# Patient Record
Sex: FEMALE | Race: WHITE | NOT HISPANIC OR LATINO | Employment: FULL TIME | URBAN - METROPOLITAN AREA
[De-identification: names, ages, dates, MRNs, and addresses within clinical notes are randomized per-mention and may not be internally consistent; named-entity substitution may affect disease eponyms.]

---

## 2017-04-24 ENCOUNTER — TELEPHONE (OUTPATIENT)
Dept: OBSTETRICS AND GYNECOLOGY | Facility: CLINIC | Age: 32
End: 2017-04-24

## 2017-04-24 NOTE — TELEPHONE ENCOUNTER
----- Message from Mary Benson sent at 4/24/2017  3:13 PM EDT -----  Has appt with dr maloney, but has been having ovary pain.  Should she have U/S before visit?

## 2017-04-24 NOTE — TELEPHONE ENCOUNTER
Pain on both sides. Rates 8/10. Has appt with Dr. Mendes on 05/11/17 that is the soonest they could get her in. States she has a history of ovarian cysts. Has not tried anything for the pain.     Encouraged pt to take ibuprofen 800mg every 8 hours as needed for the pain until appt. And if pain does not get any better go to the ER. Pt verbalized understanding.     Dr. Mendes will see her then order US if needed after.

## 2017-05-06 PROBLEM — Z01.419 WELL WOMAN EXAM WITH ROUTINE GYNECOLOGICAL EXAM: Status: ACTIVE | Noted: 2017-05-06

## 2017-11-02 ENCOUNTER — APPOINTMENT (OUTPATIENT)
Dept: LAB | Facility: HOSPITAL | Age: 32
End: 2017-11-02

## 2017-11-02 ENCOUNTER — OFFICE VISIT (OUTPATIENT)
Dept: OBSTETRICS AND GYNECOLOGY | Facility: CLINIC | Age: 32
End: 2017-11-02

## 2017-11-02 VITALS
DIASTOLIC BLOOD PRESSURE: 80 MMHG | RESPIRATION RATE: 14 BRPM | SYSTOLIC BLOOD PRESSURE: 136 MMHG | HEIGHT: 67 IN | BODY MASS INDEX: 40.18 KG/M2 | WEIGHT: 256 LBS

## 2017-11-02 DIAGNOSIS — N94.6 DYSMENORRHEA: ICD-10-CM

## 2017-11-02 DIAGNOSIS — N92.1 METRORRHAGIA: ICD-10-CM

## 2017-11-02 DIAGNOSIS — Z01.419 WELL WOMAN EXAM WITH ROUTINE GYNECOLOGICAL EXAM: Primary | ICD-10-CM

## 2017-11-02 DIAGNOSIS — Z20.2 STD EXPOSURE: ICD-10-CM

## 2017-11-02 PROBLEM — F17.200 SMOKER: Status: ACTIVE | Noted: 2017-11-02

## 2017-11-02 LAB
DEPRECATED RDW RBC AUTO: 45.4 FL (ref 37–54)
ERYTHROCYTE [DISTWIDTH] IN BLOOD BY AUTOMATED COUNT: 13.9 % (ref 11.3–14.5)
HBV SURFACE AG SERPL QL IA: NORMAL
HCT VFR BLD AUTO: 43.9 % (ref 34.5–44)
HCV AB SER DONR QL: NORMAL
HGB BLD-MCNC: 15.1 G/DL (ref 11.5–15.5)
HIV1+2 AB SER QL: NORMAL
MCH RBC QN AUTO: 30.6 PG (ref 27–31)
MCHC RBC AUTO-ENTMCNC: 34.4 G/DL (ref 32–36)
MCV RBC AUTO: 88.9 FL (ref 80–99)
PLATELET # BLD AUTO: 387 10*3/MM3 (ref 150–450)
PMV BLD AUTO: 10.4 FL (ref 6–12)
RBC # BLD AUTO: 4.94 10*6/MM3 (ref 3.89–5.14)
TSH SERPL DL<=0.05 MIU/L-ACNC: 0.97 MIU/ML (ref 0.35–5.35)
WBC NRBC COR # BLD: 28.78 10*3/MM3 (ref 3.5–10.8)

## 2017-11-02 PROCEDURE — 86803 HEPATITIS C AB TEST: CPT | Performed by: OBSTETRICS & GYNECOLOGY

## 2017-11-02 PROCEDURE — 85027 COMPLETE CBC AUTOMATED: CPT | Performed by: OBSTETRICS & GYNECOLOGY

## 2017-11-02 PROCEDURE — 36415 COLL VENOUS BLD VENIPUNCTURE: CPT | Performed by: OBSTETRICS & GYNECOLOGY

## 2017-11-02 PROCEDURE — G0432 EIA HIV-1/HIV-2 SCREEN: HCPCS | Performed by: OBSTETRICS & GYNECOLOGY

## 2017-11-02 PROCEDURE — 84443 ASSAY THYROID STIM HORMONE: CPT | Performed by: OBSTETRICS & GYNECOLOGY

## 2017-11-02 PROCEDURE — 87340 HEPATITIS B SURFACE AG IA: CPT | Performed by: OBSTETRICS & GYNECOLOGY

## 2017-11-02 PROCEDURE — 99385 PREV VISIT NEW AGE 18-39: CPT | Performed by: OBSTETRICS & GYNECOLOGY

## 2017-11-02 RX ORDER — ASPIRIN 325 MG
325 TABLET ORAL DAILY
COMMUNITY
End: 2020-04-25

## 2017-11-02 RX ORDER — PHENOL 1.4 %
600 AEROSOL, SPRAY (ML) MUCOUS MEMBRANE DAILY
COMMUNITY
End: 2020-04-25

## 2017-11-02 RX ORDER — MELATONIN
1000 DAILY
COMMUNITY
End: 2020-04-25

## 2017-11-02 NOTE — PROGRESS NOTES
Subjective   Chief Complaint   Patient presents with   • Gynecologic Exam     Clare Turner is a 32 y.o. year old  presenting to be seen for her annual exam.     SEXUAL Hx:  She is currently sexually active.  In the past year there has been new sexual partners.    Condoms are used intermittently.  She would like to be screened for STD's at today's exam.  Current birth control method: tubal ligation.  She is happy with her current method of contraception and does not want to discuss alternative methods of contraception.  MENSTRUAL Hx:  Patient's last menstrual period was 10/16/2017 (exact date).  In the past 6 months her cycles have been unpredictable.  Her menstrual flow is typically moderately heavy.   Each month on average there are roughly 4 day(s) of very heavy flow.    Intermenstrual bleeding is present.    Post-coital bleeding is present.  Dysmenorrhea: severe and affecting her activities of daily living  PMS: is not affecting her activities of daily living  Her cycles ARE a source of concern for her that she wishes to discuss today.  HEALTH Hx:  She exercises regularly: yes.  She wears her seat belt: yes.  She has concerns about domestic violence: no.  OTHER THINGS SHE WANTS TO DISCUSS TODAY:  Nothing else    The following portions of the patient's history were reviewed and updated as appropriate:problem list, current medications, allergies, past family history, past medical history, past social history and past surgical history.    Smoking status: Heavy Tobacco Smoker                                                       Packs/day: 0.75      Years: 0.00         Types: Cigarettes     Start date:   Smokeless status: Never Used                        Review of Systems  Constitutional POS: nothing reported    NEG: anorexia or night sweats   Gastointestinal POS: nothing reported    NEG: bloating, change in bowel habits, melena or reflux symptoms   Genitourinary POS: JOSE DAVID is present and it IS effecting  "her ADL's and urge incontinene is present and it IS effecting her ADL's    NEG: dysuria or hematuria   Integument POS: nothing reported    NEG: moles that are changing in size, shape, color or rashes   Breast POS: nothing reported    NEG: persistent breast lump, skin dimpling or nipple discharge        Objective   /80  Resp 14  Ht 67\" (170.2 cm)  Wt 256 lb (116 kg)  LMP 10/16/2017 (Exact Date)  Breastfeeding? No  BMI 40.1 kg/m2    General:  well developed; well nourished  no acute distress   Skin:  No suspicious lesions seen   Thyroid: normal to inspection and palpation   Breasts:  Examined in supine position  Symmetric without masses or skin dimpling  Nipples normal without inversion, lesions or discharge  There are no palpable axillary nodes   Abdomen: soft, non-tender; no masses  no umbilical or inginual hernias are present  no hepato-splenomegaly   Pelvis: Clinical staff was present for exam  External genitalia:  normal appearance of the external genitalia including Bartholin's and [place]'s glands.  :  urethral meatus normal;  Vaginal:  normal pink mucosa without prolapse or lesions.  Cervix:  normal appearance.  Uterus:  normal size, shape and consistency.  Adnexa:  non palpable bilaterally.  Rectal:  digital rectal exam not performed; anus visually normal appearing.        Assessment   1. Normal GYN exam  2. Metrorrhagia with  Dysmenorrhea - affecting her activities of daily living.  3. Smoker  4. NANCY - affecting her activities of daily living.  5. STD exposure     Plan   1. Pap was done today.  If she does not receive the results of the Pap within 2 weeks  time, she was instructed to call to find out the results.  I explained to Clare that the recommendations for Pap smear interval in a low risk patient's has lengthened to 3 years time.  I encouraged her to be seen yearly for a full physical exam including breast and pelvic exam even during the off years when PAP's will not be " performed.  2. Ultrasound needs to be done any time that is convenient for her.   3. The following tests were ordered today: CBC w/o differential, STD swabs for GC, chlamydia and trichimoniasis, TSH and Hep B&C and HIV.  It was explained to Clare that all lab test should be back within the one week after they are performed. She will be notified about the results, regardless of the findings. If she has not been contacted by the office within 2 weeks after the test has been performed, it is her responsibility to contact us to learn about her results.  4. The importance of keeping all planned follow-up and taking all medications as prescribed was emphasized.  5. Follow up for ultrasound anytime          This note was electronically signed.    Tawanda Mendes M.D.  November 2, 2017    Note: Speech recognition transcription software may have been used to create portions of this document.  An attempt at proofreading has been made but errors in transcription could still be present.

## 2017-11-03 DIAGNOSIS — D72.829 LEUKOCYTOSIS, UNSPECIFIED TYPE: Primary | ICD-10-CM

## 2017-11-07 ENCOUNTER — LAB (OUTPATIENT)
Dept: LAB | Facility: HOSPITAL | Age: 32
End: 2017-11-07

## 2017-11-07 ENCOUNTER — TELEPHONE (OUTPATIENT)
Dept: OBSTETRICS AND GYNECOLOGY | Facility: CLINIC | Age: 32
End: 2017-11-07

## 2017-11-07 DIAGNOSIS — D72.829 LEUKOCYTOSIS, UNSPECIFIED TYPE: ICD-10-CM

## 2017-11-07 LAB
BASOPHILS # BLD AUTO: 0.04 10*3/MM3 (ref 0–0.2)
BASOPHILS NFR BLD AUTO: 0.3 % (ref 0–1)
DEPRECATED RDW RBC AUTO: 47.3 FL (ref 37–54)
EOSINOPHIL # BLD AUTO: 0.54 10*3/MM3 (ref 0–0.3)
EOSINOPHIL NFR BLD AUTO: 3.4 % (ref 0–3)
ERYTHROCYTE [DISTWIDTH] IN BLOOD BY AUTOMATED COUNT: 14.3 % (ref 11.3–14.5)
HCT VFR BLD AUTO: 41.2 % (ref 34.5–44)
HGB BLD-MCNC: 13.7 G/DL (ref 11.5–15.5)
IMM GRANULOCYTES # BLD: 0.06 10*3/MM3 (ref 0–0.03)
IMM GRANULOCYTES NFR BLD: 0.4 % (ref 0–0.6)
LYMPHOCYTES # BLD AUTO: 3.94 10*3/MM3 (ref 0.6–4.8)
LYMPHOCYTES NFR BLD AUTO: 24.8 % (ref 24–44)
MCH RBC QN AUTO: 30 PG (ref 27–31)
MCHC RBC AUTO-ENTMCNC: 33.3 G/DL (ref 32–36)
MCV RBC AUTO: 90.4 FL (ref 80–99)
MONOCYTES # BLD AUTO: 1.1 10*3/MM3 (ref 0–1)
MONOCYTES NFR BLD AUTO: 6.9 % (ref 0–12)
NEUTROPHILS # BLD AUTO: 10.21 10*3/MM3 (ref 1.5–8.3)
NEUTROPHILS NFR BLD AUTO: 64.2 % (ref 41–71)
PLATELET # BLD AUTO: 419 10*3/MM3 (ref 150–450)
PMV BLD AUTO: 10.6 FL (ref 6–12)
RBC # BLD AUTO: 4.56 10*6/MM3 (ref 3.89–5.14)
WBC NRBC COR # BLD: 15.89 10*3/MM3 (ref 3.5–10.8)

## 2017-11-07 PROCEDURE — 36415 COLL VENOUS BLD VENIPUNCTURE: CPT

## 2017-11-07 PROCEDURE — 85025 COMPLETE CBC W/AUTO DIFF WBC: CPT | Performed by: OBSTETRICS & GYNECOLOGY

## 2017-11-08 RX ORDER — METRONIDAZOLE 500 MG/1
TABLET ORAL
Qty: 4 TABLET | Refills: 0 | Status: SHIPPED | OUTPATIENT
Start: 2017-11-08 | End: 2018-03-15

## 2018-07-19 ENCOUNTER — LAB (OUTPATIENT)
Dept: LAB | Facility: HOSPITAL | Age: 33
End: 2018-07-19

## 2018-07-19 ENCOUNTER — TRANSCRIBE ORDERS (OUTPATIENT)
Dept: LAB | Facility: HOSPITAL | Age: 33
End: 2018-07-19

## 2018-07-19 DIAGNOSIS — M19.171 POST-TRAUMATIC ARTHRITIS OF ANKLE, RIGHT: Primary | ICD-10-CM

## 2018-07-19 DIAGNOSIS — M19.171 POST-TRAUMATIC ARTHRITIS OF ANKLE, RIGHT: ICD-10-CM

## 2018-07-19 LAB
25(OH)D3 SERPL-MCNC: 26.1 NG/ML
ALBUMIN SERPL-MCNC: 4.19 G/DL (ref 3.2–4.8)
CALCIUM SPEC-SCNC: 9.5 MG/DL (ref 8.7–10.4)
CRP SERPL-MCNC: 0.94 MG/DL (ref 0–1)
DEPRECATED RDW RBC AUTO: 43.7 FL (ref 37–54)
ERYTHROCYTE [DISTWIDTH] IN BLOOD BY AUTOMATED COUNT: 13.4 % (ref 11.3–14.5)
ERYTHROCYTE [SEDIMENTATION RATE] IN BLOOD: 51 MM/HR (ref 0–20)
HCT VFR BLD AUTO: 46.3 % (ref 34.5–44)
HGB BLD-MCNC: 15.8 G/DL (ref 11.5–15.5)
MCH RBC QN AUTO: 30.2 PG (ref 27–31)
MCHC RBC AUTO-ENTMCNC: 34.1 G/DL (ref 32–36)
MCV RBC AUTO: 88.4 FL (ref 80–99)
PLATELET # BLD AUTO: 563 10*3/MM3 (ref 150–450)
PMV BLD AUTO: 11.1 FL (ref 6–12)
PREALB SERPL-MCNC: 27 MG/DL (ref 10–40)
RBC # BLD AUTO: 5.24 10*6/MM3 (ref 3.89–5.14)
TSH SERPL DL<=0.05 MIU/L-ACNC: 1.53 MIU/ML (ref 0.35–5.35)
WBC NRBC COR # BLD: 14.1 10*3/MM3 (ref 3.5–10.8)

## 2018-07-19 PROCEDURE — 84443 ASSAY THYROID STIM HORMONE: CPT

## 2018-07-19 PROCEDURE — 82306 VITAMIN D 25 HYDROXY: CPT | Performed by: ORTHOPAEDIC SURGERY

## 2018-07-19 PROCEDURE — 82310 ASSAY OF CALCIUM: CPT

## 2018-07-19 PROCEDURE — 85027 COMPLETE CBC AUTOMATED: CPT

## 2018-07-19 PROCEDURE — 84134 ASSAY OF PREALBUMIN: CPT

## 2018-07-19 PROCEDURE — 84466 ASSAY OF TRANSFERRIN: CPT

## 2018-07-19 PROCEDURE — 86140 C-REACTIVE PROTEIN: CPT | Performed by: ORTHOPAEDIC SURGERY

## 2018-07-19 PROCEDURE — 36415 COLL VENOUS BLD VENIPUNCTURE: CPT | Performed by: ORTHOPAEDIC SURGERY

## 2018-07-19 PROCEDURE — 82040 ASSAY OF SERUM ALBUMIN: CPT

## 2018-07-21 LAB — TRANSFERRIN SERPL-MCNC: 203 MG/DL (ref 200–370)

## 2018-08-30 ENCOUNTER — LAB (OUTPATIENT)
Dept: LAB | Facility: HOSPITAL | Age: 33
End: 2018-08-30

## 2018-08-30 ENCOUNTER — TRANSCRIBE ORDERS (OUTPATIENT)
Dept: LAB | Facility: HOSPITAL | Age: 33
End: 2018-08-30

## 2018-08-30 DIAGNOSIS — G89.29 CHRONIC PAIN OF RIGHT ANKLE: Primary | ICD-10-CM

## 2018-08-30 DIAGNOSIS — M25.571 CHRONIC PAIN OF RIGHT ANKLE: Primary | ICD-10-CM

## 2018-08-30 DIAGNOSIS — M25.571 CHRONIC PAIN OF RIGHT ANKLE: ICD-10-CM

## 2018-08-30 DIAGNOSIS — G89.29 CHRONIC PAIN OF RIGHT ANKLE: ICD-10-CM

## 2018-08-30 LAB
CRP SERPL-MCNC: 0.54 MG/DL (ref 0–1)
DEPRECATED RDW RBC AUTO: 46 FL (ref 37–54)
ERYTHROCYTE [DISTWIDTH] IN BLOOD BY AUTOMATED COUNT: 14.3 % (ref 11.3–14.5)
ERYTHROCYTE [SEDIMENTATION RATE] IN BLOOD: 30 MM/HR (ref 0–20)
HCT VFR BLD AUTO: 46.9 % (ref 34.5–44)
HGB BLD-MCNC: 15.7 G/DL (ref 11.5–15.5)
MCH RBC QN AUTO: 29.8 PG (ref 27–31)
MCHC RBC AUTO-ENTMCNC: 33.5 G/DL (ref 32–36)
MCV RBC AUTO: 89 FL (ref 80–99)
PLATELET # BLD AUTO: 462 10*3/MM3 (ref 150–450)
PMV BLD AUTO: 11.4 FL (ref 6–12)
RBC # BLD AUTO: 5.27 10*6/MM3 (ref 3.89–5.14)
WBC NRBC COR # BLD: 20.15 10*3/MM3 (ref 3.5–10.8)

## 2018-08-30 PROCEDURE — 85027 COMPLETE CBC AUTOMATED: CPT

## 2018-08-30 PROCEDURE — 86140 C-REACTIVE PROTEIN: CPT | Performed by: ORTHOPAEDIC SURGERY

## 2018-08-30 PROCEDURE — 36415 COLL VENOUS BLD VENIPUNCTURE: CPT

## 2019-12-31 PROCEDURE — 87086 URINE CULTURE/COLONY COUNT: CPT | Performed by: PHYSICIAN ASSISTANT

## 2020-01-02 ENCOUNTER — TELEPHONE (OUTPATIENT)
Dept: URGENT CARE | Facility: CLINIC | Age: 35
End: 2020-01-02

## 2020-01-04 ENCOUNTER — TELEPHONE (OUTPATIENT)
Dept: URGENT CARE | Facility: CLINIC | Age: 35
End: 2020-01-04

## 2020-01-17 ENCOUNTER — TELEPHONE (OUTPATIENT)
Dept: URGENT CARE | Facility: CLINIC | Age: 35
End: 2020-01-17

## 2020-01-17 NOTE — TELEPHONE ENCOUNTER
Patient called asking for urine culture result.  She states that she was able to take 6 days of antibiotic and then was incarcerated and unable to get med.  Still having urinary symptoms.  I advised that she would need to be reassessed.  Patient stated understanding.

## 2020-11-25 ENCOUNTER — OFFICE VISIT (OUTPATIENT)
Dept: OBSTETRICS AND GYNECOLOGY | Facility: CLINIC | Age: 35
End: 2020-11-25

## 2020-11-25 VITALS
WEIGHT: 217 LBS | DIASTOLIC BLOOD PRESSURE: 80 MMHG | RESPIRATION RATE: 14 BRPM | BODY MASS INDEX: 33.99 KG/M2 | SYSTOLIC BLOOD PRESSURE: 126 MMHG

## 2020-11-25 DIAGNOSIS — Z72.51 HIGH RISK HETEROSEXUAL BEHAVIOR: Primary | ICD-10-CM

## 2020-11-25 PROCEDURE — 99213 OFFICE O/P EST LOW 20 MIN: CPT | Performed by: OBSTETRICS & GYNECOLOGY

## 2020-11-25 NOTE — PROGRESS NOTES
Subjective   Chief Complaint   Patient presents with   • Exposure to STD     Clare Turner is a 35 y.o. year old .  Patient's last menstrual period was 2020 (approximate).  She presents to be seen because of wanting to be checked for diseases.  She is having no symptoms today.  The main reason is back when she was in recovery in January she was tested positive apparently for some kind of trichomoniasis.  She really has no strong memory of exactly what they told her.  She was treated but was not sure she kept all medicine down.  She wants to get checked just to make sure there is nothing going on.   Just got remarried 10/3/2020.  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  Additionally she is interested in talking about tubal reversal or other options to try to conceive    OTHER THINGS SHE WANTS TO DISCUSS TODAY:  Nothing else    The following portions of the patient's history were reviewed and updated as appropriate:current medications and allergies    Social History    Tobacco Use      Smoking status: Heavy Tobacco Smoker        Packs/day: 0.75        Types: Cigarettes        Start date:       Smokeless tobacco: Never Used    Review of Systems  Constitutional POS: nothing reported    NEG: anorexia or night sweats   Genitourinary POS: nothing reported    NEG: dysuria or hematuria   Gastointestinal POS: nothing reported    NEG: bloating, change in bowel habits, melena or reflux symptoms   Integument POS: nothing reported    NEG: moles that are changing in size, shape, color or rashes   Breast POS: nothing reported    NEG: persistent breast lump, skin dimpling or nipple discharge         Objective   /80   Resp 14   Wt 98.4 kg (217 lb)   LMP 2020 (Approximate)   Breastfeeding No   BMI 33.99 kg/m²     General:  well developed; well nourished  no acute distress   Pelvis: Clinical staff was present for exam  External genitalia:  normal appearance of the external genitalia including  Bartholin's and [place]'s glands.  :  urethral meatus normal;  Vaginal:  normal pink mucosa without prolapse or lesions.  Cervix:  normal appearance.     Lab Review   No data reviewed    Imaging   No data reviewed        Assessment   1. STD testing     Plan   1. The following tests were ordered today: STD swabs for GC, chlamydia and trichimoniasis.  It was explained to Clare that all lab test should be back within the one week after they are performed. She will be notified about the results, regardless of the findings. If she has not been contacted by the office within 2 weeks after the test has been performed, it is her responsibility to contact us to learn about her results.  2. As relates to plans to conceive I would encourage her to look at IVF in lieu of tubal reversal.  Given her the name of [facility] in [place] as well as Dr. Dave Barragan in [place]  3. The importance of keeping all planned follow-up and taking all medications as prescribed was emphasized.  4. Follow up PRN          This note was electronically signed.    Tawanda Mendes M.D.  November 25, 2020    Note: Speech recognition transcription software may have been used to create portions of this document.  An attempt at proofreading has been made but errors in transcription could still be present.

## 2020-11-30 RX ORDER — METRONIDAZOLE 500 MG/1
TABLET ORAL
Qty: 4 TABLET | Refills: 0 | Status: SHIPPED | OUTPATIENT
Start: 2020-11-30 | End: 2021-11-19

## 2021-04-01 ENCOUNTER — IMMUNIZATION (OUTPATIENT)
Dept: VACCINE CLINIC | Facility: HOSPITAL | Age: 36
End: 2021-04-01

## 2021-04-01 PROCEDURE — 91300 HC SARSCOV02 VAC 30MCG/0.3ML IM: CPT | Performed by: INTERNAL MEDICINE

## 2021-04-01 PROCEDURE — 0001A: CPT | Performed by: INTERNAL MEDICINE

## 2021-04-21 ENCOUNTER — IMMUNIZATION (OUTPATIENT)
Dept: VACCINE CLINIC | Facility: HOSPITAL | Age: 36
End: 2021-04-21

## 2021-04-21 PROCEDURE — 0002A: CPT | Performed by: INTERNAL MEDICINE

## 2021-04-21 PROCEDURE — 91300 HC SARSCOV02 VAC 30MCG/0.3ML IM: CPT | Performed by: INTERNAL MEDICINE

## 2021-11-19 ENCOUNTER — OFFICE VISIT (OUTPATIENT)
Dept: OBSTETRICS AND GYNECOLOGY | Facility: CLINIC | Age: 36
End: 2021-11-19

## 2021-11-19 VITALS
DIASTOLIC BLOOD PRESSURE: 78 MMHG | BODY MASS INDEX: 38.69 KG/M2 | RESPIRATION RATE: 14 BRPM | WEIGHT: 247 LBS | SYSTOLIC BLOOD PRESSURE: 132 MMHG

## 2021-11-19 DIAGNOSIS — Z01.419 WELL WOMAN EXAM WITH ROUTINE GYNECOLOGICAL EXAM: Primary | ICD-10-CM

## 2021-11-19 DIAGNOSIS — Z11.8 SCREENING FOR CHLAMYDIAL DISEASE: ICD-10-CM

## 2021-11-19 DIAGNOSIS — F17.200 SMOKER: ICD-10-CM

## 2021-11-19 DIAGNOSIS — N39.46 MIXED INCONTINENCE URGE AND STRESS: ICD-10-CM

## 2021-11-19 PROBLEM — N94.6 DYSMENORRHEA: Status: RESOLVED | Noted: 2017-11-02 | Resolved: 2021-11-19

## 2021-11-19 PROBLEM — E78.00 HIGH CHOLESTEROL: Status: ACTIVE | Noted: 2019-01-01

## 2021-11-19 PROBLEM — N92.1 METRORRHAGIA: Status: RESOLVED | Noted: 2017-11-02 | Resolved: 2021-11-19

## 2021-11-19 PROCEDURE — 99395 PREV VISIT EST AGE 18-39: CPT | Performed by: OBSTETRICS & GYNECOLOGY

## 2021-11-19 NOTE — PROGRESS NOTES
Subjective   Chief Complaint   Patient presents with   • Gynecologic Exam     Clare Turner is a 36 y.o. year old  presenting to be seen for her annual exam.     SEXUAL Hx:  She is currently sexually active.  In the past year there there has been NO new sexual partners.    Condoms are never used.  She would like to be screened for STD's at today's exam.  Current birth control method: tubal ligation.  She is happy with her current method of contraception and does not want to discuss alternative methods of contraception.  MENSTRUAL Hx:  Patient's last menstrual period was 2021 (approximate).  In the past 6 months her cycles have been regular, predictable and occur monthly.  Her menstrual flow is typically moderately heavy.   Each month on average there are roughly 2 day(s) of very heavy flow.  Intermenstrual bleeding is present.    Post-coital bleeding is absent.  Dysmenorrhea: moderate and is not affecting her activities of daily living  PMS: is not affecting her activities of daily living  Her cycles are not a source of concern for her that she wishes to discuss today.  HEALTH Hx:  She exercises regularly: yes.  She wears her seat belt: yes.  She has concerns about domestic violence: no.  OTHER THINGS SHE WANTS TO DISCUSS TODAY:  Nothing else    The following portions of the patient's history were reviewed and updated as appropriate:problem list, current medications, allergies, past family history, past medical history, past social history and past surgical history.    Social History    Tobacco Use      Smoking status: Heavy Tobacco Smoker        Packs/day: 0.75        Types: Cigarettes        Start date:       Smokeless tobacco: Never Used    Review of Systems  Constitutional POS: nothing reported    NEG: anorexia or night sweats   Genitourinary POS: both stress and urge incontinence are present and it IS effecting her ADL's    NEG: dysuria or hematuria      Gastointestinal POS: nothing  reported    NEG: bloating, change in bowel habits, melena or reflux symptoms   Integument POS: nothing reported    NEG: moles that are changing in size, shape, color or rashes   Breast POS: nothing reported    NEG: persistent breast lump, skin dimpling or nipple discharge        Objective   /78   Resp 14   Wt 112 kg (247 lb)   LMP 11/08/2021 (Approximate)   Breastfeeding No   BMI 38.69 kg/m²     General:  well developed; well nourished  no acute distress   Skin:  No suspicious lesions seen   Thyroid: normal to inspection and palpation   Breasts:  Examined in supine position  Symmetric without masses or skin dimpling  Nipples normal without inversion, lesions or discharge  There are no palpable axillary nodes   Abdomen: soft, non-tender; no masses  no umbilical or inguinal hernias are present  no hepato-splenomegaly   Pelvis: Clinical staff was present for exam  External genitalia:  normal appearance of the external genitalia including Bartholin's and [place]'s glands.  :  urethral meatus normal;  Vaginal:  normal pink mucosa without prolapse or lesions.  Cervix:  normal appearance.  Uterus:  normal size, shape and consistency.  Adnexa:  normal bimanual exam of the adnexa.  Rectal:  digital rectal exam not performed; anus visually normal appearing.        Assessment   1. Normal GYN exam  2. Smoker  3. Mixed urinary incontinence impacting day-to-day function.  This is a new problem does require additional work-up     Plan   1. Pap and STD testing was done today.  If she does not receive the results of the Pap within 2 weeks  time, she was instructed to call to find out the results.  I explained to Clare that the recommendations for Pap smear interval in a low risk patient's has lengthened to 3 years time.  I encouraged her to be seen yearly for a full physical exam including breast and pelvic exam even during the off years when PAP's will not be performed.  2. The following tests were ordered today: UA  with culture if indicated.  It was explained to Clare that all lab test should be back within the one week after they are performed. She will be notified about the results, regardless of the findings. If she has not been contacted by the office within 2 weeks after the test has been performed, it is her responsibility to contact us to learn about her results.  3. I discussed with Clare that she may be behind on needed vaccinations for Influenza.  She may be able to obtain these vaccinations at her local pharmacy OR speak about obtaining them with her primary care.  If she does obtain her vaccines, I have asked Clare to let us know the date each vaccine was obtained so that her medical record could be updated in our system.  4. The importance of keeping all planned follow-up and taking all medications as prescribed was emphasized.  5. Follow up after completes urinary log          This note was electronically signed.    Tawanda Mendes M.D.  November 19, 2021    Part of this note may be an electronic transcription/translation of spoken language to printed text using the Dragon Dictation System.

## 2022-03-11 ENCOUNTER — TELEPHONE (OUTPATIENT)
Dept: INTERNAL MEDICINE | Facility: CLINIC | Age: 37
End: 2022-03-11

## 2022-03-11 ENCOUNTER — OFFICE VISIT (OUTPATIENT)
Dept: INTERNAL MEDICINE | Facility: CLINIC | Age: 37
End: 2022-03-11

## 2022-03-11 ENCOUNTER — HOSPITAL ENCOUNTER (OUTPATIENT)
Dept: GENERAL RADIOLOGY | Facility: HOSPITAL | Age: 37
Discharge: HOME | End: 2022-03-11
Admitting: STUDENT IN AN ORGANIZED HEALTH CARE EDUCATION/TRAINING PROGRAM

## 2022-03-11 VITALS
HEART RATE: 90 BPM | OXYGEN SATURATION: 99 % | HEIGHT: 66 IN | DIASTOLIC BLOOD PRESSURE: 70 MMHG | BODY MASS INDEX: 41.59 KG/M2 | WEIGHT: 258.8 LBS | SYSTOLIC BLOOD PRESSURE: 110 MMHG | TEMPERATURE: 97.7 F

## 2022-03-11 DIAGNOSIS — K21.9 GASTROESOPHAGEAL REFLUX DISEASE WITHOUT ESOPHAGITIS: Chronic | ICD-10-CM

## 2022-03-11 DIAGNOSIS — F90.2 ATTENTION DEFICIT HYPERACTIVITY DISORDER (ADHD), COMBINED TYPE: Chronic | ICD-10-CM

## 2022-03-11 DIAGNOSIS — G25.81 RESTLESS LEG SYNDROME: Chronic | ICD-10-CM

## 2022-03-11 DIAGNOSIS — J30.89 NON-SEASONAL ALLERGIC RHINITIS, UNSPECIFIED TRIGGER: ICD-10-CM

## 2022-03-11 DIAGNOSIS — E66.01 SEVERE OBESITY (BMI >= 40): ICD-10-CM

## 2022-03-11 DIAGNOSIS — R73.09 ELEVATED GLUCOSE: ICD-10-CM

## 2022-03-11 DIAGNOSIS — R05.3 CHRONIC COUGH: ICD-10-CM

## 2022-03-11 DIAGNOSIS — J45.40 MODERATE PERSISTENT ASTHMA WITHOUT COMPLICATION: Chronic | ICD-10-CM

## 2022-03-11 DIAGNOSIS — R91.1 LUNG NODULE: ICD-10-CM

## 2022-03-11 DIAGNOSIS — H92.03 OTALGIA OF BOTH EARS: ICD-10-CM

## 2022-03-11 DIAGNOSIS — Z87.898 HISTORY OF SUBSTANCE USE DISORDER: ICD-10-CM

## 2022-03-11 DIAGNOSIS — M54.6 ACUTE RIGHT-SIDED THORACIC BACK PAIN: ICD-10-CM

## 2022-03-11 DIAGNOSIS — E78.2 MIXED HYPERLIPIDEMIA: Primary | ICD-10-CM

## 2022-03-11 DIAGNOSIS — T78.2XXD ANAPHYLAXIS, SUBSEQUENT ENCOUNTER: ICD-10-CM

## 2022-03-11 DIAGNOSIS — F17.210 CIGARETTE SMOKER: ICD-10-CM

## 2022-03-11 DIAGNOSIS — F31.75 BIPOLAR 1 DISORDER, DEPRESSED, PARTIAL REMISSION: Chronic | ICD-10-CM

## 2022-03-11 PROBLEM — T78.2XXA ANAPHYLACTIC REACTION: Status: ACTIVE | Noted: 2022-03-11

## 2022-03-11 PROBLEM — J45.909 ASTHMA: Status: ACTIVE | Noted: 2022-03-11

## 2022-03-11 PROCEDURE — 90686 IIV4 VACC NO PRSV 0.5 ML IM: CPT | Performed by: STUDENT IN AN ORGANIZED HEALTH CARE EDUCATION/TRAINING PROGRAM

## 2022-03-11 PROCEDURE — 99215 OFFICE O/P EST HI 40 MIN: CPT | Performed by: STUDENT IN AN ORGANIZED HEALTH CARE EDUCATION/TRAINING PROGRAM

## 2022-03-11 PROCEDURE — 90471 IMMUNIZATION ADMIN: CPT | Performed by: STUDENT IN AN ORGANIZED HEALTH CARE EDUCATION/TRAINING PROGRAM

## 2022-03-11 PROCEDURE — 71046 X-RAY EXAM CHEST 2 VIEWS: CPT

## 2022-03-11 RX ORDER — FLUOXETINE HYDROCHLORIDE 40 MG/1
80 CAPSULE ORAL DAILY
Qty: 30 CAPSULE | Refills: 5 | Status: SHIPPED | OUTPATIENT
Start: 2022-03-11 | End: 2022-12-20 | Stop reason: SDUPTHER

## 2022-03-11 RX ORDER — OMEPRAZOLE 20 MG/1
1 TABLET, DELAYED RELEASE ORAL DAILY
Qty: 30 EACH | Refills: 5 | Status: SHIPPED | OUTPATIENT
Start: 2022-03-11

## 2022-03-11 RX ORDER — PRAZOSIN HYDROCHLORIDE 2 MG/1
2 CAPSULE ORAL NIGHTLY
Qty: 30 CAPSULE | Refills: 5 | Status: SHIPPED | OUTPATIENT
Start: 2022-03-11 | End: 2022-12-20 | Stop reason: SDUPTHER

## 2022-03-11 RX ORDER — ATORVASTATIN CALCIUM 10 MG/1
10 TABLET, FILM COATED ORAL DAILY
Qty: 30 TABLET | Refills: 5 | Status: SHIPPED | OUTPATIENT
Start: 2022-03-11 | End: 2023-04-05

## 2022-03-11 RX ORDER — EPINEPHRINE 0.3 MG/.3ML
0.3 INJECTION SUBCUTANEOUS AS NEEDED
Qty: 1 EACH | Refills: 1 | Status: SHIPPED | OUTPATIENT
Start: 2022-03-11 | End: 2022-11-21

## 2022-03-11 RX ORDER — BUPROPION HYDROCHLORIDE 300 MG/1
300 TABLET ORAL DAILY
Qty: 30 TABLET | Refills: 5 | Status: SHIPPED | OUTPATIENT
Start: 2022-03-11 | End: 2022-12-20 | Stop reason: SDUPTHER

## 2022-03-11 RX ORDER — OXCARBAZEPINE 600 MG/1
600 TABLET, FILM COATED ORAL DAILY
Qty: 60 TABLET | Refills: 5 | Status: SHIPPED | OUTPATIENT
Start: 2022-03-11

## 2022-03-11 RX ORDER — ALBUTEROL SULFATE 90 UG/1
2 AEROSOL, METERED RESPIRATORY (INHALATION) 2 TIMES DAILY
Qty: 18 G | Refills: 5 | Status: SHIPPED | OUTPATIENT
Start: 2022-03-11

## 2022-03-11 RX ORDER — LORATADINE 10 MG/1
10 TABLET ORAL DAILY
COMMUNITY
End: 2022-03-11 | Stop reason: SDUPTHER

## 2022-03-11 RX ORDER — ROPINIROLE 0.5 MG/1
0.5 TABLET, FILM COATED ORAL
Qty: 30 TABLET | Refills: 5 | Status: SHIPPED | OUTPATIENT
Start: 2022-03-11 | End: 2023-03-08 | Stop reason: SINTOL

## 2022-03-11 RX ORDER — LORATADINE 10 MG/1
10 TABLET ORAL DAILY
Qty: 30 TABLET | Refills: 5 | Status: SHIPPED | OUTPATIENT
Start: 2022-03-11 | End: 2023-04-05

## 2022-03-11 RX ORDER — BUSPIRONE HYDROCHLORIDE 10 MG/1
10 TABLET ORAL 3 TIMES DAILY
Qty: 90 TABLET | Refills: 5 | Status: SHIPPED | OUTPATIENT
Start: 2022-03-11 | End: 2022-11-15

## 2022-03-11 RX ORDER — QUETIAPINE FUMARATE 400 MG/1
400 TABLET, FILM COATED ORAL NIGHTLY
Qty: 30 TABLET | Refills: 5 | Status: SHIPPED | OUTPATIENT
Start: 2022-03-11 | End: 2022-12-20 | Stop reason: SDUPTHER

## 2022-03-11 NOTE — PROGRESS NOTES
Chief Complaint  Clare Turner is a 36 y.o. female presenting for Ear Drainage (Left    Establish Care) and Back Pain (Lung area     wants chest x-ray).     From [place]. . 3 children. 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    Patient has a past medical history of hyperlipidemia, bipolar 1 disorder w/depression and Hx of jack, ADHD, GERD, restless legs, moderate persistent asthma, lung nodule stable for years, history of alcohol and substance abuse, urge/stress incontinence and severe obesity.    History of Present Illness  Patient establish care.  She has not had primary care physician for several years, but she has followed with OB/GYN.    She has a history of substance abuse including alcohol, smoking synthetic marijuana, smoking methamphetamine.  She was in treatment program for about a month and has been in remission since 2020.    She also has a history of bipolar disorder with depression and also history of jack.  She also has a history of ADHD, used to be on stimulants, but wanted to continue on noncontrolled medications.  She is currently well controlled on her medications.    Patient also has a history of hyperlipidemia, she was started on Lipitor 10 mg, but has run out and would like refills.    Her last week she has noticed pain of both ears, sometimes feeling clogged up, sometimes itching and irritating.  She uses Q-tips.  No fever or chills.  Chronic nasal congestion and allergies for which he uses Claritin all year-round.    Patient also has noticed pain around her right back just below the scapula for about 2.5 weeks.  No injury, no lifting.  She does have chronic cough at baseline, sometimes hears rattling of her lungs.  Occasionally she coughs up yellow sputum.  Daily wheezing, worse in the morning.  She uses albuterol about twice daily at baseline.  She also is on mometasone formoterol 100-5 mcg, 2 puffs daily.  She continues to  "smoke and has smoked about a pack a day for 20 years.  She would like to quit, but is not quite ready yet.  She also vapes daily.    The following portions of the patient's history were reviewed and updated as appropriate: allergies, current medications, past family history, past medical history, past social history, past surgical history and problem list.    PHQ-9 Depression Screening  Little interest or pleasure in doing things? 1-->several days   Feeling down, depressed, or hopeless? 1-->several days   Trouble falling or staying asleep, or sleeping too much? 3-->nearly every day   Feeling tired or having little energy? 1-->several days   Poor appetite or overeating? 1-->several days   Feeling bad about yourself - or that you are a failure or have let yourself or your family down? 1-->several days   Trouble concentrating on things, such as reading the newspaper or watching television? 1-->several days   Moving or speaking so slowly that other people could have noticed? Or the opposite - being so fidgety or restless that you have been moving around a lot more than usual? 3-->nearly every day   Thoughts that you would be better off dead, or of hurting yourself in some way? 0-->not at all   PHQ-9 Total Score 12   If you checked off any problems, how difficult have these problems made it for you to do your work, take care of things at home, or get along with other people? very difficult           Objective  /70 (BP Location: Left arm, Patient Position: Sitting, Cuff Size: Large Adult)   Pulse 90   Temp 97.7 °F (36.5 °C) (Temporal)   Ht 168 cm (66.14\")   Wt 117 kg (258 lb 12.8 oz)   SpO2 99%   BMI 41.59 kg/m²     Physical Exam  Vitals reviewed.   Constitutional:       Appearance: Normal appearance. She is obese.   HENT:      Head: Normocephalic and atraumatic.      Right Ear: Tympanic membrane, ear canal and external ear normal. There is no impacted cerumen.      Left Ear: Tympanic membrane, ear canal and " external ear normal. There is no impacted cerumen.      Nose: Congestion present.      Mouth/Throat:      Mouth: Mucous membranes are moist.   Eyes:      Extraocular Movements: Extraocular movements intact.      Conjunctiva/sclera: Conjunctivae normal.      Comments: Left eye mild redness of the temporal sclera.   Cardiovascular:      Rate and Rhythm: Normal rate and regular rhythm.      Heart sounds: Normal heart sounds. No murmur heard.  Pulmonary:      Effort: Pulmonary effort is normal.      Breath sounds: Normal breath sounds.   Abdominal:      General: There is no distension.      Palpations: Abdomen is soft. There is no mass.      Tenderness: There is no abdominal tenderness.   Musculoskeletal:         General: Tenderness present.      Cervical back: Neck supple.      Right lower leg: No edema.      Left lower leg: No edema.      Comments: Back: Tenderness to palpation paravertebral area to the right just below the scapula.  Worse with deep inhalation, worse when rotating her upper body.   Skin:     General: Skin is warm and dry.   Neurological:      Mental Status: She is alert and oriented to person, place, and time. Mental status is at baseline.   Psychiatric:         Behavior: Behavior normal.         Thought Content: Thought content normal.         Assessment/Plan   1. Bipolar 1 disorder, depressed, partial remission (HCC)  2. Attention deficit hyperactivity disorder (ADHD), combined type  Patient is clearly on multiple medications, but appears to be doing well.  She would like to continue on her current regimen.  She used to have night terrors and prazosin has helped her for that.  - QUEtiapine (SEROquel) 400 MG tablet; Take 1 tablet by mouth Every Night.  Dispense: 30 tablet; Refill: 5  - prazosin (MINIPRESS) 2 MG capsule; Take 1 capsule by mouth Every Night.  Dispense: 30 capsule; Refill: 5  - OXcarbazepine (TRILEPTAL) 600 MG tablet; Take 1 tablet by mouth Daily.  Dispense: 60 tablet; Refill: 5  -  FLUoxetine (PROzac) 40 MG capsule; Take 2 capsules by mouth Daily. Takes 2 daily  Dispense: 30 capsule; Refill: 5  - busPIRone (BUSPAR) 10 MG tablet; Take 1 tablet by mouth 3 (Three) Times a Day.  Dispense: 90 tablet; Refill: 5  - buPROPion XL (WELLBUTRIN XL) 300 MG 24 hr tablet; Take 1 tablet by mouth Daily.  Dispense: 30 tablet; Refill: 5    3. Moderate persistent asthma without complication  4. Chronic cough  Not well controlled.  We have discussed smoking cessation and vaping.  This is likely contributing to her poor control.  She could potentially also be developing COPD.  She would like to see pulmonology for evaluation.  We will do an x-ray today.  - mometasone-formoterol (Dulera) 100-5 MCG/ACT inhaler; Inhale 2 puffs Daily.  Dispense: 3 g; Refill: 5  - albuterol sulfate  (90 Base) MCG/ACT inhaler; Inhale 2 puffs 2 (Two) Times a Day.  Dispense: 18 g; Refill: 5  - Ambulatory Referral to Pulmonology  - XR Chest PA & Lateral; Future    5. Non-seasonal allergic rhinitis, unspecified trigger  Stable.  - loratadine (CLARITIN) 10 MG tablet; Take 1 tablet by mouth Daily.  Dispense: 30 tablet; Refill: 5    6. Lung nodule  We will see if anything shows on her x-ray.    7. Acute right-sided thoracic back pain  Appears more muscular.  She has tried Tylenol and ibuprofen, recommend to continue on this for as needed.    8. Otalgia of both ears  Recommend avoiding Q-tips.  Rather use oily eardrops if she feels her ears are itching.  There is no earwax whatsoever on exam, and she should not remove scant earwax which has a protective effect.  Patient verbalized understanding.    9. Restless leg syndrome  Stable.  Continue on ropinirole  - rOPINIRole (REQUIP) 0.5 MG tablet; Take 1 tablet by mouth every night at bedtime.  Dispense: 30 tablet; Refill: 5    10. Mixed hyperlipidemia  We will recheck fasting lipids and restart the atorvastatin that she has run out of.  We will also check CMP.  - Lipid Panel; Future  -  atorvastatin (LIPITOR) 10 MG tablet; Take 1 tablet by mouth Daily.  Dispense: 30 tablet; Refill: 5  - Comprehensive Metabolic Panel; Future    11. Gastroesophageal reflux disease without esophagitis  Stable.  Continue on omeprazole.  - GNP Omeprazole 20 MG tablet delayed-release; Take 20 mg by mouth Daily.  Dispense: 30 each; Refill: 5    12. Elevated glucose  We will check A1c.  - Hemoglobin A1c; Future    13. 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  She has EpiPen available.  - EPINEPHrine (EpiPen 2-Demario) 0.3 MG/0.3ML solution auto-injector injection; Inject 0.3 mL into the appropriate muscle as directed by prescriber As Needed (Allergic reaction).  Dispense: 1 each; Refill: 1    14. History of substance use disorder  Information    15. Cigarette smoker  Counseled on cessation, patient not ready to quit.  - Ambulatory Referral to Pulmonology    16. Severe obesity (BMI >= 40) (formerly [facility])  Patient's Body mass index is 41.59 kg/m². indicating that she is morbidly obese (BMI > 40 or > 35 with obesity - related health condition). Obesity-related health conditions include the following: dyslipidemias and GERD. Obesity is worsening. BMI is is above average; BMI management plan is completed. We discussed portion control and increasing exercise..    Total time spent on chart review, charting and face-to-face with patient 62 minutes.    Return in about 1 month (around 4/11/2022) for Annual physical.    Future Appointments       Provider Department Center    3/28/2022 8:40 AM Tawanda Mendes MD [facility] OB GYN [place]    4/11/2022 8:00 AM Michael Miner MD [facility] INTERNAL MEDICINE [place]            Michael Miner MD  Family Medicine  03/11/2022

## 2022-03-11 NOTE — TELEPHONE ENCOUNTER
Caller: Turner Clare Tea    Relationship: Self    Best call back number: [phone]    Requested Prescriptions:   Requested Prescriptions     Pending Prescriptions Disp Refills   • mometasone-formoterol (Dulera) 100-5 MCG/ACT inhaler 3 g 5     Sig: Inhale 2 puffs Daily.        Pharmacy where request should be sent: [id number] - [place], [place] - [address] [facility] [address] [facility] [phone] [facility] [phone]      Additional details provided by patient:   PATIENT STATED THAT SHE WAS INFORMED BY THE PHARMACY THIS IS MEDICATION NEEDS A PRIOR AUTHORIZATION FOR INSURANCE TO COVER     Does the patient have less than a 3 day supply:  [x] Yes  [] No    Mercedez Joe Rep   03/11/22 13:15 EST

## 2022-03-11 NOTE — TELEPHONE ENCOUNTER
Caller: Clare Turner    Relationship: Self    Best call back number:[phone]    What was the call regarding:   PATIENT STATED THAT SHE WAS INFORMED BY   BRANDI TENORIO MD THAT HE WOULD LIKE A COPY OF HER PREVIOUS XRAY'S TO COMPARE TO THE XRAY THAT PATIENT HAS TODAY 03/11/2022 PATIENT STATED THAT SHE HAS HER XRAY DONE AT [facility] AND [facility]     PLEASE GET RECORDS FOR LEELEE TENORIO MD

## 2022-03-15 ENCOUNTER — PRIOR AUTHORIZATION (OUTPATIENT)
Dept: INTERNAL MEDICINE | Facility: CLINIC | Age: 37
End: 2022-03-15

## 2022-03-15 ENCOUNTER — TELEPHONE (OUTPATIENT)
Dept: INTERNAL MEDICINE | Facility: CLINIC | Age: 37
End: 2022-03-15

## 2022-03-15 NOTE — TELEPHONE ENCOUNTER
Called patient back informed her that I was doing a PA on her Dulera because her insurance denied it. I asked if she had tried any other inhalers she said she has tried Breo Ellipta , Advair

## 2022-03-22 DIAGNOSIS — Z01.812 BLOOD TESTS PRIOR TO TREATMENT OR PROCEDURE: Primary | ICD-10-CM

## 2022-03-23 ENCOUNTER — CLINICAL SUPPORT NO REQUIREMENTS (OUTPATIENT)
Dept: PULMONOLOGY | Facility: CLINIC | Age: 37
End: 2022-03-23

## 2022-03-23 DIAGNOSIS — Z01.812 BLOOD TESTS PRIOR TO TREATMENT OR PROCEDURE: ICD-10-CM

## 2022-03-23 PROCEDURE — 99211 OFF/OP EST MAY X REQ PHY/QHP: CPT | Performed by: INTERNAL MEDICINE

## 2022-03-23 PROCEDURE — U0004 COV-19 TEST NON-CDC HGH THRU: HCPCS | Performed by: INTERNAL MEDICINE

## 2022-03-24 LAB — SARS-COV-2 RNA NOSE QL NAA+PROBE: NOT DETECTED

## 2022-03-25 ENCOUNTER — OFFICE VISIT (OUTPATIENT)
Dept: PULMONOLOGY | Facility: CLINIC | Age: 37
End: 2022-03-25

## 2022-03-25 VITALS
HEIGHT: 66 IN | DIASTOLIC BLOOD PRESSURE: 82 MMHG | HEART RATE: 76 BPM | TEMPERATURE: 98.2 F | SYSTOLIC BLOOD PRESSURE: 124 MMHG | WEIGHT: 259 LBS | BODY MASS INDEX: 41.62 KG/M2 | OXYGEN SATURATION: 96 %

## 2022-03-25 DIAGNOSIS — F17.210 CIGARETTE SMOKER: ICD-10-CM

## 2022-03-25 DIAGNOSIS — R91.1 LUNG NODULE: ICD-10-CM

## 2022-03-25 DIAGNOSIS — J45.40 MODERATE PERSISTENT ASTHMA WITHOUT COMPLICATION: Primary | ICD-10-CM

## 2022-03-25 PROCEDURE — 94729 DIFFUSING CAPACITY: CPT | Performed by: INTERNAL MEDICINE

## 2022-03-25 PROCEDURE — 94375 RESPIRATORY FLOW VOLUME LOOP: CPT | Performed by: INTERNAL MEDICINE

## 2022-03-25 PROCEDURE — 99214 OFFICE O/P EST MOD 30 MIN: CPT | Performed by: INTERNAL MEDICINE

## 2022-03-25 PROCEDURE — 94726 PLETHYSMOGRAPHY LUNG VOLUMES: CPT | Performed by: INTERNAL MEDICINE

## 2022-03-25 NOTE — PROGRESS NOTES
New Patient Pulmonary Office Visit      Patient Name: Clare Turner    Referring Physician: Michael Miner MD    Chief Complaint:    Chief Complaint   Patient presents with   • Asthma   • Cough   • Shortness of Breath   • Exposure To Known Illness   • Follow-up       History of Present Illness: Clrae Turner is a 36 y.o. female who is here today to establish care with Pulmonary.  Patient is a past medical history significant for ADHD, bipolar disease, drug abuse, and GERD.  Who presents to pulmonary for evaluation of shortness of breath.  Patient notes that she has had shortness of breath now for a while and previously been utilizing Dulera which she felt helped.  But she has not been using it recently.  She also has albuterol.  She has a history of smoking methamphetamines, although she does not do this anymore and has been 3 years since she has used any drugs.  She does continue to smoke tobacco.  She denies any significant chest pain other than when she had Covid.  She denies any fever, chills, nausea, or vomiting.  No significant reflux symptoms although she does to have omeprazole take on as-needed basis.  She did have a history of allergies and currently utilizes Claritin, and has a significant pecan not allergy that causes anaphylaxis which is why she has the EpiPen.  She has no other acute complaints.    Review of Systems:   Review of Systems   Constitutional: Negative for activity change, appetite change, chills and diaphoresis.   HENT: Negative for congestion, postnasal drip, sinus pressure and voice change.    Eyes: Negative for blurred vision.   Respiratory: Positive for chest tightness, shortness of breath and wheezing. Negative for cough.    Cardiovascular: Negative for chest pain.   Gastrointestinal: Negative for abdominal pain.   Musculoskeletal: Negative for myalgias.   Skin: Negative for color change and dry skin.   Allergic/Immunologic: Negative for environmental allergies.    Neurological: Negative for weakness and confusion.   Hematological: Negative for adenopathy.   Psychiatric/Behavioral: Negative for sleep disturbance and depressed mood.       Past Medical History:   Past Medical History:   Diagnosis Date   • ADD (attention deficit disorder)    • Arthritis    • Asthma    • Bipolar 1 disorder ([facility])    • Fractures    • H/O alcohol abuse     Clean date 2020   • H/O drug abuse (CMS/HCC) - methamphetamine     Clean date 2020   • Heartburn    • High cholesterol    • Restless leg syndrome 3/11/2022   • Trichimoniasis 2017   • Trichimoniasis 2020   • Trichimoniasis 2020       Past Surgical History:   Past Surgical History:   Procedure Laterality Date   • APPENDECTOMY     • CERVICAL CONIZATION     •  SECTION     •  SECTION WITH TUBAL  2012   • LAPAROSCOPIC APPENDECTOMY     • NASAL FRACTURE SURGERY  2017   • ORIF TIBIA FRACTURE Right 2016    Talar fractur   • SEPTOPLASTY     • SINUS SURGERY      debridement   • TIBIAL TALAR FUSION Right 2017   • TONSILLECTOMY AND ADENOIDECTOMY     • TUBAL ABDOMINAL LIGATION         Family History:   Family History   Problem Relation Age of Onset   • Hyperlipidemia Mother    • Cancer Mother    • Mental illness Mother    • Arthritis Mother    • Osteoporosis Mother    • Hypertension Father    • Obesity Father    • Thyroid disease Maternal Grandmother    • Hyperlipidemia Maternal Grandmother    • Stroke Maternal Grandmother    • Hypertension Maternal Grandmother    • Obesity Maternal Grandmother    • Developmental Disability Maternal Grandmother    • Mental illness Maternal Grandmother    • Liver disease Maternal Grandmother    • Arthritis Maternal Grandmother    • Migraines Maternal Grandmother    • Hypertension Maternal Grandfather    • Heart attack Maternal Grandfather    • Developmental Disability Maternal Grandfather    • Arthritis Maternal Grandfather    • Hypertension  Paternal Grandmother    • Obesity Paternal Grandmother    • Developmental Disability Paternal Grandmother    • Arthritis Paternal Grandmother    • Heart attack Paternal Grandmother    • Hypertension Paternal Grandfather    • Obesity Paternal Grandfather    • Developmental Disability Paternal Grandfather    • Arthritis Paternal Grandfather        Social History:   Social History     Socioeconomic History   • Marital status:    Tobacco Use   • Smoking status: Heavy Tobacco Smoker     Packs/day: 1.00     Years: 20.00     Pack years: 20.00     Types: Cigarettes     Start date: 2003   • Smokeless tobacco: Never Used   Vaping Use   • Vaping Use: Every day   • Substances: Nicotine, Flavoring   Substance and Sexual Activity   • Alcohol use: Not Currently     Comment: quit 1/28/20   • Drug use: Not Currently     Types: Amphetamines, Heroin     Comment: Clean ~ 1/28/2020   • Sexual activity: Defer       Medications:     Current Outpatient Medications:   •  albuterol sulfate  (90 Base) MCG/ACT inhaler, Inhale 2 puffs 2 (Two) Times a Day., Disp: 18 g, Rfl: 5  •  atorvastatin (LIPITOR) 10 MG tablet, Take 1 tablet by mouth Daily., Disp: 30 tablet, Rfl: 5  •  buPROPion XL (WELLBUTRIN XL) 300 MG 24 hr tablet, Take 1 tablet by mouth Daily., Disp: 30 tablet, Rfl: 5  •  busPIRone (BUSPAR) 10 MG tablet, Take 1 tablet by mouth 3 (Three) Times a Day., Disp: 90 tablet, Rfl: 5  •  EPINEPHrine (EpiPen 2-Demario) 0.3 MG/0.3ML solution auto-injector injection, Inject 0.3 mL into the appropriate muscle as directed by prescriber As Needed (Allergic reaction)., Disp: 1 each, Rfl: 1  •  FLUoxetine (PROzac) 40 MG capsule, Take 2 capsules by mouth Daily. Takes 2 daily, Disp: 30 capsule, Rfl: 5  •  GNP Omeprazole 20 MG tablet delayed-release, Take 20 mg by mouth Daily., Disp: 30 each, Rfl: 5  •  loratadine (CLARITIN) 10 MG tablet, Take 1 tablet by mouth Daily., Disp: 30 tablet, Rfl: 5  •  OXcarbazepine (TRILEPTAL) 600 MG tablet, Take 1  "tablet by mouth Daily., Disp: 60 tablet, Rfl: 5  •  prazosin (MINIPRESS) 2 MG capsule, Take 1 capsule by mouth Every Night., Disp: 30 capsule, Rfl: 5  •  QUEtiapine (SEROquel) 400 MG tablet, Take 1 tablet by mouth Every Night., Disp: 30 tablet, Rfl: 5  •  rOPINIRole (REQUIP) 0.5 MG tablet, Take 1 tablet by mouth every night at bedtime., Disp: 30 tablet, Rfl: 5  •  TobraDex 0.3-0.1 % ophthalmic suspension, INSTILL 1 DROP FOUR TIMES DAILY IN THE LEFT EYE FOR 7 DAYS, Disp: , Rfl:   •  Fluticasone Furoate-Vilanterol (Breo Ellipta) 200-25 MCG/INH inhaler, Inhale 1 puff Daily., Disp: 60 each, Rfl: 5    Allergies:   Allergies   Allergen Reactions   • Pecan Nut Anaphylaxis     Hives, swelling of lips and throat, difficulties breathing. Has epipen   • Sulfa Antibiotics Rash   • Sulfamethoxazole-Trimethoprim Rash       Physical Exam:  Vital Signs:   Vitals:    03/25/22 0920   BP: 124/82   Pulse: 76   Temp: 98.2 °F (36.8 °C)   SpO2: 96%  Comment: resting, room air   Weight: 117 kg (259 lb)   Height: 167.6 cm (66\")       Physical Exam  Vitals and nursing note reviewed.   Constitutional:       General: She is not in acute distress.     Appearance: She is well-developed and normal weight. She is not ill-appearing or toxic-appearing.   HENT:      Head: Normocephalic and atraumatic.   Cardiovascular:      Rate and Rhythm: Normal rate and regular rhythm.      Pulses: Normal pulses.      Heart sounds: Normal heart sounds. No murmur heard.    No friction rub. No gallop.   Pulmonary:      Effort: Pulmonary effort is normal. No respiratory distress.      Breath sounds: Wheezing and rhonchi present. No rales.   Musculoskeletal:      Right lower leg: No edema.      Left lower leg: No edema.   Skin:     General: Skin is warm and dry.   Neurological:      Mental Status: She is alert and oriented to person, place, and time.         Immunization History   Administered Date(s) Administered   • COVID-19 (PFIZER) PURPLE CAP 04/01/2021, " 04/21/2021, 11/15/2021   • FluLaval/Fluarix/Fluzone >6 03/11/2022   • Pneumococcal Polysaccharide (PPSV23) 06/06/2016       Results Review:   - I personally reviewed the pts imaging from chest x-ray 3/11/2022 shows no acute cardiopulmonary process, with a 13 mm left upper lobe nodule new compared to 2014.  - I personally reviewed the pts PFT from 3/25/2022 shows moderate obstruction with an FEV1 of 74%, FVC of 88%, residual volume is 147%, no signs of restriction and a mildly reduced DLCO.  - I personally reviewed the pts chart with regards to evaluation by her PCP    Assessment / Plan:   1. Moderate persistent asthma without complication (Primary)  -Patient does appear to have asthma.  She has obstructive PFTs with a significant residual volume.  Given her age COPD is unlikely.  For now I am going to go and start her on Breo 200 mcg 1 puff once daily.  I informed her that if insurance will cover that to please let us know and we will switch her to any other equivalent LABA/ICS combination inhaler.  I would also keep her on albuterol 2 puffs every 6 hours as needed for shortness of breath or wheezing.  -She does have some underlying allergies and in the future may be a candidate for biologic therapy.  I would like to have her on stable inhaler therapy prior to proceeding with that though.    2. 14 mm left upper lobe lung nodule (3/2021)  -Patient has a 14 mm left upper lobe nodule.  In review of the chart.  I also reviewed the records from the [facility].  She was recently there had a CT scan of the chest in January 2022 at which time they mention that there was a 14 mm left upper lobe nodule that has been stable since March 2021.  It had initially grown from 2016.  In which at that time was only 7 mm.  Informed her that we will continue to follow this and I would recommend a repeat scan in January 2022.    3.  Tobacco abuse  -Counseled the patient on tobacco cessation today.  She is not ready to quit at  this time as this was one of the mechanisms utilized to help get off drugs.  We will continue to address this on future visits.    Follow Up:   Return in about 4 months (around 7/25/2022).     JHONATAN Grant,   Pulmonary and Critical Care Medicine  Note Electronically Signed    Part of this note may be an electronic transcription/translation of spoken language to printed text using the Dragon Dictation System.

## 2022-03-27 NOTE — PROGRESS NOTES
Subjective   Chief Complaint   Patient presents with   • Abnormal Pap Smear     Clare Turner is a 36 y.o. year old  presenting to be seen for a PAP in follow-up of a prior abnormal PAP and/or HPV screen.  She has completed her 24-hour urinary log but forgot to bring it in.  She does want to talk about options to treat because it is impacting her day-to-day function.    She does not have glaucoma    OTHER THINGS SHE WANTS TO DISCUSS TODAY:  Nothing else     The following portions of the patient's history were reviewed and updated as appropriate:no additional history reviewed.    Social History    Tobacco Use      Smoking status: Heavy Tobacco Smoker        Packs/day: 1.00        Years: 20.00        Pack years: 20        Types: Cigarettes        Start date:       Smokeless tobacco: Never Used    Review of Systems  Constitutional POS: nothing reported    NEG: anorexia or night sweats   Genitourinary POS: see HPI    NEG: dysuria or hematuria      Gastointestinal POS: nothing reported    NEG: bloating, change in bowel habits, melena or reflux symptoms   Integument POS: nothing reported    NEG: moles that are changing in size, shape, color or rashes   Breast POS: nothing reported    NEG: persistent breast lump, skin dimpling or nipple discharge        Objective   Resp 14   Wt 117 kg (258 lb)   LMP 2022 (Approximate)   Breastfeeding No   BMI 41.64 kg/m²     General:  well developed; well nourished  no acute distress   Pelvis: Clinical staff was present for exam  External genitalia:  normal appearance of the external genitalia including Bartholin's and Colona's glands.  :  urethral meatus normal;  Vaginal:  normal pink mucosa without prolapse or lesions.  Cervix:  normal appearance.     Lab Review   No data reviewed    Imaging   No data reviewed       Assessment   1. ASC-US with (+) pooled HPV and (-) 16/18/45   2. Mixed stress and urge incontinence impacting day-to-day function.  Anticipate  etiology to be at least partially related to excess caffeine consumption  3. Smoker - she is aware of this being a risk factor for HPV progression     Plan   1. Pap was done today.  If she does not receive the results of the Pap within 2 weeks  time, she was instructed to call to find out the results.  I explained to Clare that because she is being seen in follow-up of a previously abnormal Pap smear, her next Pap needs to be performed in 4-6 months.  She will need to be seen roughly every 6 months until 3 consecutive normal Paps have been obtained.  At that point, she can return to routine screening intervals.  2. Still needs urine culture done as ordered at her prior visit  3. The importance of keeping all planned follow-up and taking all medications as prescribed was emphasized.  4. Needs post void residual checked today.  PVR by straight cath = < 5 mLs.  5. She will send us a copy of 24-hour urinary log  6. Follow up for repeat PAP smear 6 months         This note was electronically signed.    Tawanda Mendes M.D.  March 28, 2022    Part of this note may be an electronic transcription/translation of spoken language to printed text using the Dragon Dictation System.

## 2022-03-28 ENCOUNTER — OFFICE VISIT (OUTPATIENT)
Dept: OBSTETRICS AND GYNECOLOGY | Facility: CLINIC | Age: 37
End: 2022-03-28

## 2022-03-28 VITALS — WEIGHT: 258 LBS | BODY MASS INDEX: 41.64 KG/M2 | RESPIRATION RATE: 14 BRPM

## 2022-03-28 DIAGNOSIS — F17.210 CIGARETTE SMOKER: ICD-10-CM

## 2022-03-28 DIAGNOSIS — R87.810 ASCUS WITH POSITIVE HIGH RISK HPV CERVICAL: Primary | ICD-10-CM

## 2022-03-28 DIAGNOSIS — R87.610 ASCUS WITH POSITIVE HIGH RISK HPV CERVICAL: Primary | ICD-10-CM

## 2022-03-28 DIAGNOSIS — N39.46 MIXED INCONTINENCE URGE AND STRESS: ICD-10-CM

## 2022-03-28 PROCEDURE — 99213 OFFICE O/P EST LOW 20 MIN: CPT | Performed by: OBSTETRICS & GYNECOLOGY

## 2022-03-28 PROCEDURE — 51701 INSERT BLADDER CATHETER: CPT | Performed by: OBSTETRICS & GYNECOLOGY

## 2022-03-28 NOTE — PATIENT INSTRUCTIONS
You will be given a special clean-catch kit that contains sterile wipes.  Tear open the provided sterile wipes so they are ready to use.  Take the lid off the sterile collection cup so it is ready to use.  Sit on the toilet with the legs spread apart.  Use two fingers to completely spread open the labia.  They must be held open until the urine has been collected.  Use the first wipe to clean the inner folds of the labia. Wipe from the front to the back.  Repeat the process using the second wipe.    To collect the urine sample:  While keeping the labia open, begin to urinate.    WITHOUT STOPPING bring the sterile urine collection cup into the middle of the urine stream and fill the cup about half full.    Remove the cup from the urine stream and set aside.  Finish voiding.  Close the cup with the lid provided.

## 2022-10-11 ENCOUNTER — TELEPHONE (OUTPATIENT)
Dept: INTERNAL MEDICINE | Facility: CLINIC | Age: 37
End: 2022-10-11

## 2022-10-11 NOTE — TELEPHONE ENCOUNTER
Caller: Clare Turnern    Relationship: Self    Best call back number: [phone]    What form or medical record are you requesting: INDRA FORM    Who is requesting this form or medical record from you: PATIENT    Timeframe paperwork needed: AS SOON AS POSSIBLE    Additional notes: PATIENT WOULD LIKE TO KNOW IF SHE NEEDS TO COME IN FOR AN APPOINTMENT TO HAVE HER DOG REGISTERED AS AN EMOTIONAL SUPPORT ANIMAL. SHE IS REQUESTING THIS CERTIFICATION BEFORE SHE MOVES IN ORDER TO AVOID ASSOCIATED FEES WITH PETS.     PLEASE ADVISE IF THIS CAN BE DONE WITH OR WITHOUT AN APPOINTMENT. PATIENT STATES SHE CAN BE REACHED THROUGH [employer] OR BY PHONE.

## 2022-10-12 ENCOUNTER — OFFICE VISIT (OUTPATIENT)
Dept: INTERNAL MEDICINE | Facility: CLINIC | Age: 37
End: 2022-10-12

## 2022-10-12 VITALS
WEIGHT: 268.6 LBS | HEART RATE: 72 BPM | HEIGHT: 66 IN | TEMPERATURE: 98.2 F | BODY MASS INDEX: 43.17 KG/M2 | SYSTOLIC BLOOD PRESSURE: 122 MMHG | DIASTOLIC BLOOD PRESSURE: 82 MMHG

## 2022-10-12 DIAGNOSIS — H92.02 LEFT EAR PAIN: ICD-10-CM

## 2022-10-12 DIAGNOSIS — Z23 NEED FOR INFLUENZA VACCINATION: ICD-10-CM

## 2022-10-12 DIAGNOSIS — H15.102 EPISCLERITIS OF LEFT EYE: ICD-10-CM

## 2022-10-12 DIAGNOSIS — J30.1 SEASONAL ALLERGIC RHINITIS DUE TO POLLEN: Chronic | ICD-10-CM

## 2022-10-12 DIAGNOSIS — R73.09 ELEVATED GLUCOSE: ICD-10-CM

## 2022-10-12 DIAGNOSIS — Z23 NEED FOR COVID-19 VACCINE: ICD-10-CM

## 2022-10-12 DIAGNOSIS — E78.2 MIXED HYPERLIPIDEMIA: Chronic | ICD-10-CM

## 2022-10-12 DIAGNOSIS — F90.2 ATTENTION DEFICIT HYPERACTIVITY DISORDER (ADHD), COMBINED TYPE: Chronic | ICD-10-CM

## 2022-10-12 DIAGNOSIS — F31.9 BIPOLAR 1 DISORDER: Primary | Chronic | ICD-10-CM

## 2022-10-12 PROCEDURE — 90471 IMMUNIZATION ADMIN: CPT | Performed by: STUDENT IN AN ORGANIZED HEALTH CARE EDUCATION/TRAINING PROGRAM

## 2022-10-12 PROCEDURE — 0124A PR ADM SARSCOV2 30MCG/0.3ML BST: CPT | Performed by: STUDENT IN AN ORGANIZED HEALTH CARE EDUCATION/TRAINING PROGRAM

## 2022-10-12 PROCEDURE — 91312 COVID-19 (PFIZER) BIVALENT BOOSTER 12+YRS: CPT | Performed by: STUDENT IN AN ORGANIZED HEALTH CARE EDUCATION/TRAINING PROGRAM

## 2022-10-12 PROCEDURE — 99214 OFFICE O/P EST MOD 30 MIN: CPT | Performed by: STUDENT IN AN ORGANIZED HEALTH CARE EDUCATION/TRAINING PROGRAM

## 2022-10-12 PROCEDURE — 90686 IIV4 VACC NO PRSV 0.5 ML IM: CPT | Performed by: STUDENT IN AN ORGANIZED HEALTH CARE EDUCATION/TRAINING PROGRAM

## 2022-10-12 RX ORDER — PREDNISOLONE ACETATE 10 MG/ML
SUSPENSION/ DROPS OPHTHALMIC
COMMUNITY
Start: 2022-08-09 | End: 2022-11-15

## 2022-10-12 NOTE — PROGRESS NOTES
Chief Complaint  Clare Turner is a 37 y.o. female presenting for INDRA forms and Pain (Left  also ringing).     From [place]. . 3 children. 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     Patient has a past medical history of hyperlipidemia, bipolar 1 disorder w/depression and Hx of jack, ADHD, GERD, restless legs, moderate persistent asthma, lung nodule stable for years, history of alcohol and substance abuse, urge/stress incontinence and severe obesity.    History of Present Illness  Patient is here for evaluation of left ear pain for 1 week.  She also would like letter for emotional support animal.    She does have seasonal allergies and she has been feeling more congested recently.  No recent illness with coughing, fever or chills.  About 1 week ago she started noticing pain of the left ear, also feeling somewhat muffled hearing and ringing of her ear.  Also some pain and discharge of clear fluid.  She does not use Q-tips or any instrument of the ear canals, she does not flush her ear canals.  She also reports some itching.    She also has a dog that is very important to her and that helps her cope with her mental illness.  She is about to sign a new lease today and they did require her to bring documentation from her PCP for INDRA approval.    Patient also has follow-up with ophthalmologist, they are concerned for recurrent left episcleritis, currently on prednisolone eyedrops.  They did recommend testing for rheumatic disease.  Patient reports some stiffness of her lower back, also some issues with her ankle after her injury.  Does have family member with lupus.  No known history of ankylosing spondylitis.  No generalized morning stiffness.    The following portions of the patient's history were reviewed and updated as appropriate: allergies, current medications, past family history, past medical history, past social history, past surgical history and  "problem list.    Objective  /82 (BP Location: Left arm, Patient Position: Sitting, Cuff Size: Large Adult)   Pulse 72   Temp 98.2 °F (36.8 °C) (Temporal)   Ht 167.6 cm (65.98\")   Wt 122 kg (268 lb 9.6 oz)   BMI 43.37 kg/m²     Physical Exam  Constitutional:       Appearance: Normal appearance. She is not ill-appearing.   HENT:      Right Ear: Tympanic membrane, ear canal and external ear normal. There is no impacted cerumen.      Left Ear: Ear canal and external ear normal. There is no impacted cerumen.      Ears:      Comments: No irritation of the ear canal.  Left eardrum is clear, possible mild fluid behind the eardrum, but no sign of pus or inflammation.     Nose: No congestion.   Eyes:      Extraocular Movements: Extraocular movements intact.      Conjunctiva/sclera: Conjunctivae normal.   Musculoskeletal:      Cervical back: Neck supple.   Neurological:      Mental Status: She is alert and oriented to person, place, and time. Mental status is at baseline.   Psychiatric:         Behavior: Behavior normal.         Thought Content: Thought content normal.         Assessment/Plan   1. Bipolar 1 disorder (HCC)  2. Attention deficit hyperactivity disorder (ADHD), combined type  Continue on current medications.  Overall stable.  Letter in support of INDRA provided.    3. Left ear pain  4. Seasonal allergic rhinitis due to pollen  May be in setting of congestion related to allergies.  Will likely resolve soon.    5. Episcleritis of left eye  We will do blood work as ordered  - prednisoLONE acetate (PRED FORTE) 1 % ophthalmic suspension; ONE DROP THREE TIMES DAILY LEFT EYE X 7 DAYS THEN ONCE A DAY FOR 7 DAYS THEN STOP  - HLA-B27 Antigen; Future  - ORAL With / DsDNA, RNP, Sjogrens A / B, Still; Future    6. Mixed hyperlipidemia  Patient will return for fasting lipids, has follow-up appointment for annual physical next week  - Comprehensive metabolic panel; Future  - Lipid panel; Future    7. Elevated glucose  - " Hemoglobin A1c; Future    8. Need for COVID-19 vaccine  Administered today  - COVID-19 Bivalent Booster (Pfizer) 12+yrs    9. Need for influenza vaccination  Administered today  - FluLaval/Fluarix/Fluzone >6 Months      Return for Next scheduled follow up.    Future Appointments       Provider Department Center    10/20/2022 8:00 AM Michael Miner MD [facility] INTERNAL MEDICINE [place]    11/21/2022 2:40 PM Tawanda Mendes MD [facility] OBGYN [address] [place]          Michael Miner MD  Family Medicine  10/12/2022

## 2022-11-03 ENCOUNTER — LAB (OUTPATIENT)
Dept: LAB | Facility: HOSPITAL | Age: 37
End: 2022-11-03

## 2022-11-03 DIAGNOSIS — R73.09 ELEVATED GLUCOSE: ICD-10-CM

## 2022-11-03 DIAGNOSIS — E78.2 MIXED HYPERLIPIDEMIA: Chronic | ICD-10-CM

## 2022-11-03 DIAGNOSIS — H15.102 EPISCLERITIS OF LEFT EYE: ICD-10-CM

## 2022-11-03 PROCEDURE — 81374 HLA I TYPING 1 ANTIGEN LR: CPT

## 2022-11-03 PROCEDURE — 80061 LIPID PANEL: CPT

## 2022-11-03 PROCEDURE — 80053 COMPREHEN METABOLIC PANEL: CPT

## 2022-11-03 PROCEDURE — 86038 ANTINUCLEAR ANTIBODIES: CPT

## 2022-11-03 PROCEDURE — 36415 COLL VENOUS BLD VENIPUNCTURE: CPT

## 2022-11-03 PROCEDURE — 83036 HEMOGLOBIN GLYCOSYLATED A1C: CPT

## 2022-11-04 PROBLEM — R73.03 PREDIABETES: Status: ACTIVE | Noted: 2022-11-04

## 2022-11-04 LAB
ALBUMIN SERPL-MCNC: 4.1 G/DL (ref 3.5–5.2)
ALBUMIN/GLOB SERPL: 1.2 G/DL
ALP SERPL-CCNC: 100 U/L (ref 39–117)
ALT SERPL W P-5'-P-CCNC: 23 U/L (ref 1–33)
ANION GAP SERPL CALCULATED.3IONS-SCNC: 13.7 MMOL/L (ref 5–15)
AST SERPL-CCNC: 17 U/L (ref 1–32)
BILIRUB SERPL-MCNC: 0.3 MG/DL (ref 0–1.2)
BUN SERPL-MCNC: 15 MG/DL (ref 6–20)
BUN/CREAT SERPL: 17.9 (ref 7–25)
CALCIUM SPEC-SCNC: 9.5 MG/DL (ref 8.6–10.5)
CHLORIDE SERPL-SCNC: 100 MMOL/L (ref 98–107)
CHOLEST SERPL-MCNC: 209 MG/DL (ref 0–200)
CO2 SERPL-SCNC: 22.3 MMOL/L (ref 22–29)
CREAT SERPL-MCNC: 0.84 MG/DL (ref 0.57–1)
EGFRCR SERPLBLD CKD-EPI 2021: 92.5 ML/MIN/1.73
GLOBULIN UR ELPH-MCNC: 3.5 GM/DL
GLUCOSE SERPL-MCNC: 73 MG/DL (ref 65–99)
HBA1C MFR BLD: 5.7 % (ref 4.8–5.6)
HDLC SERPL-MCNC: 33 MG/DL (ref 40–60)
LDLC SERPL CALC-MCNC: 152 MG/DL (ref 0–100)
LDLC/HDLC SERPL: 4.55 {RATIO}
POTASSIUM SERPL-SCNC: 4.5 MMOL/L (ref 3.5–5.2)
PROT SERPL-MCNC: 7.6 G/DL (ref 6–8.5)
SODIUM SERPL-SCNC: 136 MMOL/L (ref 136–145)
TRIGL SERPL-MCNC: 130 MG/DL (ref 0–150)
VLDLC SERPL-MCNC: 24 MG/DL (ref 5–40)

## 2022-11-05 LAB — ANA SER QL: NEGATIVE

## 2022-11-14 LAB — HLA-B27 QL NAA+PROBE: NEGATIVE

## 2022-11-15 ENCOUNTER — OFFICE VISIT (OUTPATIENT)
Dept: INTERNAL MEDICINE | Facility: CLINIC | Age: 37
End: 2022-11-15

## 2022-11-15 VITALS
BODY MASS INDEX: 42.47 KG/M2 | TEMPERATURE: 99.8 F | SYSTOLIC BLOOD PRESSURE: 110 MMHG | HEIGHT: 67 IN | WEIGHT: 270.6 LBS | DIASTOLIC BLOOD PRESSURE: 78 MMHG | HEART RATE: 72 BPM

## 2022-11-15 DIAGNOSIS — F90.2 ATTENTION DEFICIT HYPERACTIVITY DISORDER (ADHD), COMBINED TYPE: Chronic | ICD-10-CM

## 2022-11-15 DIAGNOSIS — Z00.00 WELL ADULT EXAM: Primary | ICD-10-CM

## 2022-11-15 DIAGNOSIS — F31.75 BIPOLAR 1 DISORDER, DEPRESSED, PARTIAL REMISSION: Chronic | ICD-10-CM

## 2022-11-15 DIAGNOSIS — Z23 NEED FOR PNEUMOCOCCAL VACCINE: ICD-10-CM

## 2022-11-15 DIAGNOSIS — J45.41 MODERATE PERSISTENT ASTHMA WITH ACUTE EXACERBATION: ICD-10-CM

## 2022-11-15 DIAGNOSIS — Z87.898 HISTORY OF SUBSTANCE USE DISORDER: ICD-10-CM

## 2022-11-15 DIAGNOSIS — E66.01 SEVERE OBESITY (BMI >= 40): ICD-10-CM

## 2022-11-15 DIAGNOSIS — R35.0 FREQUENCY OF URINATION: ICD-10-CM

## 2022-11-15 DIAGNOSIS — R73.03 PREDIABETES: ICD-10-CM

## 2022-11-15 LAB
BILIRUB BLD-MCNC: NEGATIVE MG/DL
CLARITY, POC: ABNORMAL
COLOR UR: YELLOW
EXPIRATION DATE: ABNORMAL
GLUCOSE UR STRIP-MCNC: NEGATIVE MG/DL
KETONES UR QL: NEGATIVE
LEUKOCYTE EST, POC: ABNORMAL
Lab: ABNORMAL
NITRITE UR-MCNC: NEGATIVE MG/ML
PH UR: 6.5 [PH] (ref 5–8)
PROT UR STRIP-MCNC: NEGATIVE MG/DL
RBC # UR STRIP: NEGATIVE /UL
SP GR UR: 1.01 (ref 1–1.03)
UROBILINOGEN UR QL: ABNORMAL

## 2022-11-15 PROCEDURE — 90677 PCV20 VACCINE IM: CPT | Performed by: STUDENT IN AN ORGANIZED HEALTH CARE EDUCATION/TRAINING PROGRAM

## 2022-11-15 PROCEDURE — 81003 URINALYSIS AUTO W/O SCOPE: CPT | Performed by: STUDENT IN AN ORGANIZED HEALTH CARE EDUCATION/TRAINING PROGRAM

## 2022-11-15 PROCEDURE — 87086 URINE CULTURE/COLONY COUNT: CPT | Performed by: STUDENT IN AN ORGANIZED HEALTH CARE EDUCATION/TRAINING PROGRAM

## 2022-11-15 PROCEDURE — 99395 PREV VISIT EST AGE 18-39: CPT | Performed by: STUDENT IN AN ORGANIZED HEALTH CARE EDUCATION/TRAINING PROGRAM

## 2022-11-15 PROCEDURE — 3008F BODY MASS INDEX DOCD: CPT | Performed by: STUDENT IN AN ORGANIZED HEALTH CARE EDUCATION/TRAINING PROGRAM

## 2022-11-15 PROCEDURE — 2014F MENTAL STATUS ASSESS: CPT | Performed by: STUDENT IN AN ORGANIZED HEALTH CARE EDUCATION/TRAINING PROGRAM

## 2022-11-15 PROCEDURE — 90471 IMMUNIZATION ADMIN: CPT | Performed by: STUDENT IN AN ORGANIZED HEALTH CARE EDUCATION/TRAINING PROGRAM

## 2022-11-15 RX ORDER — BUSPIRONE HYDROCHLORIDE 10 MG/1
10 TABLET ORAL 2 TIMES DAILY
Qty: 90 TABLET | Refills: 5 | Status: SHIPPED | COMMUNITY
Start: 2022-11-15 | End: 2023-04-05

## 2022-11-15 RX ORDER — PREDNISONE 10 MG/1
TABLET ORAL
Qty: 18 TABLET | Refills: 0 | Status: SHIPPED | OUTPATIENT
Start: 2022-11-15 | End: 2022-12-20

## 2022-11-15 NOTE — PROGRESS NOTES
Chief Complaint  Clare Turner is a 37 y.o. female presenting for Annual Exam, chest tightness (2 days   back pain lung area//), and ADHD medication.     From [place]. . 3 children. 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     Patient has a past medical history of hyperlipidemia, bipolar 1 disorder w/depression and Hx of jack, ADHD, GERD, restless legs, moderate persistent asthma, lung nodule stable for years, history of alcohol and substance abuse, urge/stress incontinence and severe obesity.    History of Present Illness  Patient is here for annual physical.    Over last few weeks he is experiencing some increased urinary frequency, but no dysuria.  She is not sure if she might have an infection and would like urine checked.    Patient is still in remission from her substance use since January 2020.  She is doing well.  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  She used to be on Adderall twice daily in the past.  She also tried Strattera, that did not work so well for her.  She would like evaluation to potentially restart medication.    Patient continues to follow-up with pulmonology, she has appointment in 2 days.  Over the last couple days she has noticed more tightness of the chest and wheezing.  Also cough with some green/yellow phlegm.  Nasal congestion at baseline.  No sore throat.  No fever or chills, no body aches.  Also some pain on the left side of the chest with her cough.    Patient also is interested in starting metformin for prediabetes.  She wants to focus on lifestyle and try to lose some weight.    The following portions of the patient's history were reviewed and updated as appropriate: allergies, current medications, past family history, past medical history, past social history, past surgical history and problem list.    Objective  /78 (BP Location: Left  "arm, Patient Position: Sitting, Cuff Size: Large Adult)   Pulse 72   Temp 99.8 °F (37.7 °C) (Temporal)   Ht 169 cm (66.54\")   Wt 123 kg (270 lb 9.6 oz)   BMI 42.98 kg/m²     Physical Exam  Vitals reviewed.   Constitutional:       Appearance: Normal appearance.   HENT:      Head: Normocephalic and atraumatic.      Nose: No congestion.   Eyes:      Extraocular Movements: Extraocular movements intact.      Conjunctiva/sclera: Conjunctivae normal.   Cardiovascular:      Rate and Rhythm: Normal rate and regular rhythm.      Heart sounds: Normal heart sounds. No murmur heard.  Pulmonary:      Effort: Pulmonary effort is normal. No respiratory distress.      Breath sounds: Wheezing present.   Abdominal:      General: There is no distension.      Palpations: Abdomen is soft. There is no mass.      Tenderness: There is no abdominal tenderness.   Musculoskeletal:      Cervical back: Neck supple.      Right lower leg: No edema.      Left lower leg: No edema.   Skin:     General: Skin is warm and dry.   Neurological:      Mental Status: She is alert and oriented to person, place, and time. Mental status is at baseline.   Psychiatric:         Behavior: Behavior normal.         Thought Content: Thought content normal.         Assessment/Plan   1. Well adult exam  Counseled on recommendations for Tdap every 10 years and also pneumococcus vaccine due to her asthma.  She would like the pneumococcus vaccine today, and later on Tdap/tetanus shot.    2. Prediabetes  Hemoglobin A1C   Date Value Ref Range Status   11/03/2022 5.70 (H) 4.80 - 5.60 % Final   Counseled on side effects of metformin, we will start twice daily, but if she has side effects with nausea, severe loose bowels or diarrhea she can try to reduce and take 1 tablet a day, otherwise we will try 500 mg twice daily.  Follow-up in 3 months.  - metFORMIN (Glucophage) 500 MG tablet; Take 1 tablet by mouth 2 (Two) Times a Day With Meals.  Dispense: 60 tablet; Refill: " 5    3. Moderate persistent asthma with acute exacerbation  Concern for exacerbation, possible viral disease.  No findings at the moment concerning for pneumonia.  She has follow-up with pulmonology in 2 days, I will start prednisone at reduced dose due to her prediabetes.  - predniSONE (DELTASONE) 10 MG tablet; 40 mg day 1-3, 20 mg day 4-5, 10 mg day 6-7.  Dispense: 18 tablet; Refill: 0    4. Bipolar 1 disorder, depressed, partial remission (HCC)  5. Attention deficit hyperactivity disorder (ADHD), combined type  6. History of substance use disorder  Will refer to behavioral health for evaluation for possible reinitiation of Adderall.  Patient overall doing well and sober for close to 3 years.  - Ambulatory Referral to Behavioral Health  - busPIRone (BUSPAR) 10 MG tablet; Take 1 tablet by mouth 2 (Two) Times a Day.  Dispense: 90 tablet; Refill: 5    7. Frequency of urination  Possible infection.  Patient has fairly mild symptoms and feels it is appropriate to wait for the culture to come back and treat if positive.  - POC Urinalysis Dipstick, Automated  - Urine Culture - Urine, Urine, Clean Catch; Future  - Urine Culture - Urine, Urine, Clean Catch    8. Need for pneumococcal vaccine  Administered today  - Pneumococcal Conjugate Vaccine 20-Valent (PCV20)    9. Severe obesity (BMI >= 40) ([facility])  Class 3 Severe Obesity (BMI >=40). Obesity-related health conditions include the following: dyslipidemias and GERD. Obesity is unchanged. BMI is is above average; BMI management plan is completed. We discussed portion control and increasing exercise.      Return in about 3 months (around 2/15/2023) for Recheck.    Future Appointments       Provider Department Center    11/17/2022 11:30 AM Robinson Grant DO [facility] PULMONARY & CRITICAL CARE MEDICINE [place]    11/21/2022 2:40 PM Tawanda Mendes MD [facility] OBGYN [address] [place]    2/15/2023 8:00 AM Michael Miner MD  [facility] INTERNAL MEDICINE [place] Miner MD  Family Medicine  11/15/2022

## 2022-11-16 ENCOUNTER — TELEPHONE (OUTPATIENT)
Dept: PULMONOLOGY | Facility: CLINIC | Age: 37
End: 2022-11-16

## 2022-11-16 LAB — BACTERIA SPEC AEROBE CULT: NO GROWTH

## 2022-11-16 NOTE — TELEPHONE ENCOUNTER
Per fax Rx Breo 200 not covered through pt's insurance. Pt has an upcoming apt scheduled tomorrow(11/17/22) to discuss plan on changing inhaler or continue with PA. Insurance is requesting Dulera or Flovent.

## 2022-11-17 ENCOUNTER — PATIENT MESSAGE (OUTPATIENT)
Dept: INTERNAL MEDICINE | Facility: CLINIC | Age: 37
End: 2022-11-17

## 2022-11-17 ENCOUNTER — OFFICE VISIT (OUTPATIENT)
Dept: PULMONOLOGY | Facility: CLINIC | Age: 37
End: 2022-11-17

## 2022-11-17 VITALS
HEART RATE: 92 BPM | DIASTOLIC BLOOD PRESSURE: 80 MMHG | BODY MASS INDEX: 42.44 KG/M2 | WEIGHT: 270.4 LBS | HEIGHT: 67 IN | TEMPERATURE: 98 F | SYSTOLIC BLOOD PRESSURE: 132 MMHG | OXYGEN SATURATION: 97 %

## 2022-11-17 DIAGNOSIS — R91.1 LUNG NODULE: ICD-10-CM

## 2022-11-17 DIAGNOSIS — F17.210 CIGARETTE SMOKER: ICD-10-CM

## 2022-11-17 DIAGNOSIS — Z11.3 ROUTINE SCREENING FOR STI (SEXUALLY TRANSMITTED INFECTION): Primary | ICD-10-CM

## 2022-11-17 DIAGNOSIS — J45.40 MODERATE PERSISTENT ASTHMA WITHOUT COMPLICATION: Primary | ICD-10-CM

## 2022-11-17 PROCEDURE — 99214 OFFICE O/P EST MOD 30 MIN: CPT | Performed by: INTERNAL MEDICINE

## 2022-11-17 NOTE — TELEPHONE ENCOUNTER
From: Clare Turner  To: Michael Miner MD  Sent: 11/17/2022 8:00 AM EST  Subject: Urine samples     There is a possibility that I could have been exposed to an sti could you send an order for another UA?

## 2022-11-17 NOTE — PROGRESS NOTES
"Follow Up Office Note       Patient Name: Clare Turner    Referring Physician: No ref. provider found    Chief Complaint:    Chief Complaint   Patient presents with   • Asthma     F/U       History of Present Illness: Clare Turner is a 37 y.o. female who is here today to follow-up care with Pulmonary.  Patient has a past medical history significant for ADHD, bipolar disease, drug abuse, and GERD.  Patient was doing very well but recently got an infection saw her primary care physician was started on antibiotics and steroids.  States that her daughter now has RSV and suspect that is what is the underlying issues.  Denies any chest pain, nausea, fever, or chills.  She continues to smoke cigarettes.  No other acute complaints.    Review of Systems:   Review of Systems   Constitutional: Negative for chills, fatigue and fever.   HENT: Negative for congestion and voice change.    Eyes: Negative for blurred vision.   Respiratory: Positive for cough, shortness of breath and wheezing.    Cardiovascular: Negative for chest pain.   Skin: Negative for dry skin.   Hematological: Negative for adenopathy.   Psychiatric/Behavioral: Negative for agitation and depressed mood.       The following portions of the patient's history were reviewed and updated as appropriate: allergies, current medications, past family history, past medical history, past social history, past surgical history and problem list.    Physical Exam:  Vital Signs:   Vitals:    11/17/22 1042   BP: 132/80   BP Location: Right arm   Patient Position: Sitting   Cuff Size: Adult   Pulse: 92   Temp: 98 °F (36.7 °C)   TempSrc: Infrared   SpO2: 97%  Comment: resting, room air   Weight: 123 kg (270 lb 6.4 oz)   Height: 169 cm (66.54\")       Physical Exam  Vitals and nursing note reviewed.   Constitutional:       General: She is not in acute distress.     Appearance: She is well-developed and normal weight. She is not ill-appearing or toxic-appearing.   HENT: "      Head: Normocephalic and atraumatic.   Cardiovascular:      Rate and Rhythm: Normal rate and regular rhythm.      Pulses: Normal pulses.      Heart sounds: Normal heart sounds. No murmur heard.    No friction rub. No gallop.   Pulmonary:      Effort: Pulmonary effort is normal. No respiratory distress.      Breath sounds: Normal breath sounds. No wheezing, rhonchi or rales.   Musculoskeletal:      Right lower leg: No edema.      Left lower leg: No edema.   Skin:     General: Skin is warm and dry.   Neurological:      Mental Status: She is alert and oriented to person, place, and time.         Immunization History   Administered Date(s) Administered   • COVID-19 (PFIZER) BIVALENT BOOSTER 12+YRS 10/12/2022   • COVID-19 (PFIZER) PURPLE CAP 04/01/2021, 04/21/2021, 11/15/2021   • FluLaval/Fluzone >6mos 03/11/2022, 10/12/2022   • Pneumococcal Conjugate 20-Valent (PCV20) 11/15/2022   • Pneumococcal Polysaccharide (PPSV23) 06/06/2016       Results Review:   - chest x-ray 3/11/2022 shows no acute cardiopulmonary process, with a 13 mm left upper lobe nodule new compared to 2014.  - PFT from 3/25/2022 shows moderate obstruction with an FEV1 of 74%, FVC of 88%, residual volume is 147%, no signs of restriction and a mildly reduced DLCO.    Assessment / Plan:   1. Moderate persistent asthma without complication (Primary)  -Patient does appear to have an exacerbation at this time.  She is already on steroids antibiotics.  Told her to complete both of these.  Dulera is what the insurance is not wanting.  Therefore I will go ahead and switch her from the Breo over to Dulera 200 mcg 2 puffs twice daily.  She was given a chamber as well as counseled on proper inhaler technique on today's visit.  She also should utilize albuterol on as-needed basis.  I will send off a work-up for secondary causes of asthma as noted below given that this is her second exacerbation in the last year.  I did ask her though to get the testing performed  in the next 4 months but at least be off steroids for 2 weeks prior to having the labs drawn.    Labs ordered today:  -     Allergens, Zone 8; Standing  -     ANCA Panel; Standing  -     Aspergillus Fumigatus IgE; Standing  -     CBC & Differential; Standing  -     IgE; Standing    2. 14 mm left upper lobe lung nodule (3/2021)  -Patient is due for repeat CT scan in January 2022 I have gone ahead and ordered a CT scan to be performed prior to the next visit.    3. Cigarette smoker  - In this visit the patient was advised to stop smoking and was offered tobacco cessation measures and resources, including NRT and/or medication intervention.  I specifically discussed with the patient psychological components to quitting smoking which includes determining why the patient wants to quit smoking, identifying smoking triggers, and figuring out ways to replace those triggers.  Greater than 10 minutes was spent on face-to-face counseling regarding smoking cessation.  Plan to follow-up at the next visit on status cessation success.  Patient was unwilling to quit at this time.  Goal is to have complete tobacco cessation within 1 year.    Follow Up:   Return in about 4 months (around 3/17/2023) for CT Chest with next visit, Labs.       JHONATAN Grant, DO  Pulmonary and Critical Care Medicine  Note Electronically Signed    Part of this note may be an electronic transcription/translation of spoken language to printed text using the Dragon Dictation System.

## 2022-11-18 DIAGNOSIS — J45.40 MODERATE PERSISTENT ASTHMA WITHOUT COMPLICATION: Primary | ICD-10-CM

## 2022-11-18 RX ORDER — MOMETASONE FUROATE AND FORMOTEROL FUMARATE DIHYDRATE 200; 5 UG/1; UG/1
2 AEROSOL RESPIRATORY (INHALATION)
Qty: 13 G | Refills: 11 | Status: SHIPPED | OUTPATIENT
Start: 2022-11-18

## 2022-11-19 DIAGNOSIS — T78.2XXD ANAPHYLAXIS, SUBSEQUENT ENCOUNTER: ICD-10-CM

## 2022-11-20 DIAGNOSIS — T78.2XXD ANAPHYLAXIS, SUBSEQUENT ENCOUNTER: ICD-10-CM

## 2022-11-21 DIAGNOSIS — T78.2XXD ANAPHYLAXIS, SUBSEQUENT ENCOUNTER: ICD-10-CM

## 2022-11-21 RX ORDER — EPINEPHRINE 0.3 MG/.3ML
INJECTION SUBCUTANEOUS
Qty: 2 EACH | OUTPATIENT
Start: 2022-11-21

## 2022-11-21 RX ORDER — EPINEPHRINE 0.3 MG/.3ML
INJECTION SUBCUTANEOUS
Qty: 2 EACH | Refills: 1 | Status: SHIPPED | OUTPATIENT
Start: 2022-11-21

## 2022-11-27 PROBLEM — J30.1 SEASONAL ALLERGIC RHINITIS DUE TO POLLEN: Status: ACTIVE | Noted: 2022-10-12

## 2022-11-27 PROBLEM — J45.40 MODERATE PERSISTENT ASTHMA WITHOUT COMPLICATION: Status: ACTIVE | Noted: 2022-03-11

## 2022-11-27 PROBLEM — K21.9 GASTROESOPHAGEAL REFLUX DISEASE WITHOUT ESOPHAGITIS: Status: ACTIVE | Noted: 2022-03-11

## 2022-11-27 PROBLEM — G25.81 RESTLESS LEG SYNDROME: Status: ACTIVE | Noted: 2022-03-11

## 2022-11-27 PROBLEM — F90.2 ATTENTION DEFICIT HYPERACTIVITY DISORDER (ADHD), COMBINED TYPE: Status: ACTIVE | Noted: 2022-03-11

## 2022-12-20 ENCOUNTER — TELEMEDICINE (OUTPATIENT)
Dept: PSYCHIATRY | Facility: CLINIC | Age: 37
End: 2022-12-20
Payer: COMMERCIAL

## 2022-12-20 DIAGNOSIS — F90.2 ATTENTION DEFICIT HYPERACTIVITY DISORDER (ADHD), COMBINED TYPE: Primary | ICD-10-CM

## 2022-12-20 DIAGNOSIS — F31.75 BIPOLAR 1 DISORDER, DEPRESSED, PARTIAL REMISSION: Chronic | ICD-10-CM

## 2022-12-20 PROCEDURE — 90792 PSYCH DIAG EVAL W/MED SRVCS: CPT | Performed by: NURSE PRACTITIONER

## 2022-12-20 PROCEDURE — 1160F RVW MEDS BY RX/DR IN RCRD: CPT | Performed by: NURSE PRACTITIONER

## 2022-12-20 PROCEDURE — 1159F MED LIST DOCD IN RCRD: CPT | Performed by: NURSE PRACTITIONER

## 2022-12-20 RX ORDER — DEXTROAMPHETAMINE SACCHARATE, AMPHETAMINE ASPARTATE MONOHYDRATE, DEXTROAMPHETAMINE SULFATE AND AMPHETAMINE SULFATE 5; 5; 5; 5 MG/1; MG/1; MG/1; MG/1
20 CAPSULE, EXTENDED RELEASE ORAL EVERY MORNING
Qty: 30 CAPSULE | Refills: 0 | Status: SHIPPED | OUTPATIENT
Start: 2022-12-20 | End: 2023-01-12

## 2022-12-20 RX ORDER — BUPROPION HYDROCHLORIDE 300 MG/1
300 TABLET ORAL DAILY
Qty: 30 TABLET | Refills: 5 | Status: SHIPPED | OUTPATIENT
Start: 2022-12-20

## 2022-12-20 RX ORDER — PRAZOSIN HYDROCHLORIDE 2 MG/1
2 CAPSULE ORAL NIGHTLY
Qty: 30 CAPSULE | Refills: 5 | Status: SHIPPED | OUTPATIENT
Start: 2022-12-20

## 2022-12-20 RX ORDER — QUETIAPINE FUMARATE 400 MG/1
400 TABLET, FILM COATED ORAL NIGHTLY
Qty: 30 TABLET | Refills: 5 | Status: SHIPPED | OUTPATIENT
Start: 2022-12-20 | End: 2023-02-23

## 2022-12-20 RX ORDER — FLUOXETINE HYDROCHLORIDE 40 MG/1
80 CAPSULE ORAL DAILY
Qty: 30 CAPSULE | Refills: 5 | Status: SHIPPED | OUTPATIENT
Start: 2022-12-20

## 2022-12-20 NOTE — PROGRESS NOTES
Patient Name: Clare Turner  MRN: [id number]   :  1985     Referring Physician: Michael Miner MD    This provider is located at the [facility] (through [facility]), [address], [facility] using a secure MyChart Video Visit through [employer]. Patient is being seen remotely via telehealth at their home address in [place], and stated they are in a secure environment for this session. The patient's condition being diagnosed/treated is appropriate for telemedicine. The provider identified herself as well as her credentials.   The patient, and/or patients guardian, consent to be seen remotely, and when consent is given they understand that the consent allows for patient identifiable information to be sent to a third party as needed.   They may refuse to be seen remotely at any time. The electronic data is encrypted and password protected, and the patient and/or guardian has been advised of the potential risks to privacy not withstanding such measures.    You have chosen to receive care through a telehealth visit.  Do you consent to use a video/audio connection for your medical care today? Yes    Chief Complaint:     ICD-10-CM ICD-9-CM   1. Attention deficit hyperactivity disorder (ADHD), combined type  F90.2 314.01   2. Bipolar 1 disorder, depressed, partial remission (HCC)  F31.75 296.55       HPI:   Clare Turner is a 37 y.o. female who is here today for initial evaluation of ADHD.  Patient states she is having a very hard time at work.  Getting in trouble because she cannot keep up with tasks.  Is an .  Has tried Strattera and Tenex before they made her very drowsy and \"blah\".  Tried Adderall in the past and this worked well.  Sleep is okay.  Seroquel manages her bipolar disorder.  This is the first time she has lived by herself.  One of her children takes Tenax.  Has a 13-year-old and 10-year-old twins.  Patient states her  sister is her best support.  Also her mother, cousin, and coworkers.  Is interested in initiating therapy as well.    Past Medical History:   Past Medical History:   Diagnosis Date   • ADD (attention deficit disorder)    • Arthritis    • Asthma    • Bipolar 1 disorder ([facility])    • Fractures    • GERD (gastroesophageal reflux disease)    • H/O alcohol abuse     Clean date 2020   • H/O drug abuse (CMS/HCC) - methamphetamine     Clean date 2020   • Heartburn    • High cholesterol    • Lung nodule Early     There is one in my left lung that is seen in several chest x-rays dating back to the early    • Pneumonia 2022   • Prediabetes 2022 A1c 5.7   • Restless leg syndrome 2022   • Seasonal allergic rhinitis due to pollen 10/12/2022   • Trichimoniasis 2017   • Trichimoniasis 2020   • Trichimoniasis 2020       Past Surgical History:   Past Surgical History:   Procedure Laterality Date   • APPENDECTOMY     • CERVICAL CONIZATION     •  SECTION     •  SECTION WITH TUBAL  2012   • LAPAROSCOPIC APPENDECTOMY     • NASAL FRACTURE SURGERY  2017   • ORIF TIBIA FRACTURE Right 2016    Talar fractur   • SEPTOPLASTY     • SINUS SURGERY  2011    debridement   • TIBIAL TALAR FUSION Right 2017   • TONSILLECTOMY AND ADENOIDECTOMY     • TUBAL ABDOMINAL LIGATION         Social History:   Social History     Socioeconomic History   • Marital status:    Tobacco Use   • Smoking status: Heavy Smoker     Packs/day: 1.00     Years: 20.00     Pack years: 20.00     Types: Cigarettes     Start date: 2003   • Smokeless tobacco: Never   Vaping Use   • Vaping Use: Some days   • Start date: 9/15/2022   • Substances: Nicotine, Flavoring   Substance and Sexual Activity   • Alcohol use: Not Currently     Comment: quit 20   • Drug use: Not Currently     Types: Amphetamines, Marijuana, Methamphetamines     Comment: Clean ~  1/28/2020   • Sexual activity: Yes     Partners: Male     Birth control/protection: Other       Family History:  Family History   Problem Relation Age of Onset   • Hyperlipidemia Mother    • Cancer Mother    • Mental illness Mother    • Arthritis Mother    • Osteoporosis Mother    • Asthma Mother    • Hypertension Father    • Obesity Father    • Thyroid disease Maternal Grandmother    • Hyperlipidemia Maternal Grandmother    • Stroke Maternal Grandmother    • Hypertension Maternal Grandmother    • Obesity Maternal Grandmother    • Developmental Disability Maternal Grandmother    • Mental illness Maternal Grandmother    • Liver disease Maternal Grandmother    • Arthritis Maternal Grandmother    • Migraines Maternal Grandmother    • Diabetes Maternal Grandmother    • Hypertension Maternal Grandfather    • Heart attack Maternal Grandfather    • Developmental Disability Maternal Grandfather    • Arthritis Maternal Grandfather    • Asthma Maternal Grandfather    • Hypertension Paternal Grandmother    • Obesity Paternal Grandmother    • Developmental Disability Paternal Grandmother    • Arthritis Paternal Grandmother    • Heart attack Paternal Grandmother    • Heart failure Paternal Grandmother    • Hypertension Paternal Grandfather    • Obesity Paternal Grandfather    • Developmental Disability Paternal Grandfather    • Arthritis Paternal Grandfather    • Diabetes Paternal Grandfather        Allergy:  Allergies   Allergen Reactions   • Pecan Nut Anaphylaxis     Hives, swelling of lips and throat, difficulties breathing. Has epipen   • Sulfa Antibiotics Rash   • Sulfamethoxazole-Trimethoprim Rash       Current Medications:   Current Outpatient Medications   Medication Sig Dispense Refill   • buPROPion XL (WELLBUTRIN XL) 300 MG 24 hr tablet Take 1 tablet by mouth Daily. 30 tablet 5   • FLUoxetine (PROzac) 40 MG capsule Take 2 capsules by mouth Daily. Takes 2 daily 30 capsule 5   • prazosin (MINIPRESS) 2 MG capsule Take 1  capsule by mouth Every Night. 30 capsule 5   • QUEtiapine (SEROquel) 400 MG tablet Take 1 tablet by mouth Every Night. 30 tablet 5   • albuterol sulfate  (90 Base) MCG/ACT inhaler Inhale 2 puffs 2 (Two) Times a Day. 18 g 5   • amphetamine-dextroamphetamine XR (Adderall XR) 20 MG 24 hr capsule Take 1 capsule by mouth Every Morning 30 capsule 0   • atorvastatin (LIPITOR) 10 MG tablet Take 1 tablet by mouth Daily. 30 tablet 5   • busPIRone (BUSPAR) 10 MG tablet Take 1 tablet by mouth 2 (Two) Times a Day. 90 tablet 5   • EPINEPHrine (EPIPEN) 0.3 MG/0.3ML solution auto-injector injection INJECT 1 PEN IN THE MUSCLE AS DIRECTED AS NEEDED FOR ALLERGIC REACTION 2 each 1   • GNP Omeprazole 20 MG tablet delayed-release Take 20 mg by mouth Daily. 30 each 5   • loratadine (CLARITIN) 10 MG tablet Take 1 tablet by mouth Daily. 30 tablet 5   • metFORMIN (Glucophage) 500 MG tablet Take 1 tablet by mouth 2 (Two) Times a Day With Meals. 60 tablet 5   • mometasone-formoterol (Dulera) 200-5 MCG/ACT inhaler Inhale 2 puffs 2 (Two) Times a Day. 13 g 11   • OXcarbazepine (TRILEPTAL) 600 MG tablet Take 1 tablet by mouth Daily. 60 tablet 5   • rOPINIRole (REQUIP) 0.5 MG tablet Take 1 tablet by mouth every night at bedtime. 30 tablet 5     No current facility-administered medications for this visit.       Lab Results:   Office Visit on 11/15/2022   Component Date Value Ref Range Status   • Color 11/15/2022 Yellow  Yellow, Straw, Dark Yellow, Olena Final   • Clarity, UA 11/15/2022 Turbid (A)  Clear Final   • Specific Gravity  11/15/2022 1.015  1.005 - 1.030 Final   • pH, Urine 11/15/2022 6.5  5.0 - 8.0 Final   • Leukocytes 11/15/2022 Small (1+) (A)  Negative Final   • Nitrite, UA 11/15/2022 Negative  Negative Final   • Protein, POC 11/15/2022 Negative  Negative mg/dL Final   • Glucose, UA 11/15/2022 Negative  Negative mg/dL Final   • Ketones, UA 11/15/2022 Negative  Negative Final   • Urobilinogen, UA 11/15/2022 0.2 E.U./dL  Normal, 0.2  E.U./dL Final   • Bilirubin 11/15/2022 Negative  Negative Final   • Blood, UA 11/15/2022 Negative  Negative Final   • Lot Number 11/15/2022 [id number]   Final   • Expiration Date 11/15/2022 09-   Final   • Urine Culture 11/15/2022 No growth   Final   Lab on 11/03/2022   Component Date Value Ref Range Status   • HLA B27 11/03/2022 Negative   Final    HLA-B*27 Negative  B27 allele interpretation for all loci based on IMGT/HLA  database version 3.44  This test was developed and its performance characteristics  determined by [facility].  It has not been cleared or approved  by the Food and Drug Administration.  [facility] CLIA ID Number [id number]  This test was performed using PCR (Polymerase Chain Reaction)/SSOP  (Sequence Specific Oligonucleotide Probes) technique.  SBT (Sequence  Based Typing) and/or SSP (Sequence Specific Primers) may be used as  supplemental methods when necessary.  Please contact [facility] Customer  Service at [phone] if you have any questions.   Director of [facility]   Dr Derrick Lai, PhD   • ORAL Direct 11/03/2022 Negative  Negative Final   • Glucose 11/03/2022 73  65 - 99 mg/dL Final   • BUN 11/03/2022 15  6 - 20 mg/dL Final   • Creatinine 11/03/2022 0.84  0.57 - 1.00 mg/dL Final   • Sodium 11/03/2022 136  136 - 145 mmol/L Final   • Potassium 11/03/2022 4.5  3.5 - 5.2 mmol/L Final   • Chloride 11/03/2022 100  98 - 107 mmol/L Final   • CO2 11/03/2022 22.3  22.0 - 29.0 mmol/L Final   • Calcium 11/03/2022 9.5  8.6 - 10.5 mg/dL Final   • Total Protein 11/03/2022 7.6  6.0 - 8.5 g/dL Final   • Albumin 11/03/2022 4.10  3.50 - 5.20 g/dL Final   • ALT (SGPT) 11/03/2022 23  1 - 33 U/L Final   • AST (SGOT) 11/03/2022 17  1 - 32 U/L Final   • Alkaline Phosphatase 11/03/2022 100  39 - 117 U/L Final   • Total Bilirubin 11/03/2022 0.3  0.0 - 1.2 mg/dL Final   • Globulin 11/03/2022 3.5  gm/dL Final   • A/G Ratio 11/03/2022 1.2  g/dL Final   • BUN/Creatinine Ratio 11/03/2022 17.9  7.0 - 25.0 Final   •  Anion Gap 11/03/2022 13.7  5.0 - 15.0 mmol/L Final   • eGFR 11/03/2022 92.5  >60.0 mL/min/1.73 Final    National Kidney Foundation and American Society of Nephrology (ASN) Task Force recommended calculation based on the Chronic Kidney Disease Epidemiology Collaboration (CKD-EPI) equation refit without adjustment for race.   • Total Cholesterol 11/03/2022 209 (H)  0 - 200 mg/dL Final   • Triglycerides 11/03/2022 130  0 - 150 mg/dL Final   • HDL Cholesterol 11/03/2022 33 (L)  40 - 60 mg/dL Final   • LDL Cholesterol  11/03/2022 152 (H)  0 - 100 mg/dL Final   • VLDL Cholesterol 11/03/2022 24  5 - 40 mg/dL Final   • LDL/HDL Ratio 11/03/2022 4.55   Final   • Hemoglobin A1C 11/03/2022 5.70 (H)  4.80 - 5.60 % Final       Review of Symptoms:   Review of Systems   Constitutional: Negative for activity change, appetite change, fatigue, unexpected weight gain and unexpected weight loss.   Respiratory: Negative for shortness of breath and wheezing.    Gastrointestinal: Negative for constipation, diarrhea, nausea and vomiting.   Musculoskeletal: Negative for gait problem.   Skin: Negative for dry skin and rash.   Neurological: Negative for dizziness, speech difficulty, weakness, light-headedness, headache, memory problem and confusion.   Psychiatric/Behavioral: Positive for decreased concentration and stress. Negative for agitation, behavioral problems, dysphoric mood, hallucinations, self-injury, sleep disturbance, suicidal ideas, negative for hyperactivity and depressed mood. The patient is not nervous/anxious.        Physical Exam:   Physical Exam  Constitutional:       General: She is not in acute distress.     Appearance: She is well-developed. She is not diaphoretic.   HENT:      Head: Normocephalic and atraumatic.   Eyes:      Conjunctiva/sclera: Conjunctivae normal.   Pulmonary:      Effort: Pulmonary effort is normal. No respiratory distress.   Musculoskeletal:         General: Normal range of motion.      Cervical back:  Full passive range of motion without pain and normal range of motion.   Neurological:      Mental Status: She is alert and oriented to person, place, and time.   Psychiatric:         Mood and Affect: Mood is not anxious or depressed. Affect is not labile, blunt, angry or inappropriate.         Speech: Speech is not rapid and pressured or tangential.         Behavior: Behavior normal. Behavior is not agitated, slowed, aggressive, withdrawn, hyperactive or combative. Behavior is cooperative.         Thought Content: Thought content normal. Thought content is not paranoid or delusional. Thought content does not include homicidal or suicidal ideation. Thought content does not include homicidal or suicidal plan.         Judgment: Judgment normal.       not currently breastfeeding.  There is no height or weight on file to calculate BMI. Video appt unable to obtain.     Mental Status Exam:   Appearance: appropriate  Hygiene:   good  Cooperation:  Cooperative  Eye Contact:  Good  Psychomotor Behavior:  Appropriate  Mood:within normal limits  Affect:  Appropriate  Hopelessness: Denies  Speech:  Normal  Thought Process:  Goal directed  Thought Content:  Normal  Suicidal:  None  Homicidal:  None  Hallucinations:  None  Delusion:  None  Memory:  Intact  Orientation:  Person, Place, Time and Situation  Reliability:  good  Insight:  Good  Judgement:  Good  Impulse Control:  Good  Physical/Medical Issues:  No     PHQ-9 Depression Screening  Little interest or pleasure in doing things?     Feeling down, depressed, or hopeless?     Trouble falling or staying asleep, or sleeping too much?     Feeling tired or having little energy?     Poor appetite or overeating?     Feeling bad about yourself - or that you are a failure or have let yourself or your family down?     Trouble concentrating on things, such as reading the newspaper or watching television?     Moving or speaking so slowly that other people could have noticed? Or the  opposite - being so fidgety or restless that you have been moving around a lot more than usual?     Thoughts that you would be better off dead, or of hurting yourself in some way?     PHQ-9 Total Score     If you checked off any problems, how difficult have these problems made it for you to do your work, take care of things at home, or get along with other people?        Reviewed EVA # [id number]    Controlled Substance Medication Contract    Controlled substance medications (i.e. benzodiazepines, opioids, amphetamines) are very useful, but have a high potential for misuse and are, therefore, closely controlled by local, state, and federal government(s). As a patient of [facility], you agree and understand the followin. I am responsible for the controlled substance medications prescribed to me. If my prescription is misplaced, stolen, or if \"I run out early,\" I understand this medication will not be replaced regardless of the circumstances.  2. Refills of controlled substance medications: (a) Will be made only during regular office hours Monday-Thursday once a month and during a scheduled virtual appointment. Refills will not be made at night, weekends, or on holidays. (b) Will not be made if \"I lost my prescriptions,\" \"ran out early,\" or \"misplaced my medication\". I am solely responsible for taking the medication as prescribed and for keeping track of the remaining.   3. I agree to comply with urine drug testing and pill counts at the provider's discretion, thereby, documenting the proper use of any medications. If alcohol abuse is suspected, a breathalyzer or blood alcohol level may be ordered. Unannounced urine or serum toxicology specimens may be requested and my cooperation is required.  4. I understand that if I violate any of the above conditions, my prescriptions for controlled medications my be terminated. If the violation involves obtaining these medications from  another individual, or the concomitant use of non-prescription illicit (illegal) drugs, I may also be reported to other providers, pharmacies, medical facilities and the appropriate authorities.   5. I further understand that if I violate this controlled substance contract due to non-compliance of medical directions, such as the failure in taking medications as prescribed, utilizing other illicit drugs, or abuse of controlled medications, the prescription for controlled medications may be terminated.   6. I agree to keep my scheduled appointments and conduct myself in a courteous manner.  7. I agree not to sell, share, or give any medication to another person. I understand that such mishandling of my medication is a serious violation of this agreement and would result in my treatment being terminated without any recourse for appeal.   8. I agree not to take my medication from any physicians, nurse practitioners, pharmacies or other sources without telling my prescriber.  9. I agree to take my medication as my prescriber has instructed and not to alter the way I take my medication without first consulting my prescriber.  10. I agree to abstain from problematic alcohol usage, opioids, marijuana, cocaine, and other addictive substances.   11. If I am legally involved related to legal or illegal drugs, including alcohol, refill of controlled substances will not be given until a re-evaluation of my chemical dependency treatment plan has been completed. Refills are at the discretion of the prescriber.   12. I agree to fill all of my controlled medications at an in-state ([place]) pharmacy.   13. I understand that [facility] utilizes the [place] All Schedule Prescription Electronic Reporting (EVA) system and will monitor my prescription history via this source.  14. Benzodiazepines are drugs prescribed to treat conditions like anxiety, insomnia, and seizures. Examples of these drugs  include: alprazolam, clonazepam, diazepam, and lorazepam. The FDA has applied a Black Box Warning (one of the strictest warnings) that the use of opioids and benzodiazepines together have serious risks to include unusual dizziness or lightheadedness, extreme sleepiness, slowed or difficult breathing, coma and death. There is an added risk if alcohol is also ingested. It is the policy of [facility] to NOT prescribe benzodiazepines to patients who also use opioids. If a patient already presents already prescribed both, the prescriber my direct the patient to their previous provider who prescribed it or taper the benzodiazepine as part of the treatment plan. These patients must be monitored at appropriate intervals and so visits may be more frequent.     This APRN has discussed and reviewed this information with the patient and/or guardian. The patient and/or guardian verbally agreed (no signatures are obtained during today's visit as they are a telehealth patient and is unable to print and sign this document, therefore, verbal agreement has been obtained).     Assessment/Plan:   Diagnoses and all orders for this visit:    1. Attention deficit hyperactivity disorder (ADHD), combined type (Primary)  -     amphetamine-dextroamphetamine XR (Adderall XR) 20 MG 24 hr capsule; Take 1 capsule by mouth Every Morning  Dispense: 30 capsule; Refill: 0  -     Compliance Drug Analysis, Ur - Urine, Clean Catch; Future  -     Ambulatory Referral to Psychiatry    2. Bipolar 1 disorder, depressed, partial remission (HCC)  -     buPROPion XL (WELLBUTRIN XL) 300 MG 24 hr tablet; Take 1 tablet by mouth Daily.  Dispense: 30 tablet; Refill: 5  -     FLUoxetine (PROzac) 40 MG capsule; Take 2 capsules by mouth Daily. Takes 2 daily  Dispense: 30 capsule; Refill: 5  -     prazosin (MINIPRESS) 2 MG capsule; Take 1 capsule by mouth Every Night.  Dispense: 30 capsule; Refill: 5  -     QUEtiapine (SEROquel) 400 MG  tablet; Take 1 tablet by mouth Every Night.  Dispense: 30 tablet; Refill: 5    We will start Adderall XR 20 mg every morning.  Patient will obtain urine drug screen.  Patient will continue her medications for bipolar disorder as well.  We will refer for therapy.    A psychological evaluation was conducted in order to assess past and current level of functioning. Areas assessed included, but were not limited to: perception of social support, perception of ability to face and deal with challenges in life (positive functioning), anxiety symptoms, depressive symptoms, perspective on beliefs/belief system, coping skills for stress, intelligence level,  Therapeutic rapport was established. Interventions conducted today were geared towards incorporating medication management along with support for continued therapy. Education was also provided as to the med management with this provider and what to expect in subsequent sessions.    We discussed risks, benefits,goals and side effects of the above medication and the patient was agreeable with the plan.Patient was educated on the importance of compliance with treatment and follow-up appointments. Patient is aware to contact the [facility] [phone] with any worsening of symptoms. To call for questions or concerns and return early if necessary. Patent is agreeable to go to the Emergency Department or call 911 should they begin SI/HI.     Part of this note may be an electronic transcription/translation of spoken language to printed text using the Dragon Dictation System.    Return in about 3 weeks (around 1/10/2023) for Follow Up 30 min, Video visit.    DALLAS Hoover

## 2023-01-02 ENCOUNTER — LAB (OUTPATIENT)
Dept: LAB | Facility: HOSPITAL | Age: 38
End: 2023-01-02
Payer: COMMERCIAL

## 2023-01-02 DIAGNOSIS — Z11.3 ROUTINE SCREENING FOR STI (SEXUALLY TRANSMITTED INFECTION): ICD-10-CM

## 2023-01-02 DIAGNOSIS — J45.40 MODERATE PERSISTENT ASTHMA WITHOUT COMPLICATION: ICD-10-CM

## 2023-01-02 LAB
BASOPHILS # BLD AUTO: 0.07 10*3/MM3 (ref 0–0.2)
BASOPHILS NFR BLD AUTO: 0.3 % (ref 0–1.5)
DEPRECATED RDW RBC AUTO: 43.5 FL (ref 37–54)
EOSINOPHIL # BLD AUTO: 0.26 10*3/MM3 (ref 0–0.4)
EOSINOPHIL NFR BLD AUTO: 1.3 % (ref 0.3–6.2)
ERYTHROCYTE [DISTWIDTH] IN BLOOD BY AUTOMATED COUNT: 13 % (ref 12.3–15.4)
HCT VFR BLD AUTO: 42.4 % (ref 34–46.6)
HGB BLD-MCNC: 14 G/DL (ref 12–15.9)
IMM GRANULOCYTES # BLD AUTO: 0.08 10*3/MM3 (ref 0–0.05)
IMM GRANULOCYTES NFR BLD AUTO: 0.4 % (ref 0–0.5)
LYMPHOCYTES # BLD AUTO: 3.52 10*3/MM3 (ref 0.7–3.1)
LYMPHOCYTES NFR BLD AUTO: 17.5 % (ref 19.6–45.3)
MCH RBC QN AUTO: 30.2 PG (ref 26.6–33)
MCHC RBC AUTO-ENTMCNC: 33 G/DL (ref 31.5–35.7)
MCV RBC AUTO: 91.6 FL (ref 79–97)
MONOCYTES # BLD AUTO: 1.23 10*3/MM3 (ref 0.1–0.9)
MONOCYTES NFR BLD AUTO: 6.1 % (ref 5–12)
NEUTROPHILS NFR BLD AUTO: 14.91 10*3/MM3 (ref 1.7–7)
NEUTROPHILS NFR BLD AUTO: 74.4 % (ref 42.7–76)
NRBC BLD AUTO-RTO: 0 /100 WBC (ref 0–0.2)
PLATELET # BLD AUTO: 417 10*3/MM3 (ref 140–450)
PMV BLD AUTO: 11.2 FL (ref 6–12)
RBC # BLD AUTO: 4.63 10*6/MM3 (ref 3.77–5.28)
WBC NRBC COR # BLD: 20.07 10*3/MM3 (ref 3.4–10.8)

## 2023-01-02 PROCEDURE — 86003 ALLG SPEC IGE CRUDE XTRC EA: CPT

## 2023-01-02 PROCEDURE — 86037 ANCA TITER EACH ANTIBODY: CPT

## 2023-01-02 PROCEDURE — 87591 N.GONORRHOEAE DNA AMP PROB: CPT

## 2023-01-02 PROCEDURE — G0483 DRUG TEST DEF 22+ CLASSES: HCPCS | Performed by: NURSE PRACTITIONER

## 2023-01-02 PROCEDURE — 82785 ASSAY OF IGE: CPT

## 2023-01-02 PROCEDURE — 83516 IMMUNOASSAY NONANTIBODY: CPT

## 2023-01-02 PROCEDURE — 87491 CHLMYD TRACH DNA AMP PROBE: CPT

## 2023-01-02 PROCEDURE — 80307 DRUG TEST PRSMV CHEM ANLYZR: CPT | Performed by: NURSE PRACTITIONER

## 2023-01-02 PROCEDURE — 85025 COMPLETE CBC W/AUTO DIFF WBC: CPT

## 2023-01-03 LAB
C TRACH RRNA SPEC QL NAA+PROBE: NEGATIVE
C-ANCA TITR SER IF: ABNORMAL TITER
MYELOPEROXIDASE AB SER IA-ACNC: <0.2 UNITS (ref 0–0.9)
N GONORRHOEA RRNA SPEC QL NAA+PROBE: NEGATIVE
P-ANCA ATYPICAL TITR SER IF: ABNORMAL TITER
P-ANCA TITR SER IF: ABNORMAL TITER
PROTEINASE3 AB SER IA-ACNC: <0.2 UNITS (ref 0–0.9)

## 2023-01-04 LAB
A FUMIGATUS IGE QN: <0.1 KU/L
IGE SERPL-ACNC: 452 IU/ML (ref 6–495)

## 2023-01-05 LAB
A ALTERNATA IGE QN: <0.1 KU/L
A FUMIGATUS IGE QN: <0.1 KU/L
AMER ROACH IGE QN: <0.1 KU/L
BAHIA GRASS IGE QN: <0.1 KU/L
BERMUDA GRASS IGE QN: <0.1 KU/L
BOXELDER IGE QN: <0.1 KU/L
C HERBARUM IGE QN: <0.1 KU/L
CAT DANDER IGE QN: 1.14 KU/L
CMN PIGWEED IGE QN: <0.1 KU/L
COMMON RAGWEED IGE QN: 1.27 KU/L
CONV CLASS DESCRIPTION: ABNORMAL
D FARINAE IGE QN: 4.38 KU/L
D PTERONYSS IGE QN: 6.17 KU/L
DOG DANDER IGE QN: 0.31 KU/L
ENGL PLANTAIN IGE QN: <0.1 KU/L
HAZELNUT POLN IGE QN: <0.1 KU/L
JOHNSON GRASS IGE QN: <0.1 KU/L
KENT BLUE GRASS IGE QN: <0.1 KU/L
LONDON PLANE IGE QN: <0.1 KU/L
M RACEMOSUS IGE QN: <0.1 KU/L
MT JUNIPER IGE QN: <0.1 KU/L
MUGWORT IGE QN: <0.1 KU/L
NETTLE IGE QN: <0.1 KU/L
P NOTATUM IGE QN: <0.1 KU/L
S BOTRYOSUM IGE QN: <0.1 KU/L
SHEEP SORREL IGE QN: <0.1 KU/L
SWEET GUM IGE QN: <0.1 KU/L
WHITE ELM IGE QN: <0.1 KU/L
WHITE HICKORY IGE QN: <0.1 KU/L
WHITE MULBERRY IGE QN: <0.1 KU/L
WHITE OAK IGE QN: <0.1 KU/L

## 2023-01-12 ENCOUNTER — TELEMEDICINE (OUTPATIENT)
Dept: PSYCHIATRY | Facility: CLINIC | Age: 38
End: 2023-01-12
Payer: COMMERCIAL

## 2023-01-12 DIAGNOSIS — F31.75 BIPOLAR 1 DISORDER, DEPRESSED, PARTIAL REMISSION: ICD-10-CM

## 2023-01-12 DIAGNOSIS — F90.2 ATTENTION DEFICIT HYPERACTIVITY DISORDER (ADHD), COMBINED TYPE: Primary | ICD-10-CM

## 2023-01-12 PROCEDURE — 99214 OFFICE O/P EST MOD 30 MIN: CPT | Performed by: NURSE PRACTITIONER

## 2023-01-12 RX ORDER — DEXTROAMPHETAMINE SACCHARATE, AMPHETAMINE ASPARTATE MONOHYDRATE, DEXTROAMPHETAMINE SULFATE AND AMPHETAMINE SULFATE 7.5; 7.5; 7.5; 7.5 MG/1; MG/1; MG/1; MG/1
30 CAPSULE, EXTENDED RELEASE ORAL EVERY MORNING
Qty: 30 CAPSULE | Refills: 0 | Status: SHIPPED | OUTPATIENT
Start: 2023-01-12 | End: 2023-01-17 | Stop reason: SDUPTHER

## 2023-01-13 ENCOUNTER — PRIOR AUTHORIZATION (OUTPATIENT)
Dept: PSYCHIATRY | Facility: CLINIC | Age: 38
End: 2023-01-13
Payer: COMMERCIAL

## 2023-01-16 ENCOUNTER — PRIOR AUTHORIZATION (OUTPATIENT)
Dept: PSYCHIATRY | Facility: CLINIC | Age: 38
End: 2023-01-16
Payer: COMMERCIAL

## 2023-01-17 ENCOUNTER — HOSPITAL ENCOUNTER (OUTPATIENT)
Dept: CT IMAGING | Facility: HOSPITAL | Age: 38
Discharge: HOME | End: 2023-01-17
Admitting: INTERNAL MEDICINE
Payer: COMMERCIAL

## 2023-01-17 DIAGNOSIS — F90.2 ATTENTION DEFICIT HYPERACTIVITY DISORDER (ADHD), COMBINED TYPE: ICD-10-CM

## 2023-01-17 DIAGNOSIS — J45.40 MODERATE PERSISTENT ASTHMA WITHOUT COMPLICATION: ICD-10-CM

## 2023-01-17 PROCEDURE — 71250 CT THORAX DX C-: CPT

## 2023-01-17 RX ORDER — DEXTROAMPHETAMINE SACCHARATE, AMPHETAMINE ASPARTATE MONOHYDRATE, DEXTROAMPHETAMINE SULFATE AND AMPHETAMINE SULFATE 7.5; 7.5; 7.5; 7.5 MG/1; MG/1; MG/1; MG/1
30 CAPSULE, EXTENDED RELEASE ORAL EVERY MORNING
Qty: 30 CAPSULE | Refills: 0 | Status: SHIPPED | OUTPATIENT
Start: 2023-01-17 | End: 2023-02-23

## 2023-01-17 NOTE — TELEPHONE ENCOUNTER
Patient's pharmacy is out of stock of Adderall XR 30mg due to [employer] shortage.  Patient would like medication sent to [facility].  Please advise.

## 2023-01-20 ENCOUNTER — TELEPHONE (OUTPATIENT)
Dept: PULMONOLOGY | Facility: CLINIC | Age: 38
End: 2023-01-20
Payer: COMMERCIAL

## 2023-01-20 DIAGNOSIS — Z00.6 EXAMINATION FOR NORMAL COMPARISON OR CONTROL IN CLINICAL RESEARCH: Primary | ICD-10-CM

## 2023-01-20 NOTE — TELEPHONE ENCOUNTER
Patient called stating Blayne showed her the results of her most recent CT scan and she'd like to speak with Dr. Grant as she's concerned re: the findings on the report and doesn't want to wait until her followup in March to learn what the results were. -- Please advise.

## 2023-01-20 NOTE — PROGRESS NOTES
I called the patient and let her know that I reviewed both of her CT scans from [facility] and here.  The left upper lobe nodule has changed in size from 2016 through 2021 but from 2022 to 2023 there has not been a significant change in the nodule.  The right upper lobe nodule out as a new nodule and was not present back in 2022.  Therefore I would like for the patient to continue to follow this nodule.  I will plan to have her get a repeat CT scan in roughly 3 months.  And then I have already sent her a message informing her about the most recent set of labs that we will plan to start Biolog[employer] at the next visit.  She has an appointment to see me back in March.    Electronically signed by Robinson Grant DO, 01/20/23, 2:49 PM EST.

## 2023-02-23 ENCOUNTER — TELEMEDICINE (OUTPATIENT)
Dept: PSYCHIATRY | Facility: CLINIC | Age: 38
End: 2023-02-23
Payer: COMMERCIAL

## 2023-02-23 DIAGNOSIS — F31.75 BIPOLAR 1 DISORDER, DEPRESSED, PARTIAL REMISSION: ICD-10-CM

## 2023-02-23 DIAGNOSIS — F90.2 ATTENTION DEFICIT HYPERACTIVITY DISORDER (ADHD), COMBINED TYPE: Primary | ICD-10-CM

## 2023-02-23 PROCEDURE — 99214 OFFICE O/P EST MOD 30 MIN: CPT | Performed by: NURSE PRACTITIONER

## 2023-02-23 RX ORDER — QUETIAPINE 400 MG/1
400 TABLET, FILM COATED, EXTENDED RELEASE ORAL NIGHTLY
Qty: 30 TABLET | Refills: 1 | Status: SHIPPED | OUTPATIENT
Start: 2023-02-23

## 2023-02-23 RX ORDER — DEXTROAMPHETAMINE SULFATE, DEXTROAMPHETAMINE SACCHARATE, AMPHETAMINE ASPARTATE MONOHYDRATE, AND AMPHETAMINE SULFATE 9.375; 9.375; 9.375; 9.375 MG/1; MG/1; MG/1; MG/1
37.5 CAPSULE, EXTENDED RELEASE ORAL EVERY MORNING
Qty: 30 CAPSULE | Refills: 0 | Status: SHIPPED | OUTPATIENT
Start: 2023-02-23 | End: 2023-03-23

## 2023-03-08 ENCOUNTER — OFFICE VISIT (OUTPATIENT)
Dept: INTERNAL MEDICINE | Facility: CLINIC | Age: 38
End: 2023-03-08
Payer: COMMERCIAL

## 2023-03-08 VITALS
HEART RATE: 64 BPM | BODY MASS INDEX: 44.2 KG/M2 | HEIGHT: 66 IN | TEMPERATURE: 98.4 F | WEIGHT: 275 LBS | SYSTOLIC BLOOD PRESSURE: 110 MMHG | DIASTOLIC BLOOD PRESSURE: 76 MMHG

## 2023-03-08 DIAGNOSIS — F90.2 ATTENTION DEFICIT HYPERACTIVITY DISORDER (ADHD), COMBINED TYPE: Chronic | ICD-10-CM

## 2023-03-08 DIAGNOSIS — R73.03 PREDIABETES: Primary | Chronic | ICD-10-CM

## 2023-03-08 DIAGNOSIS — E66.01 SEVERE OBESITY (BMI >= 40): ICD-10-CM

## 2023-03-08 PROCEDURE — 99214 OFFICE O/P EST MOD 30 MIN: CPT | Performed by: STUDENT IN AN ORGANIZED HEALTH CARE EDUCATION/TRAINING PROGRAM

## 2023-03-08 PROCEDURE — 1159F MED LIST DOCD IN RCRD: CPT | Performed by: STUDENT IN AN ORGANIZED HEALTH CARE EDUCATION/TRAINING PROGRAM

## 2023-03-08 PROCEDURE — 1160F RVW MEDS BY RX/DR IN RCRD: CPT | Performed by: STUDENT IN AN ORGANIZED HEALTH CARE EDUCATION/TRAINING PROGRAM

## 2023-03-08 NOTE — PROGRESS NOTES
"Chief Complaint  Clare Turner is a 37 y.o. female presenting for ADHD (F/u ).     From [place]. . 3 children. 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     Patient has a past medical history of hyperlipidemia, bipolar 1 disorder w/depression and Hx of jack, ADHD, GERD, restless legs, moderate persistent asthma, lung nodule stable for years, history of alcohol and substance abuse, urge/stress incontinence and severe obesity.    History of Present Illness  Patient is here for follow-up.    She did start metformin and has tolerated well.  She has taken 2 of the 500 mg tablets in the morning as she sometimes does not eat supper, but she typically eats breakfast and lunch.    She is inquiring about Ozempic/semaglutide for weight loss.  She is on Medicaid, but will likely get new insurance within the next couple of months.  She did try phentermine in the past that helped somewhat, but she could not be on this medication while on her ADHD medication.    The following portions of the patient's history were reviewed and updated as appropriate: allergies, current medications, past family history, past medical history, past social history, past surgical history and problem list.    Objective  /76 (BP Location: Left arm, Patient Position: Sitting, Cuff Size: Large Adult)   Pulse 64   Temp 98.4 °F (36.9 °C) (Temporal)   Ht 167.6 cm (65.98\")   Wt 125 kg (275 lb)   BMI 44.41 kg/m²     Physical Exam  Constitutional:       Appearance: Normal appearance. She is obese.   HENT:      Head: Normocephalic and atraumatic.   Eyes:      Extraocular Movements: Extraocular movements intact.      Conjunctiva/sclera: Conjunctivae normal.   Pulmonary:      Effort: Pulmonary effort is normal. No respiratory distress.   Musculoskeletal:      Cervical back: Neck supple.   Neurological:      Mental Status: She is alert and oriented to person, place, and time. Mental status is " at baseline.   Psychiatric:         Behavior: Behavior normal.         Thought Content: Thought content normal.         Assessment/Plan   1. Prediabetes  2. Severe obesity (BMI >= 40) (HCC)  Hemoglobin A1C   Date Value Ref Range Status   11/03/2022 5.70 (H) 4.80 - 5.60 % Final   Continue on metformin 500 mg twice daily, I recommend taking it with breakfast and lunch if she does not eat supper.  However she can also take 1000 mg in the morning if that is more convenient.  Although her plan might cover semaglutide/Ozempic for diabetes, it might not be covered for prediabetes or weight loss.  No doubt it would be beneficial for her given her obesity.  She would still like me to order it for her so they can run it with insurance and see if it might be covered.  If not there is possibility with new insurance that it might be covered, she should also look into that possibility when signing up for new insurance.  We will recheck A1c and BMP after starting metformin.  Counseled on side effects of Ozempic including GI upset, severe abdominal pain which would lead to immediate cessation and she should get in touch right away, as this can be concerning for acute pancreatitis.  Medication can also cause increased risk for infections of the urine and yeast infections.  - Semaglutide,0.25 or 0.5MG/DOS, (OZEMPIC) 2 MG/1.5ML solution pen-injector; Inject 0.25 mg under the skin into the appropriate area as directed 1 (One) Time Per Week for 4 days.  Dispense: 1.5 mL; Refill: 0  - Basic Metabolic Panel; Future  - Hemoglobin A1c; Future    3. Attention deficit hyperactivity disorder (ADHD), combined type  Overall stable, continue follow-up with psychiatry on Mydayis.  Not eligible for phentermine for weight loss due to being on this medication.      Return in about 3 months (around 6/8/2023).    Future Appointments       Provider Department Center    3/23/2023 11:30 AM Nani Ball APRN [facility]  [facility]    3/23/2023 2:45 PM Robinson Grant DO [facility] [place]    4/19/2023 9:00 AM Stevenson Coker, [facility]    6/12/2023 9:00 AM Michael Miner MD [facility] INTERNAL MEDICINE [place]            Michael Miner MD  Family Medicine  03/08/2023

## 2023-03-09 ENCOUNTER — PRIOR AUTHORIZATION (OUTPATIENT)
Dept: INTERNAL MEDICINE | Facility: CLINIC | Age: 38
End: 2023-03-09
Payer: COMMERCIAL

## 2023-03-23 ENCOUNTER — TELEMEDICINE (OUTPATIENT)
Dept: PSYCHIATRY | Facility: CLINIC | Age: 38
End: 2023-03-23
Payer: COMMERCIAL

## 2023-03-23 ENCOUNTER — PRIOR AUTHORIZATION (OUTPATIENT)
Dept: PSYCHIATRY | Facility: CLINIC | Age: 38
End: 2023-03-23
Payer: COMMERCIAL

## 2023-03-23 DIAGNOSIS — F31.75 BIPOLAR 1 DISORDER, DEPRESSED, PARTIAL REMISSION: ICD-10-CM

## 2023-03-23 DIAGNOSIS — F90.2 ATTENTION DEFICIT HYPERACTIVITY DISORDER (ADHD), COMBINED TYPE: Primary | ICD-10-CM

## 2023-03-23 PROCEDURE — 1160F RVW MEDS BY RX/DR IN RCRD: CPT | Performed by: NURSE PRACTITIONER

## 2023-03-23 PROCEDURE — 99214 OFFICE O/P EST MOD 30 MIN: CPT | Performed by: NURSE PRACTITIONER

## 2023-03-23 PROCEDURE — 1159F MED LIST DOCD IN RCRD: CPT | Performed by: NURSE PRACTITIONER

## 2023-03-23 RX ORDER — DEXTROAMPHETAMINE SACCHARATE, AMPHETAMINE ASPARTATE, DEXTROAMPHETAMINE SULFATE AND AMPHETAMINE SULFATE 5; 5; 5; 5 MG/1; MG/1; MG/1; MG/1
TABLET ORAL
Qty: 30 TABLET | Refills: 0 | Status: SHIPPED | OUTPATIENT
Start: 2023-03-23

## 2023-03-23 RX ORDER — DEXTROAMPHETAMINE SACCHARATE, AMPHETAMINE ASPARTATE MONOHYDRATE, DEXTROAMPHETAMINE SULFATE AND AMPHETAMINE SULFATE 5; 5; 5; 5 MG/1; MG/1; MG/1; MG/1
40 CAPSULE, EXTENDED RELEASE ORAL EVERY MORNING
Qty: 60 CAPSULE | Refills: 0 | Status: SHIPPED | OUTPATIENT
Start: 2023-03-23

## 2023-03-23 NOTE — PROGRESS NOTES
Patient Name: Clare Turner  MRN: [id number]   :  1985     This provider is located at her home office through the [facility] (through [facility]), [address], [address] using a secure [employer] Video Visit through [employer]. Patient is being seen remotely via telehealth at their home address in [place], and stated they are in a secure environment for this session. The patient's condition being diagnosed/treated is appropriate for telemedicine. The provider identified herself as well as her credentials.   The patient, and/or patients guardian, consent to be seen remotely, and when consent is given they understand that the consent allows for patient identifiable information to be sent to a third party as needed.   They may refuse to be seen remotely at any time. The electronic data is encrypted and password protected, and the patient and/or guardian has been advised of the potential risks to privacy not withstanding such measures.    You have chosen to receive care through a telehealth visit.  Do you consent to use a video/audio connection for your medical care today? Yes    Chief Complaint:      ICD-10-CM ICD-9-CM   1. Attention deficit hyperactivity disorder (ADHD), combined type  F90.2 314.01   2. Bipolar 1 disorder, depressed, partial remission ([facility])  F31.75 296.55       History of Present Illness: Clare Turner is a 37 y.o. female is here today for medication management follow up.  Patient states she feels the Mydayis is making her focus worse.  Coworkers have noticed a lapse in her focus and task completion.  Feels the Adderall was not working great however it was better than the Mydayis.  Patient has been unable to start Seroquel XR as she states the pharmacy said it needs a prior authorization.  Has been taking the short acting broken in half.    The following portions of the patient's history were reviewed and updated as appropriate:  allergies, current medications, past family history, past medical history, past social history, past surgical history and problem list.    Review of Systems;;  Review of Systems   Constitutional: Negative for activity change, appetite change, fatigue, unexpected weight gain and unexpected weight loss.   Respiratory: Negative for shortness of breath and wheezing.    Gastrointestinal: Negative for constipation, diarrhea, nausea and vomiting.   Musculoskeletal: Negative for gait problem.   Skin: Negative for dry skin and rash.   Neurological: Negative for dizziness, speech difficulty, weakness, light-headedness, headache, memory problem and confusion.   Psychiatric/Behavioral: Positive for decreased concentration, depressed mood and stress. Negative for agitation, behavioral problems, dysphoric mood, hallucinations, self-injury, sleep disturbance, suicidal ideas and negative for hyperactivity. The patient is nervous/anxious.        Physical Exam;;  Physical Exam  Constitutional:       General: She is not in acute distress.     Appearance: She is well-developed. She is not diaphoretic.   HENT:      Head: Normocephalic and atraumatic.   Eyes:      Conjunctiva/sclera: Conjunctivae normal.   Pulmonary:      Effort: Pulmonary effort is normal. No respiratory distress.   Musculoskeletal:         General: Normal range of motion.      Cervical back: Full passive range of motion without pain and normal range of motion.   Neurological:      Mental Status: She is alert and oriented to person, place, and time.   Psychiatric:         Mood and Affect: Mood is anxious and depressed. Affect is not labile, blunt, angry or inappropriate.         Speech: Speech is not rapid and pressured or tangential.         Behavior: Behavior normal. Behavior is not agitated, slowed, aggressive, withdrawn, hyperactive or combative. Behavior is cooperative.         Thought Content: Thought content normal. Thought content is not paranoid or delusional.  Thought content does not include homicidal or suicidal ideation. Thought content does not include homicidal or suicidal plan.         Judgment: Judgment normal.       not currently breastfeeding.  There is no height or weight on file to calculate BMI. Video appt unable to obtain.     Current Medications;;    Current Outpatient Medications:   •  albuterol sulfate  (90 Base) MCG/ACT inhaler, Inhale 2 puffs 2 (Two) Times a Day., Disp: 18 g, Rfl: 5  •  amphetamine-dextroamphetamine (Adderall) 20 MG tablet, Take 20 mg orally every afternoon., Disp: 30 tablet, Rfl: 0  •  amphetamine-dextroamphetamine XR (Adderall XR) 20 MG 24 hr capsule, Take 2 capsules by mouth Every Morning, Disp: 60 capsule, Rfl: 0  •  atorvastatin (LIPITOR) 10 MG tablet, Take 1 tablet by mouth Daily., Disp: 30 tablet, Rfl: 5  •  buPROPion XL (WELLBUTRIN XL) 300 MG 24 hr tablet, Take 1 tablet by mouth Daily., Disp: 30 tablet, Rfl: 5  •  busPIRone (BUSPAR) 10 MG tablet, Take 1 tablet by mouth 2 (Two) Times a Day., Disp: 90 tablet, Rfl: 5  •  EPINEPHrine (EPIPEN) 0.3 MG/0.3ML solution auto-injector injection, INJECT 1 PEN IN THE MUSCLE AS DIRECTED AS NEEDED FOR ALLERGIC REACTION, Disp: 2 each, Rfl: 1  •  FLUoxetine (PROzac) 40 MG capsule, Take 2 capsules by mouth Daily. Takes 2 daily, Disp: 30 capsule, Rfl: 5  •  GNP Omeprazole 20 MG tablet delayed-release, Take 20 mg by mouth Daily., Disp: 30 each, Rfl: 5  •  loratadine (CLARITIN) 10 MG tablet, Take 1 tablet by mouth Daily., Disp: 30 tablet, Rfl: 5  •  metFORMIN (Glucophage) 500 MG tablet, Take 1 tablet by mouth 2 (Two) Times a Day With Meals., Disp: 60 tablet, Rfl: 5  •  mometasone-formoterol (Dulera) 200-5 MCG/ACT inhaler, Inhale 2 puffs 2 (Two) Times a Day., Disp: 13 g, Rfl: 11  •  mupirocin (BACTROBAN) 2 % ointment, Apply  topically to the appropriate area as directed 3 (Three) Times a Day., Disp: 15 g, Rfl: 0  •  OXcarbazepine (TRILEPTAL) 600 MG tablet, Take 1 tablet by mouth Daily., Disp:  60 tablet, Rfl: 5  •  prazosin (MINIPRESS) 2 MG capsule, Take 1 capsule by mouth Every Night., Disp: 30 capsule, Rfl: 5  •  QUEtiapine XR (SEROquel XR) 400 MG 24 hr tablet, Take 1 tablet by mouth Every Night., Disp: 30 tablet, Rfl: 1    Lab Results:   Admission on 01/02/2023, Discharged on 01/02/2023   Component Date Value Ref Range Status   • Culture 01/02/2023 Final report (A)   Final   • Result 1 01/02/2023 Comment (A)   Final    Beta hemolytic Streptococcus, group B  Moderate growth  Penicillin and ampicillin are drugs of choice for treatment of  beta-hemolytic streptococcal infections. Susceptibility testing of  penicillins and other beta-lactam agents approved by the FDA for  treatment of beta-hemolytic streptococcal infections need not be  performed routinely because nonsusceptible isolates are extremely  rare in any beta-hemolytic streptococcus and have not been reported  for Streptococcus pyogenes (group A). (CLSI)   Lab on 01/02/2023   Component Date Value Ref Range Status   • Chlamydia trachomatis, FAZAL 01/02/2023 Negative  Negative Final   • Neisseria gonorrhoeae, FAZAL 01/02/2023 Negative  Negative Final   • Class Description 01/02/2023 Comment   Final        Levels of Specific IgE       Class  Description of Class      ---------------------------  -----  --------------------                     < 0.10         0         Negative             0.10 -    0.31         0/I       Equivocal/Low             0.32 -    0.55         I         Low             0.56 -    1.40         II        Moderate             1.41 -    3.90         III       High             3.91 -   19.00         IV        Very High            19.01 -  100.00         V         Very High                    >100.00         VI        Very High   • D. pteronyssinus (dust mite) 01/02/2023 6.17 (A)  Class IV kU/L Final   • D. farinae (dust mite) 01/02/2023 4.38 (A)  Class IV kU/L Final   • Cat Dander 01/02/2023 1.14 (A)  Class II kU/L Final   • Dog  Dander, IgE 01/02/2023 0.31 (A)  Class 0/I kU/L Final   • [place] Grass 01/02/2023 <0.10  Class 0 kU/L Final   • [place] IgE 01/02/2023 <0.10  Class 0 kU/L Final   • [place] Grass 01/02/2023 <0.10  Class 0 kU/L Final   • [place] Grass 01/02/2023 <0.10  Class 0 kU/L Final   • Cockroach, American 01/02/2023 <0.10  Class 0 kU/L Final   • Penicillium chrysogen 01/02/2023 <0.10  Class 0 kU/L Final   • Cladosporium herbarum 01/02/2023 <0.10  Class 0 kU/L Final   • Aspergillus fumigatus 01/02/2023 <0.10  Class 0 kU/L Final   • Mucor racemosus 01/02/2023 <0.10  Class 0 kU/L Final   • Alternaria alternata 01/02/2023 <0.10  Class 0 kU/L Final   • Stemphylium herbarum 01/02/2023 <0.10  Class 0 kU/L Final   • [place], White 01/02/2023 <0.10  Class 0 kU/L Final   • Elm, American 01/02/2023 <0.10  Class 0 kU/L Final   • Maple/[place] 01/02/2023 <0.10  Class 0 kU/L Final   • Hazelnut Tree 01/02/2023 <0.10  Class 0 kU/L Final   • [place], White 01/02/2023 <0.10  Class 0 kU/L Final   • Maple [place] 01/02/2023 <0.10  Class 0 kU/L Final   • [place] 01/02/2023 <0.10  Class 0 kU/L Final   • [place], Mountain 01/02/2023 <0.10  Class 0 kU/L Final   • Sweet Gum 01/02/2023 <0.10  Class 0 kU/L Final   • Ragweed, Common/Short 01/02/2023 1.27 (A)  Class II kU/L Final   • Mugwort 01/02/2023 <0.10  Class 0 kU/L Final   • English Plantain 01/02/2023 <0.10  Class 0 kU/L Final   • Pigweed, Rough/Common 01/02/2023 <0.10  Class 0 kU/L Final   • Sheep Sorrel 01/02/2023 <0.10  Class 0 kU/L Final   • Nettle 01/02/2023 <0.10  Class 0 kU/L Final   • ANTI-MPO ANTIBODIES 01/02/2023 <0.2  0.0 - 0.9 units Final   • ANTI-PR3 ANTIBODIES 01/02/2023 <0.2  0.0 - 0.9 units Final   • C-ANCA 01/02/2023 1:160 (H)  Neg:<1:20 titer Final   • P-ANCA 01/02/2023 <1:20  Neg:<1:20 titer Final    The presence of positive fluorescence exhibiting P-ANCA or C-ANCA  patterns alone is not specific for the diagnosis of Wegener's  Granulomatosis (WG) or  microscopic polyangiitis. Decisions about  treatment should not be based solely on ANCA IFA results.  The  International ANCA Group Consensus recommends follow up testing of  positive sera with both NE-3 and MPO-ANCA enzyme immunoassays. As  many as 5% serum samples are positive only by EIA.  Ref. AM J Clin Pathol 1999;111:507-513.   • Atypical pANCA 01/02/2023 <1:20  Neg:<1:20 titer Final    The atypical pANCA pattern has been observed in a significant  percentage of patients with ulcerative colitis, primary sclerosing  cholangitis and autoimmune hepatitis.   • Aspergillus fumigatus 01/02/2023 <0.10  Class 0 kU/L Final        Levels of Specific IgE       Class  Description of Class      ---------------------------  -----  --------------------                     < 0.10         0         Negative             0.10 -    0.31         0/I       Equivocal/Low             0.32 -    0.55         I         Low             0.56 -    1.40         II        Moderate             1.41 -    3.90         III       High             3.91 -   19.00         IV        Very High            19.01 -  100.00         V         Very High                    >100.00         VI        Very High   • IgE 01/02/2023 452  6 - 495 IU/mL Final   • WBC 01/02/2023 20.07 (H)  3.40 - 10.80 10*3/mm3 Final   • RBC 01/02/2023 4.63  3.77 - 5.28 10*6/mm3 Final   • Hemoglobin 01/02/2023 14.0  12.0 - 15.9 g/dL Final   • Hematocrit 01/02/2023 42.4  34.0 - 46.6 % Final   • MCV 01/02/2023 91.6  79.0 - 97.0 fL Final   • MCH 01/02/2023 30.2  26.6 - 33.0 pg Final   • MCHC 01/02/2023 33.0  31.5 - 35.7 g/dL Final   • RDW 01/02/2023 13.0  12.3 - 15.4 % Final   • RDW-SD 01/02/2023 43.5  37.0 - 54.0 fl Final   • MPV 01/02/2023 11.2  6.0 - 12.0 fL Final   • Platelets 01/02/2023 417  140 - 450 10*3/mm3 Final   • Neutrophil % 01/02/2023 74.4  42.7 - 76.0 % Final   • Lymphocyte % 01/02/2023 17.5 (L)  19.6 - 45.3 % Final   • Monocyte % 01/02/2023 6.1  5.0 - 12.0 % Final   •  Eosinophil % 01/02/2023 1.3  0.3 - 6.2 % Final   • Basophil % 01/02/2023 0.3  0.0 - 1.5 % Final   • Immature Grans % 01/02/2023 0.4  0.0 - 0.5 % Final   • Neutrophils, Absolute 01/02/2023 14.91 (H)  1.70 - 7.00 10*3/mm3 Final   • Lymphocytes, Absolute 01/02/2023 3.52 (H)  0.70 - 3.10 10*3/mm3 Final   • Monocytes, Absolute 01/02/2023 1.23 (H)  0.10 - 0.90 10*3/mm3 Final   • Eosinophils, Absolute 01/02/2023 0.26  0.00 - 0.40 10*3/mm3 Final   • Basophils, Absolute 01/02/2023 0.07  0.00 - 0.20 10*3/mm3 Final   • Immature Grans, Absolute 01/02/2023 0.08 (H)  0.00 - 0.05 10*3/mm3 Final   • nRBC 01/02/2023 0.0  0.0 - 0.2 /100 WBC Final   Results Encounter on 12/25/2022   Component Date Value Ref Range Status   • Report Summary 01/02/2023 FINAL   Final    Comment: ====================================================================  [facility] COMP DRUG ANALYSIS,UR  ====================================================================  Specimen Alert  Note:  Urinary creatinine is low; ability to detect some  drugs may be compromised.  Interpret results  with caution.  ====================================================================  Test                             Result       Flag       Units  Drug Present    Amphetamine                    2259                    ng/mg creat     Amphetamine is available as a schedule II prescription drug.    Oxcarbazepine MHD              PRESENT     Oxcarbazepine MHD is the active metabolite of oxcarbazepine and     eslicarbazepine.    Hydroxybupropion               PRESENT     Hydroxybupropion is an expected metabolite of bupropion.    Fluoxetine                     PRESENT    Norfluoxetine                  PRESENT     Norfluoxetine is an expected metabolite of                            fluoxetine.  ====================================================================  Test                      Result    Flag   Units      Ref Range    Creatinine              17        L      mg/dL       >=20  ====================================================================  Declared Medications:   Medication list was not provided.  ====================================================================  For clinical consultation, please call [phone].  ====================================================================       Mental Status Exam:   Hygiene:   good  Cooperation:  Cooperative  Eye Contact:  Good  Psychomotor Behavior:  Appropriate  Mood:anxious and depressed  Affect:  Appropriate  Hopelessness: Denies  Speech:  Normal  Thought Process:  Goal directed  Thought Content:  Normal  Suicidal:  None  Homicidal:  None  Hallucinations:  None  Delusion:  None  Memory:  Intact  Orientation:  Person, Place, Time and Situation  Reliability:  good  Insight:  Good  Judgement:  Good  Impulse Control:  Good    PHQ-9 Depression Screening  Little interest or pleasure in doing things? (P) 1-->several days   Feeling down, depressed, or hopeless? (P) 0-->not at all   Trouble falling or staying asleep, or sleeping too much? (P) 1-->several days   Feeling tired or having little energy? (P) 1-->several days   Poor appetite or overeating? (P) 1-->several days   Feeling bad about yourself - or that you are a failure or have let yourself or your family down? (P) 0-->not at all   Trouble concentrating on things, such as reading the newspaper or watching television? (P) 3-->nearly every day   Moving or speaking so slowly that other people could have noticed? Or the opposite - being so fidgety or restless that you have been moving around a lot more than usual? (P) 1-->several days   Thoughts that you would be better off dead, or of hurting yourself in some way? (P) 0-->not at all   PHQ-9 Total Score (P) 8   If you checked off any problems, how difficult have these problems made it for you to do your work, take care of things at home, or get along with other people? (P) extremely difficult        Assessment/Plan:  Diagnoses  and all orders for this visit:    1. Attention deficit hyperactivity disorder (ADHD), combined type (Primary)  -     amphetamine-dextroamphetamine XR (Adderall XR) 20 MG 24 hr capsule; Take 2 capsules by mouth Every Morning  Dispense: 60 capsule; Refill: 0  -     amphetamine-dextroamphetamine (Adderall) 20 MG tablet; Take 20 mg orally every afternoon.  Dispense: 30 tablet; Refill: 0    2. Bipolar 1 disorder, depressed, partial remission (HCC)    We will discontinue Mydayis.  We will go back to Adderall however adjust dosing.  We will start Adderall XR 20 mg capsules take 2 every morning.  We will add Adderall 20 mg tablets every afternoon.  We will follow up with prior authorization for Seroquel XR.    A psychological evaluation was conducted in order to assess past and current level of functioning. Areas assessed included, but were not limited to: perception of social support, perception of ability to face and deal with challenges in life (positive functioning), anxiety symptoms, depressive symptoms, perspective on beliefs/belief system, coping skills for stress, intelligence level,  Therapeutic rapport was established. Interventions conducted today were geared towards incorporating medication management along with support for continued therapy. Education was also provided as to the med management with this provider and what to expect in subsequent sessions.    We discussed risks, benefits,goals and side effects of the above medication and the patient was agreeable with the plan.Patient was educated on the importance of compliance with treatment and follow-up appointments. Patient is aware to contact the [facility] [phone] with any worsening of symptoms. To call for questions or concerns and return early if necessary. Patent is agreeable to go to the Emergency Department or call 911 should they begin SI/HI.     Part of this note may be an electronic transcription/translation of  spoken language to printed text using the Dragon Dictation System.    Return in about 4 weeks (around 4/20/2023) for Follow Up 30 min, Video visit.    Nani Ball, APRN

## 2023-04-04 ENCOUNTER — PRIOR AUTHORIZATION (OUTPATIENT)
Dept: PSYCHIATRY | Facility: CLINIC | Age: 38
End: 2023-04-04
Payer: COMMERCIAL

## 2023-04-05 DIAGNOSIS — F31.75 BIPOLAR 1 DISORDER, DEPRESSED, PARTIAL REMISSION: Chronic | ICD-10-CM

## 2023-04-05 DIAGNOSIS — J30.89 NON-SEASONAL ALLERGIC RHINITIS, UNSPECIFIED TRIGGER: ICD-10-CM

## 2023-04-05 DIAGNOSIS — E78.2 MIXED HYPERLIPIDEMIA: ICD-10-CM

## 2023-04-05 RX ORDER — BUSPIRONE HYDROCHLORIDE 10 MG/1
TABLET ORAL
Qty: 90 TABLET | Refills: 5 | Status: SHIPPED | OUTPATIENT
Start: 2023-04-05

## 2023-04-05 RX ORDER — ATORVASTATIN CALCIUM 10 MG/1
10 TABLET, FILM COATED ORAL DAILY
Qty: 30 TABLET | Refills: 5 | Status: SHIPPED | OUTPATIENT
Start: 2023-04-05

## 2023-04-05 RX ORDER — LORATADINE 10 MG/1
TABLET ORAL
Qty: 30 TABLET | Refills: 5 | Status: SHIPPED | OUTPATIENT
Start: 2023-04-05

## 2023-04-05 NOTE — TELEPHONE ENCOUNTER
Rx Refill Note  Requested Prescriptions     Pending Prescriptions Disp Refills   • loratadine (CLARITIN) 10 MG tablet [Pharmacy Med Name: LORATADINE 10MG TABLETS] 30 tablet 5     Sig: TAKE 1 TABLET BY MOUTH EVERY DAY   • busPIRone (BUSPAR) 10 MG tablet [Pharmacy Med Name: BUSPIRONE 10MG TABLETS] 90 tablet 5     Sig: TAKE 1 TABLET BY MOUTH THREE TIMES DAILY   • atorvastatin (LIPITOR) 10 MG tablet [Pharmacy Med Name: ATORVASTATIN 10MG TABLETS] 30 tablet 5     Sig: TAKE 1 TABLET BY MOUTH DAILY      Last office visit with prescribing clinician: 3/8/2023   Last telemedicine visit with prescribing clinician: 6/12/2023   Next office visit with prescribing clinician: 6/12/2023                         Would you like a call back once the refill request has been completed: [] Yes [] No    If the office needs to give you a call back, can they leave a voicemail: [] Yes [] No    Taylor Cerna LPN  04/05/23, 07:34 EDT

## 2023-04-12 ENCOUNTER — TELEPHONE (OUTPATIENT)
Dept: PULMONOLOGY | Facility: CLINIC | Age: 38
End: 2023-04-12
Payer: COMMERCIAL

## 2023-04-12 RX ORDER — FLUTICASONE FUROATE AND VILANTEROL 200; 25 UG/1; UG/1
1 POWDER RESPIRATORY (INHALATION)
Qty: 60 EACH | Refills: 5 | Status: SHIPPED | OUTPATIENT
Start: 2023-04-12

## 2023-04-18 ENCOUNTER — TELEPHONE (OUTPATIENT)
Dept: INTERNAL MEDICINE | Facility: CLINIC | Age: 38
End: 2023-04-18

## 2023-04-18 NOTE — TELEPHONE ENCOUNTER
Prednisone eyedrops will be prescribed by eye doctor for patients with chronic eye disease of some type.  If it is significant inflammation of the eyes she should come in for a visit.

## 2023-04-18 NOTE — TELEPHONE ENCOUNTER
Caller: Clare Turner    Relationship: Self    Best call back number: [phone]    What medication are you requesting: PREDNISONE EYE DROPS    What are your current symptoms: INFLAMMATION IN EYES    How long have you been experiencing symptoms:     Have you had these symptoms before:    [x] Yes  [] No    Have you been treated for these symptoms before:   [x] Yes  [] No    If a prescription is needed, what is your preferred pharmacy and phone number: [id number] [place] [address] [address] AT [facility] - [phone]  - [phone] FX     Additional notes: PATIENT STATES THIS IS A RECURRING ISSUE

## 2023-04-19 ENCOUNTER — OFFICE VISIT (OUTPATIENT)
Dept: INTERNAL MEDICINE | Facility: CLINIC | Age: 38
End: 2023-04-19
Payer: COMMERCIAL

## 2023-04-19 ENCOUNTER — TELEMEDICINE (OUTPATIENT)
Dept: PSYCHIATRY | Facility: CLINIC | Age: 38
End: 2023-04-19
Payer: COMMERCIAL

## 2023-04-19 VITALS
SYSTOLIC BLOOD PRESSURE: 122 MMHG | HEIGHT: 66 IN | HEART RATE: 60 BPM | WEIGHT: 272.8 LBS | BODY MASS INDEX: 43.84 KG/M2 | TEMPERATURE: 97.8 F | DIASTOLIC BLOOD PRESSURE: 80 MMHG

## 2023-04-19 DIAGNOSIS — B37.9 YEAST INFECTION: ICD-10-CM

## 2023-04-19 DIAGNOSIS — H15.101 EPISCLERITIS OF RIGHT EYE: Primary | Chronic | ICD-10-CM

## 2023-04-19 DIAGNOSIS — F90.2 ATTENTION DEFICIT HYPERACTIVITY DISORDER, COMBINED TYPE: ICD-10-CM

## 2023-04-19 DIAGNOSIS — N30.00 ACUTE CYSTITIS WITHOUT HEMATURIA: ICD-10-CM

## 2023-04-19 DIAGNOSIS — F31.75 BIPOLAR 1 DISORDER, DEPRESSED, PARTIAL REMISSION: Primary | ICD-10-CM

## 2023-04-19 DIAGNOSIS — E66.01 SEVERE OBESITY (BMI >= 40): ICD-10-CM

## 2023-04-19 LAB
BILIRUB BLD-MCNC: NEGATIVE MG/DL
CLARITY, POC: CLEAR
COLOR UR: ABNORMAL
EXPIRATION DATE: ABNORMAL
GLUCOSE UR STRIP-MCNC: NEGATIVE MG/DL
KETONES UR QL: NEGATIVE
LEUKOCYTE EST, POC: ABNORMAL
Lab: ABNORMAL
NITRITE UR-MCNC: NEGATIVE MG/ML
PH UR: 6 [PH] (ref 5–8)
PROT UR STRIP-MCNC: NEGATIVE MG/DL
RBC # UR STRIP: NEGATIVE /UL
SP GR UR: 1.01 (ref 1–1.03)
UROBILINOGEN UR QL: ABNORMAL

## 2023-04-19 RX ORDER — FLUCONAZOLE 150 MG/1
150 TABLET ORAL ONCE AS NEEDED
Qty: 1 TABLET | Refills: 0 | Status: SHIPPED | OUTPATIENT
Start: 2023-04-19

## 2023-04-19 RX ORDER — PREDNISOLONE ACETATE 10 MG/ML
1 SUSPENSION/ DROPS OPHTHALMIC 3 TIMES DAILY
Qty: 5 ML | Refills: 0 | Status: SHIPPED | OUTPATIENT
Start: 2023-04-19 | End: 2023-04-29

## 2023-04-19 RX ORDER — NITROFURANTOIN 25; 75 MG/1; MG/1
100 CAPSULE ORAL 2 TIMES DAILY
Qty: 6 CAPSULE | Refills: 0 | Status: SHIPPED | OUTPATIENT
Start: 2023-04-19 | End: 2023-04-22

## 2023-04-19 RX ORDER — DICYCLOMINE HYDROCHLORIDE 10 MG/1
CAPSULE ORAL
COMMUNITY
Start: 2023-03-30

## 2023-04-19 RX ORDER — PREDNISOLONE ACETATE 10 MG/ML
1 SUSPENSION/ DROPS OPHTHALMIC 3 TIMES DAILY
COMMUNITY
End: 2023-04-19 | Stop reason: SDUPTHER

## 2023-04-19 RX ORDER — ONDANSETRON 4 MG/1
4 TABLET, FILM COATED ORAL EVERY 6 HOURS PRN
COMMUNITY
Start: 2023-03-30
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
## 2023-04-19 NOTE — PROGRESS NOTES
Chief Complaint  Clare Turner is a 37 y.o. female presenting for Eyeball inflamed (Right ) and Urinary Frequency (Pain ).     From [place]. . 3 children. 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     Patient has a past medical history of hyperlipidemia, bipolar 1 disorder w/depression and Hx of jack, ADHD, GERD, episcleritis, restless legs, moderate persistent asthma, lung nodule stable for years, history of alcohol and substance abuse, urge/stress incontinence and severe obesity.    History of Present Illness  Patient is here due to flareup of her right eye.  I was not aware, but she does have history of this that has been treated by her optometrist.  She never saw medical eye doctor/ophthalmologist.  Does not have a history of rheumatic disease.  She tried to get in touch with her optometrist but was not able to get in.  She also reports family history of glaucoma.  She does have flareups about 6 times a year, so every 2 months on average.  She reports irritation and some light sensitivity of her right eye    Since yesterday she also has been experiencing increased urinary frequency, urinating every hour, feeling pressure and painful urination.  Also some lower back pain, but no flank pain.  No hematuria.  She was recently done with her period.    Of note she was recently seen at [facility] with concern for pancreatitis, blood work and CT scan were normal.  We will request records.  Symptoms now resolved.    The following portions of the patient's history were reviewed and updated as appropriate: allergies, current medications, past family history, past medical history, past social history, past surgical history and problem list.    Images captured per patient verbal consent:            Objective  /80 (BP Location: Left arm, Patient Position: Sitting, Cuff Size: Large Adult)   Pulse 60   Temp 97.8 °F (36.6 °C) (Temporal)   Ht  "167.6 cm (65.98\")   Wt 124 kg (272 lb 12.8 oz)   BMI 44.05 kg/m²     Physical Exam  Constitutional:       Appearance: Normal appearance.   HENT:      Head: Normocephalic and atraumatic.   Eyes:      General: No scleral icterus.        Right eye: No discharge.         Left eye: No discharge.      Extraocular Movements: Extraocular movements intact.      Conjunctiva/sclera:      Right eye: Right conjunctiva is injected.      Left eye: Left conjunctiva is not injected.      Pupils: Pupils are equal, round, and reactive to light.     Pulmonary:      Effort: Pulmonary effort is normal. No respiratory distress.   Abdominal:      Tenderness: There is no right CVA tenderness or left CVA tenderness.   Musculoskeletal:      Cervical back: Neck supple.   Neurological:      Mental Status: She is alert and oriented to person, place, and time. Mental status is at baseline.   Psychiatric:         Behavior: Behavior normal.         Thought Content: Thought content normal.         Assessment/Plan   1. Episcleritis of right eye  Flareup of episcleritis, she has recurrent episodes of both eyes.  This needs to be monitored by eye doctor, and she also has family history of glaucoma.  I will prescribe her 10-day course of prednisone eyedrops, but this would need to be managed by eye doctor.  I will refer her to ophthalmology for evaluation.  - Ambulatory Referral to Ophthalmology  - prednisoLONE acetate (PRED FORTE) 1 % ophthalmic suspension; Administer 1 drop to the right eye 3 (Three) Times a Day for 10 days.  Dispense: 5 mL; Refill: 0    2. Acute cystitis without hematuria  Very clear symptoms.  We will culture her urine.  She feels chance of STI is very low.  She typically gets yeast infection after antibiotics, so I will prescribe her for as needed use.  - POC Urinalysis Dipstick, Automated  - Urine Culture - Urine, Urine, Clean Catch; Future  - nitrofurantoin, macrocrystal-monohydrate, (Macrobid) 100 MG capsule; Take 1 capsule by " mouth 2 (Two) Times a Day for 3 days.  Dispense: 6 capsule; Refill: 0    3. Yeast infection  - fluconazole (Diflucan) 150 MG tablet; Take 1 tablet by mouth 1 (One) Time As Needed (With symptoms of yeast infection) for up to 1 dose.  Dispense: 1 tablet; Refill: 0    4. Severe obesity (BMI >= 40)  Class 3 Severe Obesity (BMI >=40). Obesity-related health conditions include the following: dyslipidemias and GERD. Obesity is unchanged. BMI is is above average; BMI management plan is completed. We discussed pharmacologic options including Wegovy/Ozempic/semaglutide.  Patient will possibly get new insurance through her employer, and potentially may qualify for medication for weight loss..    Return if symptoms worsen or fail to improve, for Next scheduled follow up.    Future Appointments       Provider Department Center    4/20/2023 11:30 AM Nani Ball APRN [facility]    5/1/2023 2:30 PM Robinson Grant DO [facility] [place]    5/18/2023 8:30 AM Stevenson Coker, MANOJW [facility]    6/12/2023 9:00 AM Michael Miner MD [facility] [place]            Michael Miner MD  Family Medicine  04/19/2023

## 2023-04-19 NOTE — Clinical Note
Please request CT scan, lab work and EGD report from [facility] in [place].  She was there within a few weeks ago.  Thanks

## 2023-04-20 ENCOUNTER — TELEMEDICINE (OUTPATIENT)
Dept: PSYCHIATRY | Facility: CLINIC | Age: 38
End: 2023-04-20
Payer: COMMERCIAL

## 2023-04-20 ENCOUNTER — TELEPHONE (OUTPATIENT)
Dept: INTERNAL MEDICINE | Facility: CLINIC | Age: 38
End: 2023-04-20
Payer: COMMERCIAL

## 2023-04-20 DIAGNOSIS — F90.2 ATTENTION DEFICIT HYPERACTIVITY DISORDER, COMBINED TYPE: ICD-10-CM

## 2023-04-20 DIAGNOSIS — N30.00 ACUTE CYSTITIS WITHOUT HEMATURIA: Primary | ICD-10-CM

## 2023-04-20 DIAGNOSIS — F31.75 BIPOLAR 1 DISORDER, DEPRESSED, PARTIAL REMISSION: Primary | ICD-10-CM

## 2023-04-20 DIAGNOSIS — F90.2 ATTENTION DEFICIT HYPERACTIVITY DISORDER (ADHD), COMBINED TYPE: ICD-10-CM

## 2023-04-20 PROCEDURE — 1160F RVW MEDS BY RX/DR IN RCRD: CPT | Performed by: NURSE PRACTITIONER

## 2023-04-20 PROCEDURE — 99214 OFFICE O/P EST MOD 30 MIN: CPT | Performed by: NURSE PRACTITIONER

## 2023-04-20 PROCEDURE — 1159F MED LIST DOCD IN RCRD: CPT | Performed by: NURSE PRACTITIONER

## 2023-04-20 PROCEDURE — 87086 URINE CULTURE/COLONY COUNT: CPT | Performed by: STUDENT IN AN ORGANIZED HEALTH CARE EDUCATION/TRAINING PROGRAM

## 2023-04-20 RX ORDER — PHENAZOPYRIDINE HYDROCHLORIDE 200 MG/1
200 TABLET, FILM COATED ORAL 3 TIMES DAILY PRN
Qty: 6 TABLET | Refills: 0 | Status: SHIPPED | OUTPATIENT
Start: 2023-04-20 | End: 2023-04-22

## 2023-04-20 RX ORDER — BUPROPION HYDROCHLORIDE 300 MG/1
300 TABLET ORAL DAILY
Qty: 30 TABLET | Refills: 5 | Status: SHIPPED | OUTPATIENT
Start: 2023-04-20

## 2023-04-20 RX ORDER — DEXTROAMPHETAMINE SACCHARATE, AMPHETAMINE ASPARTATE, DEXTROAMPHETAMINE SULFATE AND AMPHETAMINE SULFATE 5; 5; 5; 5 MG/1; MG/1; MG/1; MG/1
TABLET ORAL
Qty: 30 TABLET | Refills: 0 | Status: SHIPPED | OUTPATIENT
Start: 2023-04-20

## 2023-04-20 RX ORDER — QUETIAPINE 400 MG/1
400 TABLET, FILM COATED, EXTENDED RELEASE ORAL NIGHTLY
Qty: 30 TABLET | Refills: 6 | Status: SHIPPED | OUTPATIENT
Start: 2023-04-20

## 2023-04-20 RX ORDER — DEXTROAMPHETAMINE SACCHARATE, AMPHETAMINE ASPARTATE MONOHYDRATE, DEXTROAMPHETAMINE SULFATE AND AMPHETAMINE SULFATE 5; 5; 5; 5 MG/1; MG/1; MG/1; MG/1
40 CAPSULE, EXTENDED RELEASE ORAL EVERY MORNING
Qty: 60 CAPSULE | Refills: 0 | Status: SHIPPED | OUTPATIENT
Start: 2023-04-20

## 2023-04-20 RX ORDER — FLUOXETINE HYDROCHLORIDE 20 MG/1
20 CAPSULE ORAL DAILY
Qty: 30 CAPSULE | Refills: 0 | Status: SHIPPED | OUTPATIENT
Start: 2023-04-20 | End: 2024-04-19

## 2023-04-20 NOTE — PROGRESS NOTES
I HAVE SPOKEN WITH VIVIAN, [facility] [phone] [employer] AND SHE IS FAXING THE LAST CT SCAN, LAB WORK AND EGD.

## 2023-04-20 NOTE — TELEPHONE ENCOUNTER
Caller: Clare Turner    Relationship: Self    Best call back number: [phone]    What medication are you requesting: PERIDIUM    What are your current symptoms: UTI    How long have you been experiencing symptoms: 3 DAYS    Have you had these symptoms before:    [x] Yes  [] No    Have you been treated for these symptoms before:   [x] Yes  [] No    If a prescription is needed, what is your preferred pharmacy and phone number: [id number] [place] [id number] [address] AT [facility] [address] - [phone]  - [phone] FX     Additional notes: PATIENT WOULD LIKE THIS ON TOP OF THE ANTIBIOTICS PRESCRIBED YESTERDAY.

## 2023-04-20 NOTE — PROGRESS NOTES
Patient Name: Clare uTrner  MRN: [id number]   :  1985     This provider is located at her home office through the [facility] (through [facility]), [address], [address] using a secure [employer] Video Visit through [facility]. Patient is being seen remotely via telehealth at their home address in [place], and stated they are in a secure environment for this session. The patient's condition being diagnosed/treated is appropriate for telemedicine. The provider identified herself as well as her credentials.   The patient, and/or patients guardian, consent to be seen remotely, and when consent is given they understand that the consent allows for patient identifiable information to be sent to a third party as needed.   They may refuse to be seen remotely at any time. The electronic data is encrypted and password protected, and the patient and/or guardian has been advised of the potential risks to privacy not withstanding such measures.    You have chosen to receive care through a telehealth visit.  Do you consent to use a video/audio connection for your medical care today? Yes    Chief Complaint:      ICD-10-CM ICD-9-CM   1. Bipolar 1 disorder, depressed, partial remission  F31.75 296.55   2. Attention deficit hyperactivity disorder, combined type  F90.2 314.01   3. Attention deficit hyperactivity disorder (ADHD), combined type  F90.2 314.01       History of Present Illness: Clare Turner is a 37 y.o. female is here today for medication management follow up.  Patient states depression has been up.  States Monday was a rough day.  Has been crying.  Sleeping all day.  Family dynamics with son and son's grandmother not getting along.    The following portions of the patient's history were reviewed and updated as appropriate: allergies, current medications, past family history, past medical history, past social history, past surgical history and problem  list.    Review of Systems;;  Review of Systems   Constitutional: Negative for activity change, appetite change, fatigue, unexpected weight gain and unexpected weight loss.   Respiratory: Negative for shortness of breath and wheezing.    Gastrointestinal: Negative for constipation, diarrhea, nausea and vomiting.   Musculoskeletal: Negative for gait problem.   Skin: Negative for dry skin and rash.   Neurological: Negative for dizziness, speech difficulty, weakness, light-headedness, headache, memory problem and confusion.   Psychiatric/Behavioral: Positive for depressed mood and stress. Negative for agitation, behavioral problems, decreased concentration, dysphoric mood, hallucinations, self-injury, sleep disturbance, suicidal ideas and negative for hyperactivity. The patient is not nervous/anxious.        Physical Exam;;  Physical Exam  Constitutional:       General: She is not in acute distress.     Appearance: She is well-developed. She is not diaphoretic.   HENT:      Head: Normocephalic and atraumatic.   Eyes:      Conjunctiva/sclera: Conjunctivae normal.   Pulmonary:      Effort: Pulmonary effort is normal. No respiratory distress.   Musculoskeletal:         General: Normal range of motion.      Cervical back: Full passive range of motion without pain and normal range of motion.   Neurological:      Mental Status: She is alert and oriented to person, place, and time.   Psychiatric:         Mood and Affect: Mood is depressed. Mood is not anxious. Affect is not labile, blunt, angry or inappropriate.         Speech: Speech is not rapid and pressured or tangential.         Behavior: Behavior normal. Behavior is not agitated, slowed, aggressive, withdrawn, hyperactive or combative. Behavior is cooperative.         Thought Content: Thought content normal. Thought content is not paranoid or delusional. Thought content does not include homicidal or suicidal ideation. Thought content does not include homicidal or  suicidal plan.         Judgment: Judgment normal.       not currently breastfeeding.  There is no height or weight on file to calculate BMI. Video appt unable to obtain.     Current Medications;;    Current Outpatient Medications:   •  amphetamine-dextroamphetamine (Adderall) 20 MG tablet, Take 20 mg orally every afternoon., Disp: 30 tablet, Rfl: 0  •  amphetamine-dextroamphetamine XR (Adderall XR) 20 MG 24 hr capsule, Take 2 capsules by mouth Every Morning, Disp: 60 capsule, Rfl: 0  •  buPROPion XL (WELLBUTRIN XL) 300 MG 24 hr tablet, Take 1 tablet by mouth Daily., Disp: 30 tablet, Rfl: 5  •  QUEtiapine XR (SEROquel XR) 400 MG 24 hr tablet, Take 1 tablet by mouth Every Night., Disp: 30 tablet, Rfl: 6  •  albuterol sulfate  (90 Base) MCG/ACT inhaler, Inhale 2 puffs 2 (Two) Times a Day., Disp: 18 g, Rfl: 5  •  atorvastatin (LIPITOR) 10 MG tablet, TAKE 1 TABLET BY MOUTH DAILY, Disp: 30 tablet, Rfl: 5  •  busPIRone (BUSPAR) 10 MG tablet, TAKE 1 TABLET BY MOUTH THREE TIMES DAILY, Disp: 90 tablet, Rfl: 5  •  dicyclomine (BENTYL) 10 MG capsule, TAKE 1 CAPSULE BY MOUTH EVERY 6 HOURS AS NEEDED, Disp: , Rfl:   •  EPINEPHrine (EPIPEN) 0.3 MG/0.3ML solution auto-injector injection, INJECT 1 PEN IN THE MUSCLE AS DIRECTED AS NEEDED FOR ALLERGIC REACTION, Disp: 2 each, Rfl: 1  •  fluconazole (Diflucan) 150 MG tablet, Take 1 tablet by mouth 1 (One) Time As Needed (With symptoms of yeast infection) for up to 1 dose., Disp: 1 tablet, Rfl: 0  •  FLUoxetine (PROzac) 20 MG capsule, Take 1 capsule by mouth Daily., Disp: 30 capsule, Rfl: 0  •  Fluticasone Furoate-Vilanterol (BREO ELLIPTA) 200-25 MCG/ACT inhaler, Inhale 1 puff Daily. 1 inhalation once a day, Disp: 60 each, Rfl: 5  •  GNP Omeprazole 20 MG tablet delayed-release, Take 20 mg by mouth Daily., Disp: 30 each, Rfl: 5  •  loratadine (CLARITIN) 10 MG tablet, TAKE 1 TABLET BY MOUTH EVERY DAY, Disp: 30 tablet, Rfl: 5  •  metFORMIN (Glucophage) 500 MG tablet, Take 1 tablet by  mouth 2 (Two) Times a Day With Meals., Disp: 60 tablet, Rfl: 5  •  mupirocin (BACTROBAN) 2 % ointment, Apply  topically to the appropriate area as directed 3 (Three) Times a Day., Disp: 15 g, Rfl: 0  •  ondansetron (ZOFRAN) 4 MG tablet, Take 1 tablet by mouth Every 6 (Six) Hours As Needed., Disp: , Rfl:   •  OXcarbazepine (TRILEPTAL) 600 MG tablet, Take 1 tablet by mouth Daily., Disp: 60 tablet, Rfl: 5  •  prazosin (MINIPRESS) 2 MG capsule, Take 1 capsule by mouth Every Night., Disp: 30 capsule, Rfl: 5  •  prednisoLONE acetate (PRED FORTE) 1 % ophthalmic suspension, Administer 1 drop to the right eye 3 (Three) Times a Day for 10 days., Disp: 5 mL, Rfl: 0    Lab Results:   Office Visit on 04/19/2023   Component Date Value Ref Range Status   • Color 04/19/2023 Dark Yellow  Yellow, Straw, Dark Yellow, Olena Final   • Clarity, UA 04/19/2023 Clear  Clear Final   • Specific Gravity  04/19/2023 1.015  1.005 - 1.030 Final   • pH, Urine 04/19/2023 6.0  5.0 - 8.0 Final   • Leukocytes 04/19/2023 Trace (A)  Negative Final   • Nitrite, UA 04/19/2023 Negative  Negative Final   • Protein, POC 04/19/2023 Negative  Negative mg/dL Final   • Glucose, UA 04/19/2023 Negative  Negative mg/dL Final   • Ketones, UA 04/19/2023 Negative  Negative Final   • Urobilinogen, UA 04/19/2023 0.2 E.U./dL  Normal, 0.2 E.U./dL Final   • Bilirubin 04/19/2023 Negative  Negative Final   • Blood, UA 04/19/2023 Negative  Negative Final   • Lot Number 04/19/2023 [id number]   Final   • Expiration Date 04/19/2023 02-29-20245   Final   • Urine Culture 04/20/2023 No growth   Final       Mental Status Exam:   Hygiene:   good  Cooperation:  Cooperative  Eye Contact:  Good  Psychomotor Behavior:  Appropriate  Mood:depressed and sad  Affect:  Appropriate  Hopelessness: Denies  Speech:  Normal  Thought Process:  Goal directed  Thought Content:  Normal  Suicidal:  None  Homicidal:  None  Hallucinations:  None  Delusion:  None  Memory:  Intact  Orientation:  Person,  Place, Time and Situation  Reliability:  good  Insight:  Good  Judgement:  Good  Impulse Control:  Good    PHQ-9 Depression Screening  Little interest or pleasure in doing things?     Feeling down, depressed, or hopeless?     Trouble falling or staying asleep, or sleeping too much?     Feeling tired or having little energy?     Poor appetite or overeating?     Feeling bad about yourself - or that you are a failure or have let yourself or your family down?     Trouble concentrating on things, such as reading the newspaper or watching television?     Moving or speaking so slowly that other people could have noticed? Or the opposite - being so fidgety or restless that you have been moving around a lot more than usual?     Thoughts that you would be better off dead, or of hurting yourself in some way?     PHQ-9 Total Score     If you checked off any problems, how difficult have these problems made it for you to do your work, take care of things at home, or get along with other people?          Assessment/Plan:  Diagnoses and all orders for this visit:    1. Bipolar 1 disorder, depressed, partial remission (Primary)  -     buPROPion XL (WELLBUTRIN XL) 300 MG 24 hr tablet; Take 1 tablet by mouth Daily.  Dispense: 30 tablet; Refill: 5  -     FLUoxetine (PROzac) 20 MG capsule; Take 1 capsule by mouth Daily.  Dispense: 30 capsule; Refill: 0  -     QUEtiapine XR (SEROquel XR) 400 MG 24 hr tablet; Take 1 tablet by mouth Every Night.  Dispense: 30 tablet; Refill: 6    2. Attention deficit hyperactivity disorder, combined type    3. Attention deficit hyperactivity disorder (ADHD), combined type  -     amphetamine-dextroamphetamine (Adderall) 20 MG tablet; Take 20 mg orally every afternoon.  Dispense: 30 tablet; Refill: 0  -     amphetamine-dextroamphetamine XR (Adderall XR) 20 MG 24 hr capsule; Take 2 capsules by mouth Every Morning  Dispense: 60 capsule; Refill: 0      We will continue to monitor patient's mood and follow-up in  6 weeks.    A psychological evaluation was conducted in order to assess past and current level of functioning. Areas assessed included, but were not limited to: perception of social support, perception of ability to face and deal with challenges in life (positive functioning), anxiety symptoms, depressive symptoms, perspective on beliefs/belief system, coping skills for stress, intelligence level,  Therapeutic rapport was established. Interventions conducted today were geared towards incorporating medication management along with support for continued therapy. Education was also provided as to the med management with this provider and what to expect in subsequent sessions.    We discussed risks, benefits,goals and side effects of the above medication and the patient was agreeable with the plan.Patient was educated on the importance of compliance with treatment and follow-up appointments. Patient is aware to contact the [facility] [phone] with any worsening of symptoms. To call for questions or concerns and return early if necessary. Patent is agreeable to go to the Emergency Department or call 911 should they begin SI/HI.     Part of this note may be an electronic transcription/translation of spoken language to printed text using the Dragon Dictation System.    Return in about 6 weeks (around 6/1/2023) for Follow Up 30 min, Video visit.    DALLAS Hoover

## 2023-04-21 ENCOUNTER — PATIENT MESSAGE (OUTPATIENT)
Dept: INTERNAL MEDICINE | Facility: CLINIC | Age: 38
End: 2023-04-21
Payer: COMMERCIAL

## 2023-04-21 DIAGNOSIS — Z11.3 ROUTINE SCREENING FOR STI (SEXUALLY TRANSMITTED INFECTION): Primary | ICD-10-CM

## 2023-04-21 LAB — BACTERIA SPEC AEROBE CULT: NO GROWTH

## 2023-05-01 ENCOUNTER — OFFICE VISIT (OUTPATIENT)
Dept: PULMONOLOGY | Facility: CLINIC | Age: 38
End: 2023-05-01
Payer: COMMERCIAL

## 2023-05-01 VITALS
BODY MASS INDEX: 45.29 KG/M2 | OXYGEN SATURATION: 99 % | TEMPERATURE: 96.9 F | HEART RATE: 98 BPM | HEIGHT: 66 IN | SYSTOLIC BLOOD PRESSURE: 124 MMHG | WEIGHT: 281.8 LBS | DIASTOLIC BLOOD PRESSURE: 82 MMHG

## 2023-05-01 DIAGNOSIS — R91.1 LUNG NODULE: Primary | ICD-10-CM

## 2023-05-01 DIAGNOSIS — R91.1 RIGHT UPPER LOBE PULMONARY NODULE: ICD-10-CM

## 2023-05-01 DIAGNOSIS — J45.40 MODERATE PERSISTENT ASTHMA WITHOUT COMPLICATION: ICD-10-CM

## 2023-05-01 NOTE — PROGRESS NOTES
"Follow Up Office Note       Patient Name: Clare Turner    Referring Physician: No ref. provider found    Chief Complaint:    Chief Complaint   Patient presents with   • Moderate persistent asthma without complication     F/U       History of Present Illness: Clare Turner is a 37 y.o. female who is here today to follow-up care with Pulmonary.    Patient has a past medical history significant for ADHD, bipolar disease, drug abuse, and GERD.  Patient is currently doing fairly well denying chest pain, nausea, fever, or chills.  Has had a few exacerbations last visit.  Has been on Breo and Dulera due to insurance issues but currently is on Breo and albuterol.  No other acute complaints continues to smoke.    Review of Systems:   Review of Systems   Constitutional: Negative for activity change, appetite change, chills and diaphoresis.   HENT: Negative for congestion, postnasal drip, sinus pressure and voice change.    Eyes: Negative for blurred vision.   Respiratory: Negative for cough, shortness of breath and wheezing.    Cardiovascular: Negative for chest pain.   Gastrointestinal: Negative for abdominal pain.   Musculoskeletal: Negative for myalgias.   Skin: Negative for color change and dry skin.   Allergic/Immunologic: Negative for environmental allergies.   Neurological: Negative for weakness and confusion.   Hematological: Negative for adenopathy.   Psychiatric/Behavioral: Negative for sleep disturbance and depressed mood.       The following portions of the patient's history were reviewed and updated as appropriate: allergies, current medications, past family history, past medical history, past social history, past surgical history and problem list.    Physical Exam:  Vital Signs:   Vitals:    05/01/23 1445   BP: 124/82   Pulse: 98   Temp: 96.9 °F (36.1 °C)   TempSrc: Temporal   SpO2: 99%  Comment: Resting at room air   Weight: 128 kg (281 lb 12.8 oz)   Height: 167.6 cm (65.98\")       Physical " Exam  Vitals and nursing note reviewed.   Constitutional:       General: She is not in acute distress.     Appearance: She is well-developed and normal weight. She is not ill-appearing or toxic-appearing.   HENT:      Head: Normocephalic and atraumatic.   Cardiovascular:      Rate and Rhythm: Normal rate and regular rhythm.      Pulses: Normal pulses.      Heart sounds: Normal heart sounds. No murmur heard.    No friction rub. No gallop.   Pulmonary:      Effort: Pulmonary effort is normal. No respiratory distress.      Breath sounds: Normal breath sounds. No wheezing, rhonchi or rales.   Musculoskeletal:      Right lower leg: No edema.      Left lower leg: No edema.   Skin:     General: Skin is warm and dry.   Neurological:      Mental Status: She is alert and oriented to person, place, and time.         Immunization History   Administered Date(s) Administered   • COVID-19 (PFIZER) BIVALENT 12+YRS 10/12/2022   • COVID-19 (PFIZER) Purple Cap Monovalent 04/01/2021, 04/21/2021, 11/15/2021   • FluLaval/Fluzone >6mos 03/11/2022, 10/12/2022   • Pneumococcal Conjugate 20-Valent (PCV20) 11/15/2022   • Pneumococcal Polysaccharide (PPSV23) 06/06/2016       Results Review:   -Personally reviewed the CT scans from January 2023 in January 2022, the left upper lobe nodule is 15 mm and stable in size, but in January 2023 there was a right upper 6 mm nodule that is now present.  This was not present in January 2022.   - chest x-ray 3/11/2022 shows no acute cardiopulmonary process, with a 13 mm left upper lobe nodule new compared to 2014.  -I personally reviewed your labs from January 2023.  There were multiple allergens positive, eosinophil count was 260 Aspergillus IgE was negative and IgE level was 452.  - PFT from 3/25/2022 shows moderate obstruction with an FEV1 of 74%, FVC of 88%, residual volume is 147%, no signs of restriction and a mildly reduced DLCO.    Assessment / Plan:   Diagnoses and all orders for this visit:    1.  14 mm left upper lobe lung nodule (3/2021) (Primary)  -Left upper lobe nodule is currently stable, but the 6 mm right upper lobe nodule does need ongoing follow-up it is new compared to 2022.  I suspect this is all old granulomatous disease partially of the calcifications but given the new nodule in the smoking history we do need ongoing follow-up.  I have ordered a CT of the chest to be performed in the next couple of weeks to ensure stability of the right upper lobe nodule.  This point the patient that if I ended up going into biopsy the right side that we would also consider biopsy in the left due to the fact that the left nodule has definitely increased in size since 2016    2. Moderate persistent asthma without complication  -Asthma is overall poorly controlled, she had multiple positive allergens, she does qualify for Biologics I want her to continue on the Breo, albuterol, and Singulair but in addition to that I am going to go ahead and start her on Dupixent we discussed the risk and benefits of the medication today including anaphylaxis, she verbalized understanding of all this and agreed with the plan.  The patient has had multiple exacerbations she has had a least 4 exacerbations over the course of the last year.    3. 6 mm right upper lobe noncalcified nodule (January 2023)  -As noted above 6 mm nodule will need to be followed with a follow-up CT scan in the next couple of weeks.      Follow Up:   Return in about 4 months (around 9/1/2023) for CT Chest with next visit.       JHONATAN Grant, DO  Pulmonary and Critical Care Medicine  Note Electronically Signed    Part of this note may be an electronic transcription/translation of spoken language to printed text using the Dragon Dictation System.

## 2023-05-03 ENCOUNTER — TELEPHONE (OUTPATIENT)
Dept: PULMONOLOGY | Facility: CLINIC | Age: 38
End: 2023-05-03
Payer: COMMERCIAL

## 2023-05-03 DIAGNOSIS — J45.40 MODERATE PERSISTENT ASTHMA WITHOUT COMPLICATION: Primary | ICD-10-CM

## 2023-05-03 NOTE — TELEPHONE ENCOUNTER
Spoke with patient regarding Dupixent PA approval. Prescription sent to [facility]. [facility] [place] phone number [phone] given for potential financial  assistance. Patient requested to administer medication at home. Patient states have given injections in the past, sister is an RN and does currently have an EPI PEN. Dosing schedule reviewed with patient.  Patient verbalized understanding.

## 2023-05-04 ENCOUNTER — LAB (OUTPATIENT)
Dept: LAB | Facility: HOSPITAL | Age: 38
End: 2023-05-04
Payer: COMMERCIAL

## 2023-05-04 DIAGNOSIS — Z11.3 ROUTINE SCREENING FOR STI (SEXUALLY TRANSMITTED INFECTION): ICD-10-CM

## 2023-05-04 DIAGNOSIS — R73.03 PREDIABETES: Chronic | ICD-10-CM

## 2023-05-04 LAB
ANION GAP SERPL CALCULATED.3IONS-SCNC: 11.4 MMOL/L (ref 5–15)
BUN SERPL-MCNC: 9 MG/DL (ref 6–20)
BUN/CREAT SERPL: 11.7 (ref 7–25)
CALCIUM SPEC-SCNC: 9.3 MG/DL (ref 8.6–10.5)
CHLORIDE SERPL-SCNC: 100 MMOL/L (ref 98–107)
CO2 SERPL-SCNC: 24.6 MMOL/L (ref 22–29)
CREAT SERPL-MCNC: 0.77 MG/DL (ref 0.57–1)
EGFRCR SERPLBLD CKD-EPI 2021: 102.7 ML/MIN/1.73
GLUCOSE SERPL-MCNC: 76 MG/DL (ref 65–99)
HBA1C MFR BLD: 5.4 % (ref 4.8–5.6)
POTASSIUM SERPL-SCNC: 3.9 MMOL/L (ref 3.5–5.2)
SODIUM SERPL-SCNC: 136 MMOL/L (ref 136–145)

## 2023-05-04 PROCEDURE — 87591 N.GONORRHOEAE DNA AMP PROB: CPT

## 2023-05-04 PROCEDURE — 87491 CHLMYD TRACH DNA AMP PROBE: CPT

## 2023-05-04 PROCEDURE — 80048 BASIC METABOLIC PNL TOTAL CA: CPT

## 2023-05-04 PROCEDURE — 83036 HEMOGLOBIN GLYCOSYLATED A1C: CPT

## 2023-05-05 DIAGNOSIS — F31.75 BIPOLAR 1 DISORDER, DEPRESSED, PARTIAL REMISSION: Chronic | ICD-10-CM

## 2023-05-05 DIAGNOSIS — R73.03 PREDIABETES: ICD-10-CM

## 2023-05-05 LAB
C TRACH RRNA SPEC QL NAA+PROBE: NEGATIVE
N GONORRHOEA RRNA SPEC QL NAA+PROBE: NEGATIVE

## 2023-05-05 RX ORDER — QUETIAPINE FUMARATE 400 MG/1
400 TABLET, FILM COATED ORAL NIGHTLY
Qty: 30 TABLET | Refills: 5 | OUTPATIENT
Start: 2023-05-05

## 2023-05-05 NOTE — TELEPHONE ENCOUNTER
Rx Refill Note  Requested Prescriptions     Pending Prescriptions Disp Refills   • QUEtiapine (SEROquel) 400 MG tablet [Pharmacy Med Name: QUETIAPINE 400MG TABLETS] 30 tablet 5     Sig: TAKE 1 TABLET BY MOUTH EVERY NIGHT   • metFORMIN (GLUCOPHAGE) 500 MG tablet [Pharmacy Med Name: METFORMIN 500MG TABLETS] 60 tablet 5     Sig: TAKE 1 TABLET BY MOUTH TWICE DAILY WITH MEALS      Last office visit with prescribing clinician: 4/19/2023   Last telemedicine visit with prescribing clinician: 6/12/2023   Next office visit with prescribing clinician: 6/12/2023                         Would you like a call back once the refill request has been completed: [] Yes [] No    If the office needs to give you a call back, can they leave a voicemail: [] Yes [] No    Taylor Cerna LPN  05/05/23, 08:53 EDT

## 2023-05-08 DIAGNOSIS — F90.2 ATTENTION DEFICIT HYPERACTIVITY DISORDER (ADHD), COMBINED TYPE: ICD-10-CM

## 2023-05-08 DIAGNOSIS — F31.75 BIPOLAR 1 DISORDER, DEPRESSED, PARTIAL REMISSION: Chronic | ICD-10-CM

## 2023-05-08 RX ORDER — DEXTROAMPHETAMINE SACCHARATE, AMPHETAMINE ASPARTATE MONOHYDRATE, DEXTROAMPHETAMINE SULFATE AND AMPHETAMINE SULFATE 5; 5; 5; 5 MG/1; MG/1; MG/1; MG/1
40 CAPSULE, EXTENDED RELEASE ORAL EVERY MORNING
Qty: 60 CAPSULE | Refills: 0 | Status: SHIPPED | OUTPATIENT
Start: 2023-05-08

## 2023-05-08 RX ORDER — OXCARBAZEPINE 600 MG/1
600 TABLET, FILM COATED ORAL DAILY
Qty: 60 TABLET | Refills: 5 | Status: SHIPPED | OUTPATIENT
Start: 2023-05-08

## 2023-05-08 RX ORDER — DEXTROAMPHETAMINE SACCHARATE, AMPHETAMINE ASPARTATE, DEXTROAMPHETAMINE SULFATE AND AMPHETAMINE SULFATE 5; 5; 5; 5 MG/1; MG/1; MG/1; MG/1
TABLET ORAL
Qty: 30 TABLET | Refills: 0 | Status: SHIPPED | OUTPATIENT
Start: 2023-05-08

## 2023-05-11 DIAGNOSIS — J45.40 MODERATE PERSISTENT ASTHMA WITHOUT COMPLICATION: ICD-10-CM

## 2023-05-11 RX ORDER — DUPILUMAB 300 MG/2ML
INJECTION, SOLUTION SUBCUTANEOUS
Refills: 0 | OUTPATIENT
Start: 2023-05-11

## 2023-05-18 ENCOUNTER — TELEMEDICINE (OUTPATIENT)
Dept: PSYCHIATRY | Facility: CLINIC | Age: 38
End: 2023-05-18
Payer: COMMERCIAL

## 2023-05-18 DIAGNOSIS — F31.75 BIPOLAR 1 DISORDER, DEPRESSED, PARTIAL REMISSION: Primary | ICD-10-CM

## 2023-05-18 DIAGNOSIS — F90.2 ATTENTION DEFICIT HYPERACTIVITY DISORDER, COMBINED TYPE: ICD-10-CM

## 2023-05-18 NOTE — PLAN OF CARE
Problem: Mood Instability  Goal: Patient will achieve mood stability as evidenced by controlled behavior and a more deliberate thought process  Outcome: Ongoing, Not Progressing

## 2023-05-18 NOTE — TREATMENT PLAN
Multi-Disciplinary Problems (from Behavioral Health Treatment Plan)    Active Problems     Problem: Mood Instability  Start Date: 05/18/23    Problem Details: The patient self-scales this problem as a 8 with 10 being the worst.        Goal Priority Start Date Expected End Date End Date    Patient will achieve mood stability as evidenced by controlled behavior and a more deliberate thought process -- 05/18/23 -- --    Goal Details: Progress toward goal:  Not appropriate to rate progress toward goal since this is the initial treatment plan.        Goal Intervention Frequency Start Date End Date    Provide structure and focus to patient's thoughts and actions by establishing plans and routine. Q4 Weeks 05/18/23 --    Intervention Details: Duration of treatment until until remission of symptoms.        Goal Intervention Frequency Start Date End Date    Assist patient in setting responsible goals and limits in behavior. Q4 Weeks 05/18/23 --    Intervention Details: Duration of treatment until until remission of symptoms.                    Reviewed By     Stevenson Coker, [facility] 05/18/23 2880                 I have discussed and reviewed this treatment plan with the patient.

## 2023-05-18 NOTE — PROGRESS NOTES
[facility]   Follow-up Progress Note     Date: May 18, 2023  Time In: 2:02  Time Out: 2:27      PROGRESS NOTE  Data:  Clare Turner is a 37 y.o. female presenting to [facility] (through [facility]), [address] [place], [address] using a secure MyChart Video Visit through [facility] for assessment with Stevenson Coker LCSW. The patient is seen remotely in their home using [facility] My Chart. Patient is being seen via telehealth and stated they are in a secure environment for this session. The patient's condition being diagnosed/treated is appropriate for telemedicine. The provider identified herself as well as her credentials. The patient and/or patients guardian consent to be seen remotely, and when consent is given they understand that the consent allows for patient identifiable information to be sent to a third party as needed. They may refuse to be seen remotely at any time. The electronic data is encrypted and password protected, and the patient has been advised of the potential risks to privacy not withstanding such measures.    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 Patient stated she decided to take a new position. Patient reported she is still struggling with her son's behavior. Patient stated their grandmother continues to be resistant to allow the children to return to patient's care. Patient expressed she does not feel moving back to their town will be supportive to her continued sobriety. Patient expressed that she would like to build skills to on managing intrusive thoughts and manage her anxiety. Patient stated she would also like to have a safe space to positively process her thoughts and emotions. Patient reported she would also like to continue to build a relationship with her children that is supportive to their struggles.       Clinical  Maneuvering/Intervention:    Chief Complaint: mood instability     (Scales based on 0 - 10 with 10 being the worst)  Depression: 5 Anxiety: 8     Assisted Patient in processing above session content; acknowledged and normalized patient’s thoughts, feelings, and concerns.  Rationalized patient thought process regarding identifying treatment goals.  Discussed triggers associated with patient's mood instability.  Also discussed coping skills for patient to implement such as maintaining healthy boundaries.    Allowed Patient to freely discuss issues  without interruption or judgement with unconditional positive regard, active listening skills, and empathy. Therapist provided a safe, confidential environment to facilitate the development of a positive therapeutic relationship and encouraged open, honest communication. Assisted Patient in identifying risk factors which would indicate the need for higher level of care including thoughts to harm self or others and/or self-harming behavior and encouraged Patient to contact this office, call 911, or present to the nearest emergency room should any of these events occur. Discussed crisis intervention services and means to access. Patient adamantly and convincingly denies current suicidal or homicidal ideation or perceptual disturbance. Assisted Patient in processing session content; acknowledged and normalized Patient’s thoughts, feelings, and concerns by utilizing a person-centered approach in efforts to build appropriate rapport and a positive therapeutic relationship with open and honest communication. Therapist utilized dialectical behavior techniques to teach and model emotional regulation and relaxation methods. Therapist assisted Patient with identifying and implementing healthier coping strategies.     Assessment   Patient appears to be experiencing heightened anxiety and depression in response to COVID-19 epidemic  As a result, they can be reasonably expected to  continue to benefit from treatment and would likely be at increased risk for decompensation otherwise.    Mental Status Exam:   Hygiene:   good  Cooperation:  Cooperative  Eye Contact:  Good  Psychomotor Behavior:  Appropriate  Affect:  Full range  Mood: anxious  Speech:  Normal  Thought Process:  Goal directed  Thought Content:  Mood congruent  Suicidal:  None  Homicidal:  None  Hallucinations:  None  Delusion:  None  Memory:  Intact  Orientation:  Person, Place, Time and Situation  Reliability:  good  Insight:  Good  Judgement:  Good  Impulse Control:  Good  Physical/Medical Issues:  No      PHQ-Score Total:  PHQ-9 Total Score:        Patient's Support Network Includes:  children, mother, extended family and friends, siblings, coworkers    Functional Status: Moderate impairment     Progress toward goal: Not at goal    Prognosis: Good with Ongoing Treatment            Impression/Formulation:    VISIT DIAGNOSIS:     ICD-10-CM ICD-9-CM   1. Bipolar 1 disorder, depressed, partial remission  F31.75 296.55   2. Attention deficit hyperactivity disorder, combined type  F90.2 314.01        Patient appeared alert and oriented.  Patient is voluntarily requesting to continue outpatient therapy at [facility].  Patient is receptive to assistance with maintaining a stable lifestyle.  Patient presents with history of mood instability and ADHD.  Patient is agreeable to attend routine therapy sessions.  Patient expressed desire to maintain stability and participate in the therapeutic process.        Crisis Plan:  Symptoms and/or behaviors to indicate a crisis: Isolation and Increased hunger or lack of appetite    What calming techniques or other strategies will patient use to de-esclate and stay safe: slow down, breathe, visualize calming self, think it though, listen to music, change focus, take a walk    Who is one person patient can contact to assist with de-escalation? sisters    If  symptoms/behaviors persist, Patient will present to the nearest hospital for an assessment. Advised patient of [facility] ER and assessment services.     Plan:   Patient will continue in individual outpatient therapy with focus on improved functioning and coping skills, maintaining stability, and avoiding decompensation and the need for higher level of care.    Patient will contact this office ([facility] at [phone]), call 911 or present to the nearest emergency room should suicidal or homicidal ideations occur. Provide Cognitive Behavioral Therapy and Solution Focused Therapy to improve functioning, maintain stability, and avoid decompensation and the need for higher level of care.     Return in about 4 weeks, or earlier if symptoms worsen or fail to improve.    Recommended Referrals: none        This document has been electronically signed by Stevenson Coker LCSW  May 18, 2023 14:01 EDT        Part of this note may be an electronic transcription/translation of spoken language to printed text using the Dragon Dictation System.

## 2023-05-19 DIAGNOSIS — F31.75 BIPOLAR 1 DISORDER, DEPRESSED, PARTIAL REMISSION: ICD-10-CM

## 2023-05-21 RX ORDER — FLUOXETINE HYDROCHLORIDE 20 MG/1
20 CAPSULE ORAL DAILY
Qty: 30 CAPSULE | Refills: 6 | Status: SHIPPED | OUTPATIENT
Start: 2023-05-21 | End: 2024-05-20

## 2023-05-28 DIAGNOSIS — K21.9 GASTROESOPHAGEAL REFLUX DISEASE WITHOUT ESOPHAGITIS: Chronic | ICD-10-CM

## 2023-05-30 RX ORDER — NICOTINE POLACRILEX 4 MG/1
GUM, CHEWING ORAL
Qty: 30 EACH | Refills: 4 | Status: SHIPPED | OUTPATIENT
Start: 2023-05-30

## 2023-05-30 NOTE — TELEPHONE ENCOUNTER
Rx Refill Note  Requested Prescriptions     Pending Prescriptions Disp Refills   • Omeprazole 20 MG tablet delayed-release [Pharmacy Med Name: OMEPRAZOLE 20MG (OTC) TABLETS]       Sig: TAKE 1 TABLET BY MOUTH EVERY DAY      Last office visit with prescribing clinician: 4/19/2023   Last telemedicine visit with prescribing clinician: Visit date not found   Next office visit with prescribing clinician: 6/12/2023                         Would you like a call back once the refill request has been completed: [] Yes [] No    If the office needs to give you a call back, can they leave a voicemail: [] Yes [] No    Taylor Cerna LPN  05/30/23, 10:56 EDT

## 2023-06-01 ENCOUNTER — TELEMEDICINE (OUTPATIENT)
Dept: PSYCHIATRY | Facility: CLINIC | Age: 38
End: 2023-06-01

## 2023-06-01 DIAGNOSIS — F90.2 ATTENTION DEFICIT HYPERACTIVITY DISORDER, COMBINED TYPE: ICD-10-CM

## 2023-06-01 DIAGNOSIS — F90.2 ATTENTION DEFICIT HYPERACTIVITY DISORDER (ADHD), COMBINED TYPE: ICD-10-CM

## 2023-06-01 DIAGNOSIS — F31.75 BIPOLAR 1 DISORDER, DEPRESSED, PARTIAL REMISSION: Primary | ICD-10-CM

## 2023-06-01 PROCEDURE — 99214 OFFICE O/P EST MOD 30 MIN: CPT | Performed by: NURSE PRACTITIONER

## 2023-06-01 PROCEDURE — 1160F RVW MEDS BY RX/DR IN RCRD: CPT | Performed by: NURSE PRACTITIONER

## 2023-06-01 PROCEDURE — 1159F MED LIST DOCD IN RCRD: CPT | Performed by: NURSE PRACTITIONER

## 2023-06-01 RX ORDER — DEXTROAMPHETAMINE SACCHARATE, AMPHETAMINE ASPARTATE MONOHYDRATE, DEXTROAMPHETAMINE SULFATE AND AMPHETAMINE SULFATE 5; 5; 5; 5 MG/1; MG/1; MG/1; MG/1
40 CAPSULE, EXTENDED RELEASE ORAL EVERY MORNING
Qty: 60 CAPSULE | Refills: 0 | Status: SHIPPED | OUTPATIENT
Start: 2023-06-01

## 2023-06-01 RX ORDER — DEXTROAMPHETAMINE SACCHARATE, AMPHETAMINE ASPARTATE, DEXTROAMPHETAMINE SULFATE AND AMPHETAMINE SULFATE 5; 5; 5; 5 MG/1; MG/1; MG/1; MG/1
TABLET ORAL
Qty: 30 TABLET | Refills: 0 | Status: SHIPPED | OUTPATIENT
Start: 2023-06-01

## 2023-06-01 RX ORDER — PRAZOSIN HYDROCHLORIDE 2 MG/1
2 CAPSULE ORAL NIGHTLY
Qty: 30 CAPSULE | Refills: 5 | Status: SHIPPED | OUTPATIENT
Start: 2023-06-01

## 2023-06-01 RX ORDER — FLUOXETINE HYDROCHLORIDE 40 MG/1
40 CAPSULE ORAL DAILY
Qty: 30 CAPSULE | Refills: 2 | Status: SHIPPED | OUTPATIENT
Start: 2023-06-01 | End: 2024-05-31
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
## 2023-06-01 NOTE — PROGRESS NOTES
I have reviewed the notes, assessments, and/or procedures performed by Nani Ball, I concur with her/his documentation of Clare Turner.

## 2023-06-12 DIAGNOSIS — F90.2 ATTENTION DEFICIT HYPERACTIVITY DISORDER (ADHD), COMBINED TYPE: ICD-10-CM

## 2023-06-12 RX ORDER — DEXTROAMPHETAMINE SACCHARATE, AMPHETAMINE ASPARTATE MONOHYDRATE, DEXTROAMPHETAMINE SULFATE AND AMPHETAMINE SULFATE 5; 5; 5; 5 MG/1; MG/1; MG/1; MG/1
40 CAPSULE, EXTENDED RELEASE ORAL EVERY MORNING
Qty: 60 CAPSULE | Refills: 0 | Status: SHIPPED | OUTPATIENT
Start: 2023-06-12

## 2023-07-14 NOTE — PROGRESS NOTES
Patient Name: Clare Turner  MRN: [id number]   :  1985     This provider is located at the [facility]), [address], [address] using a secure [employer] Video Visit through [employer]. Patient is being seen remotely via telehealth at their home address in [place], and stated they are in a secure environment for this session. The patient's condition being diagnosed/treated is appropriate for telemedicine. The provider identified herself as well as her credentials.   The patient, and/or patients guardian, consent to be seen remotely, and when consent is given they understand that the consent allows for patient identifiable information to be sent to a third party as needed.   They may refuse to be seen remotely at any time. The electronic data is encrypted and password protected, and the patient and/or guardian has been advised of the potential risks to privacy not withstanding such measures.    You have chosen to receive care through a telehealth visit.  Do you consent to use a video/audio connection for your medical care today? Yes    Chief Complaint:      ICD-10-CM ICD-9-CM   1. Attention deficit hyperactivity disorder (ADHD), combined type  F90.2 314.01   2. Bipolar 1 disorder, depressed, partial remission ([facility])  F31.75 296.55       History of Present Illness: Clare Turner is a 37 y.o. female is here today for medication management follow up.  Patient states the Adderall is working however it runs out about 11 AM.  States sleep is good.  Some mild anxiety but BuSpar helps manage that.    The following portions of the patient's history were reviewed and updated as appropriate: allergies, current medications, past family history, past medical history, past social history, past surgical history and problem list.    Review of Systems;;  Review of Systems   Constitutional: Negative for activity change, appetite change, fatigue,  [FreeTextEntry1] : Ms. SOTO is a 29 year old woman with morbid obesity who is unable to lose weight and lower her risk of co-morbid conditions with medical management including diet and exercise. She wishes to proceed with planning for bariatric surgery.\par  unexpected weight gain and unexpected weight loss.   Respiratory: Negative for shortness of breath and wheezing.    Gastrointestinal: Negative for constipation, diarrhea, nausea and vomiting.   Musculoskeletal: Negative for gait problem.   Skin: Negative for dry skin and rash.   Neurological: Negative for dizziness, speech difficulty, weakness, light-headedness, headache, memory problem and confusion.   Psychiatric/Behavioral: Positive for decreased concentration. Negative for agitation, behavioral problems, dysphoric mood, hallucinations, self-injury, sleep disturbance, suicidal ideas, negative for hyperactivity, depressed mood and stress. The patient is not nervous/anxious.        Physical Exam;;  Physical Exam  Constitutional:       General: She is not in acute distress.     Appearance: She is well-developed. She is not diaphoretic.   HENT:      Head: Normocephalic and atraumatic.   Eyes:      Conjunctiva/sclera: Conjunctivae normal.   Pulmonary:      Effort: Pulmonary effort is normal. No respiratory distress.   Musculoskeletal:         General: Normal range of motion.      Cervical back: Full passive range of motion without pain and normal range of motion.   Neurological:      Mental Status: She is alert and oriented to person, place, and time.   Psychiatric:         Mood and Affect: Mood is not anxious or depressed. Affect is not labile, blunt, angry or inappropriate.         Speech: Speech is not rapid and pressured or tangential.         Behavior: Behavior normal. Behavior is not agitated, slowed, aggressive, withdrawn, hyperactive or combative. Behavior is cooperative.         Thought Content: Thought content normal. Thought content is not paranoid or delusional. Thought content does not include homicidal or suicidal ideation. Thought content does not include homicidal or suicidal plan.         Judgment: Judgment normal.       not currently breastfeeding.  There is no height or weight on file to calculate  BMI. Video appt unable to obtain.     Current Medications;;    Current Outpatient Medications:   •  albuterol sulfate  (90 Base) MCG/ACT inhaler, Inhale 2 puffs 2 (Two) Times a Day., Disp: 18 g, Rfl: 5  •  amphetamine-dextroamphetamine XR (Adderall XR) 30 MG 24 hr capsule, Take 1 capsule by mouth Every Morning, Disp: 30 capsule, Rfl: 0  •  atorvastatin (LIPITOR) 10 MG tablet, Take 1 tablet by mouth Daily., Disp: 30 tablet, Rfl: 5  •  buPROPion XL (WELLBUTRIN XL) 300 MG 24 hr tablet, Take 1 tablet by mouth Daily., Disp: 30 tablet, Rfl: 5  •  busPIRone (BUSPAR) 10 MG tablet, Take 1 tablet by mouth 2 (Two) Times a Day., Disp: 90 tablet, Rfl: 5  •  EPINEPHrine (EPIPEN) 0.3 MG/0.3ML solution auto-injector injection, INJECT 1 PEN IN THE MUSCLE AS DIRECTED AS NEEDED FOR ALLERGIC REACTION, Disp: 2 each, Rfl: 1  •  FLUoxetine (PROzac) 40 MG capsule, Take 2 capsules by mouth Daily. Takes 2 daily, Disp: 30 capsule, Rfl: 5  •  GNP Omeprazole 20 MG tablet delayed-release, Take 20 mg by mouth Daily., Disp: 30 each, Rfl: 5  •  loratadine (CLARITIN) 10 MG tablet, Take 1 tablet by mouth Daily., Disp: 30 tablet, Rfl: 5  •  metFORMIN (Glucophage) 500 MG tablet, Take 1 tablet by mouth 2 (Two) Times a Day With Meals., Disp: 60 tablet, Rfl: 5  •  mometasone-formoterol (Dulera) 200-5 MCG/ACT inhaler, Inhale 2 puffs 2 (Two) Times a Day., Disp: 13 g, Rfl: 11  •  mupirocin (BACTROBAN) 2 % ointment, Apply  topically to the appropriate area as directed 3 (Three) Times a Day., Disp: 15 g, Rfl: 0  •  OXcarbazepine (TRILEPTAL) 600 MG tablet, Take 1 tablet by mouth Daily., Disp: 60 tablet, Rfl: 5  •  prazosin (MINIPRESS) 2 MG capsule, Take 1 capsule by mouth Every Night., Disp: 30 capsule, Rfl: 5  •  QUEtiapine (SEROquel) 400 MG tablet, Take 1 tablet by mouth Every Night., Disp: 30 tablet, Rfl: 5  •  rOPINIRole (REQUIP) 0.5 MG tablet, Take 1 tablet by mouth every night at bedtime., Disp: 30 tablet, Rfl: 5    Lab Results:   Admission on  01/02/2023, Discharged on 01/02/2023   Component Date Value Ref Range Status   • Culture 01/02/2023 Final report (A)   Final   • Result 1 01/02/2023 Comment (A)   Final    Beta hemolytic Streptococcus, group B  Moderate growth  Penicillin and ampicillin are drugs of choice for treatment of  beta-hemolytic streptococcal infections. Susceptibility testing of  penicillins and other beta-lactam agents approved by the FDA for  treatment of beta-hemolytic streptococcal infections need not be  performed routinely because nonsusceptible isolates are extremely  rare in any beta-hemolytic streptococcus and have not been reported  for Streptococcus pyogenes (group A). (CLSI)   Lab on 01/02/2023   Component Date Value Ref Range Status   • Chlamydia trachomatis, FAZAL 01/02/2023 Negative  Negative Final   • Neisseria gonorrhoeae, FAZAL 01/02/2023 Negative  Negative Final   • Class Description 01/02/2023 Comment   Final        Levels of Specific IgE       Class  Description of Class      ---------------------------  -----  --------------------                     < 0.10         0         Negative             0.10 -    0.31         0/I       Equivocal/Low             0.32 -    0.55         I         Low             0.56 -    1.40         II        Moderate             1.41 -    3.90         III       High             3.91 -   19.00         IV        Very High            19.01 -  100.00         V         Very High                    >100.00         VI        Very High   • D. pteronyssinus (dust mite) 01/02/2023 6.17 (A)  Class IV kU/L Final   • D. farinae (dust mite) 01/02/2023 4.38 (A)  Class IV kU/L Final   • Cat Dander 01/02/2023 1.14 (A)  Class II kU/L Final   • Dog Dander, IgE 01/02/2023 0.31 (A)  Class 0/I kU/L Final   • [place] Grass 01/02/2023 <0.10  Class 0 kU/L Final   • [place] IgE 01/02/2023 <0.10  Class 0 kU/L Final   • [place] Grass 01/02/2023 <0.10  Class 0 kU/L Final   • [place] Grass 01/02/2023 <0.10  Class 0  kU/L Final   • Cockroach, American 01/02/2023 <0.10  Class 0 kU/L Final   • Penicillium chrysogen 01/02/2023 <0.10  Class 0 kU/L Final   • Cladosporium herbarum 01/02/2023 <0.10  Class 0 kU/L Final   • Aspergillus fumigatus 01/02/2023 <0.10  Class 0 kU/L Final   • Mucor racemosus 01/02/2023 <0.10  Class 0 kU/L Final   • Alternaria alternata 01/02/2023 <0.10  Class 0 kU/L Final   • Stemphylium herbarum 01/02/2023 <0.10  Class 0 kU/L Final   • [place], White 01/02/2023 <0.10  Class 0 kU/L Final   • Elm, American 01/02/2023 <0.10  Class 0 kU/L Final   • Maple/[place] 01/02/2023 <0.10  Class 0 kU/L Final   • Hazelnut Tree 01/02/2023 <0.10  Class 0 kU/L Final   • [place], White 01/02/2023 <0.10  Class 0 kU/L Final   • Maple [place] 01/02/2023 <0.10  Class 0 kU/L Final   • White [place] 01/02/2023 <0.10  Class 0 kU/L Final   • [place], Mountain 01/02/2023 <0.10  Class 0 kU/L Final   • Sweet Gum 01/02/2023 <0.10  Class 0 kU/L Final   • Ragweed, Common/Short 01/02/2023 1.27 (A)  Class II kU/L Final   • Mugwort 01/02/2023 <0.10  Class 0 kU/L Final   • [place] Plantain 01/02/2023 <0.10  Class 0 kU/L Final   • Pigweed, Rough/Common 01/02/2023 <0.10  Class 0 kU/L Final   • Sheep Sorrel 01/02/2023 <0.10  Class 0 kU/L Final   • Nettle 01/02/2023 <0.10  Class 0 kU/L Final   • ANTI-MPO ANTIBODIES 01/02/2023 <0.2  0.0 - 0.9 units Final   • ANTI-PR3 ANTIBODIES 01/02/2023 <0.2  0.0 - 0.9 units Final   • C-ANCA 01/02/2023 1:160 (H)  Neg:<1:20 titer Final   • P-ANCA 01/02/2023 <1:20  Neg:<1:20 titer Final    The presence of positive fluorescence exhibiting P-ANCA or C-ANCA  patterns alone is not specific for the diagnosis of Wegener's  Granulomatosis (WG) or microscopic polyangiitis. Decisions about  treatment should not be based solely on ANCA IFA results.  The  International ANCA Group Consensus recommends follow up testing of  positive sera with both NV-3 and MPO-ANCA enzyme immunoassays. As  many as 5% serum samples are positive  only by EIA.  Ref. AM J Clin Pathol 1999;111:507-513.   • Atypical pANCA 01/02/2023 <1:20  Neg:<1:20 titer Final    The atypical pANCA pattern has been observed in a significant  percentage of patients with ulcerative colitis, primary sclerosing  cholangitis and autoimmune hepatitis.   • Aspergillus fumigatus 01/02/2023 <0.10  Class 0 kU/L Final        Levels of Specific IgE       Class  Description of Class      ---------------------------  -----  --------------------                     < 0.10         0         Negative             0.10 -    0.31         0/I       Equivocal/Low             0.32 -    0.55         I         Low             0.56 -    1.40         II        Moderate             1.41 -    3.90         III       High             3.91 -   19.00         IV        Very High            19.01 -  100.00         V         Very High                    >100.00         VI        Very High   • IgE 01/02/2023 452  6 - 495 IU/mL Final   • WBC 01/02/2023 20.07 (H)  3.40 - 10.80 10*3/mm3 Final   • RBC 01/02/2023 4.63  3.77 - 5.28 10*6/mm3 Final   • Hemoglobin 01/02/2023 14.0  12.0 - 15.9 g/dL Final   • Hematocrit 01/02/2023 42.4  34.0 - 46.6 % Final   • MCV 01/02/2023 91.6  79.0 - 97.0 fL Final   • MCH 01/02/2023 30.2  26.6 - 33.0 pg Final   • MCHC 01/02/2023 33.0  31.5 - 35.7 g/dL Final   • RDW 01/02/2023 13.0  12.3 - 15.4 % Final   • RDW-SD 01/02/2023 43.5  37.0 - 54.0 fl Final   • MPV 01/02/2023 11.2  6.0 - 12.0 fL Final   • Platelets 01/02/2023 417  140 - 450 10*3/mm3 Final   • Neutrophil % 01/02/2023 74.4  42.7 - 76.0 % Final   • Lymphocyte % 01/02/2023 17.5 (L)  19.6 - 45.3 % Final   • Monocyte % 01/02/2023 6.1  5.0 - 12.0 % Final   • Eosinophil % 01/02/2023 1.3  0.3 - 6.2 % Final   • Basophil % 01/02/2023 0.3  0.0 - 1.5 % Final   • Immature Grans % 01/02/2023 0.4  0.0 - 0.5 % Final   • Neutrophils, Absolute 01/02/2023 14.91 (H)  1.70 - 7.00 10*3/mm3 Final   • Lymphocytes, Absolute 01/02/2023 3.52 (H)  0.70 -  3.10 10*3/mm3 Final   • Monocytes, Absolute 01/02/2023 1.23 (H)  0.10 - 0.90 10*3/mm3 Final   • Eosinophils, Absolute 01/02/2023 0.26  0.00 - 0.40 10*3/mm3 Final   • Basophils, Absolute 01/02/2023 0.07  0.00 - 0.20 10*3/mm3 Final   • Immature Grans, Absolute 01/02/2023 0.08 (H)  0.00 - 0.05 10*3/mm3 Final   • nRBC 01/02/2023 0.0  0.0 - 0.2 /100 WBC Final   Results Encounter on 12/25/2022   Component Date Value Ref Range Status   • Report Summary 01/02/2023 FINAL   Final    Comment: ====================================================================  [facility] COMP DRUG ANALYSIS,UR  ====================================================================  Specimen Alert  Note:  Urinary creatinine is low; ability to detect some  drugs may be compromised.  Interpret results  with caution.  ====================================================================  Test                             Result       Flag       Units  Drug Present    Amphetamine                    2259                    ng/mg creat     Amphetamine is available as a schedule II prescription drug.    Oxcarbazepine MHD              PRESENT     Oxcarbazepine MHD is the active metabolite of oxcarbazepine and     eslicarbazepine.    Hydroxybupropion               PRESENT     Hydroxybupropion is an expected metabolite of bupropion.    Fluoxetine                     PRESENT    Norfluoxetine                  PRESENT     Norfluoxetine is an expected metabolite of                            fluoxetine.  ====================================================================  Test                      Result    Flag   Units      Ref Range    Creatinine              17        L      mg/dL      >=20  ====================================================================  Declared Medications:   Medication list was not provided.  ====================================================================  For clinical consultation, please call [phone].  ====================================================================   Office Visit on 11/15/2022   Component Date Value Ref Range Status   • Color 11/15/2022 Yellow  Yellow, Straw, Dark Yellow, [place] Final   • Clarity, UA 11/15/2022 Turbid (A)  Clear Final   • Specific Gravity  11/15/2022 1.015  1.005 - 1.030 Final   • pH, Urine 11/15/2022 6.5  5.0 - 8.0 Final   • Leukocytes 11/15/2022 Small (1+) (A)  Negative Final   • Nitrite, UA 11/15/2022 Negative  Negative Final   • Protein, POC 11/15/2022 Negative  Negative mg/dL Final   • Glucose, UA 11/15/2022 Negative  Negative mg/dL Final   • Ketones, UA 11/15/2022 Negative  Negative Final   • Urobilinogen, UA 11/15/2022 0.2 E.U./dL  Normal, 0.2 E.U./dL Final   • Bilirubin 11/15/2022 Negative  Negative Final   • Blood, UA 11/15/2022 Negative  Negative Final   • Lot Number 11/15/2022 [id number]   Final   • Expiration Date 11/15/2022 09-   Final   • Urine Culture 11/15/2022 No growth   Final   Lab on 11/03/2022   Component Date Value Ref Range Status   • HLA B27 11/03/2022 Negative   Final    HLA-B*27 Negative  B27 allele interpretation for all loci based on IMGT/HLA  database version 3.44  This test was developed and its performance characteristics  determined by [facility].  It has not been cleared or approved  by the Food and Drug Administration.  [facility] CLIA ID Number [id number]  This test was performed using PCR (Polymerase Chain Reaction)/SSOP  (Sequence Specific Oligonucleotide Probes) technique.  SBT (Sequence  Based Typing) and/or SSP (Sequence Specific Primers) may be used as  supplemental methods when necessary.  Please contact HLA Customer  Service at [phone] if you have any questions.   Director of [facility] Laboratory   Dr Derrick Lai, PhD   • ORAL Direct 11/03/2022 Negative  Negative Final   • Glucose 11/03/2022 73  65 - 99 mg/dL Final   • BUN 11/03/2022 15  6 - 20 mg/dL Final   • Creatinine 11/03/2022 0.84  0.57 - 1.00 mg/dL Final   • Sodium  11/03/2022 136  136 - 145 mmol/L Final   • Potassium 11/03/2022 4.5  3.5 - 5.2 mmol/L Final   • Chloride 11/03/2022 100  98 - 107 mmol/L Final   • CO2 11/03/2022 22.3  22.0 - 29.0 mmol/L Final   • Calcium 11/03/2022 9.5  8.6 - 10.5 mg/dL Final   • Total Protein 11/03/2022 7.6  6.0 - 8.5 g/dL Final   • Albumin 11/03/2022 4.10  3.50 - 5.20 g/dL Final   • ALT (SGPT) 11/03/2022 23  1 - 33 U/L Final   • AST (SGOT) 11/03/2022 17  1 - 32 U/L Final   • Alkaline Phosphatase 11/03/2022 100  39 - 117 U/L Final   • Total Bilirubin 11/03/2022 0.3  0.0 - 1.2 mg/dL Final   • Globulin 11/03/2022 3.5  gm/dL Final   • A/G Ratio 11/03/2022 1.2  g/dL Final   • BUN/Creatinine Ratio 11/03/2022 17.9  7.0 - 25.0 Final   • Anion Gap 11/03/2022 13.7  5.0 - 15.0 mmol/L Final   • eGFR 11/03/2022 92.5  >60.0 mL/min/1.73 Final    National Kidney Foundation and American Society of Nephrology (ASN) Task Force recommended calculation based on the Chronic Kidney Disease Epidemiology Collaboration (CKD-EPI) equation refit without adjustment for race.   • Total Cholesterol 11/03/2022 209 (H)  0 - 200 mg/dL Final   • Triglycerides 11/03/2022 130  0 - 150 mg/dL Final   • HDL Cholesterol 11/03/2022 33 (L)  40 - 60 mg/dL Final   • LDL Cholesterol  11/03/2022 152 (H)  0 - 100 mg/dL Final   • VLDL Cholesterol 11/03/2022 24  5 - 40 mg/dL Final   • LDL/HDL Ratio 11/03/2022 4.55   Final   • Hemoglobin A1C 11/03/2022 5.70 (H)  4.80 - 5.60 % Final       Mental Status Exam:   Hygiene:   good  Cooperation:  Cooperative  Eye Contact:  Good  Psychomotor Behavior:  Appropriate  Mood:within normal limits  Affect:  Appropriate  Hopelessness: Denies  Speech:  Normal  Thought Process:  Goal directed  Thought Content:  Normal  Suicidal:  None  Homicidal:  None  Hallucinations:  None  Delusion:  None  Memory:  Intact  Orientation:  Person, Place, Time and Situation  Reliability:  good  Insight:  Good  Judgement:  Good  Impulse Control:  Good    PHQ-9 Depression  Screening  Little interest or pleasure in doing things?     Feeling down, depressed, or hopeless?     Trouble falling or staying asleep, or sleeping too much?     Feeling tired or having little energy?     Poor appetite or overeating?     Feeling bad about yourself - or that you are a failure or have let yourself or your family down?     Trouble concentrating on things, such as reading the newspaper or watching television?     Moving or speaking so slowly that other people could have noticed? Or the opposite - being so fidgety or restless that you have been moving around a lot more than usual?     Thoughts that you would be better off dead, or of hurting yourself in some way?     PHQ-9 Total Score     If you checked off any problems, how difficult have these problems made it for you to do your work, take care of things at home, or get along with other people?          Assessment/Plan:  Diagnoses and all orders for this visit:    1. Attention deficit hyperactivity disorder (ADHD), combined type (Primary)  -     amphetamine-dextroamphetamine XR (Adderall XR) 30 MG 24 hr capsule; Take 1 capsule by mouth Every Morning  Dispense: 30 capsule; Refill: 0    2. Bipolar 1 disorder, depressed, partial remission (HCC)      Patient states mood is well managed.  Patient states she has some anxiety however the BuSpar helps that.  Adderall is not lasting past 11 AM.  We will increase Adderall XR to 30 mg every morning.    A psychological evaluation was conducted in order to assess past and current level of functioning. Areas assessed included, but were not limited to: perception of social support, perception of ability to face and deal with challenges in life (positive functioning), anxiety symptoms, depressive symptoms, perspective on beliefs/belief system, coping skills for stress, intelligence level,  Therapeutic rapport was established. Interventions conducted today were geared towards incorporating medication management along  with support for continued therapy. Education was also provided as to the med management with this provider and what to expect in subsequent sessions.    We discussed risks, benefits,goals and side effects of the above medication and the patient was agreeable with the plan.Patient was educated on the importance of compliance with treatment and follow-up appointments. Patient is aware to contact the [facility] [phone] with any worsening of symptoms. To call for questions or concerns and return early if necessary. Patent is agreeable to go to the Emergency Department or call 911 should they begin SI/HI.     Part of this note may be an electronic transcription/translation of spoken language to printed text using the Dragon Dictation System.    Return in about 4 weeks (around 2/9/2023) for Follow Up 30 min, Video visit.    Nani Ball, [place]

## 2023-07-17 PROBLEM — R73.03 PREDIABETES: Chronic | Status: ACTIVE | Noted: 2022-11-04

## 2023-07-21 ENCOUNTER — PRIOR AUTHORIZATION (OUTPATIENT)
Dept: INTERNAL MEDICINE | Facility: CLINIC | Age: 38
End: 2023-07-21
Payer: COMMERCIAL

## 2023-07-21 NOTE — TELEPHONE ENCOUNTER
PA requested for [employer]    KEY - [id number]    The health plan does not accept electronic Prior Authorization request at this time.

## 2023-07-24 ENCOUNTER — TELEMEDICINE (OUTPATIENT)
Dept: PSYCHIATRY | Facility: CLINIC | Age: 38
End: 2023-07-24
Payer: COMMERCIAL

## 2023-07-24 DIAGNOSIS — F41.1 GENERALIZED ANXIETY DISORDER: ICD-10-CM

## 2023-07-24 DIAGNOSIS — F31.75 BIPOLAR 1 DISORDER, DEPRESSED, PARTIAL REMISSION: Chronic | ICD-10-CM

## 2023-07-24 DIAGNOSIS — F90.2 ATTENTION DEFICIT HYPERACTIVITY DISORDER (ADHD), COMBINED TYPE: Primary | ICD-10-CM

## 2023-07-24 PROCEDURE — 99214 OFFICE O/P EST MOD 30 MIN: CPT | Performed by: NURSE PRACTITIONER

## 2023-07-24 RX ORDER — BUPROPION HYDROCHLORIDE 300 MG/1
300 TABLET ORAL DAILY
Qty: 30 TABLET | Refills: 5 | Status: SHIPPED | OUTPATIENT
Start: 2023-07-24

## 2023-07-24 RX ORDER — BUSPIRONE HYDROCHLORIDE 10 MG/1
10 TABLET ORAL 3 TIMES DAILY
Qty: 90 TABLET | Refills: 5 | Status: SHIPPED | OUTPATIENT
Start: 2023-07-24

## 2023-07-24 RX ORDER — FLUOXETINE HYDROCHLORIDE 40 MG/1
40 CAPSULE ORAL DAILY
Qty: 30 CAPSULE | Refills: 2 | Status: SHIPPED | OUTPATIENT
Start: 2023-07-24 | End: 2024-07-23

## 2023-07-24 RX ORDER — DEXTROAMPHETAMINE SACCHARATE, AMPHETAMINE ASPARTATE, DEXTROAMPHETAMINE SULFATE AND AMPHETAMINE SULFATE 5; 5; 5; 5 MG/1; MG/1; MG/1; MG/1
TABLET ORAL
Qty: 30 TABLET | Refills: 0 | Status: SHIPPED | OUTPATIENT
Start: 2023-07-24

## 2023-07-24 RX ORDER — DEXTROAMPHETAMINE SACCHARATE, AMPHETAMINE ASPARTATE MONOHYDRATE, DEXTROAMPHETAMINE SULFATE AND AMPHETAMINE SULFATE 5; 5; 5; 5 MG/1; MG/1; MG/1; MG/1
40 CAPSULE, EXTENDED RELEASE ORAL EVERY MORNING
Qty: 60 CAPSULE | Refills: 0 | Status: SHIPPED | OUTPATIENT
Start: 2023-07-24

## 2023-07-24 NOTE — PROGRESS NOTES
Patient Name: Clare Turner  MRN: [id number]   :  1985     This provider is located at her home office through the [facility] (through [facility]), [address], [address] using a secure [employer] Video Visit through [employer]. Patient is being seen remotely via telehealth at their home address in [place], and stated they are in a secure environment for this session. The patient's condition being diagnosed/treated is appropriate for telemedicine. The provider identified herself as well as her credentials.   The patient, and/or patients guardian, consent to be seen remotely, and when consent is given they understand that the consent allows for patient identifiable information to be sent to a third party as needed.   They may refuse to be seen remotely at any time. The electronic data is encrypted and password protected, and the patient and/or guardian has been advised of the potential risks to privacy not withstanding such measures.    You have chosen to receive care through a telehealth visit.  Do you consent to use a video/audio connection for your medical care today? Yes    Chief Complaint:      ICD-10-CM ICD-9-CM   1. Attention deficit hyperactivity disorder (ADHD), combined type  F90.2 314.01   2. Bipolar 1 disorder, depressed, partial remission  F31.75 296.55   3. Generalized anxiety disorder  F41.1 300.02       History of Present Illness: Clare Turner is a 38 y.o. female is here today for medication management follow up.  Patient states her anxiety comes and goes.  They are short staffed at work so she is working more.  States she has more up days than down.  Patient's son came home from the hospital and is doing an IOP.  Patient states sleep is good when she is able to have the time to lay down to go to sleep.    The following portions of the patient's history were reviewed and updated as appropriate: allergies, current medications, past  family history, past medical history, past social history, past surgical history, and problem list.    Review of Systems;;  Review of Systems   Constitutional:  Negative for activity change, appetite change, fatigue, unexpected weight gain and unexpected weight loss.   Respiratory:  Negative for shortness of breath and wheezing.    Gastrointestinal:  Negative for constipation, diarrhea, nausea and vomiting.   Musculoskeletal:  Negative for gait problem.   Skin:  Negative for dry skin and rash.   Neurological:  Negative for dizziness, speech difficulty, weakness, light-headedness, headache, memory problem and confusion.   Psychiatric/Behavioral:  Positive for stress. Negative for agitation, behavioral problems, decreased concentration, dysphoric mood, hallucinations, self-injury, sleep disturbance, suicidal ideas, negative for hyperactivity and depressed mood. The patient is nervous/anxious.      Physical Exam;;  Physical Exam  Constitutional:       General: She is not in acute distress.     Appearance: She is well-developed. She is not diaphoretic.   HENT:      Head: Normocephalic and atraumatic.   Eyes:      Conjunctiva/sclera: Conjunctivae normal.   Pulmonary:      Effort: Pulmonary effort is normal. No respiratory distress.   Musculoskeletal:         General: Normal range of motion.      Cervical back: Full passive range of motion without pain and normal range of motion.   Neurological:      Mental Status: She is alert and oriented to person, place, and time.   Psychiatric:         Mood and Affect: Mood is anxious. Mood is not depressed. Affect is not labile, blunt, angry or inappropriate.         Speech: Speech is not rapid and pressured or tangential.         Behavior: Behavior normal. Behavior is not agitated, slowed, aggressive, withdrawn, hyperactive or combative. Behavior is cooperative.         Thought Content: Thought content normal. Thought content is not paranoid or delusional. Thought content does not  include homicidal or suicidal ideation. Thought content does not include homicidal or suicidal plan.         Judgment: Judgment normal.     not currently breastfeeding.  There is no height or weight on file to calculate BMI. Video appt unable to obtain.     Current Medications;;    Current Outpatient Medications:     amphetamine-dextroamphetamine (Adderall) 20 MG tablet, Take 20 mg orally every afternoon., Disp: 30 tablet, Rfl: 0    amphetamine-dextroamphetamine XR (Adderall XR) 20 MG 24 hr capsule, Take 2 capsules by mouth Every Morning, Disp: 60 capsule, Rfl: 0    buPROPion XL (WELLBUTRIN XL) 300 MG 24 hr tablet, Take 1 tablet by mouth Daily., Disp: 30 tablet, Rfl: 5    busPIRone (BUSPAR) 10 MG tablet, Take 1 tablet by mouth 3 (Three) Times a Day., Disp: 90 tablet, Rfl: 5    FLUoxetine (PROzac) 40 MG capsule, Take 1 capsule by mouth Daily., Disp: 30 capsule, Rfl: 2    albuterol sulfate  (90 Base) MCG/ACT inhaler, Inhale 2 puffs 2 (Two) Times a Day., Disp: 18 g, Rfl: 5    atorvastatin (LIPITOR) 10 MG tablet, TAKE 1 TABLET BY MOUTH DAILY, Disp: 30 tablet, Rfl: 5    Dupilumab 300 MG/2ML solution pen-injector, Inject 2 mL under the skin into the appropriate area as directed Every 14 (Fourteen) Days., Disp: 4 mL, Rfl: 11    EPINEPHrine (EPIPEN) 0.3 MG/0.3ML solution auto-injector injection, INJECT 1 PEN IN THE MUSCLE AS DIRECTED AS NEEDED FOR ALLERGIC REACTION, Disp: 2 each, Rfl: 1    Fluticasone Furoate-Vilanterol (BREO ELLIPTA) 200-25 MCG/ACT inhaler, Inhale 1 puff Daily. 1 inhalation once a day, Disp: 60 each, Rfl: 5    furosemide (LASIX) 20 MG tablet, Take 1 tablet by mouth 2 (Two) Times a Week. PRN for leg/ankle swelling., Disp: 30 tablet, Rfl: 1    loratadine (CLARITIN) 10 MG tablet, TAKE 1 TABLET BY MOUTH EVERY DAY, Disp: 30 tablet, Rfl: 5    Omeprazole 20 MG tablet delayed-release, TAKE 1 TABLET BY MOUTH EVERY DAY, Disp: 30 each, Rfl: 4    OXcarbazepine (TRILEPTAL) 600 MG tablet, TAKE 1 TABLET BY MOUTH  DAILY, Disp: 60 tablet, Rfl: 5    QUEtiapine XR (SEROquel XR) 400 MG 24 hr tablet, Take 1 tablet by mouth Every Night., Disp: 30 tablet, Rfl: 6    Semaglutide-Weight Management 0.25 MG/0.5ML solution auto-injector, Inject 0.25 mg under the skin into the appropriate area as directed 1 (One) Time Per Week for 4 doses. Follow up visit 3-4w after start, Disp: 2 mL, Rfl: 0    Lab Results:   Lab on 05/04/2023   Component Date Value Ref Range Status    Glucose 05/04/2023 76  65 - 99 mg/dL Final    BUN 05/04/2023 9  6 - 20 mg/dL Final    Creatinine 05/04/2023 0.77  0.57 - 1.00 mg/dL Final    Sodium 05/04/2023 136  136 - 145 mmol/L Final    Potassium 05/04/2023 3.9  3.5 - 5.2 mmol/L Final    Chloride 05/04/2023 100  98 - 107 mmol/L Final    CO2 05/04/2023 24.6  22.0 - 29.0 mmol/L Final    Calcium 05/04/2023 9.3  8.6 - 10.5 mg/dL Final    BUN/Creatinine Ratio 05/04/2023 11.7  7.0 - 25.0 Final    Anion Gap 05/04/2023 11.4  5.0 - 15.0 mmol/L Final    eGFR 05/04/2023 102.7  >60.0 mL/min/1.73 Final    Hemoglobin A1C 05/04/2023 5.40  4.80 - 5.60 % Final    Chlamydia trachomatis, FAZAL 05/04/2023 Negative  Negative Final    Neisseria gonorrhoeae, FAZAL 05/04/2023 Negative  Negative Final       Mental Status Exam:   Hygiene:   good  Cooperation:  Cooperative  Eye Contact:  Good  Psychomotor Behavior:  Appropriate  Mood:anxious  Affect:  Appropriate  Hopelessness: Denies  Speech:  Normal  Thought Process:  Goal directed  Thought Content:  Normal  Suicidal:  None  Homicidal:  None  Hallucinations:  None  Delusion:  None  Memory:  Intact  Orientation:  Person, Place, Time, and Situation  Reliability:  good  Insight:  Good  Judgement:  Good  Impulse Control:  Good    PHQ-9 Depression Screening  Little interest or pleasure in doing things?     Feeling down, depressed, or hopeless?     Trouble falling or staying asleep, or sleeping too much?     Feeling tired or having little energy?     Poor appetite or overeating?     Feeling bad about  yourself - or that you are a failure or have let yourself or your family down?     Trouble concentrating on things, such as reading the newspaper or watching television?     Moving or speaking so slowly that other people could have noticed? Or the opposite - being so fidgety or restless that you have been moving around a lot more than usual?     Thoughts that you would be better off dead, or of hurting yourself in some way?     PHQ-9 Total Score     If you checked off any problems, how difficult have these problems made it for you to do your work, take care of things at home, or get along with other people?          Assessment/Plan:  Diagnoses and all orders for this visit:    1. Attention deficit hyperactivity disorder (ADHD), combined type (Primary)  -     amphetamine-dextroamphetamine (Adderall) 20 MG tablet; Take 20 mg orally every afternoon.  Dispense: 30 tablet; Refill: 0  -     amphetamine-dextroamphetamine XR (Adderall XR) 20 MG 24 hr capsule; Take 2 capsules by mouth Every Morning  Dispense: 60 capsule; Refill: 0    2. Bipolar 1 disorder, depressed, partial remission  -     buPROPion XL (WELLBUTRIN XL) 300 MG 24 hr tablet; Take 1 tablet by mouth Daily.  Dispense: 30 tablet; Refill: 5  -     busPIRone (BUSPAR) 10 MG tablet; Take 1 tablet by mouth 3 (Three) Times a Day.  Dispense: 90 tablet; Refill: 5  -     FLUoxetine (PROzac) 40 MG capsule; Take 1 capsule by mouth Daily.  Dispense: 30 capsule; Refill: 2    3. Generalized anxiety disorder    Patient feels she is doing well on current medications.  Has some situational stress that seems to be managed well.    A psychological evaluation was conducted in order to assess past and current level of functioning. Areas assessed included, but were not limited to: perception of social support, perception of ability to face and deal with challenges in life (positive functioning), anxiety symptoms, depressive symptoms, perspective on beliefs/belief system, coping skills  for stress, intelligence level,  Therapeutic rapport was established. Interventions conducted today were geared towards incorporating medication management along with support for continued therapy. Education was also provided as to the med management with this provider and what to expect in subsequent sessions.    We discussed risks, benefits,goals and side effects of the above medication and the patient was agreeable with the plan.Patient was educated on the importance of compliance with treatment and follow-up appointments. Patient is aware to contact the [facility] [phone] with any worsening of symptoms. To call for questions or concerns and return early if necessary. Patent is agreeable to go to the Emergency Department or call 911 should they begin SI/HI.     Part of this note may be an electronic transcription/translation of spoken language to printed text using the Dragon Dictation System.    Return in about 2 months (around 9/24/2023) for Follow Up 30 min, Video visit.    DALLAS Hoover

## 2023-07-31 ENCOUNTER — HOSPITAL ENCOUNTER (OUTPATIENT)
Dept: CT IMAGING | Facility: HOSPITAL | Age: 38
Discharge: HOME | End: 2023-07-31
Admitting: INTERNAL MEDICINE
Payer: COMMERCIAL

## 2023-07-31 DIAGNOSIS — R91.1 RIGHT UPPER LOBE PULMONARY NODULE: ICD-10-CM

## 2023-07-31 PROCEDURE — 71250 CT THORAX DX C-: CPT

## 2023-08-01 DIAGNOSIS — R91.1 RIGHT UPPER LOBE PULMONARY NODULE: Primary | ICD-10-CM

## 2023-08-02 DIAGNOSIS — F90.2 ATTENTION DEFICIT HYPERACTIVITY DISORDER (ADHD), COMBINED TYPE: ICD-10-CM

## 2023-08-02 RX ORDER — DEXTROAMPHETAMINE SACCHARATE, AMPHETAMINE ASPARTATE MONOHYDRATE, DEXTROAMPHETAMINE SULFATE AND AMPHETAMINE SULFATE 5; 5; 5; 5 MG/1; MG/1; MG/1; MG/1
40 CAPSULE, EXTENDED RELEASE ORAL EVERY MORNING
Qty: 60 CAPSULE | Refills: 0 | Status: SHIPPED | OUTPATIENT
Start: 2023-08-02

## 2023-08-21 ENCOUNTER — TELEMEDICINE (OUTPATIENT)
Dept: PSYCHIATRY | Facility: CLINIC | Age: 38
End: 2023-08-21
Payer: COMMERCIAL

## 2023-08-21 ENCOUNTER — TELEPHONE (OUTPATIENT)
Dept: PSYCHIATRY | Facility: CLINIC | Age: 38
End: 2023-08-21

## 2023-08-21 DIAGNOSIS — F31.75 BIPOLAR 1 DISORDER, DEPRESSED, PARTIAL REMISSION: Primary | ICD-10-CM

## 2023-08-21 DIAGNOSIS — F90.2 ATTENTION DEFICIT HYPERACTIVITY DISORDER, COMBINED TYPE: ICD-10-CM

## 2023-08-21 PROCEDURE — 90832 PSYTX W PT 30 MINUTES: CPT | Performed by: SOCIAL WORKER

## 2023-08-21 NOTE — PROGRESS NOTES
[facility]   Follow-up Progress Note     Date: August 21, 2023  Time In: 1:47  Time Out: 2:04      PROGRESS NOTE  Data:  Clare Turner is a 38 y.o. female presenting to [facility] (through [facility]), [address], [place], [address] using a secure MyChart Video Visit through [facility] for assessment with Stevenson Coker LCSW. The patient is seen remotely in their  car  using [facility] My Chart. Patient is being seen via telehealth and stated they are in a secure environment for this session. The patient's condition being diagnosed/treated is appropriate for telemedicine. The provider identified herself as well as her credentials. The patient and/or patients guardian consent to be seen remotely, and when consent is given they understand that the consent allows for patient identifiable information to be sent to a third party as needed. They may refuse to be seen remotely at any time. The electronic data is encrypted and password protected, and the patient has been advised of the potential risks to privacy not withstanding such measures.    Today Patient stated her eldest son has continued to have issues with his mental health. Patient stated the second day of school he got in significant trouble and ran away from his school. Patient stated the police had to be called to bring her son back. Patient stated after this incident his grandmother allowed patient to take back full time custody. Patient stated his behavior has continued to escalate under patient's care. Patient stated she is concerned about his behavior and she has had to make challenging decisions about his future. Patient stated she has had to miss significant amounts of time at her job because of her son's behavior. Patient stated she has had to take extra precautions due to the increase in risky behaviors. Patient stated she is working with his  grandmother and court appointment worker to find a solution that is going to be appropriate. Provided patient with validation that she is doing the best she can with the resources she has available. Engaged patient in processing her emotions. The appointment ended abruptly due to patient needing to attend a meeting to discuss the future care for her son. Provider's office reached out to patient to schedule a follow-up appointment.       Clinical Maneuvering/Intervention:    Chief Complaint: mood instability    (Scales based on 0 - 10 with 10 being the worst)  Depression: Unable to answer Anxiety: Unable to answer     Assisted Patient in processing above session content; acknowledged and normalized patient's thoughts, feelings, and concerns.  Rationalized patient thought process regarding increase in stress related to parenting her son who is engaging in high risk taking/defiant behaviors.  Discussed triggers associated with patient's mood instability.  Also discussed coping skills for patient to implement such as validating her emotions.    Allowed Patient to freely discuss issues  without interruption or judgement with unconditional positive regard, active listening skills, and empathy. Therapist provided a safe, confidential environment to facilitate the development of a positive therapeutic relationship and encouraged open, honest communication. Assisted Patient in identifying risk factors which would indicate the need for higher level of care including thoughts to harm self or others and/or self-harming behavior and encouraged Patient to contact this office, call 911, or present to the nearest emergency room should any of these events occur. Discussed crisis intervention services and means to access. Patient adamantly and convincingly denies current suicidal or homicidal ideation or perceptual disturbance. Assisted Patient in processing session content; acknowledged and normalized Patient's thoughts, feelings,  and concerns by utilizing a person-centered approach in efforts to build appropriate rapport and a positive therapeutic relationship with open and honest communication. Therapist utilized dialectical behavior techniques to teach and model emotional regulation and relaxation methods. Therapist assisted Patient with identifying and implementing healthier coping strategies.     Assessment   Patient appears to be experiencing heightened anxiety and depression in response to COVID-19 epidemic  As a result, they can be reasonably expected to continue to benefit from treatment and would likely be at increased risk for decompensation otherwise.    Mental Status Exam:   Hygiene:   good  Cooperation:  Cooperative  Eye Contact:  Good  Psychomotor Behavior:  Appropriate  Affect:  Full range  Mood:  stressed  Speech:  Normal  Thought Process:  Goal directed  Thought Content:  Mood congruent  Suicidal:  None  Homicidal:  None  Hallucinations:  None  Delusion:  None  Memory:  Intact  Orientation:  Person, Place, Time, and Situation  Reliability:  good  Insight:  Good  Judgement:  Good  Impulse Control:  Good  Physical/Medical Issues:  No      PHQ-Score Total:  PHQ-9 Total Score:        Patient's Support Network Includes:  children, mother, extended family, and friends, coworkers    Functional Status: Moderate impairment     Progress toward goal: Not at goal    Prognosis: Good with Ongoing Treatment            Impression/Formulation:    VISIT DIAGNOSIS:     ICD-10-CM ICD-9-CM   1. Bipolar 1 disorder, depressed, partial remission  F31.75 296.55   2. Attention deficit hyperactivity disorder, combined type  F90.2 314.01        Patient appeared alert and oriented.  Patient is voluntarily requesting to continue outpatient therapy at [facility].  Patient is receptive to assistance with maintaining a stable lifestyle.  Patient presents with history of mood instability and ADHD.  Patient is agreeable to  attend routine therapy sessions.  Patient expressed desire to maintain stability and participate in the therapeutic process.        Crisis Plan:  Symptoms and/or behaviors to indicate a crisis: Isolation and Increased hunger or lack of appetite    What calming techniques or other strategies will patient use to de-esclate and stay safe: slow down, breathe, visualize calming self, think it though, listen to music, change focus, take a walk    Who is one person patient can contact to assist with de-escalation? sister    If symptoms/behaviors persist, Patient will present to the nearest hospital for an assessment. Advised patient of [facility] ER and assessment services.     Plan:   Patient will continue in individual outpatient therapy with focus on improved functioning and coping skills, maintaining stability, and avoiding decompensation and the need for higher level of care.    Patient will contact this office ([facility] at [phone]), call 911 or present to the nearest emergency room should suicidal or homicidal ideations occur. Provide Cognitive Behavioral Therapy and Solution Focused Therapy to improve functioning, maintain stability, and avoid decompensation and the need for higher level of care.     Return in about 4 weeks, or earlier if symptoms worsen or fail to improve.    Recommended Referrals: none        This document has been electronically signed by Stevenson Coker LCSW  August 21, 2023 13:44 EDT        Part of this note may be an electronic transcription/translation of spoken language to printed text using the Dragon Dictation System.

## 2023-08-22 DIAGNOSIS — F90.2 ATTENTION DEFICIT HYPERACTIVITY DISORDER (ADHD), COMBINED TYPE: ICD-10-CM

## 2023-08-22 RX ORDER — DEXTROAMPHETAMINE SACCHARATE, AMPHETAMINE ASPARTATE, DEXTROAMPHETAMINE SULFATE AND AMPHETAMINE SULFATE 5; 5; 5; 5 MG/1; MG/1; MG/1; MG/1
TABLET ORAL
Qty: 30 TABLET | Refills: 0 | Status: SHIPPED | OUTPATIENT
Start: 2023-08-22

## 2023-09-06 ENCOUNTER — OFFICE VISIT (OUTPATIENT)
Dept: INTERNAL MEDICINE | Facility: CLINIC | Age: 38
End: 2023-09-06
Payer: COMMERCIAL

## 2023-09-06 ENCOUNTER — TELEPHONE (OUTPATIENT)
Dept: INTERNAL MEDICINE | Facility: CLINIC | Age: 38
End: 2023-09-06

## 2023-09-06 VITALS
BODY MASS INDEX: 45.61 KG/M2 | TEMPERATURE: 97.8 F | HEIGHT: 66 IN | HEART RATE: 68 BPM | SYSTOLIC BLOOD PRESSURE: 124 MMHG | DIASTOLIC BLOOD PRESSURE: 70 MMHG | WEIGHT: 283.8 LBS

## 2023-09-06 DIAGNOSIS — F90.2 ATTENTION DEFICIT HYPERACTIVITY DISORDER (ADHD), COMBINED TYPE: ICD-10-CM

## 2023-09-06 DIAGNOSIS — J45.51 SEVERE PERSISTENT ASTHMA WITH ACUTE EXACERBATION: Primary | Chronic | ICD-10-CM

## 2023-09-06 PROBLEM — Z87.891 PERSONAL HISTORY OF SMOKING: Status: ACTIVE | Noted: 2017-11-02

## 2023-09-06 LAB
EXPIRATION DATE: NORMAL
EXPIRATION DATE: NORMAL
FLUAV AG UPPER RESP QL IA.RAPID: NOT DETECTED
FLUBV AG UPPER RESP QL IA.RAPID: NOT DETECTED
INTERNAL CONTROL: NORMAL
INTERNAL CONTROL: NORMAL
Lab: NORMAL
Lab: NORMAL
S PYO AG THROAT QL: NEGATIVE
SARS-COV-2 AG UPPER RESP QL IA.RAPID: NOT DETECTED

## 2023-09-06 PROCEDURE — 99214 OFFICE O/P EST MOD 30 MIN: CPT | Performed by: STUDENT IN AN ORGANIZED HEALTH CARE EDUCATION/TRAINING PROGRAM

## 2023-09-06 PROCEDURE — 87428 SARSCOV & INF VIR A&B AG IA: CPT | Performed by: STUDENT IN AN ORGANIZED HEALTH CARE EDUCATION/TRAINING PROGRAM

## 2023-09-06 PROCEDURE — 87880 STREP A ASSAY W/OPTIC: CPT | Performed by: STUDENT IN AN ORGANIZED HEALTH CARE EDUCATION/TRAINING PROGRAM

## 2023-09-06 RX ORDER — DEXTROAMPHETAMINE SACCHARATE, AMPHETAMINE ASPARTATE MONOHYDRATE, DEXTROAMPHETAMINE SULFATE AND AMPHETAMINE SULFATE 5; 5; 5; 5 MG/1; MG/1; MG/1; MG/1
40 CAPSULE, EXTENDED RELEASE ORAL EVERY MORNING
Qty: 60 CAPSULE | Refills: 0 | Status: SHIPPED | OUTPATIENT
Start: 2023-09-06

## 2023-09-06 RX ORDER — AMOXICILLIN AND CLAVULANATE POTASSIUM 875; 125 MG/1; MG/1
1 TABLET, FILM COATED ORAL 2 TIMES DAILY
Qty: 20 TABLET | Refills: 0 | Status: SHIPPED | OUTPATIENT
Start: 2023-09-06 | End: 2023-09-16

## 2023-09-06 RX ORDER — BROMPHENIRAMINE MALEATE, PSEUDOEPHEDRINE HYDROCHLORIDE, AND DEXTROMETHORPHAN HYDROBROMIDE 2; 30; 10 MG/5ML; MG/5ML; MG/5ML
SYRUP ORAL
Qty: 120 ML | Refills: 0 | Status: SHIPPED | OUTPATIENT
Start: 2023-09-06

## 2023-09-06 NOTE — TELEPHONE ENCOUNTER
See my note.  Patient told me she had a diagnosis of penicillin allergy in childhood, but later on she has received amoxicillin and tolerated well, which means she is not allergic.

## 2023-09-06 NOTE — TELEPHONE ENCOUNTER
Pharmacy Name:  [facility]    Pharmacy representative name: ALONA    Pharmacy representative phone number: [phone]     What medication are you calling in regards to: AUGMENTIN    What question does the pharmacy have: PHARMACY STATED THAT THEY HAVE THE PATIENT AS ALLERGIC TO PENICILLIN.    Who is the provider that prescribed the medication: DR TENORIO

## 2023-09-06 NOTE — PROGRESS NOTES
"Chief Complaint  Clare Turner is a 38 y.o. female presenting for Follow-up (Urgent care ).     From [place]. . 3 children. 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     Patient has a past medical history of hyperlipidemia, bipolar 1 disorder w/depression and Hx of jack, ADHD, GERD, episcleritis, restless legs, moderate persistent asthma (since childhood, f/u pulm), lung nodule stable for years, history of alcohol and substance abuse, urge/stress incontinence and severe obesity.    History of Present Illness  Patient is here for new evaluation.  He was seen at urgent care, prescribed cough syrup, steroid course and inhaler.  She is using albuterol nebulizer every 4 hours.  She was recently started on Dupixent, and has follow-up with pulmonology, but was not able to get in with them during current illness.  She continues to smoke up to half a pack of cigarettes daily.  She has a history of severe asthma since childhood.  She is reporting symptoms that started 4 days ago with sore throat, lost her voice, congested, low-grade fever around 99 F, worsening cough, wheezing, chest feels tight.  Of note she is on her second dose of steroids for the last month..    The following portions of the patient's history were reviewed and updated as appropriate: allergies, current medications, past family history, past medical history, past social history, past surgical history, and problem list.    Objective  /70 (BP Location: Left arm, Patient Position: Sitting, Cuff Size: Large Adult)   Pulse 68   Temp 97.8 °F (36.6 °C) (Temporal)   Ht 167.6 cm (65.98\")   Wt 129 kg (283 lb 12.8 oz)   BMI 45.83 kg/m²     Physical Exam  Vitals reviewed.   Constitutional:       General: She is not in acute distress.     Appearance: Normal appearance. She is ill-appearing. She is not toxic-appearing or diaphoretic.   HENT:      " Head: Normocephalic and atraumatic.      Right Ear: Tympanic membrane, ear canal and external ear normal. There is no impacted cerumen.      Left Ear: Ear canal and external ear normal. There is no impacted cerumen.      Ears:      Comments: (Mild redness of the right eardrum, no yellowing, no bulging.     Nose: Nose normal. No congestion.      Mouth/Throat:      Mouth: Mucous membranes are moist.      Pharynx: Oropharynx is clear. No posterior oropharyngeal erythema.      Comments: Speaking with hoarse voice.  Eyes:      Extraocular Movements: Extraocular movements intact.      Conjunctiva/sclera: Conjunctivae normal.   Cardiovascular:      Rate and Rhythm: Normal rate and regular rhythm.      Heart sounds: Normal heart sounds. No murmur heard.  Pulmonary:      Effort: Pulmonary effort is normal. No respiratory distress.      Breath sounds: Wheezing present.      Comments: Fairly good air movement bilaterally, but wheezes throughout both lungs, also with coarse breath sounds.  No focal findings.  Abdominal:      General: There is no distension.      Palpations: Abdomen is soft.   Musculoskeletal:      Cervical back: Neck supple. No tenderness.   Lymphadenopathy:      Cervical: No cervical adenopathy.   Skin:     General: Skin is warm and dry.   Neurological:      Mental Status: She is alert and oriented to person, place, and time. Mental status is at baseline.   Psychiatric:         Behavior: Behavior normal.         Thought Content: Thought content normal.       Assessment/Plan   1. Severe persistent asthma with acute exacerbation  Strep, COVID and flu negative.  Counseled on smoking cessation, this is likely a contributing factor.  Symptoms are consistent with viral infection, but cannot rule out secondary infection.  No history of COPD as far as I can see. Given worsening symptoms, we will do trial of Augmentin.  She has been on amoxicillin, and tolerated well.  Recently she was on doxycycline.  With any  worsening symptoms she should get in touch right away.  - POCT SARS-CoV-2 Antigen IZABEL  - POC Rapid Strep A  - amoxicillin-clavulanate (AUGMENTIN) 875-125 MG per tablet; Take 1 tablet by mouth 2 (Two) Times a Day for 10 days.  Dispense: 20 tablet; Refill: 0  - brompheniramine-pseudoephedrine-DM 30-2-10 MG/5ML syrup; 1 to 2 teaspoons 4 times daily as needed cough congestion  Dispense: 120 mL; Refill: 0      Return in about 10 weeks (around 11/15/2023), or if symptoms worsen or fail to improve, for Annual physical.    Future Appointments         Provider Department Center    9/7/2023 9:00 AM Khadra Clements APRN [facility] [place]    9/25/2023 2:30 PM Nani Ball APRN [facility]    11/17/2023 8:00 AM Michael Miner MD [facility] INTER[facility]IC[facility] [place]              Michael Miner MD  Family Medicine  09/06/2023

## 2023-09-06 NOTE — LETTER
September 6, 2023     Patient: Clare Turner   YOB: 1985   Date of Visit: 9/6/2023       To Whom It May Concern:    Clare Turner was seen in my clinic today. Please excuse her absence from work 09/06/23-9/8/2023, return to work 9/9/2023.         Sincerely,        Michael Miner MD    CC: No Recipients

## 2023-09-07 ENCOUNTER — TELEPHONE (OUTPATIENT)
Dept: PULMONOLOGY | Facility: CLINIC | Age: 38
End: 2023-09-07

## 2023-09-07 ENCOUNTER — TELEPHONE (OUTPATIENT)
Dept: PULMONOLOGY | Facility: CLINIC | Age: 38
End: 2023-09-07
Payer: COMMERCIAL

## 2023-09-07 ENCOUNTER — OFFICE VISIT (OUTPATIENT)
Dept: PULMONOLOGY | Facility: CLINIC | Age: 38
End: 2023-09-07
Payer: COMMERCIAL

## 2023-09-07 VITALS
HEIGHT: 66 IN | WEIGHT: 283 LBS | HEART RATE: 108 BPM | BODY MASS INDEX: 45.48 KG/M2 | OXYGEN SATURATION: 97 % | DIASTOLIC BLOOD PRESSURE: 80 MMHG | TEMPERATURE: 98 F | SYSTOLIC BLOOD PRESSURE: 120 MMHG

## 2023-09-07 DIAGNOSIS — J30.1 SEASONAL ALLERGIC RHINITIS DUE TO POLLEN: ICD-10-CM

## 2023-09-07 DIAGNOSIS — Z87.891 PERSONAL HISTORY OF SMOKING: ICD-10-CM

## 2023-09-07 DIAGNOSIS — J45.50 SEVERE PERSISTENT ASTHMA WITHOUT COMPLICATION: Primary | ICD-10-CM

## 2023-09-07 PROBLEM — J45.40 MODERATE PERSISTENT ASTHMA WITHOUT COMPLICATION: Chronic | Status: ACTIVE | Noted: 2023-09-07

## 2023-09-07 PROCEDURE — 99214 OFFICE O/P EST MOD 30 MIN: CPT | Performed by: NURSE PRACTITIONER

## 2023-09-07 RX ORDER — PREDNISONE 10 MG/1
10 TABLET ORAL DAILY
Qty: 30 TABLET | Refills: 0 | Status: SHIPPED | OUTPATIENT
Start: 2023-09-07

## 2023-09-07 RX ORDER — ALBUTEROL SULFATE 2.5 MG/3ML
2.5 SOLUTION RESPIRATORY (INHALATION) 4 TIMES DAILY PRN
Qty: 120 EACH | Refills: 5 | Status: SHIPPED | OUTPATIENT
Start: 2023-09-07

## 2023-09-07 RX ORDER — ALBUTEROL SULFATE 90 UG/1
2 AEROSOL, METERED RESPIRATORY (INHALATION) 2 TIMES DAILY
Qty: 18 G | Refills: 5 | Status: SHIPPED | OUTPATIENT
Start: 2023-09-07 | End: 2023-09-07 | Stop reason: SDUPTHER

## 2023-09-07 RX ORDER — BUDESONIDE 0.5 MG/2ML
0.5 INHALANT ORAL 2 TIMES DAILY
Qty: 60 EACH | Refills: 0 | Status: SHIPPED | OUTPATIENT
Start: 2023-09-07

## 2023-09-07 RX ORDER — ALBUTEROL SULFATE 90 UG/1
2 AEROSOL, METERED RESPIRATORY (INHALATION) 2 TIMES DAILY
Qty: 18 G | Refills: 5 | Status: SHIPPED | OUTPATIENT
Start: 2023-09-07

## 2023-09-07 NOTE — TELEPHONE ENCOUNTER
Spoke with patient regarding Dupixent PA approval. [facility] phone number [phone] opt 1 given for potential financial assistance. Patient verbalized understanding.

## 2023-09-07 NOTE — PROGRESS NOTES
"[facility] Pulmonary Follow up      Chief Complaint  Asthma, Lung Nodule, and Follow-up    Subjective          Clare Turner presents to [facility] for worsening asthma symptoms.  For about a month now she is having quite a bit of worsening cough and congestion.  The cough is mostly dry and nonproductive.  She is also been having worsening wheezing and shortness of breath.  She initially took a round of doxycycline and a steroid Dosepak.  She does continue on her Breo 200.  She continues to be quite symptomatic and is currently on Augmentin and prednisone.  She is taking 20 mg twice daily.        She also has a history of a stable 14 mm left upper lobe nodule that has been stable for years, that is partially calcified.  On her last CT follow-up in January she had a new 6 mm right upper lobe nodule, she did complete a 6-month follow-up in July that showed stability of that nodule as well.      Objective     Vital Signs:   /80   Pulse 108   Temp 98 °F (36.7 °C)   Ht 167.6 cm (65.98\")   Wt 128 kg (283 lb)   SpO2 97% Comment: resting, room air  BMI 45.71 kg/m²       Immunization History   Administered Date(s) Administered    COVID-19 (PFIZER) BIVALENT 12+YRS 10/12/2022    COVID-19 (PFIZER) Purple Cap Monovalent 04/01/2021, 04/21/2021, 11/15/2021    Fluzone >6mos 03/11/2022, 10/12/2022    Pneumococcal Conjugate 20-Valent (PCV20) 11/15/2022    Pneumococcal Polysaccharide (PPSV23) 06/06/2016       Physical Exam  Vitals reviewed.   Constitutional:       Appearance: She is well-developed.   HENT:      Head: Normocephalic and atraumatic.   Eyes:      Pupils: Pupils are equal, round, and reactive to light.   Cardiovascular:      Rate and Rhythm: Normal rate and regular rhythm.      Heart sounds: No murmur heard.  Pulmonary:      Effort: Pulmonary effort is normal. No respiratory distress.      Breath sounds: Wheezing and rhonchi present. No rales.   Abdominal: "      General: Bowel sounds are normal. There is no distension.      Palpations: Abdomen is soft.   Musculoskeletal:         General: Normal range of motion.      Cervical back: Normal range of motion and neck supple.   Skin:     General: Skin is warm and dry.      Findings: No erythema.   Neurological:      Mental Status: She is alert and oriented to person, place, and time.   Psychiatric:         Behavior: Behavior normal.        Result Review :       Data reviewed : Radiologic studies CT chest 7/31/2023      Findings:       MEDIASTINUM: Unremarkable. Aortic and heart size are normal. No mass nor pericardial effusion.  CORONARY ARTERIES: No calcified atherosclerotic disease.  LUNGS: Stable nodules including:  *1.7 x 1.4 cm left upper lobe nodule (image 39, series 4)  *0.6 cm right upper lobe nodule (image 26, series 4)  There is a calcified peripheral right upper lobe granuloma. Improving mosaic/groundglass attenuation PLEURAL SPACE: No effusion, mass, nor pneumothorax.  LYMPH NODES: There are numerous prominent mediastinal lymph nodes, some of which are partially calcified consistent with sequela of granulomatous disease.     UPPER ABDOMEN: Unremarkable     OSSEOUS STRUCTURES: Appropriate for age with no acute process identified.     IMPRESSION:  Impression:  1.Stable nodules  2.Improving nonspecific mosaic attenuation of the lungs.     Recommendation:  3 month follow up with LDCT.  PET/CT may be used if there is a =8 mm (=268 mm3) solid nodule or solid component     Lung Rads Assessment:  Lung-RADS L4A - Suspicious, 5-15% chance of malignancy.           Electronically Signed: Willard Richards MD    8/1/2023 9:09 AM CDT    Workstation ID: [id number]               Assessment and Plan    Problem List Items Addressed This Visit          Allergies and Adverse Reactions    Seasonal allergic rhinitis due to pollen    Relevant Medications    predniSONE (DELTASONE) 10 MG tablet       Pulmonary and Pneumonias    Severe  persistent asthma without complication - Primary    Relevant Medications    budesonide (PULMICORT) 0.5 MG/2ML nebulizer solution    albuterol (PROVENTIL) (2.5 MG/3ML) 0.083% nebulizer solution    albuterol sulfate  (90 Base) MCG/ACT inhaler    Other Relevant Orders    Home Nebulizer       Tobacco    Personal history of smoking     She has been having acute exacerbation of her asthma.    Currently she is on a round of Augmentin as well as prednisone.  Going to extend her prednisone out for a few more days after she completes her 20 twice daily.    I would also like for her to start on budesonide nebulizers twice daily, as well as albuterol nebulizers as needed.    During her visit back in May Dr. Grant try to get her set up on some Dupixent, she has since had some insurance changes and has not had that refilled.  Joaquina will work with her today to try to get that restarted.  She would certainly benefit from a biologic agent with her asthma, she has frequent exacerbations and prednisone use.    Follow back up in 6 to 8 weeks, or sooner if needed.    Follow Up     Return in about 4 weeks (around 10/5/2023).  Patient was given instructions and counseling regarding her condition or for health maintenance advice. Please see specific information pulled into the AVS if appropriate.     Moderate level of Medical Decision Making complexity based on 2 or more chronic stable illnesses and prescription drug management.    [place] Fox, ACNP  [facility] Pulmonary Critical Care Medicine  Electronically signed

## 2023-09-18 DIAGNOSIS — J45.50 SEVERE PERSISTENT ASTHMA WITHOUT COMPLICATION: Primary | ICD-10-CM

## 2023-09-18 RX ORDER — BUDESONIDE 0.5 MG/2ML
0.5 INHALANT ORAL 2 TIMES DAILY
Qty: 120 EACH | Refills: 11
Start: 2023-09-18

## 2023-09-25 DIAGNOSIS — F90.2 ATTENTION DEFICIT HYPERACTIVITY DISORDER (ADHD), COMBINED TYPE: ICD-10-CM

## 2023-09-25 RX ORDER — DEXTROAMPHETAMINE SACCHARATE, AMPHETAMINE ASPARTATE, DEXTROAMPHETAMINE SULFATE AND AMPHETAMINE SULFATE 5; 5; 5; 5 MG/1; MG/1; MG/1; MG/1
TABLET ORAL
Qty: 30 TABLET | Refills: 0 | Status: SHIPPED | OUTPATIENT
Start: 2023-09-25

## 2023-10-02 ENCOUNTER — TELEMEDICINE (OUTPATIENT)
Dept: PSYCHIATRY | Facility: CLINIC | Age: 38
End: 2023-10-02
Payer: COMMERCIAL

## 2023-10-02 DIAGNOSIS — F90.2 ATTENTION DEFICIT HYPERACTIVITY DISORDER (ADHD), COMBINED TYPE: ICD-10-CM

## 2023-10-02 DIAGNOSIS — F90.2 ATTENTION DEFICIT HYPERACTIVITY DISORDER (ADHD), COMBINED TYPE: Primary | ICD-10-CM

## 2023-10-02 DIAGNOSIS — F41.1 GENERALIZED ANXIETY DISORDER: ICD-10-CM

## 2023-10-02 DIAGNOSIS — F31.75 BIPOLAR 1 DISORDER, DEPRESSED, PARTIAL REMISSION: Chronic | ICD-10-CM

## 2023-10-02 PROCEDURE — 99214 OFFICE O/P EST MOD 30 MIN: CPT | Performed by: NURSE PRACTITIONER

## 2023-10-02 RX ORDER — DEXTROAMPHETAMINE SACCHARATE, AMPHETAMINE ASPARTATE MONOHYDRATE, DEXTROAMPHETAMINE SULFATE AND AMPHETAMINE SULFATE 5; 5; 5; 5 MG/1; MG/1; MG/1; MG/1
40 CAPSULE, EXTENDED RELEASE ORAL EVERY MORNING
Qty: 60 CAPSULE | Refills: 0 | Status: SHIPPED | OUTPATIENT
Start: 2023-10-02

## 2023-10-02 RX ORDER — FLUOXETINE HYDROCHLORIDE 20 MG/1
20 CAPSULE ORAL DAILY
Qty: 30 CAPSULE | Refills: 6 | Status: SHIPPED | OUTPATIENT
Start: 2023-10-02

## 2023-10-02 RX ORDER — OXCARBAZEPINE 600 MG/1
600 TABLET, FILM COATED ORAL DAILY
Qty: 60 TABLET | Refills: 6 | Status: SHIPPED | OUTPATIENT
Start: 2023-10-02

## 2023-10-02 RX ORDER — QUETIAPINE 400 MG/1
400 TABLET, FILM COATED, EXTENDED RELEASE ORAL NIGHTLY
Qty: 30 TABLET | Refills: 6 | Status: SHIPPED | OUTPATIENT
Start: 2023-10-02

## 2023-10-02 RX ORDER — DEXTROAMPHETAMINE SACCHARATE, AMPHETAMINE ASPARTATE MONOHYDRATE, DEXTROAMPHETAMINE SULFATE AND AMPHETAMINE SULFATE 5; 5; 5; 5 MG/1; MG/1; MG/1; MG/1
40 CAPSULE, EXTENDED RELEASE ORAL EVERY MORNING
Qty: 60 CAPSULE | Refills: 0 | Status: CANCELLED | OUTPATIENT
Start: 2023-10-02

## 2023-10-02 NOTE — PROGRESS NOTES
Patient Name: Clare Turner  MRN: [id number]   :  1985     This provider is located at her home office through the [facility] (through [facility]), [address], [address] using a secure [employer] Video Visit through [employer]. Patient is being seen remotely via telehealth at their home address in [place], and stated they are in a secure environment for this session. The patient's condition being diagnosed/treated is appropriate for telemedicine. The provider identified herself as well as her credentials.   The patient, and/or patients guardian, consent to be seen remotely, and when consent is given they understand that the consent allows for patient identifiable information to be sent to a third party as needed.   They may refuse to be seen remotely at any time. The electronic data is encrypted and password protected, and the patient and/or guardian has been advised of the potential risks to privacy not withstanding such measures.    You have chosen to receive care through a telehealth visit.  Do you consent to use a video/audio connection for your medical care today? Yes    Chief Complaint:      ICD-10-CM ICD-9-CM   1. Attention deficit hyperactivity disorder (ADHD), combined type  F90.2 314.01   2. Bipolar 1 disorder, depressed, partial remission  F31.75 296.55   3. Generalized anxiety disorder  F41.1 300.02       History of Present Illness: Clare Turner is a 38 y.o. female is here today for medication management follow up.  Patient states she is doing much better.  Does feel more leveled out however feels Prozac may need an increase.  Got full custody of her son and he is doing much better now that his medication has been adjusted.  Patient states sleep is better as well.    The following portions of the patient's history were reviewed and updated as appropriate: allergies, current medications, past family history, past medical history, past  social history, past surgical history, and problem list.    Review of Systems;;  Review of Systems   Constitutional:  Negative for activity change, appetite change, fatigue, unexpected weight gain and unexpected weight loss.   Respiratory:  Negative for shortness of breath and wheezing.    Gastrointestinal:  Negative for constipation, diarrhea, nausea and vomiting.   Musculoskeletal:  Negative for gait problem.   Skin:  Negative for dry skin and rash.   Neurological:  Negative for dizziness, speech difficulty, weakness, light-headedness, headache, memory problem and confusion.   Psychiatric/Behavioral:  Negative for agitation, behavioral problems, decreased concentration, dysphoric mood, hallucinations, self-injury, sleep disturbance, suicidal ideas, negative for hyperactivity, depressed mood and stress. The patient is nervous/anxious.      Physical Exam;;  Physical Exam  Constitutional:       General: She is not in acute distress.     Appearance: She is well-developed. She is not diaphoretic.   HENT:      Head: Normocephalic and atraumatic.   Eyes:      Conjunctiva/sclera: Conjunctivae normal.   Pulmonary:      Effort: Pulmonary effort is normal. No respiratory distress.   Musculoskeletal:         General: Normal range of motion.      Cervical back: Full passive range of motion without pain and normal range of motion.   Neurological:      Mental Status: She is alert and oriented to person, place, and time.   Psychiatric:         Mood and Affect: Mood is anxious. Mood is not depressed. Affect is not labile, blunt, angry or inappropriate.         Speech: Speech is not rapid and pressured or tangential.         Behavior: Behavior normal. Behavior is not agitated, slowed, aggressive, withdrawn, hyperactive or combative. Behavior is cooperative.         Thought Content: Thought content normal. Thought content is not paranoid or delusional. Thought content does not include homicidal or suicidal ideation. Thought content  does not include homicidal or suicidal plan.         Judgment: Judgment normal.     not currently breastfeeding.  There is no height or weight on file to calculate BMI.    Current Medications;;    Current Outpatient Medications:     OXcarbazepine (TRILEPTAL) 600 MG tablet, Take 1 tablet by mouth Daily., Disp: 60 tablet, Rfl: 6    QUEtiapine XR (SEROquel XR) 400 MG 24 hr tablet, Take 1 tablet by mouth Every Night., Disp: 30 tablet, Rfl: 6    albuterol (PROVENTIL) (2.5 MG/3ML) 0.083% nebulizer solution, Take 2.5 mg by nebulization 4 (Four) Times a Day As Needed for Wheezing., Disp: 120 each, Rfl: 5    albuterol sulfate  (90 Base) MCG/ACT inhaler, Inhale 2 puffs 2 (Two) Times a Day., Disp: 18 g, Rfl: 5    amphetamine-dextroamphetamine (Adderall) 20 MG tablet, Take 20 mg orally every afternoon., Disp: 30 tablet, Rfl: 0    amphetamine-dextroamphetamine XR (Adderall XR) 20 MG 24 hr capsule, Take 2 capsules by mouth Every Morning, Disp: 60 capsule, Rfl: 0    atorvastatin (LIPITOR) 10 MG tablet, TAKE 1 TABLET BY MOUTH DAILY, Disp: 30 tablet, Rfl: 5    benzonatate (TESSALON) 200 MG capsule, Take 1 capsule by mouth 3 (Three) Times a Day As Needed for Cough., Disp: 21 capsule, Rfl: 0    brompheniramine-pseudoephedrine-DM 30-2-10 MG/5ML syrup, 1 to 2 teaspoons 4 times daily as needed cough congestion, Disp: 120 mL, Rfl: 0    budesonide (Pulmicort) 0.5 MG/2ML nebulizer solution, Take 2 mL by nebulization 2 (Two) Times a Day., Disp: 120 each, Rfl: 11    buPROPion XL (WELLBUTRIN XL) 300 MG 24 hr tablet, Take 1 tablet by mouth Daily., Disp: 30 tablet, Rfl: 5    busPIRone (BUSPAR) 10 MG tablet, Take 1 tablet by mouth 3 (Three) Times a Day., Disp: 90 tablet, Rfl: 5    dexAMETHasone (DECADRON) 0.1 % ophthalmic solution, , Disp: , Rfl:     difluprednate (DUREZOL) 0.05 % ophthalmic emulsion, , Disp: , Rfl:     Dupilumab 300 MG/2ML solution pen-injector, Inject 2 mL under the skin into the appropriate area as directed Every 14  (Fourteen) Days., Disp: 4 mL, Rfl: 11    EPINEPHrine (EPIPEN) 0.3 MG/0.3ML solution auto-injector injection, INJECT 1 PEN IN THE MUSCLE AS DIRECTED AS NEEDED FOR ALLERGIC REACTION, Disp: 2 each, Rfl: 1    FLUoxetine (PROzac) 20 MG capsule, Take 1 capsule by mouth Daily. WITH 40 mg, Disp: 30 capsule, Rfl: 6    FLUoxetine (PROzac) 40 MG capsule, Take 1 capsule by mouth Daily., Disp: 30 capsule, Rfl: 2    Fluticasone Furoate-Vilanterol (BREO ELLIPTA) 200-25 MCG/ACT inhaler, Inhale 1 puff Daily. 1 inhalation once a day, Disp: 60 each, Rfl: 5    furosemide (LASIX) 20 MG tablet, Take 1 tablet by mouth 2 (Two) Times a Week. PRN for leg/ankle swelling., Disp: 30 tablet, Rfl: 1    loratadine (CLARITIN) 10 MG tablet, TAKE 1 TABLET BY MOUTH EVERY DAY, Disp: 30 tablet, Rfl: 5    Omeprazole 20 MG tablet delayed-release, TAKE 1 TABLET BY MOUTH EVERY DAY, Disp: 30 each, Rfl: 4    Lab Results:   Office Visit on 09/06/2023   Component Date Value Ref Range Status    SARS Antigen 09/06/2023 Not Detected  Not Detected, Presumptive Negative Final    Influenza A Antigen IZABEL 09/06/2023 Not Detected  Not Detected Final    Influenza B Antigen IZABEL 09/06/2023 Not Detected  Not Detected Final    Internal Control 09/06/2023 Passed  Passed Final    Lot Number 09/06/2023 [id number]   Final    Expiration Date 09/06/2023 04-   Final    Rapid Strep A Screen 09/06/2023 Negative  Negative, VALID, INVALID, Not Performed Final    Internal Control 09/06/2023 Passed  Passed Final    Lot Number 09/06/2023 [id number]   Final    Expiration Date 09/06/2023 01-   Final   Admission on 09/04/2023, Discharged on 09/04/2023   Component Date Value Ref Range Status    Expiration Date 09/04/2023 09-   Final    Lot Number 09/04/2023 [id number]   Final    Internal Control 09/04/2023 Passed   Final    Rapid Strep A Screen 09/04/2023 Negative   Final    Rapid Influenza A Ag 09/04/2023 Negative  Negative Final    Rapid Influenza B Ag 09/04/2023 Negative   Negative Final    Internal Control 09/04/2023 Passed  Passed Final    Lot Number 09/04/2023 [id number]   Final    Expiration Date 09/04/2023 0110/18/2024   Final    SARS Antigen 09/04/2023 Not Detected  Not Detected, Presumptive Negative Final    Internal Control 09/04/2023 Passed  Passed Final    Lot Number 09/04/2023 [id number]   Final    Expiration Date 09/04/2023 04/02/2024   Final   Admission on 08/20/2023, Discharged on 08/20/2023   Component Date Value Ref Range Status    SARS Antigen 08/20/2023 Not Detected  Not Detected, Presumptive Negative Final-Edited    Influenza A Antigen IZABEL 08/20/2023 Not Detected  Not Detected Final-Edited    Influenza B Antigen IZABEL 08/20/2023 Not Detected  Not Detected Final-Edited    Internal Control 08/20/2023 Passed  Passed Final-Edited    Lot Number 08/20/2023 [id number]   Final-Edited    Expiration Date 08/20/2023 4/10/24   Final-Edited       Mental Status Exam:   Hygiene:   good  Cooperation:  Cooperative  Eye Contact:  Good  Psychomotor Behavior:  Appropriate  Mood:anxious  Affect:  Appropriate  Hopelessness: Denies  Speech:  Normal  Thought Process:  Goal directed  Thought Content:  Normal  Suicidal:  None  Homicidal:  None  Hallucinations:  None  Delusion:  None  Memory:  Intact  Orientation:  Person, Place, Time, and Situation  Reliability:  good  Insight:  Good  Judgement:  Good  Impulse Control:  Good    PHQ-9 Depression Screening  Little interest or pleasure in doing things?     Feeling down, depressed, or hopeless?     Trouble falling or staying asleep, or sleeping too much?     Feeling tired or having little energy?     Poor appetite or overeating?     Feeling bad about yourself - or that you are a failure or have let yourself or your family down?     Trouble concentrating on things, such as reading the newspaper or watching television?     Moving or speaking so slowly that other people could have noticed? Or the opposite - being so fidgety or restless that you have been  moving around a lot more than usual?     Thoughts that you would be better off dead, or of hurting yourself in some way?     PHQ-9 Total Score     If you checked off any problems, how difficult have these problems made it for you to do your work, take care of things at home, or get along with other people?        Reviewed EVA # [id number]     Assessment/Plan:  Diagnoses and all orders for this visit:    1. Attention deficit hyperactivity disorder (ADHD), combined type (Primary)    2. Bipolar 1 disorder, depressed, partial remission  -     OXcarbazepine (TRILEPTAL) 600 MG tablet; Take 1 tablet by mouth Daily.  Dispense: 60 tablet; Refill: 6  -     QUEtiapine XR (SEROquel XR) 400 MG 24 hr tablet; Take 1 tablet by mouth Every Night.  Dispense: 30 tablet; Refill: 6    3. Generalized anxiety disorder  -     FLUoxetine (PROzac) 20 MG capsule; Take 1 capsule by mouth Daily. WITH 40 mg  Dispense: 30 capsule; Refill: 6      Patient doing well overall.  Does feel like her Prozac has plateaued.  We will increase Prozac to 60 mg daily by adding a 20 mg capsule.  Patient feels comfortable following up in 3 months.    A psychological evaluation was conducted in order to assess past and current level of functioning. Areas assessed included, but were not limited to: perception of social support, perception of ability to face and deal with challenges in life (positive functioning), anxiety symptoms, depressive symptoms, perspective on beliefs/belief system, coping skills for stress, intelligence level,  Therapeutic rapport was established. Interventions conducted today were geared towards incorporating medication management along with support for continued therapy. Education was also provided as to the med management with this provider and what to expect in subsequent sessions.    We discussed risks, benefits,goals and side effects of the above medication and the patient was agreeable with the plan.Patient was educated on the  importance of compliance with treatment and follow-up appointments. Patient is aware to contact the [facility] [phone] with any worsening of symptoms. To call for questions or concerns and return early if necessary. Patent is agreeable to go to the Emergency Department or call 911 should they begin SI/HI.     Part of this note may be an electronic transcription/translation of spoken language to printed text using the Dragon Dictation System.    Return in about 3 months (around 1/2/2024) for Follow Up 30 min, Video visit.    Nani Ball, D[place]

## 2023-10-23 ENCOUNTER — TELEPHONE (OUTPATIENT)
Dept: PSYCHIATRY | Facility: CLINIC | Age: 38
End: 2023-10-23
Payer: COMMERCIAL

## 2023-10-23 DIAGNOSIS — F90.2 ATTENTION DEFICIT HYPERACTIVITY DISORDER (ADHD), COMBINED TYPE: Primary | ICD-10-CM

## 2023-10-23 DIAGNOSIS — F90.2 ATTENTION DEFICIT HYPERACTIVITY DISORDER (ADHD), COMBINED TYPE: ICD-10-CM

## 2023-10-23 RX ORDER — DEXTROAMPHETAMINE SACCHARATE, AMPHETAMINE ASPARTATE, DEXTROAMPHETAMINE SULFATE AND AMPHETAMINE SULFATE 5; 5; 5; 5 MG/1; MG/1; MG/1; MG/1
TABLET ORAL
Qty: 30 TABLET | Refills: 0 | Status: SHIPPED | OUTPATIENT
Start: 2023-10-23

## 2023-11-01 ENCOUNTER — TELEPHONE (OUTPATIENT)
Dept: PSYCHIATRY | Facility: CLINIC | Age: 38
End: 2023-11-01
Payer: COMMERCIAL

## 2023-11-01 DIAGNOSIS — F90.2 ATTENTION DEFICIT HYPERACTIVITY DISORDER (ADHD), COMBINED TYPE: ICD-10-CM

## 2023-11-02 ENCOUNTER — LAB (OUTPATIENT)
Dept: LAB | Facility: HOSPITAL | Age: 38
End: 2023-11-02
Payer: COMMERCIAL

## 2023-11-02 PROCEDURE — 80307 DRUG TEST PRSMV CHEM ANLYZR: CPT | Performed by: NURSE PRACTITIONER

## 2023-11-02 RX ORDER — DEXTROAMPHETAMINE SACCHARATE, AMPHETAMINE ASPARTATE MONOHYDRATE, DEXTROAMPHETAMINE SULFATE AND AMPHETAMINE SULFATE 5; 5; 5; 5 MG/1; MG/1; MG/1; MG/1
40 CAPSULE, EXTENDED RELEASE ORAL EVERY MORNING
Qty: 60 CAPSULE | Refills: 0 | Status: SHIPPED | OUTPATIENT
Start: 2023-11-02

## 2023-12-05 DIAGNOSIS — F90.2 ATTENTION DEFICIT HYPERACTIVITY DISORDER (ADHD), COMBINED TYPE: ICD-10-CM

## 2023-12-05 RX ORDER — DEXTROAMPHETAMINE SACCHARATE, AMPHETAMINE ASPARTATE MONOHYDRATE, DEXTROAMPHETAMINE SULFATE AND AMPHETAMINE SULFATE 5; 5; 5; 5 MG/1; MG/1; MG/1; MG/1
40 CAPSULE, EXTENDED RELEASE ORAL EVERY MORNING
Qty: 60 CAPSULE | Refills: 0 | Status: SHIPPED | OUTPATIENT
Start: 2023-12-05

## 2023-12-05 RX ORDER — DEXTROAMPHETAMINE SACCHARATE, AMPHETAMINE ASPARTATE, DEXTROAMPHETAMINE SULFATE AND AMPHETAMINE SULFATE 5; 5; 5; 5 MG/1; MG/1; MG/1; MG/1
TABLET ORAL
Qty: 30 TABLET | Refills: 0 | Status: SHIPPED | OUTPATIENT
Start: 2023-12-05

## 2023-12-29 ENCOUNTER — TELEMEDICINE (OUTPATIENT)
Dept: PSYCHIATRY | Facility: CLINIC | Age: 38
End: 2023-12-29
Payer: COMMERCIAL

## 2023-12-29 DIAGNOSIS — F31.75 BIPOLAR 1 DISORDER, DEPRESSED, PARTIAL REMISSION: Primary | ICD-10-CM

## 2023-12-29 DIAGNOSIS — F90.2 ATTENTION DEFICIT HYPERACTIVITY DISORDER, COMBINED TYPE: ICD-10-CM

## 2023-12-29 NOTE — PROGRESS NOTES
"[facility]   Follow-up Progress Note     Date: December 29, 2023  Time In: 11:02  Time Out: 12:01      PROGRESS NOTE  Data:  Clare Turner is a 38 y.o. female presenting to [facility] (through [facility]), [address] [place], [address] using a secure MyChart Video Visit through [facility] for assessment with Stevenson Coker LCSW. The patient is seen remotely in their  home  using [facility] My Chart. Patient is being seen via telehealth and stated they are in a secure environment for this session. The patient's condition being diagnosed/treated is appropriate for telemedicine. The provider identified herself as well as her credentials. The patient and/or patients guardian consent to be seen remotely, and when consent is given they understand that the consent allows for patient identifiable information to be sent to a third party as needed. They may refuse to be seen remotely at any time. The electronic data is encrypted and password protected, and the patient has been advised of the potential risks to privacy not withstanding such measures.    Today Patient has gotten full custody of her oldest son since previous appointment. Patient stated the beginning was challenging that lead to police involvement and her son being hospitalized for 7 days due to some statements. Patient stated since then his behavior has slowly progressed. Patient stated she did lose her job due to an allegation of \"unjustified force.\" Patient expressed though she is disappointment to lose that job the hours were strenuous. Patient stated she has been able to refocus on the career she is trained in and spend more time with her son. Patient stated she initially was depressed by losing her job but recognized it was an opportunity to move forward with her life goals. Patient stated she has recently been frustrated with her family members " treatment of her partner. Patient stated about 40% are supportive and 60% are not. Patient stated doesn't want to damage her relationship or her familial relationships. Processed with patient looking at different perspectives of her life circumstances in choosing a new career.        Clinical Maneuvering/Intervention:    Chief Complaint: mood instability, ADHD    (Scales based on 0 - 10 with 10 being the worst)  Depression: 4 Anxiety: 7     Assisted Patient in processing above session content; acknowledged and normalized patient’s thoughts, feelings, and concerns.  Rationalized patient thought process regarding managing life stressors.  Discussed triggers associated with patient's mood instability.  Also discussed coping skills for patient to implement such as focusing on the present.    Allowed Patient to freely discuss issues  without interruption or judgement with unconditional positive regard, active listening skills, and empathy. Therapist provided a safe, confidential environment to facilitate the development of a positive therapeutic relationship and encouraged open, honest communication. Assisted Patient in identifying risk factors which would indicate the need for higher level of care including thoughts to harm self or others and/or self-harming behavior and encouraged Patient to contact this office, call 911, or present to the nearest emergency room should any of these events occur. Discussed crisis intervention services and means to access. Patient adamantly and convincingly denies current suicidal or homicidal ideation or perceptual disturbance. Assisted Patient in processing session content; acknowledged and normalized Patient’s thoughts, feelings, and concerns by utilizing a person-centered approach in efforts to build appropriate rapport and a positive therapeutic relationship with open and honest communication. Therapist utilized dialectical behavior techniques to teach and model emotional regulation  and relaxation methods. Therapist assisted Patient with identifying and implementing healthier coping strategies.     Assessment   Patient appears to be experiencing heightened anxiety and depression in response to COVID-19 epidemic  As a result, they can be reasonably expected to continue to benefit from treatment and would likely be at increased risk for decompensation otherwise.    Mental Status Exam:   Hygiene:   good  Cooperation:  Cooperative  Eye Contact:  Good  Psychomotor Behavior:  Appropriate  Affect:  Full range  Mood:  positive  Speech:  Normal  Thought Process:  Goal directed  Thought Content:  Mood congruent  Suicidal:  None  Homicidal:  None  Hallucinations:  None  Delusion:  None  Memory:  Intact  Orientation:  Person, Place, Time, and Situation  Reliability:  good  Insight:  Good  Judgement:  Good  Impulse Control:  Good  Physical/Medical Issues:  No      PHQ-Score Total:  PHQ-9 Total Score:        Patient's Support Network Includes:  children, mother, extended family, and friends, coworkers    Functional Status: Moderate impairment     Progress toward goal: Not at goal    Prognosis: Good with Ongoing Treatment            Impression/Formulation:    VISIT DIAGNOSIS:     ICD-10-CM ICD-9-CM   1. Bipolar 1 disorder, depressed, partial remission  F31.75 296.55   2. Attention deficit hyperactivity disorder, combined type  F90.2 314.01        Patient appeared alert and oriented.  Patient is voluntarily requesting to continue outpatient therapy at [facility].  Patient is receptive to assistance with maintaining a stable lifestyle.  Patient presents with history of mood instability and ADHD.  Patient is agreeable to attend routine therapy sessions.  Patient expressed desire to maintain stability and participate in the therapeutic process.        Crisis Plan:  Symptoms and/or behaviors to indicate a crisis: Isolation and Increased hunger or lack of appetite    What  calming techniques or other strategies will patient use to de-esclate and stay safe: slow down, breathe, visualize calming self, think it though, listen to music, change focus, take a walk    Who is one person patient can contact to assist with de-escalation? sister    If symptoms/behaviors persist, Patient will present to the nearest hospital for an assessment. Advised patient of [facility] ER and assessment services.     Plan:   Patient will continue in individual outpatient therapy with focus on improved functioning and coping skills, maintaining stability, and avoiding decompensation and the need for higher level of care.    Patient will contact this office ([facility] at [phone]), call 911 or present to the nearest emergency room should suicidal or homicidal ideations occur. Provide Cognitive Behavioral Therapy and Solution Focused Therapy to improve functioning, maintain stability, and avoid decompensation and the need for higher level of care.     Return in about 4 weeks, or earlier if symptoms worsen or fail to improve.    Recommended Referrals: none        This document has been electronically signed by Stevenson Coker LCSW  December 29, 2023 11:01 EST        Part of this note may be an electronic transcription/translation of spoken language to printed text using the Dragon Dictation System.

## 2024-01-02 ENCOUNTER — TELEMEDICINE (OUTPATIENT)
Dept: PSYCHIATRY | Facility: CLINIC | Age: 39
End: 2024-01-02
Payer: COMMERCIAL

## 2024-01-02 DIAGNOSIS — F90.2 ATTENTION DEFICIT HYPERACTIVITY DISORDER (ADHD), COMBINED TYPE: ICD-10-CM

## 2024-01-02 DIAGNOSIS — F31.75 BIPOLAR 1 DISORDER, DEPRESSED, PARTIAL REMISSION: Primary | ICD-10-CM

## 2024-01-02 DIAGNOSIS — F90.2 ATTENTION DEFICIT HYPERACTIVITY DISORDER, COMBINED TYPE: ICD-10-CM

## 2024-01-02 DIAGNOSIS — F41.1 GENERALIZED ANXIETY DISORDER: ICD-10-CM

## 2024-01-02 PROCEDURE — 99214 OFFICE O/P EST MOD 30 MIN: CPT | Performed by: NURSE PRACTITIONER

## 2024-01-02 PROCEDURE — 1159F MED LIST DOCD IN RCRD: CPT | Performed by: NURSE PRACTITIONER

## 2024-01-02 PROCEDURE — 1160F RVW MEDS BY RX/DR IN RCRD: CPT | Performed by: NURSE PRACTITIONER

## 2024-01-02 RX ORDER — BUSPIRONE HYDROCHLORIDE 10 MG/1
10 TABLET ORAL 3 TIMES DAILY
Qty: 90 TABLET | Refills: 6 | Status: SHIPPED | OUTPATIENT
Start: 2024-01-02

## 2024-01-02 RX ORDER — BUPROPION HYDROCHLORIDE 300 MG/1
300 TABLET ORAL DAILY
Qty: 30 TABLET | Refills: 6 | Status: SHIPPED | OUTPATIENT
Start: 2024-01-02

## 2024-01-02 RX ORDER — DEXTROAMPHETAMINE SACCHARATE, AMPHETAMINE ASPARTATE, DEXTROAMPHETAMINE SULFATE AND AMPHETAMINE SULFATE 5; 5; 5; 5 MG/1; MG/1; MG/1; MG/1
TABLET ORAL
Qty: 30 TABLET | Refills: 0 | Status: SHIPPED | OUTPATIENT
Start: 2024-01-02

## 2024-01-02 RX ORDER — FLUOXETINE HYDROCHLORIDE 40 MG/1
80 CAPSULE ORAL DAILY
Qty: 60 CAPSULE | Refills: 6 | Status: SHIPPED | OUTPATIENT
Start: 2024-01-02 | End: 2025-01-01

## 2024-01-02 RX ORDER — DEXTROAMPHETAMINE SACCHARATE, AMPHETAMINE ASPARTATE MONOHYDRATE, DEXTROAMPHETAMINE SULFATE AND AMPHETAMINE SULFATE 5; 5; 5; 5 MG/1; MG/1; MG/1; MG/1
40 CAPSULE, EXTENDED RELEASE ORAL EVERY MORNING
Qty: 60 CAPSULE | Refills: 0 | Status: SHIPPED | OUTPATIENT
Start: 2024-01-02

## 2024-01-02 NOTE — PROGRESS NOTES
Patient Name: Clare Turner  MRN: [id number]   :  1985     This provider is located at her home office through the [facility] (through [facility]), [address], [address] using a secure Vocabhart Video Visit through [facility]. Patient is being seen remotely via telehealth at their home address in [place], and stated they are in a secure environment for this session. The patient's condition being diagnosed/treated is appropriate for telemedicine. The provider identified herself as well as her credentials.   The patient, and/or patients guardian, consent to be seen remotely, and when consent is given they understand that the consent allows for patient identifiable information to be sent to a third party as needed.   They may refuse to be seen remotely at any time. The electronic data is encrypted and password protected, and the patient and/or guardian has been advised of the potential risks to privacy not withstanding such measures.    You have chosen to receive care through a telehealth visit.  Do you consent to use a video/audio connection for your medical care today? Yes    Chief Complaint:      ICD-10-CM ICD-9-CM   1. Bipolar 1 disorder, depressed, partial remission  F31.75 296.55   2. Attention deficit hyperactivity disorder, combined type  F90.2 314.01   3. Generalized anxiety disorder  F41.1 300.02   4. Attention deficit hyperactivity disorder (ADHD), combined type  F90.2 314.01       History of Present Illness: Clare Turner is a 38 y.o. female is here today for medication management follow up.  Patient had 2 rough weeks the end of November when she was let go from her job.  Patient states thankfully she was still able to give her family a good [place].  States looking back it is a blessing as she was spending too much time at work.  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  Patient was supposed to start today however has COVID.   Feels she would like to increase her Prozac to get through this rough patch.    The following portions of the patient's history were reviewed and updated as appropriate: allergies, current medications, past family history, past medical history, past social history, past surgical history, and problem list.    Review of Systems;;  Review of Systems   Constitutional:  Negative for activity change, appetite change, fatigue, unexpected weight gain and unexpected weight loss.   Respiratory:  Negative for shortness of breath and wheezing.    Gastrointestinal:  Negative for constipation, diarrhea, nausea and vomiting.   Musculoskeletal:  Negative for gait problem.   Skin:  Negative for dry skin and rash.   Neurological:  Negative for dizziness, speech difficulty, weakness, light-headedness, headache, memory problem and confusion.   Psychiatric/Behavioral:  Positive for depressed mood and stress. Negative for agitation, behavioral problems, decreased concentration, dysphoric mood, hallucinations, self-injury, sleep disturbance, suicidal ideas and negative for hyperactivity. The patient is not nervous/anxious.        Physical Exam;;  Physical Exam  Constitutional:       General: She is not in acute distress.     Appearance: She is well-developed. She is not diaphoretic.   HENT:      Head: Normocephalic and atraumatic.   Eyes:      Conjunctiva/sclera: Conjunctivae normal.   Pulmonary:      Effort: Pulmonary effort is normal. No respiratory distress.   Musculoskeletal:         General: Normal range of motion.      Cervical back: Full passive range of motion without pain and normal range of motion.   Neurological:      Mental Status: She is alert and oriented to person, place, and time.   Psychiatric:         Mood and Affect: Mood is depressed. Mood is not anxious. Affect is not labile, blunt, angry or inappropriate.         Speech: Speech is not rapid and pressured or tangential.         Behavior: Behavior normal. Behavior is  not agitated, slowed, aggressive, withdrawn, hyperactive or combative. Behavior is cooperative.         Thought Content: Thought content normal. Thought content is not paranoid or delusional. Thought content does not include homicidal or suicidal ideation. Thought content does not include homicidal or suicidal plan.         Judgment: Judgment normal.       Last menstrual period 12/13/2023, not currently breastfeeding.  There is no height or weight on file to calculate BMI. Video appt unable to obtain.     Current Medications;;    Current Outpatient Medications:     amphetamine-dextroamphetamine (Adderall) 20 MG tablet, Take 20 mg orally every afternoon., Disp: 30 tablet, Rfl: 0    amphetamine-dextroamphetamine XR (Adderall XR) 20 MG 24 hr capsule, Take 2 capsules by mouth Every Morning, Disp: 60 capsule, Rfl: 0    buPROPion XL (WELLBUTRIN XL) 300 MG 24 hr tablet, Take 1 tablet by mouth Daily., Disp: 30 tablet, Rfl: 6    busPIRone (BUSPAR) 10 MG tablet, Take 1 tablet by mouth 3 (Three) Times a Day., Disp: 90 tablet, Rfl: 6    FLUoxetine (PROzac) 40 MG capsule, Take 2 capsules by mouth Daily., Disp: 60 capsule, Rfl: 6    albuterol (PROVENTIL) (2.5 MG/3ML) 0.083% nebulizer solution, Take 2.5 mg by nebulization 4 (Four) Times a Day As Needed for Wheezing., Disp: 120 each, Rfl: 5    albuterol sulfate  (90 Base) MCG/ACT inhaler, Inhale 2 puffs 2 (Two) Times a Day., Disp: 18 g, Rfl: 5    atorvastatin (LIPITOR) 10 MG tablet, TAKE 1 TABLET BY MOUTH DAILY, Disp: 30 tablet, Rfl: 5    budesonide (Pulmicort) 0.5 MG/2ML nebulizer solution, Take 2 mL by nebulization 2 (Two) Times a Day., Disp: 120 each, Rfl: 11    dexAMETHasone (DECADRON) 0.1 % ophthalmic solution, , Disp: , Rfl:     difluprednate (DUREZOL) 0.05 % ophthalmic emulsion, , Disp: , Rfl:     doxycycline (VIBRAMYCIN) 100 MG capsule, Take 1 capsule by mouth 2 (Two) Times a Day., Disp: 14 capsule, Rfl: 0    Dupilumab 300 MG/2ML solution pen-injector, Inject 2 mL  under the skin into the appropriate area as directed Every 14 (Fourteen) Days., Disp: 4 mL, Rfl: 11    EPINEPHrine (EPIPEN) 0.3 MG/0.3ML solution auto-injector injection, INJECT 1 PEN IN THE MUSCLE AS DIRECTED AS NEEDED FOR ALLERGIC REACTION, Disp: 2 each, Rfl: 1    Fluticasone Furoate-Vilanterol (BREO ELLIPTA) 200-25 MCG/ACT inhaler, Inhale 1 puff Daily. 1 inhalation once a day, Disp: 60 each, Rfl: 5    furosemide (LASIX) 20 MG tablet, Take 1 tablet by mouth 2 (Two) Times a Week. PRN for leg/ankle swelling., Disp: 30 tablet, Rfl: 1    loratadine (CLARITIN) 10 MG tablet, TAKE 1 TABLET BY MOUTH EVERY DAY, Disp: 30 tablet, Rfl: 5    Molnupiravir (LAGEVRIO) 200 MG capsule, Take 4 capsules by mouth Every 12 (Twelve) Hours for 5 days., Disp: 40 capsule, Rfl: 0    Omeprazole 20 MG tablet delayed-release, TAKE 1 TABLET BY MOUTH EVERY DAY, Disp: 30 each, Rfl: 4    OXcarbazepine (TRILEPTAL) 600 MG tablet, Take 1 tablet by mouth Daily., Disp: 60 tablet, Rfl: 6    predniSONE (DELTASONE) 20 MG tablet, Take 2 tablets by mouth Daily for 5 days., Disp: 10 tablet, Rfl: 0    promethazine-dextromethorphan (PROMETHAZINE-DM) 6.25-15 MG/5ML syrup, Take 5 mL by mouth 4 (Four) Times a Day As Needed for Cough., Disp: 180 mL, Rfl: 0    QUEtiapine XR (SEROquel XR) 400 MG 24 hr tablet, Take 1 tablet by mouth Every Night., Disp: 30 tablet, Rfl: 6    Lab Results:   Admission on 01/01/2024, Discharged on 01/01/2024   Component Date Value Ref Range Status    SARS Antigen 01/01/2024 Detected (A)  Not Detected, Presumptive Negative Final    Influenza A Antigen IZABEL 01/01/2024 Not Detected  Not Detected Final    Influenza B Antigen IZABEL 01/01/2024 Not Detected  Not Detected Final    Internal Control 01/01/2024 Passed  Passed Final    Lot Number 01/01/2024 [id number]   Final    Expiration Date 01/01/2024 11/10/24   Final    Rapid Strep A Screen 01/01/2024 Negative   Final    Internal Control 01/01/2024 Passed   Final    Lot Number 01/01/2024 [id number]    Final    Expiration Date 01/01/2024 1/31/25   Final   Results Encounter on 10/23/2023   Component Date Value Ref Range Status    THC, Screen, Urine 11/02/2023 Negative  Negative Final    Phencyclidine (PCP), Urine 11/02/2023 Negative  Negative Final    Cocaine Screen, Urine 11/02/2023 Negative  Negative Final    Methamphetamine, Ur 11/02/2023 Negative  Negative Final    Opiate Screen 11/02/2023 Negative  Negative Final    Amphetamine Screen, Urine 11/02/2023 Positive (A)  Negative Final    Benzodiazepine Screen, Urine 11/02/2023 Negative  Negative Final    Tricyclic Antidepressants Screen 11/02/2023 Negative  Negative Final    Methadone Screen, Urine 11/02/2023 Negative  Negative Final    Barbiturates Screen, Urine 11/02/2023 Negative  Negative Final    Oxycodone Screen, Urine 11/02/2023 Negative  Negative Final    Buprenorphine, Screen, Urine 11/02/2023 Negative  Negative Final    Fentanyl, Urine 11/02/2023 Negative  Negative Final       Mental Status Exam:   Hygiene:   good  Cooperation:  Cooperative  Eye Contact:  Good  Psychomotor Behavior:  Appropriate  Mood:depressed  Affect:  Appropriate  Hopelessness: Denies  Speech:  Normal  Thought Process:  Goal directed  Thought Content:  Normal  Suicidal:  None  Homicidal:  None  Hallucinations:  None  Delusion:  None  Memory:  Intact  Orientation:  Person, Place, Time, and Situation  Reliability:  good  Insight:  Good  Judgement:  Good  Impulse Control:  Good    PHQ-9 Depression Screening  Little interest or pleasure in doing things? (P) 1-->several days   Feeling down, depressed, or hopeless? (P) 1-->several days   Trouble falling or staying asleep, or sleeping too much? (P) 1-->several days   Feeling tired or having little energy? (P) 1-->several days   Poor appetite or overeating? (P) 1-->several days   Feeling bad about yourself - or that you are a failure or have let yourself or your family down? (P) 1-->several days   Trouble concentrating on things, such as  reading the newspaper or watching television? (P) 1-->several days   Moving or speaking so slowly that other people could have noticed? Or the opposite - being so fidgety or restless that you have been moving around a lot more than usual? (P) 1-->several days   Thoughts that you would be better off dead, or of hurting yourself in some way? (P) 0-->not at all   PHQ-9 Total Score (P) 8   If you checked off any problems, how difficult have these problems made it for you to do your work, take care of things at home, or get along with other people? (P) somewhat difficult      Reviewed EVA # [id number]     Assessment/Plan:  Diagnoses and all orders for this visit:    1. Bipolar 1 disorder, depressed, partial remission (Primary)  -     FLUoxetine (PROzac) 40 MG capsule; Take 2 capsules by mouth Daily.  Dispense: 60 capsule; Refill: 6  -     buPROPion XL (WELLBUTRIN XL) 300 MG 24 hr tablet; Take 1 tablet by mouth Daily.  Dispense: 30 tablet; Refill: 6  -     busPIRone (BUSPAR) 10 MG tablet; Take 1 tablet by mouth 3 (Three) Times a Day.  Dispense: 90 tablet; Refill: 6    2. Attention deficit hyperactivity disorder, combined type    3. Generalized anxiety disorder    4. Attention deficit hyperactivity disorder (ADHD), combined type  -     amphetamine-dextroamphetamine (Adderall) 20 MG tablet; Take 20 mg orally every afternoon.  Dispense: 30 tablet; Refill: 0  -     amphetamine-dextroamphetamine XR (Adderall XR) 20 MG 24 hr capsule; Take 2 capsules by mouth Every Morning  Dispense: 60 capsule; Refill: 0      Patient struggling with depression right now.  We will increase Prozac to 80 mg daily.  Patient would like to follow-up in 3 months.    A psychological evaluation was conducted in order to assess past and current level of functioning. Areas assessed included, but were not limited to: perception of social support, perception of ability to face and deal with challenges in life (positive functioning), anxiety symptoms,  depressive symptoms, perspective on beliefs/belief system, coping skills for stress, intelligence level,  Therapeutic rapport was established. Interventions conducted today were geared towards incorporating medication management along with support for continued therapy. Education was also provided as to the med management with this provider and what to expect in subsequent sessions.    We discussed risks, benefits,goals and side effects of the above medication and the patient was agreeable with the plan.Patient was educated on the importance of compliance with treatment and follow-up appointments. Patient is aware to contact the [facility] [phone] with any worsening of symptoms. To call for questions or concerns and return early if necessary. Patent is agreeable to go to the Emergency Department or call 911 should they begin SI/HI.     Part of this note may be an electronic transcription/translation of spoken language to printed text using the Dragon Dictation System.    Return in about 3 months (around 4/2/2024) for Follow Up 30 min, Video visit.    DALLAS Hoover

## 2024-02-05 DIAGNOSIS — F90.2 ATTENTION DEFICIT HYPERACTIVITY DISORDER (ADHD), COMBINED TYPE: ICD-10-CM

## 2024-02-05 RX ORDER — DEXTROAMPHETAMINE SACCHARATE, AMPHETAMINE ASPARTATE MONOHYDRATE, DEXTROAMPHETAMINE SULFATE AND AMPHETAMINE SULFATE 5; 5; 5; 5 MG/1; MG/1; MG/1; MG/1
40 CAPSULE, EXTENDED RELEASE ORAL EVERY MORNING
Qty: 60 CAPSULE | Refills: 0 | Status: SHIPPED | OUTPATIENT
Start: 2024-02-05

## 2024-02-05 RX ORDER — DEXTROAMPHETAMINE SACCHARATE, AMPHETAMINE ASPARTATE, DEXTROAMPHETAMINE SULFATE AND AMPHETAMINE SULFATE 5; 5; 5; 5 MG/1; MG/1; MG/1; MG/1
TABLET ORAL
Qty: 30 TABLET | Refills: 0 | Status: SHIPPED | OUTPATIENT
Start: 2024-02-05

## 2024-03-05 DIAGNOSIS — F90.2 ATTENTION DEFICIT HYPERACTIVITY DISORDER (ADHD), COMBINED TYPE: ICD-10-CM

## 2024-03-05 RX ORDER — DEXTROAMPHETAMINE SACCHARATE, AMPHETAMINE ASPARTATE MONOHYDRATE, DEXTROAMPHETAMINE SULFATE AND AMPHETAMINE SULFATE 5; 5; 5; 5 MG/1; MG/1; MG/1; MG/1
40 CAPSULE, EXTENDED RELEASE ORAL EVERY MORNING
Qty: 60 CAPSULE | Refills: 0 | Status: SHIPPED | OUTPATIENT
Start: 2024-03-05

## 2024-03-05 RX ORDER — DEXTROAMPHETAMINE SACCHARATE, AMPHETAMINE ASPARTATE, DEXTROAMPHETAMINE SULFATE AND AMPHETAMINE SULFATE 5; 5; 5; 5 MG/1; MG/1; MG/1; MG/1
TABLET ORAL
Qty: 30 TABLET | Refills: 0 | Status: SHIPPED | OUTPATIENT
Start: 2024-03-05

## 2024-04-08 ENCOUNTER — TELEMEDICINE (OUTPATIENT)
Dept: PSYCHIATRY | Facility: CLINIC | Age: 39
End: 2024-04-08
Payer: COMMERCIAL

## 2024-04-08 ENCOUNTER — PRIOR AUTHORIZATION (OUTPATIENT)
Dept: PSYCHIATRY | Facility: CLINIC | Age: 39
End: 2024-04-08

## 2024-04-08 DIAGNOSIS — F90.2 ATTENTION DEFICIT HYPERACTIVITY DISORDER (ADHD), COMBINED TYPE: ICD-10-CM

## 2024-04-08 DIAGNOSIS — F31.75 BIPOLAR 1 DISORDER, DEPRESSED, PARTIAL REMISSION: ICD-10-CM

## 2024-04-08 DIAGNOSIS — F41.1 GENERALIZED ANXIETY DISORDER: Primary | ICD-10-CM

## 2024-04-08 PROCEDURE — 1159F MED LIST DOCD IN RCRD: CPT | Performed by: NURSE PRACTITIONER

## 2024-04-08 PROCEDURE — 99214 OFFICE O/P EST MOD 30 MIN: CPT | Performed by: NURSE PRACTITIONER

## 2024-04-08 PROCEDURE — 1160F RVW MEDS BY RX/DR IN RCRD: CPT | Performed by: NURSE PRACTITIONER

## 2024-04-08 RX ORDER — HYDROXYZINE PAMOATE 25 MG/1
25 CAPSULE ORAL 3 TIMES DAILY PRN
Qty: 90 CAPSULE | Refills: 6 | Status: SHIPPED | OUTPATIENT
Start: 2024-04-08

## 2024-04-08 RX ORDER — DEXTROAMPHETAMINE SACCHARATE, AMPHETAMINE ASPARTATE, DEXTROAMPHETAMINE SULFATE AND AMPHETAMINE SULFATE 5; 5; 5; 5 MG/1; MG/1; MG/1; MG/1
TABLET ORAL
Qty: 30 TABLET | Refills: 0 | Status: SHIPPED | OUTPATIENT
Start: 2024-04-08

## 2024-04-08 RX ORDER — BUPROPION HYDROCHLORIDE 300 MG/1
300 TABLET ORAL DAILY
Qty: 30 TABLET | Refills: 6 | Status: SHIPPED | OUTPATIENT
Start: 2024-04-08

## 2024-04-08 RX ORDER — QUETIAPINE 400 MG/1
400 TABLET, FILM COATED, EXTENDED RELEASE ORAL NIGHTLY
Qty: 30 TABLET | Refills: 6 | Status: SHIPPED | OUTPATIENT
Start: 2024-04-08

## 2024-04-08 RX ORDER — DEXTROAMPHETAMINE SACCHARATE, AMPHETAMINE ASPARTATE MONOHYDRATE, DEXTROAMPHETAMINE SULFATE AND AMPHETAMINE SULFATE 5; 5; 5; 5 MG/1; MG/1; MG/1; MG/1
40 CAPSULE, EXTENDED RELEASE ORAL EVERY MORNING
Qty: 60 CAPSULE | Refills: 0 | Status: SHIPPED | OUTPATIENT
Start: 2024-04-08

## 2024-04-08 RX ORDER — OXCARBAZEPINE 600 MG/1
600 TABLET, FILM COATED ORAL DAILY
Qty: 60 TABLET | Refills: 6 | Status: SHIPPED | OUTPATIENT
Start: 2024-04-08

## 2024-04-08 RX ORDER — FLUOXETINE HYDROCHLORIDE 40 MG/1
80 CAPSULE ORAL DAILY
Qty: 60 CAPSULE | Refills: 6 | Status: SHIPPED | OUTPATIENT
Start: 2024-04-08 | End: 2025-04-08

## 2024-04-08 NOTE — PROGRESS NOTES
Patient Name: Clare Turner  MRN: [id number]   :  1985     This provider is located at her home office through the [facility] (through [facility]), [address], [address] using a secure [facility] Video Visit through [employer]. Patient is being seen remotely via telehealth at their home address in [place], and stated they are in a secure environment for this session. The patient's condition being diagnosed/treated is appropriate for telemedicine. The provider identified herself as well as her credentials.   The patient, and/or patients guardian, consent to be seen remotely, and when consent is given they understand that the consent allows for patient identifiable information to be sent to a third party as needed.   They may refuse to be seen remotely at any time. The electronic data is encrypted and password protected, and the patient and/or guardian has been advised of the potential risks to privacy not withstanding such measures.    You have chosen to receive care through a telehealth visit.  Do you consent to use a video/audio connection for your medical care today? Yes    Chief Complaint:      ICD-10-CM ICD-9-CM   1. Generalized anxiety disorder  F41.1 300.02   2. Attention deficit hyperactivity disorder (ADHD), combined type  F90.2 314.01   3. Bipolar 1 disorder, depressed, partial remission  F31.75 296.55       History of Present Illness: Clare Turner is a 38 y.o. female is here today for medication management follow up.  Patient has been under extreme stress with her son and his mental health.  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  Patient states she does not feel BuSpar is working and would like to potentially try Vistaril if she could.    The following portions of the patient's history were reviewed and updated as appropriate: allergies, current  medications, past family history, past medical history, past social history, past surgical history, and problem list.    Review of Systems;;  Review of Systems   Constitutional:  Negative for activity change, appetite change, fatigue, unexpected weight gain and unexpected weight loss.   Respiratory:  Negative for shortness of breath and wheezing.    Gastrointestinal:  Negative for constipation, diarrhea, nausea and vomiting.   Musculoskeletal:  Negative for gait problem.   Skin:  Negative for dry skin and rash.   Neurological:  Negative for dizziness, speech difficulty, weakness, light-headedness, headache, memory problem and confusion.   Psychiatric/Behavioral:  Positive for dysphoric mood and stress. Negative for agitation, behavioral problems, decreased concentration, hallucinations, self-injury, sleep disturbance, suicidal ideas, negative for hyperactivity and depressed mood. The patient is nervous/anxious.        Physical Exam;;  Physical Exam  Constitutional:       General: She is not in acute distress.     Appearance: She is well-developed. She is not diaphoretic.   HENT:      Head: Normocephalic and atraumatic.   Eyes:      Conjunctiva/sclera: Conjunctivae normal.   Pulmonary:      Effort: Pulmonary effort is normal. No respiratory distress.   Musculoskeletal:         General: Normal range of motion.      Cervical back: Full passive range of motion without pain and normal range of motion.   Neurological:      Mental Status: She is alert and oriented to person, place, and time.   Psychiatric:         Mood and Affect: Mood is anxious and depressed. Affect is tearful. Affect is not labile, blunt, angry or inappropriate.         Speech: Speech is not rapid and pressured or tangential.         Behavior: Behavior normal. Behavior is not agitated, slowed, aggressive, withdrawn, hyperactive or combative. Behavior is cooperative.         Thought Content: Thought content normal. Thought content is not paranoid or  delusional. Thought content does not include homicidal or suicidal ideation. Thought content does not include homicidal or suicidal plan.         Judgment: Judgment normal.       not currently breastfeeding.  There is no height or weight on file to calculate BMI. Video appt unable to obtain.     Current Medications;;    Current Outpatient Medications:     amphetamine-dextroamphetamine (Adderall) 20 MG tablet, Take 20 mg orally every afternoon., Disp: 30 tablet, Rfl: 0    amphetamine-dextroamphetamine XR (Adderall XR) 20 MG 24 hr capsule, Take 2 capsules by mouth Every Morning, Disp: 60 capsule, Rfl: 0    buPROPion XL (WELLBUTRIN XL) 300 MG 24 hr tablet, Take 1 tablet by mouth Daily., Disp: 30 tablet, Rfl: 6    FLUoxetine (PROzac) 40 MG capsule, Take 2 capsules by mouth Daily., Disp: 60 capsule, Rfl: 6    OXcarbazepine (TRILEPTAL) 600 MG tablet, Take 1 tablet by mouth Daily., Disp: 60 tablet, Rfl: 6    QUEtiapine XR (SEROquel XR) 400 MG 24 hr tablet, Take 1 tablet by mouth Every Night., Disp: 30 tablet, Rfl: 6    albuterol (PROVENTIL) (2.5 MG/3ML) 0.083% nebulizer solution, Take 2.5 mg by nebulization 4 (Four) Times a Day As Needed for Wheezing., Disp: 120 each, Rfl: 5    albuterol sulfate  (90 Base) MCG/ACT inhaler, Inhale 2 puffs 2 (Two) Times a Day., Disp: 18 g, Rfl: 5    atorvastatin (LIPITOR) 10 MG tablet, TAKE 1 TABLET BY MOUTH DAILY, Disp: 30 tablet, Rfl: 5    budesonide (Pulmicort) 0.5 MG/2ML nebulizer solution, Take 2 mL by nebulization 2 (Two) Times a Day., Disp: 120 each, Rfl: 11    busPIRone (BUSPAR) 10 MG tablet, Take 1 tablet by mouth 3 (Three) Times a Day., Disp: 90 tablet, Rfl: 6    difluprednate (DUREZOL) 0.05 % ophthalmic emulsion, , Disp: , Rfl:     doxycycline (VIBRAMYCIN) 100 MG capsule, Take 1 capsule by mouth 2 (Two) Times a Day., Disp: 14 capsule, Rfl: 0    Dupilumab 300 MG/2ML solution pen-injector, Inject 2 mL under the skin into the appropriate area as directed Every 14 (Fourteen)  Days., Disp: 4 mL, Rfl: 11    EPINEPHrine (EPIPEN) 0.3 MG/0.3ML solution auto-injector injection, INJECT 1 PEN IN THE MUSCLE AS DIRECTED AS NEEDED FOR ALLERGIC REACTION, Disp: 2 each, Rfl: 1    Fluticasone Furoate-Vilanterol (BREO ELLIPTA) 200-25 MCG/ACT inhaler, Inhale 1 puff Daily. 1 inhalation once a day, Disp: 60 each, Rfl: 5    furosemide (LASIX) 20 MG tablet, Take 1 tablet by mouth 2 (Two) Times a Week. PRN for leg/ankle swelling., Disp: 30 tablet, Rfl: 1    hydrOXYzine pamoate (VISTARIL) 25 MG capsule, Take 1 capsule by mouth 3 (Three) Times a Day As Needed for Anxiety., Disp: 90 capsule, Rfl: 6    loratadine (CLARITIN) 10 MG tablet, TAKE 1 TABLET BY MOUTH EVERY DAY, Disp: 30 tablet, Rfl: 5    Omeprazole 20 MG tablet delayed-release, TAKE 1 TABLET BY MOUTH EVERY DAY, Disp: 30 each, Rfl: 4    Lab Results:   No visits with results within 3 Month(s) from this visit.   Latest known visit with results is:   Admission on 01/01/2024, Discharged on 01/01/2024   Component Date Value Ref Range Status    SARS Antigen 01/01/2024 Detected (A)  Not Detected, Presumptive Negative Final    Influenza A Antigen IZABEL 01/01/2024 Not Detected  Not Detected Final    Influenza B Antigen IZABEL 01/01/2024 Not Detected  Not Detected Final    Internal Control 01/01/2024 Passed  Passed Final    Lot Number 01/01/2024 [id number]   Final    Expiration Date 01/01/2024 11/10/24   Final    Rapid Strep A Screen 01/01/2024 Negative   Final    Internal Control 01/01/2024 Passed   Final    Lot Number 01/01/2024 [id number]   Final    Expiration Date 01/01/2024 1/31/25   Final       Mental Status Exam:   Hygiene:   good  Cooperation:  Cooperative  Eye Contact:  Good  Psychomotor Behavior:  Appropriate  Mood:anxious, dysthymic, and sad  Affect:   tearful  Hopelessness: Denies  Speech:  Normal  Thought Process:  Goal directed  Thought Content:  Normal  Suicidal:  None  Homicidal:  None  Hallucinations:  None  Delusion:  None  Memory:  Intact  Orientation:   Person, Place, Time, and Situation  Reliability:  good  Insight:  Good  Judgement:  Good  Impulse Control:  Good    PHQ-9 Depression Screening  Little interest or pleasure in doing things? (P) 2-->more than half the days   Feeling down, depressed, or hopeless? (P) 2-->more than half the days   Trouble falling or staying asleep, or sleeping too much? (P) 1-->several days   Feeling tired or having little energy? (P) 1-->several days   Poor appetite or overeating? (P) 2-->more than half the days   Feeling bad about yourself - or that you are a failure or have let yourself or your family down? (P) 2-->more than half the days   Trouble concentrating on things, such as reading the newspaper or watching television? (P) 0-->not at all   Moving or speaking so slowly that other people could have noticed? Or the opposite - being so fidgety or restless that you have been moving around a lot more than usual? (P) 0-->not at all   Thoughts that you would be better off dead, or of hurting yourself in some way? (P) 0-->not at all   PHQ-9 Total Score (P) 10   If you checked off any problems, how difficult have these problems made it for you to do your work, take care of things at home, or get along with other people? (P) somewhat difficult        Assessment/Plan:  Diagnoses and all orders for this visit:    1. Generalized anxiety disorder (Primary)  -     hydrOXYzine pamoate (VISTARIL) 25 MG capsule; Take 1 capsule by mouth 3 (Three) Times a Day As Needed for Anxiety.  Dispense: 90 capsule; Refill: 6    2. Attention deficit hyperactivity disorder (ADHD), combined type  -     amphetamine-dextroamphetamine (Adderall) 20 MG tablet; Take 20 mg orally every afternoon.  Dispense: 30 tablet; Refill: 0  -     amphetamine-dextroamphetamine XR (Adderall XR) 20 MG 24 hr capsule; Take 2 capsules by mouth Every Morning  Dispense: 60 capsule; Refill: 0    3. Bipolar 1 disorder, depressed, partial remission  -     buPROPion XL (WELLBUTRIN XL) 300  MG 24 hr tablet; Take 1 tablet by mouth Daily.  Dispense: 30 tablet; Refill: 6  -     FLUoxetine (PROzac) 40 MG capsule; Take 2 capsules by mouth Daily.  Dispense: 60 capsule; Refill: 6  -     OXcarbazepine (TRILEPTAL) 600 MG tablet; Take 1 tablet by mouth Daily.  Dispense: 60 tablet; Refill: 6  -     QUEtiapine XR (SEROquel XR) 400 MG 24 hr tablet; Take 1 tablet by mouth Every Night.  Dispense: 30 tablet; Refill: 6      Patient is struggling significantly with anxiety secondary to behavioral and emotional issues with her son.  Overall patient feels medication is working well except the BuSpar is not helping her anxiety.  Would like to try hydroxyzine pamoate and we will do this at 25 mg up to 3 times a day as needed for anxiety and we will follow-up in 3 weeks.    A psychological evaluation was conducted in order to assess past and current level of functioning. Areas assessed included, but were not limited to: perception of social support, perception of ability to face and deal with challenges in life (positive functioning), anxiety symptoms, depressive symptoms, perspective on beliefs/belief system, coping skills for stress, intelligence level,  Therapeutic rapport was established. Interventions conducted today were geared towards incorporating medication management along with support for continued therapy. Education was also provided as to the med management with this provider and what to expect in subsequent sessions.    We discussed risks, benefits,goals and side effects of the above medication and the patient was agreeable with the plan.Patient was educated on the importance of compliance with treatment and follow-up appointments. Patient is aware to contact the [facility] [phone] with any worsening of symptoms. To call for questions or concerns and return early if necessary. Patent is agreeable to go to the Emergency Department or call 911 should they begin SI/HI.     Part  of this note may be an electronic transcription/translation of spoken language to printed text using the Dragon Dictation System.    Return in about 3 weeks (around 4/29/2024) for Follow Up 30 min, Video visit.    Nani Ball, APRN

## 2024-04-12 ENCOUNTER — OFFICE VISIT (OUTPATIENT)
Dept: INTERNAL MEDICINE | Facility: CLINIC | Age: 39
End: 2024-04-12
Payer: COMMERCIAL

## 2024-04-12 VITALS
DIASTOLIC BLOOD PRESSURE: 66 MMHG | HEIGHT: 67 IN | BODY MASS INDEX: 45.99 KG/M2 | SYSTOLIC BLOOD PRESSURE: 118 MMHG | HEART RATE: 80 BPM | WEIGHT: 293 LBS | TEMPERATURE: 97.8 F

## 2024-04-12 DIAGNOSIS — Z83.719 FAMILY HISTORY OF COLONIC POLYPS: ICD-10-CM

## 2024-04-12 DIAGNOSIS — Z23 NEED FOR TDAP VACCINATION: ICD-10-CM

## 2024-04-12 DIAGNOSIS — R73.03 PREDIABETES: Chronic | ICD-10-CM

## 2024-04-12 DIAGNOSIS — R63.5 WEIGHT GAIN: ICD-10-CM

## 2024-04-12 DIAGNOSIS — Z00.00 WELL ADULT EXAM: Primary | ICD-10-CM

## 2024-04-12 DIAGNOSIS — E78.2 MIXED HYPERLIPIDEMIA: ICD-10-CM

## 2024-04-12 DIAGNOSIS — G89.29 CHRONIC PAIN OF RIGHT ANKLE: ICD-10-CM

## 2024-04-12 DIAGNOSIS — M25.571 CHRONIC PAIN OF RIGHT ANKLE: ICD-10-CM

## 2024-04-12 DIAGNOSIS — Z12.11 SCREEN FOR COLON CANCER: ICD-10-CM

## 2024-04-12 DIAGNOSIS — E66.01 SEVERE OBESITY (BMI >= 40): ICD-10-CM

## 2024-04-12 DIAGNOSIS — D72.9 ABNORMAL WBC COUNT: ICD-10-CM

## 2024-04-12 PROCEDURE — 99395 PREV VISIT EST AGE 18-39: CPT | Performed by: STUDENT IN AN ORGANIZED HEALTH CARE EDUCATION/TRAINING PROGRAM

## 2024-04-12 PROCEDURE — 90471 IMMUNIZATION ADMIN: CPT | Performed by: STUDENT IN AN ORGANIZED HEALTH CARE EDUCATION/TRAINING PROGRAM

## 2024-04-12 PROCEDURE — 90715 TDAP VACCINE 7 YRS/> IM: CPT | Performed by: STUDENT IN AN ORGANIZED HEALTH CARE EDUCATION/TRAINING PROGRAM

## 2024-04-12 PROCEDURE — 1159F MED LIST DOCD IN RCRD: CPT | Performed by: STUDENT IN AN ORGANIZED HEALTH CARE EDUCATION/TRAINING PROGRAM

## 2024-04-12 PROCEDURE — 2014F MENTAL STATUS ASSESS: CPT | Performed by: STUDENT IN AN ORGANIZED HEALTH CARE EDUCATION/TRAINING PROGRAM

## 2024-04-12 PROCEDURE — 1160F RVW MEDS BY RX/DR IN RCRD: CPT | Performed by: STUDENT IN AN ORGANIZED HEALTH CARE EDUCATION/TRAINING PROGRAM

## 2024-04-12 NOTE — PROGRESS NOTES
"Chief Complaint  Clare Turner is a 38 y.o. female presenting for Annual Exam.     From [place]. . 3 children. 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     Patient has a past medical history of hyperlipidemia, bipolar 1 disorder w/depression and Hx of jack, ADHD, GERD, episcleritis, restless legs, moderate persistent asthma (since childhood, f/u pulm), lung nodule stable for years, history of alcohol and substance abuse, urge/stress incontinence and severe obesity.    History of Present Illness  Patient is here for annual physical.  She is also requesting referral to bariatric surgery.  She has gained about 11 pounds since January.  She tries to stay physically active, but her right ankle is limiting due to pain after her ankle surgery.  She has been trying to work on her diet, but has not been able to lose weight.  She did check with her insurance and formal was obesity it is possible that her insurance would cover surgery.    Patient does follow with Dr. Mendes, she had abnormal Pap smears in the past.  Last on file is from 2022.    She has no family history of breast cancer.  No family history of colorectal cancer, but her mother has history of colon polyps.  Patient never had colonoscopy and is amenable.    Patient goes to the dentist once a year, she has 6 lower teeth, partial lower and full upper denture.    The following portions of the patient's history were reviewed and updated as appropriate: allergies, current medications, past family history, past medical history, past social history, past surgical history, and problem list.    Objective  /66 (BP Location: Left arm, Patient Position: Sitting, Cuff Size: Large Adult)   Pulse 80   Temp 97.8 °F (36.6 °C) (Temporal)   Ht 170 cm (66.93\")   Wt 133 kg (294 lb)   BMI 46.14 kg/m²     Physical Exam  Vitals reviewed.   Constitutional:     "   Appearance: Normal appearance.   HENT:      Head: Normocephalic and atraumatic.      Right Ear: Tympanic membrane, ear canal and external ear normal. There is no impacted cerumen.      Left Ear: Tympanic membrane, ear canal and external ear normal. There is no impacted cerumen.      Nose: Nose normal. No congestion.      Mouth/Throat:      Mouth: Mucous membranes are moist.      Pharynx: Oropharynx is clear.   Eyes:      Extraocular Movements: Extraocular movements intact.      Conjunctiva/sclera: Conjunctivae normal.   Cardiovascular:      Rate and Rhythm: Normal rate and regular rhythm.      Heart sounds: Normal heart sounds. No murmur heard.  Pulmonary:      Effort: Pulmonary effort is normal.      Breath sounds: Normal breath sounds.   Abdominal:      General: There is no distension.      Palpations: Abdomen is soft. There is no mass.      Tenderness: There is no abdominal tenderness.   Musculoskeletal:         General: Tenderness present.      Cervical back: Neck supple. No tenderness.      Right lower leg: No edema.      Left lower leg: No edema.        Legs:    Lymphadenopathy:      Cervical: No cervical adenopathy.   Skin:     General: Skin is warm and dry.   Neurological:      Mental Status: She is alert and oriented to person, place, and time. Mental status is at baseline.   Psychiatric:         Behavior: Behavior normal.         Thought Content: Thought content normal.         Assessment/Plan   1. Well adult exam  Counseled on recommendations for tetanus/Tdap vaccine every 10 years, she would like administered today.  Recommendations for COVID Booster.  She Is Up-To-Date on Vaccines at This Time.  Counseled on recommendations for regular dental visits.    2. Severe obesity (BMI >= 40)  Severe obesity, getting worse.  Right ankle limiting possibility for exercise.  Will refer to bariatric surgery.  - Ambulatory Referral to Bariatric Surgery    3. Weight gain  Check thyroid  - TSH Rfx On Abnormal To Free  T4; Future    4. Chronic pain of right ankle  Patient will follow-up with Ortho.  Might need orthotics, has seen them before.    5. Mixed hyperlipidemia  Patient will return for fasting lipids  - Lipid Panel; Future  - Comprehensive Metabolic Panel; Future    6. Abnormal WBC count  Will recheck level  - CBC & Differential; Future    7. Need for Tdap vaccination  Administered today  - Tdap Vaccine Greater Than or Equal To 6yo IM    8. Family history of colonic polyps  9. Screen for colon cancer  Will proceed with colonoscopy due to family history of colon polyps in mother.  - Ambulatory Referral For Screening Colonoscopy    10. Prediabetes  Hemoglobin A1C   Date Value Ref Range Status   05/04/2023 5.40 4.80 - 5.60 % Final   11/03/2022 5.70 (H) 4.80 - 5.60 % Final   Patient is due for recheck.  - Hemoglobin A1c; Future  - MicroAlbumin, Urine, Random - Urine, Clean Catch; Future      Return in about 6 months (around 10/12/2024), or if symptoms worsen or fail to improve, for Recheck.    Future Appointments         Provider Department Center    4/17/2024 1:30 PM Stevenson Coker, [facility]    5/1/2024 8:30 AM Nani Ball APRN [facility]    5/6/2024 2:30 PM Stevenson Coker, [facility]    10/18/2024 9:00 AM Michael Miner MD [facility]              Michael Miner MD  Family Medicine  04/12/2024

## 2024-04-16 ENCOUNTER — TELEPHONE (OUTPATIENT)
Dept: GASTROENTEROLOGY | Facility: CLINIC | Age: 39
End: 2024-04-16

## 2024-04-16 NOTE — TELEPHONE ENCOUNTER
[facility] TRANSFERRED PATIENT SHE WAS RETUNING CALL TO JAYDEN. TRANSFERED TO CRYSTAL

## 2024-04-18 ENCOUNTER — LAB (OUTPATIENT)
Dept: LAB | Facility: HOSPITAL | Age: 39
End: 2024-04-18
Payer: COMMERCIAL

## 2024-04-18 DIAGNOSIS — R63.5 WEIGHT GAIN: ICD-10-CM

## 2024-04-18 DIAGNOSIS — E78.2 MIXED HYPERLIPIDEMIA: ICD-10-CM

## 2024-04-18 DIAGNOSIS — R73.03 PREDIABETES: Chronic | ICD-10-CM

## 2024-04-18 DIAGNOSIS — D72.9 ABNORMAL WBC COUNT: ICD-10-CM

## 2024-04-18 LAB
ALBUMIN SERPL-MCNC: 4.3 G/DL (ref 3.5–5.2)
ALBUMIN UR-MCNC: <1.2 MG/DL
ALBUMIN/GLOB SERPL: 1.3 G/DL
ALP SERPL-CCNC: 106 U/L (ref 39–117)
ALT SERPL W P-5'-P-CCNC: 35 U/L (ref 1–33)
ANION GAP SERPL CALCULATED.3IONS-SCNC: 9 MMOL/L (ref 5–15)
AST SERPL-CCNC: 20 U/L (ref 1–32)
BASOPHILS # BLD AUTO: 0.06 10*3/MM3 (ref 0–0.2)
BASOPHILS NFR BLD AUTO: 0.5 % (ref 0–1.5)
BILIRUB SERPL-MCNC: 0.3 MG/DL (ref 0–1.2)
BUN SERPL-MCNC: 10 MG/DL (ref 6–20)
BUN/CREAT SERPL: 14.7 (ref 7–25)
CALCIUM SPEC-SCNC: 9.4 MG/DL (ref 8.6–10.5)
CHLORIDE SERPL-SCNC: 103 MMOL/L (ref 98–107)
CHOLEST SERPL-MCNC: 194 MG/DL (ref 0–200)
CO2 SERPL-SCNC: 25 MMOL/L (ref 22–29)
CREAT SERPL-MCNC: 0.68 MG/DL (ref 0.57–1)
DEPRECATED RDW RBC AUTO: 39.9 FL (ref 37–54)
EGFRCR SERPLBLD CKD-EPI 2021: 115.2 ML/MIN/1.73
EOSINOPHIL # BLD AUTO: 0.46 10*3/MM3 (ref 0–0.4)
EOSINOPHIL NFR BLD AUTO: 3.5 % (ref 0.3–6.2)
ERYTHROCYTE [DISTWIDTH] IN BLOOD BY AUTOMATED COUNT: 12.6 % (ref 12.3–15.4)
GLOBULIN UR ELPH-MCNC: 3.2 GM/DL
GLUCOSE SERPL-MCNC: 84 MG/DL (ref 65–99)
HBA1C MFR BLD: 5.7 % (ref 4.8–5.6)
HCT VFR BLD AUTO: 44.9 % (ref 34–46.6)
HDLC SERPL-MCNC: 37 MG/DL (ref 40–60)
HGB BLD-MCNC: 14.7 G/DL (ref 12–15.9)
IMM GRANULOCYTES # BLD AUTO: 0.07 10*3/MM3 (ref 0–0.05)
IMM GRANULOCYTES NFR BLD AUTO: 0.5 % (ref 0–0.5)
LDLC SERPL CALC-MCNC: 133 MG/DL (ref 0–100)
LDLC/HDLC SERPL: 3.54 {RATIO}
LYMPHOCYTES # BLD AUTO: 3.82 10*3/MM3 (ref 0.7–3.1)
LYMPHOCYTES NFR BLD AUTO: 29.1 % (ref 19.6–45.3)
MCH RBC QN AUTO: 28.8 PG (ref 26.6–33)
MCHC RBC AUTO-ENTMCNC: 32.7 G/DL (ref 31.5–35.7)
MCV RBC AUTO: 88 FL (ref 79–97)
MONOCYTES # BLD AUTO: 1.05 10*3/MM3 (ref 0.1–0.9)
MONOCYTES NFR BLD AUTO: 8 % (ref 5–12)
NEUTROPHILS NFR BLD AUTO: 58.4 % (ref 42.7–76)
NEUTROPHILS NFR BLD AUTO: 7.67 10*3/MM3 (ref 1.7–7)
NRBC BLD AUTO-RTO: 0 /100 WBC (ref 0–0.2)
PLATELET # BLD AUTO: 420 10*3/MM3 (ref 140–450)
PMV BLD AUTO: 10.6 FL (ref 6–12)
POTASSIUM SERPL-SCNC: 4.6 MMOL/L (ref 3.5–5.2)
PROT SERPL-MCNC: 7.5 G/DL (ref 6–8.5)
RBC # BLD AUTO: 5.1 10*6/MM3 (ref 3.77–5.28)
SODIUM SERPL-SCNC: 137 MMOL/L (ref 136–145)
TRIGL SERPL-MCNC: 131 MG/DL (ref 0–150)
TSH SERPL DL<=0.05 MIU/L-ACNC: 1.46 UIU/ML (ref 0.27–4.2)
VLDLC SERPL-MCNC: 24 MG/DL (ref 5–40)
WBC NRBC COR # BLD AUTO: 13.13 10*3/MM3 (ref 3.4–10.8)

## 2024-04-18 PROCEDURE — 83036 HEMOGLOBIN GLYCOSYLATED A1C: CPT

## 2024-04-18 PROCEDURE — 82043 UR ALBUMIN QUANTITATIVE: CPT

## 2024-04-18 PROCEDURE — 80061 LIPID PANEL: CPT

## 2024-04-18 PROCEDURE — 80050 GENERAL HEALTH PANEL: CPT

## 2024-04-24 RX ORDER — PEG-3350, SODIUM SULFATE, SODIUM CHLORIDE, POTASSIUM CHLORIDE, SODIUM ASCORBATE AND ASCORBIC ACID 7.5-2.691G
KIT ORAL
Qty: 1 EACH | Refills: 0 | Status: SHIPPED | OUTPATIENT
Start: 2024-04-24

## 2024-05-01 ENCOUNTER — TELEMEDICINE (OUTPATIENT)
Dept: PSYCHIATRY | Facility: CLINIC | Age: 39
End: 2024-05-01
Payer: COMMERCIAL

## 2024-05-01 DIAGNOSIS — F31.75 BIPOLAR 1 DISORDER, DEPRESSED, PARTIAL REMISSION: Primary | ICD-10-CM

## 2024-05-01 DIAGNOSIS — F90.2 ATTENTION DEFICIT HYPERACTIVITY DISORDER (ADHD), COMBINED TYPE: ICD-10-CM

## 2024-05-01 DIAGNOSIS — F41.1 GENERALIZED ANXIETY DISORDER: ICD-10-CM

## 2024-05-01 PROCEDURE — 99214 OFFICE O/P EST MOD 30 MIN: CPT | Performed by: NURSE PRACTITIONER

## 2024-05-01 RX ORDER — QUETIAPINE 200 MG/1
200 TABLET, FILM COATED, EXTENDED RELEASE ORAL NIGHTLY
Qty: 10 TABLET | Refills: 0 | Status: SHIPPED | OUTPATIENT
Start: 2024-05-01

## 2024-05-01 RX ORDER — DEXTROAMPHETAMINE SACCHARATE, AMPHETAMINE ASPARTATE MONOHYDRATE, DEXTROAMPHETAMINE SULFATE AND AMPHETAMINE SULFATE 5; 5; 5; 5 MG/1; MG/1; MG/1; MG/1
40 CAPSULE, EXTENDED RELEASE ORAL EVERY MORNING
Qty: 60 CAPSULE | Refills: 0 | Status: SHIPPED | OUTPATIENT
Start: 2024-05-01

## 2024-05-01 RX ORDER — DEXTROAMPHETAMINE SACCHARATE, AMPHETAMINE ASPARTATE, DEXTROAMPHETAMINE SULFATE AND AMPHETAMINE SULFATE 5; 5; 5; 5 MG/1; MG/1; MG/1; MG/1
TABLET ORAL
Qty: 30 TABLET | Refills: 0 | Status: SHIPPED | OUTPATIENT
Start: 2024-05-01

## 2024-05-06 ENCOUNTER — TELEMEDICINE (OUTPATIENT)
Dept: PSYCHIATRY | Facility: CLINIC | Age: 39
End: 2024-05-06
Payer: COMMERCIAL

## 2024-05-06 DIAGNOSIS — F31.75 BIPOLAR 1 DISORDER, DEPRESSED, PARTIAL REMISSION: Primary | ICD-10-CM

## 2024-05-06 DIAGNOSIS — F90.2 ATTENTION DEFICIT HYPERACTIVITY DISORDER, COMBINED TYPE: ICD-10-CM

## 2024-05-06 PROCEDURE — 90837 PSYTX W PT 60 MINUTES: CPT | Performed by: SOCIAL WORKER

## 2024-05-06 PROCEDURE — 1160F RVW MEDS BY RX/DR IN RCRD: CPT | Performed by: SOCIAL WORKER

## 2024-05-06 PROCEDURE — 1159F MED LIST DOCD IN RCRD: CPT | Performed by: SOCIAL WORKER

## 2024-05-08 ENCOUNTER — OUTSIDE FACILITY SERVICE (OUTPATIENT)
Dept: GASTROENTEROLOGY | Facility: CLINIC | Age: 39
End: 2024-05-08
Payer: COMMERCIAL

## 2024-05-08 PROCEDURE — 45385 COLONOSCOPY W/LESION REMOVAL: CPT | Performed by: INTERNAL MEDICINE

## 2024-05-08 PROCEDURE — 88305 TISSUE EXAM BY PATHOLOGIST: CPT | Performed by: INTERNAL MEDICINE

## 2024-05-09 ENCOUNTER — LAB REQUISITION (OUTPATIENT)
Dept: LAB | Facility: HOSPITAL | Age: 39
End: 2024-05-09
Payer: COMMERCIAL

## 2024-05-09 DIAGNOSIS — K57.30 DIVERTICULOSIS OF LARGE INTESTINE WITHOUT PERFORATION OR ABSCESS WITHOUT BLEEDING: ICD-10-CM

## 2024-05-09 DIAGNOSIS — D12.5 BENIGN NEOPLASM OF SIGMOID COLON: ICD-10-CM

## 2024-05-09 DIAGNOSIS — Z83.719 FAMILY HISTORY OF COLON POLYPS, UNSPECIFIED: ICD-10-CM

## 2024-05-09 DIAGNOSIS — D12.7 BENIGN NEOPLASM OF RECTOSIGMOID JUNCTION: ICD-10-CM

## 2024-05-09 DIAGNOSIS — D12.8 BENIGN NEOPLASM OF RECTUM: ICD-10-CM

## 2024-05-09 DIAGNOSIS — Z12.11 ENCOUNTER FOR SCREENING FOR MALIGNANT NEOPLASM OF COLON: ICD-10-CM

## 2024-05-10 LAB — REF LAB TEST METHOD: NORMAL

## 2024-05-20 PROBLEM — D12.6 ADENOMATOUS COLON POLYP: Status: ACTIVE | Noted: 2024-05-20

## 2024-06-05 ENCOUNTER — TELEMEDICINE (OUTPATIENT)
Dept: PSYCHIATRY | Facility: CLINIC | Age: 39
End: 2024-06-05
Payer: COMMERCIAL

## 2024-06-05 DIAGNOSIS — F90.2 ATTENTION DEFICIT HYPERACTIVITY DISORDER, COMBINED TYPE: ICD-10-CM

## 2024-06-05 DIAGNOSIS — F31.75 BIPOLAR 1 DISORDER, DEPRESSED, PARTIAL REMISSION: Primary | ICD-10-CM

## 2024-06-05 PROCEDURE — 90834 PSYTX W PT 45 MINUTES: CPT | Performed by: SOCIAL WORKER

## 2024-06-05 NOTE — PROGRESS NOTES
[facility]   Follow-up Progress Note     Date: June 5, 2024  Time In: 10:13  Time Out: 11:00      PROGRESS NOTE  Data:  Clare Turner is a 38 y.o. female presenting to [facility] ([facility] [address] [facility] [address] [place], [address] using a secure MyChart Video Visit through [facility] for assessment with Stevenson Coker LCSW. The patient is seen remotely in their  car  using [facility] My Chart. Patient is being seen via telehealth and stated they are in a secure environment for this session. The patient's condition being diagnosed/treated is appropriate for telemedicine. The provider identified herself as well as her credentials. The patient and/or patients guardian consent to be seen remotely, and when consent is given they understand that the consent allows for patient identifiable information to be sent to a third party as needed. They may refuse to be seen remotely at any time. The electronic data is encrypted and password protected, and the patient has been advised of the potential risks to privacy not withstanding such measures.    Today Patient stated her medication has been helpful in managing her depressive symptoms. Patient reported she has been having dreams about passed loved ones. Patient stated the dreams about her father have been the most distressing. Patient stated she is uncertain if the dreams are a result of being tapered off her other sleep medication. Patient stated she has felt stressed at work and going through a major transition. Patient stated she has also requested a raise due to her dedication and competency to her job. 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 Patient expressed she has turned down a couple of other job offers with better pay and benefits. Patient expressed hopefulness  that her loyalty will be validated with increased compensation. Patient processed how the stress from her job is impacting her ability to make sound decisions around her career. Patient stated the lack of sleep has also added additional stress within her marriage. Patient stated her  has questioned if patient is uninterested in their relationship due to her exhaustion.       Clinical Maneuvering/Intervention:    Chief Complaint: mood instability, adhd    (Scales based on 0 - 10 with 10 being the worst)  Depression: 4 Anxiety: 8     Assisted Patient in processing above session content; acknowledged and normalized patient’s thoughts, feelings, and concerns.  Rationalized patient thought process regarding changes happening at her job and how lack of sleep is impacting her stressors.  Discussed triggers associated with patient's mood instability.  Also discussed coping skills for patient to implement such as mindfulness.    Allowed Patient to freely discuss issues  without interruption or judgement with unconditional positive regard, active listening skills, and empathy. Therapist provided a safe, confidential environment to facilitate the development of a positive therapeutic relationship and encouraged open, honest communication. Assisted Patient in identifying risk factors which would indicate the need for higher level of care including thoughts to harm self or others and/or self-harming behavior and encouraged Patient to contact this office, call 911, or present to the nearest emergency room should any of these events occur. Discussed crisis intervention services and means to access. Patient adamantly and convincingly denies current suicidal or homicidal ideation or perceptual disturbance. Assisted Patient in processing session content; acknowledged and normalized Patient’s thoughts, feelings, and concerns by utilizing a person-centered approach in efforts to build appropriate rapport and a positive  therapeutic relationship with open and honest communication. Therapist utilized dialectical behavior techniques to teach and model emotional regulation and relaxation methods. Therapist assisted Patient with identifying and implementing healthier coping strategies.     Assessment   Patient appears to be experiencing heightened anxiety and depression in response to COVID-19 epidemic  As a result, they can be reasonably expected to continue to benefit from treatment and would likely be at increased risk for decompensation otherwise.    Mental Status Exam:   Hygiene:   good  Cooperation:  Cooperative  Eye Contact:  Good  Psychomotor Behavior:  Appropriate  Affect:  Full range  Mood:  tired  Speech:  Normal  Thought Process:  Goal directed  Thought Content:  Mood congruent  Suicidal:  None  Homicidal:  None  Hallucinations:  None  Delusion:  None  Memory:  Intact  Orientation:  Person, Place, Time, and Situation  Reliability:  good  Insight:  Good  Judgement:  Good  Impulse Control:  Good  Physical/Medical Issues:  No      PHQ-Score Total:  PHQ-9 Total Score:        Patient's Support Network Includes:  significant other, children, mother, extended family, and friends    Functional Status: Moderate impairment     Progress toward goal: Not at goal    Prognosis: Good with Ongoing Treatment            Impression/Formulation:    VISIT DIAGNOSIS:     ICD-10-CM ICD-9-CM   1. Bipolar 1 disorder, depressed, partial remission  F31.75 296.55   2. Attention deficit hyperactivity disorder, combined type  F90.2 314.01        Patient appeared alert and oriented.  Patient is voluntarily requesting to continue outpatient therapy at [facility].  Patient is receptive to assistance with maintaining a stable lifestyle.  Patient presents with history of mood instability and ADHD.  Patient is agreeable to attend routine therapy sessions.  Patient expressed desire to maintain stability and participate in  the therapeutic process.        Crisis Plan:  Symptoms and/or behaviors to indicate a crisis: Isolation and Increased hunger or lack of appetite    What calming techniques or other strategies will patient use to de-esclate and stay safe: slow down, breathe, visualize calming self, think it though, listen to music, change focus, take a walk    Who is one person patient can contact to assist with de-escalation? sister    If symptoms/behaviors persist, Patient will present to the nearest hospital for an assessment. Advised patient of [facility] ER and assessment services.     Plan:   Patient will continue in individual outpatient therapy with focus on improved functioning and coping skills, maintaining stability, and avoiding decompensation and the need for higher level of care.    Patient will contact this office ([facility] at [phone]), call 911 or present to the nearest emergency room should suicidal or homicidal ideations occur. Provide Cognitive Behavioral Therapy and Solution Focused Therapy to improve functioning, maintain stability, and avoid decompensation and the need for higher level of care.     Return in about 4 weeks, or earlier if symptoms worsen or fail to improve.    Recommended Referrals: none        This document has been electronically signed by Stevenson Coker LCSW  June 5, 2024 10:13 EDT        Part of this note may be an electronic transcription/translation of spoken language to printed text using the Dragon Dictation System.

## 2024-06-06 ENCOUNTER — TELEMEDICINE (OUTPATIENT)
Dept: PSYCHIATRY | Facility: CLINIC | Age: 39
End: 2024-06-06
Payer: COMMERCIAL

## 2024-06-06 DIAGNOSIS — F31.75 BIPOLAR 1 DISORDER, DEPRESSED, PARTIAL REMISSION: Primary | ICD-10-CM

## 2024-06-06 DIAGNOSIS — F90.2 ATTENTION DEFICIT HYPERACTIVITY DISORDER (ADHD), COMBINED TYPE: ICD-10-CM

## 2024-06-06 DIAGNOSIS — F90.2 ATTENTION DEFICIT HYPERACTIVITY DISORDER, COMBINED TYPE: ICD-10-CM

## 2024-06-06 DIAGNOSIS — F41.1 GENERALIZED ANXIETY DISORDER: ICD-10-CM

## 2024-06-06 PROCEDURE — 1160F RVW MEDS BY RX/DR IN RCRD: CPT | Performed by: NURSE PRACTITIONER

## 2024-06-06 PROCEDURE — 99214 OFFICE O/P EST MOD 30 MIN: CPT | Performed by: NURSE PRACTITIONER

## 2024-06-06 PROCEDURE — 1159F MED LIST DOCD IN RCRD: CPT | Performed by: NURSE PRACTITIONER

## 2024-06-06 RX ORDER — DEXTROAMPHETAMINE SACCHARATE, AMPHETAMINE ASPARTATE MONOHYDRATE, DEXTROAMPHETAMINE SULFATE AND AMPHETAMINE SULFATE 5; 5; 5; 5 MG/1; MG/1; MG/1; MG/1
40 CAPSULE, EXTENDED RELEASE ORAL EVERY MORNING
Qty: 60 CAPSULE | Refills: 0 | Status: SHIPPED | OUTPATIENT
Start: 2024-06-06

## 2024-06-06 RX ORDER — DEXTROAMPHETAMINE SACCHARATE, AMPHETAMINE ASPARTATE, DEXTROAMPHETAMINE SULFATE AND AMPHETAMINE SULFATE 5; 5; 5; 5 MG/1; MG/1; MG/1; MG/1
TABLET ORAL
Qty: 30 TABLET | Refills: 0 | Status: SHIPPED | OUTPATIENT
Start: 2024-06-06

## 2024-06-06 NOTE — PROGRESS NOTES
Patient Name: Clare Turner  MRN: [id number]   :  1985     This provider is located at her home office through the [facility] ([facility]), [address] [facility], [address] using a secure [employer] Video Visit through [employer]. Patient is being seen remotely via telehealth at their home address in [place], and stated they are in a secure environment for this session. The patient's condition being diagnosed/treated is appropriate for telemedicine. The provider identified herself as well as her credentials.   The patient, and/or patients guardian, consent to be seen remotely, and when consent is given they understand that the consent allows for patient identifiable information to be sent to a third party as needed.   They may refuse to be seen remotely at any time. The electronic data is encrypted and password protected, and the patient and/or guardian has been advised of the potential risks to privacy not withstanding such measures.    You have chosen to receive care through a telehealth visit.  Do you consent to use a video/audio connection for your medical care today? Yes    Chief Complaint:      ICD-10-CM ICD-9-CM   1. Bipolar 1 disorder, depressed, partial remission  F31.75 296.55   2. Attention deficit hyperactivity disorder, combined type  F90.2 314.01   3. Generalized anxiety disorder  F41.1 300.02   4. Attention deficit hyperactivity disorder (ADHD), combined type  F90.2 314.01       History of Present Illness: Clare Turner is a 38 y.o. female is here today for medication management follow up.  Patient [place] [facility].  States her mood and anxiety have improved.  States she is not as manic.  States Adderall is still good for focus and task completion is struggling some with sleep.    The following portions of the patient's history were reviewed and updated as appropriate: allergies, current medications, past family history, past medical  history, past social history, past surgical history, and problem list.    Review of Systems;;  Review of Systems   Constitutional:  Negative for activity change, appetite change, fatigue, unexpected weight gain and unexpected weight loss.   Respiratory:  Negative for shortness of breath and wheezing.    Gastrointestinal:  Negative for constipation, diarrhea, nausea and vomiting.   Musculoskeletal:  Negative for gait problem.   Skin:  Negative for dry skin and rash.   Neurological:  Negative for dizziness, speech difficulty, weakness, light-headedness, headache, memory problem and confusion.   Psychiatric/Behavioral:  Negative for agitation, behavioral problems, decreased concentration, dysphoric mood, hallucinations, self-injury, sleep disturbance, suicidal ideas, negative for hyperactivity, depressed mood and stress. The patient is not nervous/anxious.        Physical Exam;;  Physical Exam  Constitutional:       General: She is not in acute distress.     Appearance: She is well-developed. She is not diaphoretic.   HENT:      Head: Normocephalic and atraumatic.   Eyes:      Conjunctiva/sclera: Conjunctivae normal.   Pulmonary:      Effort: Pulmonary effort is normal. No respiratory distress.   Musculoskeletal:         General: Normal range of motion.      Cervical back: Full passive range of motion without pain and normal range of motion.   Neurological:      Mental Status: She is alert and oriented to person, place, and time.   Psychiatric:         Mood and Affect: Mood is not anxious or depressed. Affect is not labile, blunt, angry or inappropriate.         Speech: Speech is not rapid and pressured or tangential.         Behavior: Behavior normal. Behavior is not agitated, slowed, aggressive, withdrawn, hyperactive or combative. Behavior is cooperative.         Thought Content: Thought content normal. Thought content is not paranoid or delusional. Thought content does not include homicidal or suicidal ideation.  Thought content does not include homicidal or suicidal plan.         Judgment: Judgment normal.       not currently breastfeeding.  There is no height or weight on file to calculate BMI. Video appt unable to obtain.     Current Medications;;    Current Outpatient Medications:     amphetamine-dextroamphetamine (Adderall) 20 MG tablet, Take 20 mg orally every afternoon., Disp: 30 tablet, Rfl: 0    amphetamine-dextroamphetamine XR (Adderall XR) 20 MG 24 hr capsule, Take 2 capsules by mouth Every Morning, Disp: 60 capsule, Rfl: 0    Cariprazine HCl (Vraylar) 3 MG capsule capsule, Take 1 capsule by mouth Daily., Disp: 30 capsule, Rfl: 6    albuterol (PROVENTIL) (2.5 MG/3ML) 0.083% nebulizer solution, Take 2.5 mg by nebulization 4 (Four) Times a Day As Needed for Wheezing., Disp: 120 each, Rfl: 5    albuterol sulfate  (90 Base) MCG/ACT inhaler, Inhale 2 puffs 2 (Two) Times a Day., Disp: 18 g, Rfl: 5    atorvastatin (LIPITOR) 10 MG tablet, TAKE 1 TABLET BY MOUTH DAILY, Disp: 30 tablet, Rfl: 5    budesonide (Pulmicort) 0.5 MG/2ML nebulizer solution, Take 2 mL by nebulization 2 (Two) Times a Day., Disp: 120 each, Rfl: 11    buPROPion XL (WELLBUTRIN XL) 300 MG 24 hr tablet, Take 1 tablet by mouth Daily., Disp: 30 tablet, Rfl: 6    Dupilumab 300 MG/2ML solution pen-injector, Inject 2 mL under the skin into the appropriate area as directed Every 14 (Fourteen) Days., Disp: 4 mL, Rfl: 11    EPINEPHrine (EPIPEN) 0.3 MG/0.3ML solution auto-injector injection, INJECT 1 PEN IN THE MUSCLE AS DIRECTED AS NEEDED FOR ALLERGIC REACTION, Disp: 2 each, Rfl: 1    FLUoxetine (PROzac) 40 MG capsule, Take 2 capsules by mouth Daily., Disp: 60 capsule, Rfl: 6    Fluticasone Furoate-Vilanterol (BREO ELLIPTA) 200-25 MCG/ACT inhaler, Inhale 1 puff Daily. 1 inhalation once a day (Patient taking differently: Inhale 1 puff Daily.), Disp: 60 each, Rfl: 5    furosemide (LASIX) 20 MG tablet, Take 1 tablet by mouth 2 (Two) Times a Week. PRN for  leg/ankle swelling., Disp: 30 tablet, Rfl: 1    hydrOXYzine pamoate (VISTARIL) 25 MG capsule, Take 1 capsule by mouth 3 (Three) Times a Day As Needed for Anxiety., Disp: 90 capsule, Rfl: 6    loratadine (CLARITIN) 10 MG tablet, TAKE 1 TABLET BY MOUTH EVERY DAY, Disp: 30 tablet, Rfl: 5    Omeprazole 20 MG tablet delayed-release, TAKE 1 TABLET BY MOUTH EVERY DAY, Disp: 30 each, Rfl: 4    OXcarbazepine (TRILEPTAL) 600 MG tablet, Take 1 tablet by mouth Daily., Disp: 60 tablet, Rfl: 6    PEG-KCl-NaCl-NaSulf-Na Asc-C (MoviPrep) 100 g reconstituted solution powder, Use as directed by provider for colonoscopy prep, Disp: 1 each, Rfl: 0    Lab Results:   Lab Requisition on 2024   Component Date Value Ref Range Status    Reference Lab Report 2024    Final                    Value:[facility]  [address] [place] [address]  Phone: [phone] or [phone]  Fax: [phone]  Dave Fontanez M.D., Medical Director    PATIENT NAME                           LABORATORY NO.  ORACIO GARY.                  [id number]  [id number]                         AGE              SEX  SSN           CLIENT REF #  [facility]           38      1985  F    [id number]   [id number]    [address] [facility]              REQUESTING M.D.     ATTENDING [place] [address]                MICKY MARKS BJORN  DATE COLLECTED      DATE RECEIVED      DATE REPORTED  2024          2024         05/10/2024    DIAGNOSIS:  A.   SIGMOID COLON POLYP, 30CM:  Tubular adenoma without high-grade dysplasia  B.   COLON, POLYPS, X2 15CM:  Hyperplastic polyp  C.   RECTAL POLYP:  Hyperplastic polyp    RLL    CLINICAL HISTORY:  Diverticulosis of large intestine without perforation or                           abscess without bleeding,  Encounter for screening for malignant neoplasm of colon, family history of  colon polyps, Benign neoplasm of  sigmoid colon, benign neoplasm of  rectosigmoid junction, benign neoplasm of rectum    SPECIMENS RECEIVED:  A.  SIGMOID COLON POLYP, 30CM  B.  COLON, POLYPS, X2 15CM  C.  RECTAL POLYP    MICROSCOPIC DESCRIPTION:  Tissue blocks are prepared and slides are examined microscopically on all  specimens. See diagnosis for details.    Professional interpretation rendered by Dave Fontanez M.D., NEFTALI at  [facility] [employer], [address] [place] [address].    GROSS DESCRIPTION:  A.  Labeled sigmoid colon at 30 cm consisting of 2 pieces of tan soft tissue  measuring 0.5 x 0.3 x 0.2 cm in aggregate.  Submitted entirely in 1  cassette.  BAW  B.  Labeled polyp at 15 cm consisting of 2 pieces of tan soft tissue measuring  0.6 x 0.3 x 0.2 cm in aggregate.  Submitted entirely in 1 cassette.  C.  Labeled rectal polyp consisting of 1 piece of gray-tan soft                           tissue measuring  0.5 x 0.3 x 0.2 cm.  Submitted entirely in 1 cassette.    REVIEWED, DIAGNOSED AND ELECTRONICALLY  SIGNED BY:    Dave Fontanez M.D., F.C.A.P.  CPT CODES:  88305x3     Lab on 04/18/2024   Component Date Value Ref Range Status    Total Cholesterol 04/18/2024 194  0 - 200 mg/dL Final    Triglycerides 04/18/2024 131  0 - 150 mg/dL Final    HDL Cholesterol 04/18/2024 37 (L)  40 - 60 mg/dL Final    LDL Cholesterol  04/18/2024 133 (H)  0 - 100 mg/dL Final    VLDL Cholesterol 04/18/2024 24  5 - 40 mg/dL Final    LDL/HDL Ratio 04/18/2024 3.54   Final    Glucose 04/18/2024 84  65 - 99 mg/dL Final    BUN 04/18/2024 10  6 - 20 mg/dL Final    Creatinine 04/18/2024 0.68  0.57 - 1.00 mg/dL Final    Sodium 04/18/2024 137  136 - 145 mmol/L Final    Potassium 04/18/2024 4.6  3.5 - 5.2 mmol/L Final    Chloride 04/18/2024 103  98 - 107 mmol/L Final    CO2 04/18/2024 25.0  22.0 - 29.0 mmol/L Final    Calcium 04/18/2024 9.4  8.6 - 10.5 mg/dL Final    Total Protein 04/18/2024 7.5  6.0 - 8.5 g/dL Final    Albumin 04/18/2024 4.3  3.5 - 5.2 g/dL Final     ALT (SGPT) 04/18/2024 35 (H)  1 - 33 U/L Final    AST (SGOT) 04/18/2024 20  1 - 32 U/L Final    Alkaline Phosphatase 04/18/2024 106  39 - 117 U/L Final    Total Bilirubin 04/18/2024 0.3  0.0 - 1.2 mg/dL Final    Globulin 04/18/2024 3.2  gm/dL Final    A/G Ratio 04/18/2024 1.3  g/dL Final    BUN/Creatinine Ratio 04/18/2024 14.7  7.0 - 25.0 Final    Anion Gap 04/18/2024 9.0  5.0 - 15.0 mmol/L Final    eGFR 04/18/2024 115.2  >60.0 mL/min/1.73 Final    TSH 04/18/2024 1.460  0.270 - 4.200 uIU/mL Final    Hemoglobin A1C 04/18/2024 5.70 (H)  4.80 - 5.60 % Final    Microalbumin, Urine 04/18/2024 <1.2  mg/dL Final    WBC 04/18/2024 13.13 (H)  3.40 - 10.80 10*3/mm3 Final    RBC 04/18/2024 5.10  3.77 - 5.28 10*6/mm3 Final    Hemoglobin 04/18/2024 14.7  12.0 - 15.9 g/dL Final    Hematocrit 04/18/2024 44.9  34.0 - 46.6 % Final    MCV 04/18/2024 88.0  79.0 - 97.0 fL Final    MCH 04/18/2024 28.8  26.6 - 33.0 pg Final    MCHC 04/18/2024 32.7  31.5 - 35.7 g/dL Final    RDW 04/18/2024 12.6  12.3 - 15.4 % Final    RDW-SD 04/18/2024 39.9  37.0 - 54.0 fl Final    MPV 04/18/2024 10.6  6.0 - 12.0 fL Final    Platelets 04/18/2024 420  140 - 450 10*3/mm3 Final    Neutrophil % 04/18/2024 58.4  42.7 - 76.0 % Final    Lymphocyte % 04/18/2024 29.1  19.6 - 45.3 % Final    Monocyte % 04/18/2024 8.0  5.0 - 12.0 % Final    Eosinophil % 04/18/2024 3.5  0.3 - 6.2 % Final    Basophil % 04/18/2024 0.5  0.0 - 1.5 % Final    Immature Grans % 04/18/2024 0.5  0.0 - 0.5 % Final    Neutrophils, Absolute 04/18/2024 7.67 (H)  1.70 - 7.00 10*3/mm3 Final    Lymphocytes, Absolute 04/18/2024 3.82 (H)  0.70 - 3.10 10*3/mm3 Final    Monocytes, Absolute 04/18/2024 1.05 (H)  0.10 - 0.90 10*3/mm3 Final    Eosinophils, Absolute 04/18/2024 0.46 (H)  0.00 - 0.40 10*3/mm3 Final    Basophils, Absolute 04/18/2024 0.06  0.00 - 0.20 10*3/mm3 Final    Immature Grans, Absolute 04/18/2024 0.07 (H)  0.00 - 0.05 10*3/mm3 Final    nRBC 04/18/2024 0.0  0.0 - 0.2 /100 WBC Final        Mental Status Exam:   Hygiene:   good  Cooperation:  Cooperative  Eye Contact:  Good  Psychomotor Behavior:  Appropriate  Mood:sad  Affect:  Appropriate  Hopelessness: Denies  Speech:  Normal  Thought Process:  Goal directed  Thought Content:  Normal  Suicidal:  None  Homicidal:  None  Hallucinations:  None  Delusion:  None  Memory:  Intact  Orientation:  Person, Place, Time, and Situation  Reliability:  good  Insight:  Good  Judgement:  Good  Impulse Control:  Good    PHQ-9 Depression Screening  Little interest or pleasure in doing things? (P) 1-->several days   Feeling down, depressed, or hopeless? (P) 1-->several days   Trouble falling or staying asleep, or sleeping too much? (P) 3-->nearly every day   Feeling tired or having little energy? (P) 0-->not at all   Poor appetite or overeating? (P) 2-->more than half the days   Feeling bad about yourself - or that you are a failure or have let yourself or your family down? (P) 1-->several days   Trouble concentrating on things, such as reading the newspaper or watching television? (P) 1-->several days   Moving or speaking so slowly that other people could have noticed? Or the opposite - being so fidgety or restless that you have been moving around a lot more than usual? (P) 1-->several days   Thoughts that you would be better off dead, or of hurting yourself in some way? (P) 0-->not at all   PHQ-9 Total Score (P) 10   If you checked off any problems, how difficult have these problems made it for you to do your work, take care of things at home, or get along with other people? (P) somewhat difficult      REVIEWED [facility] # [id number]     Assessment/Plan:  Diagnoses and all orders for this visit:    1. Bipolar 1 disorder, depressed, partial remission (Primary)  -     Cariprazine HCl (Vraylar) 3 MG capsule capsule; Take 1 capsule by mouth Daily.  Dispense: 30 capsule; Refill: 6    2. Attention deficit hyperactivity disorder, combined type    3. Generalized anxiety  disorder    4. Attention deficit hyperactivity disorder (ADHD), combined type  -     amphetamine-dextroamphetamine (Adderall) 20 MG tablet; Take 20 mg orally every afternoon.  Dispense: 30 tablet; Refill: 0  -     amphetamine-dextroamphetamine XR (Adderall XR) 20 MG 24 hr capsule; Take 2 capsules by mouth Every Morning  Dispense: 60 capsule; Refill: 0      Patient feels she is having trouble with sleep.  States mood is great.  Looking into bariatric surgery.  Feels her depression and anxiety have improved.    A psychological evaluation was conducted in order to assess past and current level of functioning. Areas assessed included, but were not limited to: perception of social support, perception of ability to face and deal with challenges in life (positive functioning), anxiety symptoms, depressive symptoms, perspective on beliefs/belief system, coping skills for stress, intelligence level,  Therapeutic rapport was established. Interventions conducted today were geared towards incorporating medication management along with support for continued therapy. Education was also provided as to the med management with this provider and what to expect in subsequent sessions.    We discussed risks, benefits,goals and side effects of the above medication and the patient was agreeable with the plan.Patient was educated on the importance of compliance with treatment and follow-up appointments. Patient is aware to contact the [facility] [phone] with any worsening of symptoms. To call for questions or concerns and return early if necessary. Patent is agreeable to go to the Emergency Department or call 911 should they begin SI/HI.     Part of this note may be an electronic transcription/translation of spoken language to printed text using the Dragon Dictation System.    Return in about 4 weeks (around 7/4/2024) for Follow Up 30 min, Video visit.    DALLAS Hoover

## 2024-07-02 DIAGNOSIS — E78.2 MIXED HYPERLIPIDEMIA: ICD-10-CM

## 2024-07-02 DIAGNOSIS — K21.9 GASTROESOPHAGEAL REFLUX DISEASE WITHOUT ESOPHAGITIS: Chronic | ICD-10-CM

## 2024-07-02 DIAGNOSIS — J45.50 SEVERE PERSISTENT ASTHMA WITHOUT COMPLICATION: ICD-10-CM

## 2024-07-02 DIAGNOSIS — T78.2XXD ANAPHYLAXIS, SUBSEQUENT ENCOUNTER: ICD-10-CM

## 2024-07-02 DIAGNOSIS — J30.89 NON-SEASONAL ALLERGIC RHINITIS, UNSPECIFIED TRIGGER: ICD-10-CM

## 2024-07-02 DIAGNOSIS — M25.473 ANKLE SWELLING, UNSPECIFIED LATERALITY: ICD-10-CM

## 2024-07-02 RX ORDER — FUROSEMIDE 20 MG/1
20 TABLET ORAL 2 TIMES WEEKLY
Qty: 30 TABLET | Refills: 1 | Status: SHIPPED | OUTPATIENT
Start: 2024-07-04

## 2024-07-02 RX ORDER — ATORVASTATIN CALCIUM 10 MG/1
10 TABLET, FILM COATED ORAL DAILY
Qty: 30 TABLET | Refills: 5 | Status: SHIPPED | OUTPATIENT
Start: 2024-07-02

## 2024-07-02 RX ORDER — EPINEPHRINE 0.3 MG/.3ML
INJECTION SUBCUTANEOUS
Qty: 2 EACH | Refills: 1 | Status: SHIPPED | OUTPATIENT
Start: 2024-07-02

## 2024-07-02 RX ORDER — LORATADINE 10 MG/1
10 TABLET ORAL DAILY
Qty: 30 TABLET | Refills: 5 | Status: SHIPPED | OUTPATIENT
Start: 2024-07-02

## 2024-07-02 RX ORDER — NICOTINE POLACRILEX 4 MG/1
1 GUM, CHEWING ORAL DAILY
Qty: 30 EACH | Refills: 4 | Status: SHIPPED | OUTPATIENT
Start: 2024-07-02

## 2024-07-09 ENCOUNTER — TELEMEDICINE (OUTPATIENT)
Dept: PSYCHIATRY | Facility: CLINIC | Age: 39
End: 2024-07-09
Payer: COMMERCIAL

## 2024-07-09 DIAGNOSIS — F51.04 PSYCHOPHYSIOLOGICAL INSOMNIA: ICD-10-CM

## 2024-07-09 DIAGNOSIS — F90.2 ATTENTION DEFICIT HYPERACTIVITY DISORDER, COMBINED TYPE: Primary | ICD-10-CM

## 2024-07-09 DIAGNOSIS — F31.75 BIPOLAR 1 DISORDER, DEPRESSED, PARTIAL REMISSION: ICD-10-CM

## 2024-07-09 DIAGNOSIS — F41.1 GENERALIZED ANXIETY DISORDER: ICD-10-CM

## 2024-07-09 DIAGNOSIS — F90.2 ATTENTION DEFICIT HYPERACTIVITY DISORDER (ADHD), COMBINED TYPE: ICD-10-CM

## 2024-07-09 PROCEDURE — 99214 OFFICE O/P EST MOD 30 MIN: CPT | Performed by: NURSE PRACTITIONER

## 2024-07-09 RX ORDER — LAMOTRIGINE 25 MG/1
TABLET ORAL
Qty: 42 TABLET | Refills: 0 | Status: SHIPPED | OUTPATIENT
Start: 2024-07-09 | End: 2024-08-06

## 2024-07-09 RX ORDER — DEXTROAMPHETAMINE SACCHARATE, AMPHETAMINE ASPARTATE, DEXTROAMPHETAMINE SULFATE AND AMPHETAMINE SULFATE 5; 5; 5; 5 MG/1; MG/1; MG/1; MG/1
TABLET ORAL
Qty: 30 TABLET | Refills: 0 | Status: SHIPPED | OUTPATIENT
Start: 2024-07-09

## 2024-07-09 RX ORDER — ESZOPICLONE 3 MG/1
3 TABLET, FILM COATED ORAL NIGHTLY PRN
Qty: 30 TABLET | Refills: 2 | Status: SHIPPED | OUTPATIENT
Start: 2024-07-09 | End: 2025-07-09

## 2024-07-09 RX ORDER — LAMOTRIGINE 100 MG/1
100 TABLET ORAL DAILY
Qty: 30 TABLET | Refills: 2 | Status: SHIPPED | OUTPATIENT
Start: 2024-07-09 | End: 2025-07-09

## 2024-07-09 RX ORDER — DEXTROAMPHETAMINE SACCHARATE, AMPHETAMINE ASPARTATE MONOHYDRATE, DEXTROAMPHETAMINE SULFATE AND AMPHETAMINE SULFATE 5; 5; 5; 5 MG/1; MG/1; MG/1; MG/1
40 CAPSULE, EXTENDED RELEASE ORAL EVERY MORNING
Qty: 60 CAPSULE | Refills: 0 | Status: SHIPPED | OUTPATIENT
Start: 2024-07-09

## 2024-07-09 NOTE — PROGRESS NOTES
Patient Name: Clare Turner  MRN: [id number]   :  1985     This provider is located at her home office through the [facility] (through [facility]), [address], [address] using a secure [employer] Video Visit through [employer]. Patient is being seen remotely via telehealth at their home address in [place], and stated they are in a secure environment for this session. The patient's condition being diagnosed/treated is appropriate for telemedicine. The provider identified herself as well as her credentials.   The patient, and/or patients guardian, consent to be seen remotely, and when consent is given they understand that the consent allows for patient identifiable information to be sent to a third party as needed.   They may refuse to be seen remotely at any time. The electronic data is encrypted and password protected, and the patient and/or guardian has been advised of the potential risks to privacy not withstanding such measures.    You have chosen to receive care through a telehealth visit.  Do you consent to use a video/audio connection for your medical care today? Yes    Chief Complaint:      ICD-10-CM ICD-9-CM   1. Attention deficit hyperactivity disorder, combined type  F90.2 314.01   2. Bipolar 1 disorder, depressed, partial remission  F31.75 296.55   3. Generalized anxiety disorder  F41.1 300.02   4. Psychophysiological insomnia  F51.04 307.42   5. Attention deficit hyperactivity disorder (ADHD), combined type  F90.2 314.01       History of Present Illness: Clare Turner is a 39 y.o. female is here today for medication management follow up.  Patient states she is still having trouble with sleep.  Having excessive sweating with Trileptal.  Wonders about restarting Lamictal.  States focus is better.    The following portions of the patient's history were reviewed and updated as appropriate: allergies, current medications, past family  history, past medical history, past social history, past surgical history, and problem list.    Review of Systems;;  Review of Systems   Constitutional:  Negative for activity change, appetite change, fatigue, unexpected weight gain and unexpected weight loss.   Respiratory:  Negative for shortness of breath and wheezing.    Gastrointestinal:  Negative for constipation, diarrhea, nausea and vomiting.   Musculoskeletal:  Negative for gait problem.   Skin:  Negative for dry skin and rash.   Neurological:  Negative for dizziness, speech difficulty, weakness, light-headedness, headache, memory problem and confusion.   Psychiatric/Behavioral:  Positive for sleep disturbance and stress. Negative for agitation, behavioral problems, decreased concentration, dysphoric mood, hallucinations, self-injury, suicidal ideas, negative for hyperactivity and depressed mood. The patient is not nervous/anxious.        Physical Exam;;  Physical Exam  Constitutional:       General: She is not in acute distress.     Appearance: She is well-developed. She is not diaphoretic.   HENT:      Head: Normocephalic and atraumatic.   Eyes:      Conjunctiva/sclera: Conjunctivae normal.   Pulmonary:      Effort: Pulmonary effort is normal. No respiratory distress.   Musculoskeletal:         General: Normal range of motion.      Cervical back: Full passive range of motion without pain and normal range of motion.   Neurological:      Mental Status: She is alert and oriented to person, place, and time.   Psychiatric:         Mood and Affect: Mood is not anxious or depressed. Affect is not labile, blunt, angry or inappropriate.         Speech: Speech is not rapid and pressured or tangential.         Behavior: Behavior normal. Behavior is not agitated, slowed, aggressive, withdrawn, hyperactive or combative. Behavior is cooperative.         Thought Content: Thought content normal. Thought content is not paranoid or delusional. Thought content does not  include homicidal or suicidal ideation. Thought content does not include homicidal or suicidal plan.         Judgment: Judgment normal.       not currently breastfeeding.  There is no height or weight on file to calculate BMI. Video appt unable to obtain.     Current Medications;;    Current Outpatient Medications:     amphetamine-dextroamphetamine (Adderall) 20 MG tablet, Take 20 mg orally every afternoon., Disp: 30 tablet, Rfl: 0    amphetamine-dextroamphetamine XR (Adderall XR) 20 MG 24 hr capsule, Take 2 capsules by mouth Every Morning, Disp: 60 capsule, Rfl: 0    albuterol (PROVENTIL) (2.5 MG/3ML) 0.083% nebulizer solution, Take 2.5 mg by nebulization 4 (Four) Times a Day As Needed for Wheezing., Disp: 120 each, Rfl: 5    albuterol sulfate  (90 Base) MCG/ACT inhaler, Inhale 2 puffs 2 (Two) Times a Day., Disp: 18 g, Rfl: 5    atorvastatin (LIPITOR) 10 MG tablet, Take 1 tablet by mouth Daily., Disp: 30 tablet, Rfl: 5    budesonide (Pulmicort) 0.5 MG/2ML nebulizer solution, Take 2 mL by nebulization 2 (Two) Times a Day., Disp: 120 each, Rfl: 11    buPROPion XL (WELLBUTRIN XL) 300 MG 24 hr tablet, Take 1 tablet by mouth Daily., Disp: 30 tablet, Rfl: 6    Cariprazine HCl (Vraylar) 3 MG capsule capsule, Take 1 capsule by mouth Daily., Disp: 30 capsule, Rfl: 6    Dupilumab 300 MG/2ML solution pen-injector, Inject 2 mL under the skin into the appropriate area as directed Every 14 (Fourteen) Days., Disp: 4 mL, Rfl: 1    EPINEPHrine (EPIPEN) 0.3 MG/0.3ML solution auto-injector injection, Inject 1 pen in the muscle as directed PRN allergic reaction, Disp: 2 each, Rfl: 1    eszopiclone (Lunesta) 3 MG tablet, Take 1 tablet by mouth At Night As Needed for Sleep. Take immediately before bedtime, Disp: 30 tablet, Rfl: 2    FLUoxetine (PROzac) 40 MG capsule, Take 2 capsules by mouth Daily., Disp: 60 capsule, Rfl: 6    Fluticasone Furoate-Vilanterol (BREO ELLIPTA) 200-25 MCG/ACT inhaler, Inhale 1 puff Daily. 1 inhalation  once a day (Patient taking differently: Inhale 1 puff Daily.), Disp: 60 each, Rfl: 5    furosemide (LASIX) 20 MG tablet, Take 1 tablet by mouth 2 (Two) Times a Week. PRN for leg/ankle swelling., Disp: 30 tablet, Rfl: 1    hydrOXYzine pamoate (VISTARIL) 25 MG capsule, Take 1 capsule by mouth 3 (Three) Times a Day As Needed for Anxiety., Disp: 90 capsule, Rfl: 6    lamoTRIgine (LaMICtal) 100 MG tablet, Take 1 tablet by mouth Daily., Disp: 30 tablet, Rfl: 2    lamoTRIgine (LaMICtal) 25 MG tablet, Take 1 tablet by mouth Daily for 14 days, THEN 2 tablets Daily for 14 days. Then next script, Disp: 42 tablet, Rfl: 0    loratadine (CLARITIN) 10 MG tablet, Take 1 tablet by mouth Daily., Disp: 30 tablet, Rfl: 5    Omeprazole 20 MG tablet delayed-release, Take 20 mg by mouth Daily., Disp: 30 each, Rfl: 4    Lab Results:   Lab Requisition on 2024   Component Date Value Ref Range Status    Reference Lab Report 2024    Final                    Value:[facility]  [address] [place] [address]  Phone: [phone] or [phone]  Fax: [phone]  Dave Fontanez M.D., Medical Director    PATIENT NAME                           LABORATORY NO.  ORACIO GARY.                  [id number]  [id number]                         AGE              SEX  SSN           CLIENT REF #  [facility] [place] [facility]           38      1985  F    [id number]   [id number]    [address] [facility]              REQUESTING MADRIANA     ATTENDING [place] [address]                MICKY MARKS BJORN  DATE COLLECTED      DATE RECEIVED      DATE REPORTED  2024          2024         05/10/2024    DIAGNOSIS:  A.   SIGMOID COLON POLYP, 30CM:  Tubular adenoma without high-grade dysplasia  B.   COLON, POLYPS, X2 15CM:  Hyperplastic polyp  C.   RECTAL POLYP:  Hyperplastic polyp    RLL    CLINICAL HISTORY:  Diverticulosis of large intestine without  perforation or                           abscess without bleeding,  Encounter for screening for malignant neoplasm of colon, family history of  colon polyps, Benign neoplasm of sigmoid colon, benign neoplasm of  rectosigmoid junction, benign neoplasm of rectum    SPECIMENS RECEIVED:  A.  SIGMOID COLON POLYP, 30CM  B.  COLON, POLYPS, X2 15CM  C.  RECTAL POLYP    MICROSCOPIC DESCRIPTION:  Tissue blocks are prepared and slides are examined microscopically on all  specimens. See diagnosis for details.    Professional interpretation rendered by Dave Fontanez M.D., NE[facility]ALI at  [facility], [address] [place] [address].    GROSS DESCRIPTION:  A.  Labeled sigmoid colon at 30 cm consisting of 2 pieces of tan soft tissue  measuring 0.5 x 0.3 x 0.2 cm in aggregate.  Submitted entirely in 1  cassette.  BAW  B.  Labeled polyp at 15 cm consisting of 2 pieces of tan soft tissue measuring  0.6 x 0.3 x 0.2 cm in aggregate.  Submitted entirely in 1 cassette.  C.  Labeled rectal polyp consisting of 1 piece of gray-tan soft                           tissue measuring  0.5 x 0.3 x 0.2 cm.  Submitted entirely in 1 cassette.    REVIEWED, DIAGNOSED AND ELECTRONICALLY  SIGNED BY:    Dave Fontanze M.D., F.C.A.P.  CPT CODES:  88305x3     Lab on 04/18/2024   Component Date Value Ref Range Status    Total Cholesterol 04/18/2024 194  0 - 200 mg/dL Final    Triglycerides 04/18/2024 131  0 - 150 mg/dL Final    HDL Cholesterol 04/18/2024 37 (L)  40 - 60 mg/dL Final    LDL Cholesterol  04/18/2024 133 (H)  0 - 100 mg/dL Final    VLDL Cholesterol 04/18/2024 24  5 - 40 mg/dL Final    LDL/HDL Ratio 04/18/2024 3.54   Final    Glucose 04/18/2024 84  65 - 99 mg/dL Final    BUN 04/18/2024 10  6 - 20 mg/dL Final    Creatinine 04/18/2024 0.68  0.57 - 1.00 mg/dL Final    Sodium 04/18/2024 137  136 - 145 mmol/L Final    Potassium 04/18/2024 4.6  3.5 - 5.2 mmol/L Final    Chloride 04/18/2024 103  98 - 107 mmol/L Final    CO2 04/18/2024 25.0   22.0 - 29.0 mmol/L Final    Calcium 04/18/2024 9.4  8.6 - 10.5 mg/dL Final    Total Protein 04/18/2024 7.5  6.0 - 8.5 g/dL Final    Albumin 04/18/2024 4.3  3.5 - 5.2 g/dL Final    ALT (SGPT) 04/18/2024 35 (H)  1 - 33 U/L Final    AST (SGOT) 04/18/2024 20  1 - 32 U/L Final    Alkaline Phosphatase 04/18/2024 106  39 - 117 U/L Final    Total Bilirubin 04/18/2024 0.3  0.0 - 1.2 mg/dL Final    Globulin 04/18/2024 3.2  gm/dL Final    A/G Ratio 04/18/2024 1.3  g/dL Final    BUN/Creatinine Ratio 04/18/2024 14.7  7.0 - 25.0 Final    Anion Gap 04/18/2024 9.0  5.0 - 15.0 mmol/L Final    eGFR 04/18/2024 115.2  >60.0 mL/min/1.73 Final    TSH 04/18/2024 1.460  0.270 - 4.200 uIU/mL Final    Hemoglobin A1C 04/18/2024 5.70 (H)  4.80 - 5.60 % Final    Microalbumin, Urine 04/18/2024 <1.2  mg/dL Final    WBC 04/18/2024 13.13 (H)  3.40 - 10.80 10*3/mm3 Final    RBC 04/18/2024 5.10  3.77 - 5.28 10*6/mm3 Final    Hemoglobin 04/18/2024 14.7  12.0 - 15.9 g/dL Final    Hematocrit 04/18/2024 44.9  34.0 - 46.6 % Final    MCV 04/18/2024 88.0  79.0 - 97.0 fL Final    MCH 04/18/2024 28.8  26.6 - 33.0 pg Final    MCHC 04/18/2024 32.7  31.5 - 35.7 g/dL Final    RDW 04/18/2024 12.6  12.3 - 15.4 % Final    RDW-SD 04/18/2024 39.9  37.0 - 54.0 fl Final    MPV 04/18/2024 10.6  6.0 - 12.0 fL Final    Platelets 04/18/2024 420  140 - 450 10*3/mm3 Final    Neutrophil % 04/18/2024 58.4  42.7 - 76.0 % Final    Lymphocyte % 04/18/2024 29.1  19.6 - 45.3 % Final    Monocyte % 04/18/2024 8.0  5.0 - 12.0 % Final    Eosinophil % 04/18/2024 3.5  0.3 - 6.2 % Final    Basophil % 04/18/2024 0.5  0.0 - 1.5 % Final    Immature Grans % 04/18/2024 0.5  0.0 - 0.5 % Final    Neutrophils, Absolute 04/18/2024 7.67 (H)  1.70 - 7.00 10*3/mm3 Final    Lymphocytes, Absolute 04/18/2024 3.82 (H)  0.70 - 3.10 10*3/mm3 Final    Monocytes, Absolute 04/18/2024 1.05 (H)  0.10 - 0.90 10*3/mm3 Final    Eosinophils, Absolute 04/18/2024 0.46 (H)  0.00 - 0.40 10*3/mm3 Final    Basophils,  Absolute 04/18/2024 0.06  0.00 - 0.20 10*3/mm3 Final    Immature Grans, Absolute 04/18/2024 0.07 (H)  0.00 - 0.05 10*3/mm3 Final    nRBC 04/18/2024 0.0  0.0 - 0.2 /100 WBC Final       Mental Status Exam:   Hygiene:   good  Cooperation:  Cooperative  Eye Contact:  Good  Psychomotor Behavior:  Appropriate  Mood:dysthymic  Affect:  Appropriate  Hopelessness: Denies  Speech:  Normal  Thought Process:  Goal directed  Thought Content:  Normal  Suicidal:  None  Homicidal:  None  Hallucinations:  None  Delusion:  None  Memory:  Intact  Orientation:  Person, Place, Time, and Situation  Reliability:  good  Insight:  Good  Judgement:  Good  Impulse Control:  Good    PHQ-9 Depression Screening  Little interest or pleasure in doing things? (P) 1-->several days   Feeling down, depressed, or hopeless? (P) 1-->several days   Trouble falling or staying asleep, or sleeping too much? (P) 3-->nearly every day   Feeling tired or having little energy? (P) 1-->several days   Poor appetite or overeating? (P) 1-->several days   Feeling bad about yourself - or that you are a failure or have let yourself or your family down? (P) 1-->several days   Trouble concentrating on things, such as reading the newspaper or watching television? (P) 1-->several days   Moving or speaking so slowly that other people could have noticed? Or the opposite - being so fidgety or restless that you have been moving around a lot more than usual? (P) 1-->several days   Thoughts that you would be better off dead, or of hurting yourself in some way? (P) 0-->not at all   PHQ-9 Total Score (P) 10   If you checked off any problems, how difficult have these problems made it for you to do your work, take care of things at home, or get along with other people? (P) somewhat difficult        Assessment/Plan:  Diagnoses and all orders for this visit:    1. Attention deficit hyperactivity disorder, combined type (Primary)  -     amphetamine-dextroamphetamine (Adderall) 20 MG  tablet; Take 20 mg orally every afternoon.  Dispense: 30 tablet; Refill: 0  -     amphetamine-dextroamphetamine XR (Adderall XR) 20 MG 24 hr capsule; Take 2 capsules by mouth Every Morning  Dispense: 60 capsule; Refill: 0    2. Bipolar 1 disorder, depressed, partial remission  -     lamoTRIgine (LaMICtal) 25 MG tablet; Take 1 tablet by mouth Daily for 14 days, THEN 2 tablets Daily for 14 days. Then next script  Dispense: 42 tablet; Refill: 0  -     lamoTRIgine (LaMICtal) 100 MG tablet; Take 1 tablet by mouth Daily.  Dispense: 30 tablet; Refill: 2    3. Generalized anxiety disorder    4. Psychophysiological insomnia  -     eszopiclone (Lunesta) 3 MG tablet; Take 1 tablet by mouth At Night As Needed for Sleep. Take immediately before bedtime  Dispense: 30 tablet; Refill: 2    5. Attention deficit hyperactivity disorder (ADHD), combined type    Patient would like to start back on Lamictal.  We will titrate to 100 mg.  Patient also continues to struggle with sleep.  We will start Lunesta 3 mg at bedtime.    A psychological evaluation was conducted in order to assess past and current level of functioning. Areas assessed included, but were not limited to: perception of social support, perception of ability to face and deal with challenges in life (positive functioning), anxiety symptoms, depressive symptoms, perspective on beliefs/belief system, coping skills for stress, intelligence level,  Therapeutic rapport was established. Interventions conducted today were geared towards incorporating medication management along with support for continued therapy. Education was also provided as to the med management with this provider and what to expect in subsequent sessions.    We discussed risks, benefits,goals and side effects of the above medication and the patient was agreeable with the plan.Patient was educated on the importance of compliance with treatment and follow-up appointments. Patient is aware to contact the [facility] [phone] with any worsening of symptoms. To call for questions or concerns and return early if necessary. Patent is agreeable to go to the Emergency Department or call 911 should they begin SI/HI.     Part of this note may be an electronic transcription/translation of spoken language to printed text using the Dragon Dictation System.    Return in about 4 weeks (around 8/6/2024) for Follow Up 30 min, Video visit.    Nani Ball, APRN

## 2024-07-16 ENCOUNTER — TELEMEDICINE (OUTPATIENT)
Dept: PSYCHIATRY | Facility: CLINIC | Age: 39
End: 2024-07-16
Payer: COMMERCIAL

## 2024-07-16 DIAGNOSIS — F31.75 BIPOLAR 1 DISORDER, DEPRESSED, PARTIAL REMISSION: Primary | ICD-10-CM

## 2024-07-16 DIAGNOSIS — F90.2 ATTENTION DEFICIT HYPERACTIVITY DISORDER, COMBINED TYPE: ICD-10-CM

## 2024-07-16 DIAGNOSIS — F41.1 GENERALIZED ANXIETY DISORDER: ICD-10-CM

## 2024-07-16 PROCEDURE — 90837 PSYTX W PT 60 MINUTES: CPT | Performed by: SOCIAL WORKER

## 2024-07-16 NOTE — PROGRESS NOTES
"[facility]   Follow-up Progress Note     Date: July 16, 2024  Time In: 12:32  Time Out: 1:26      PROGRESS NOTE  Data:  Clare Turner is a 39 y.o. female presenting to [facility] (through [facility]), [address] [place], [address] using a secure MyChart Video Visit through Psychiatric for assessment with Stevenson Coker LCSW. The patient is seen remotely in their  work  using [facility] My Chart. Patient is being seen via telehealth and stated they are in a secure environment for this session. The patient's condition being diagnosed/treated is appropriate for telemedicine. The provider identified herself as well as her credentials. The patient and/or patients guardian consent to be seen remotely, and when consent is given they understand that the consent allows for patient identifiable information to be sent to a third party as needed. They may refuse to be seen remotely at any time. The electronic data is encrypted and password protected, and the patient has been advised of the potential risks to privacy not withstanding such measures.    Today Patient expressed she feels \"overwhelmed and overstimulated.\" Patient stated her work made the transition to the new system this week. Patient stated learning the new structure is causing her to feel overwhelmed. Patient expressed she has been regretting not taking the other job opportunities. Patient stated she also reported a situation that made her feel uncomfortable with a male co-worker but felt it was disregarded. Patient stated she is bothered the dismissal of her concern. Patient stated she has been questioning if her judgement was \"off\" when she reported the situation. Patient expressed she struggles to trust her own judgement over others. Patient stated she has had some good things happen such as getting a boat and having opportunities to spend time together " "as a family. Patient stated her on is doing well and progressing in his treatment. Patient stated she feels optimistic about his willingness to participate. Patient stated her children's grandmother has been more flexible with allowing patient to spend time with her children. Patient expressed she does feel \"behind\" on her goal to regain custody of all of her children. Processed with patient her expectations to her goals and the reality of ability to meet her goal. Patient engaged in identifying treatment goals such as wanting to be able to be \"happy and healthy\" to take care of her family.       Clinical Maneuvering/Intervention:    Chief Complaint: mood instability, adhd, anxiety    (Scales based on 0 - 10 with 10 being the worst)  Depression: 6 Anxiety: 8     Assisted Patient in processing above session content; acknowledged and normalized patient’s thoughts, feelings, and concerns.  Rationalized patient thought process regarding questioning her judgement.  Discussed triggers associated with patient's anxiety.  Also discussed coping skills for patient to implement such as prioritizing patient's needs.    Allowed Patient to freely discuss issues  without interruption or judgement with unconditional positive regard, active listening skills, and empathy. Therapist provided a safe, confidential environment to facilitate the development of a positive therapeutic relationship and encouraged open, honest communication. Assisted Patient in identifying risk factors which would indicate the need for higher level of care including thoughts to harm self or others and/or self-harming behavior and encouraged Patient to contact this office, call 911, or present to the nearest emergency room should any of these events occur. Discussed crisis intervention services and means to access. Patient adamantly and convincingly denies current suicidal or homicidal ideation or perceptual disturbance. Assisted Patient in processing session " content; acknowledged and normalized Patient’s thoughts, feelings, and concerns by utilizing a person-centered approach in efforts to build appropriate rapport and a positive therapeutic relationship with open and honest communication. Therapist utilized dialectical behavior techniques to teach and model emotional regulation and relaxation methods. Therapist assisted Patient with identifying and implementing healthier coping strategies.     Assessment   Patient appears to be experiencing heightened anxiety and depression in response to COVID-19 epidemic  As a result, they can be reasonably expected to continue to benefit from treatment and would likely be at increased risk for decompensation otherwise.    Mental Status Exam:   Hygiene:   good  Cooperation:  Cooperative  Eye Contact:  Good  Psychomotor Behavior:  Appropriate  Affect:  Full range  Mood:  discouraged  Speech:  Normal  Thought Process:  Goal directed  Thought Content:  Mood congruent  Suicidal:  None  Homicidal:  None  Hallucinations:  None  Delusion:  None  Memory:  Intact  Orientation:  Person, Place, Time, and Situation  Reliability:  good  Insight:  Good  Judgement:  Good  Impulse Control:  Good  Physical/Medical Issues:  No      PHQ-Score Total:  PHQ-9 Total Score:        Patient's Support Network Includes:  significant other, children, mother, extended family, and friends    Functional Status: Moderate impairment     Progress toward goal: Not at goal    Prognosis: Good with Ongoing Treatment            Impression/Formulation:    VISIT DIAGNOSIS:     ICD-10-CM ICD-9-CM   1. Bipolar 1 disorder, depressed, partial remission  F31.75 296.55   2. Attention deficit hyperactivity disorder, combined type  F90.2 314.01   3. Generalized anxiety disorder  F41.1 300.02        Patient appeared alert and oriented.  Patient is voluntarily requesting to continue outpatient [facility] at [facility].  Patient is receptive to  assistance with maintaining a stable lifestyle.  Patient presents with history of mood instability, anxiety, and ADHD.  Patient is agreeable to attend routine therapy sessions.  Patient expressed desire to maintain stability and participate in the therapeutic process.        Crisis Plan:  Symptoms and/or behaviors to indicate a crisis: Isolation and Increased hunger or lack of appetite    What calming techniques or other strategies will patient use to de-esclate and stay safe: slow down, breathe, visualize calming self, think it though, listen to music, change focus, take a walk    Who is one person patient can contact to assist with de-escalation? sister    If symptoms/behaviors persist, Patient will present to the nearest hospital for an assessment. Advised patient of [facility] ER and assessment services.     Plan:   Patient will continue in individual outpatient therapy with focus on improved functioning and coping skills, maintaining stability, and avoiding decompensation and the need for higher level of care.    Patient will contact this office ([facility] at [phone]), call 911 or present to the nearest emergency room should suicidal or homicidal ideations occur. Provide Cognitive Behavioral Therapy and Solution Focused Therapy to improve functioning, maintain stability, and avoid decompensation and the need for higher level of care.     Return in about 4 weeks, or earlier if symptoms worsen or fail to improve.    Recommended Referrals: none        This document has been electronically signed by Stevenson Coker LCSW  July 16, 2024 12:32 EDT        Part of this note may be an electronic transcription/translation of spoken language to printed text using the Dragon Dictation System.

## 2024-07-16 NOTE — TREATMENT PLAN
Multi-Disciplinary Problems (from Behavioral Health Treatment Plan)      Active Problems       Problem: Anxiety  Start Date: 07/16/24      Problem Details: The patient self-scales this problem as a 8 with 10 being the worst.          Goal Priority Start Date Expected End Date End Date    Patient will develop and implement behavioral and cognitive strategies to reduce anxiety and irrational fears. -- 07/16/24 -- --    Goal Details: Progress toward goal:  Not appropriate to rate progress toward goal since this is the initial treatment plan.          Goal Intervention Frequency Start Date End Date    Help patient explore past emotional issues in relation to present anxiety. Q4 Weeks 07/16/24 --    Intervention Details: Duration of treatment until until discharged.          Goal Intervention Frequency Start Date End Date    Help patient develop an awareness of their cognitive and physical responses to anxiety. Q4 Weeks 07/16/24 --    Intervention Details: Duration of treatment until until discharged.                  Problem: Mood Instability  Start Date: 07/16/24      Problem Details: The patient self-scales this problem as a 5 with 10 being the worst.          Goal Priority Start Date Expected End Date End Date    Patient will achieve mood stability as evidenced by controlled behavior and a more deliberate thought process -- 07/16/24 -- --    Goal Details: Progress toward goal:  The patient self-scales their progress related to this goal as a 5 with 10 being the worst.          Goal Intervention Frequency Start Date End Date    Provide structure and focus to patient's thoughts and actions by establishing plans and routine. Q4 Weeks 07/16/24 --    Intervention Details: Duration of treatment until until discharged.          Goal Intervention Frequency Start Date End Date    Assist patient in setting responsible goals and limits in behavior. Q4 Weeks 07/16/24 --    Intervention Details: Duration of treatment until until  discharged.                                 I have discussed and reviewed this treatment plan with the patient.

## 2024-07-18 ENCOUNTER — OFFICE VISIT (OUTPATIENT)
Dept: BARIATRICS/WEIGHT MGMT | Facility: CLINIC | Age: 39
End: 2024-07-18
Payer: COMMERCIAL

## 2024-07-18 ENCOUNTER — OFFICE VISIT (OUTPATIENT)
Dept: BEHAVIORAL HEALTH | Facility: CLINIC | Age: 39
End: 2024-07-18
Payer: COMMERCIAL

## 2024-07-18 ENCOUNTER — DOCUMENTATION (OUTPATIENT)
Dept: BARIATRICS/WEIGHT MGMT | Facility: CLINIC | Age: 39
End: 2024-07-18
Payer: COMMERCIAL

## 2024-07-18 VITALS
BODY MASS INDEX: 45.99 KG/M2 | HEIGHT: 67 IN | RESPIRATION RATE: 18 BRPM | TEMPERATURE: 97.8 F | DIASTOLIC BLOOD PRESSURE: 68 MMHG | WEIGHT: 293 LBS | HEART RATE: 85 BPM | OXYGEN SATURATION: 99 % | SYSTOLIC BLOOD PRESSURE: 124 MMHG

## 2024-07-18 DIAGNOSIS — E78.2 MIXED HYPERLIPIDEMIA: ICD-10-CM

## 2024-07-18 DIAGNOSIS — R73.03 PREDIABETES: Chronic | ICD-10-CM

## 2024-07-18 DIAGNOSIS — F17.290 OTHER TOBACCO PRODUCT NICOTINE DEPENDENCE, UNCOMPLICATED: ICD-10-CM

## 2024-07-18 DIAGNOSIS — F19.11 HISTORY OF SUBSTANCE ABUSE: ICD-10-CM

## 2024-07-18 DIAGNOSIS — R12 HEARTBURN: ICD-10-CM

## 2024-07-18 DIAGNOSIS — F10.21 HISTORY OF ALCOHOLISM: ICD-10-CM

## 2024-07-18 DIAGNOSIS — F90.2 ATTENTION DEFICIT HYPERACTIVITY DISORDER (ADHD), COMBINED TYPE: Chronic | ICD-10-CM

## 2024-07-18 DIAGNOSIS — E66.01 OBESITY, CLASS III, BMI 40-49.9 (MORBID OBESITY): Primary | ICD-10-CM

## 2024-07-18 DIAGNOSIS — F31.9 BIPOLAR 1 DISORDER: ICD-10-CM

## 2024-07-18 DIAGNOSIS — E66.01 MORBID OBESITY WITH BMI OF 45.0-49.9, ADULT: Primary | ICD-10-CM

## 2024-07-18 DIAGNOSIS — Z71.89 ENCOUNTER FOR PSYCHOLOGICAL ASSESSMENT PRIOR TO BARIATRIC SURGERY: ICD-10-CM

## 2024-07-18 DIAGNOSIS — Z87.891 PERSONAL HISTORY OF SMOKING: ICD-10-CM

## 2024-07-18 DIAGNOSIS — R53.83 FATIGUE, UNSPECIFIED TYPE: ICD-10-CM

## 2024-07-18 PROBLEM — F17.200 NICOTINE DEPENDENCE: Status: ACTIVE | Noted: 2024-07-18

## 2024-07-18 PROCEDURE — 90791 PSYCH DIAGNOSTIC EVALUATION: CPT | Performed by: PSYCHOLOGIST

## 2024-07-18 NOTE — PROGRESS NOTES
"Weight Loss Surgery  Presurgical Nutrition Assessment     Clare Turner  07/18/2024  [id number]  [id number]  1985   female    Surgery desired: LSG (sleeve gastrectomy)     HEIGHT: 168.9 cm (66.5\")   WEIGHT: 134 kg (295 #)   BMI: 49.96    Highest weight ever: 134.5 kg (296 #)      Allergies   Allergen Reactions    Pecan Nut Anaphylaxis     Hives, swelling of lips and throat, difficulties breathing. Has epipen    Sulfa Antibiotics Rash    Sulfamethoxazole-Trimethoprim Rash       Current Outpatient Medications:     albuterol (PROVENTIL) (2.5 MG/3ML) 0.083% nebulizer solution, Take 2.5 mg by nebulization 4 (Four) Times a Day As Needed for Wheezing., Disp: 120 each, Rfl: 5    albuterol sulfate  (90 Base) MCG/ACT inhaler, Inhale 2 puffs 2 (Two) Times a Day., Disp: 18 g, Rfl: 5    amphetamine-dextroamphetamine (Adderall) 20 MG tablet, Take 20 mg orally every afternoon., Disp: 30 tablet, Rfl: 0    amphetamine-dextroamphetamine XR (Adderall XR) 20 MG 24 hr capsule, Take 2 capsules by mouth Every Morning, Disp: 60 capsule, Rfl: 0    atorvastatin (LIPITOR) 10 MG tablet, Take 1 tablet by mouth Daily., Disp: 30 tablet, Rfl: 5    budesonide (Pulmicort) 0.5 MG/2ML nebulizer solution, Take 2 mL by nebulization 2 (Two) Times a Day., Disp: 120 each, Rfl: 11    buPROPion XL (WELLBUTRIN XL) 300 MG 24 hr tablet, Take 1 tablet by mouth Daily., Disp: 30 tablet, Rfl: 6    Cariprazine HCl (Vraylar) 3 MG capsule capsule, Take 1 capsule by mouth Daily., Disp: 30 capsule, Rfl: 6    Dupilumab 300 MG/2ML solution pen-injector, Inject 2 mL under the skin into the appropriate area as directed Every 14 (Fourteen) Days., Disp: 4 mL, Rfl: 1    EPINEPHrine (EPIPEN) 0.3 MG/0.3ML solution auto-injector injection, Inject 1 pen in the muscle as directed PRN allergic reaction, Disp: 2 each, Rfl: 1    eszopiclone (Lunesta) 3 MG tablet, Take 1 tablet by mouth At Night As Needed for Sleep. Take immediately before bedtime, Disp: 30 tablet, " Rfl: 2    FLUoxetine (PROzac) 40 MG capsule, Take 2 capsules by mouth Daily., Disp: 60 capsule, Rfl: 6    Fluticasone Furoate-Vilanterol (BREO ELLIPTA) 200-25 MCG/ACT inhaler, Inhale 1 puff Daily. 1 inhalation once a day (Patient taking differently: Inhale 1 puff Daily.), Disp: 60 each, Rfl: 5    furosemide (LASIX) 20 MG tablet, Take 1 tablet by mouth 2 (Two) Times a Week. PRN for leg/ankle swelling., Disp: 30 tablet, Rfl: 1    hydrOXYzine pamoate (VISTARIL) 25 MG capsule, Take 1 capsule by mouth 3 (Three) Times a Day As Needed for Anxiety., Disp: 90 capsule, Rfl: 6    lamoTRIgine (LaMICtal) 100 MG tablet, Take 1 tablet by mouth Daily., Disp: 30 tablet, Rfl: 2    lamoTRIgine (LaMICtal) 25 MG tablet, Take 1 tablet by mouth Daily for 14 days, THEN 2 tablets Daily for 14 days. Then next script, Disp: 42 tablet, Rfl: 0    loratadine (CLARITIN) 10 MG tablet, Take 1 tablet by mouth Daily., Disp: 30 tablet, Rfl: 5    Omeprazole 20 MG tablet delayed-release, Take 20 mg by mouth Daily., Disp: 30 each, Rfl: 4      Subjective information: Consulted with patient and her mother, who has had a sleeve gastrectomy and is successfully maintaining her weight.  Patient's father is also post sleeve gastrectomy and doing well. Patilent states she has observed their diet post-surgery and feels like she knows what to do       Nutrition Recall   Example of Usual 24 hour intake:     Breakfast: No food ever.  + 2 12 oz Redbull + 3 to 4 12 oz cans Mt Dew regular throughot the day - first at breakfast.  Rarely a fried egg sandwich.     Lunch: microwave protein bowl OR fast food from door dash OR taco salad from [employer] car easily beat.     Dinner: take out or some type of meat wilth potatoes, noodles and Mt Dew    Snacks:Zingers, chips, pretzels, nerd clusters.  Also clancy tomatoes, cotton candy grapes, occ just Dove chocolate.  Formerly ate thru the night, but no longer does this. Taking Lunesta helps sleepless ness    Beverages of Choice: Maggie  bottled caramel machiatto, water, reg Mt Dew, sometimes Coke or Pepsi    Food Allergies or Intolerances: nuts, ana laura pecans          Exercise / Activity: none currently       Assessment of Nutritional Adequacy, Excessive Intake or Deficiencies:        Protein intake is deficient to ensure successful weight loss                                        Processed / simple Carbohydrate intake is: extremely high                                       Balance of diet with a variety of fruits and vegetables is: fair                                                       Reliance on restaurant food including fast food is: very high                                                                          Ingestion of sweet beverages eg soda, sweet tea, fruit juice: extremely high      Education    Provided Nutrition Guidelines for Bariatric and Metabolic Surgery   Reviewed guidelines for higher protein, limited carbohydrate diet to promote weight loss. Demonstrated means of counting protein and carbohydrate grams.   Educated patient to wisely choose an appropriate protein supplement beverage for the post-surgery liquid diet.  Provided product guidelines and examples.    Explained importance of goal setting to help in changing eating behaviors that are not conducive to weight loss.  Specific macronutrient goals as below.   Provided follow-up options for support, including contact information for dietitians here.     Discussed importance of tracking grams of protein and carbohydrate in diet.  Web-based support information and apps for smart phones and computers given.        Nutrition Goals   Protein goal: 100 grams per day in three regular balanced meals and two to three high protein snacks each day, to ensure desired weight loss.   Carbohydrate goal:  100-140 grams per day  Beverage goal: Appropriate non-carbonated beverage intake.  Patient to wean self off of any sweet beverages, including soda.    Exercise Goals  Continue  current exercise routine, if appropriate, and obtain approval from caregiver if physically limited for any reason.   Start activity plan per PCP/specialist advice if not currently exercising.     Recommend that team proceed with surgery and follow per protocol.   Patrica Garcia RD  07/18/2024  14:35 EDT

## 2024-07-18 NOTE — PROGRESS NOTES
[facility]  [address]    [facility] [address]  [phone]      Patient  Name:  Clare Turner  :  1985      Date of Visit: 2024      Chief Complaint:  weight gain; unable to maintain weight loss      History of Present Illness:  Clare Turner is a 39 y.o. female who presents today for evaluation, education and consultation regarding metabolic and bariatric surgery with Dr. Spencer.     Clare has been overweight for at least 27 years, has been 35 pounds or more overweight for at least 27 years, has been 100 pounds or more overweight for 10 or more years and started dieting at age 14.  Previous diet attempts include: Weight Watchers; Wellbutrin, Amphetamines, and Prozac.  The most weight Clare lost was 28 pounds but was unable to maintain that weight loss.  Her maximum lifetime weight is 296 pounds.      Hx drug/substance abuse, clean/sober since , reports 100+lbs weight gain since.       Complete history has been obtained and discussed today, as pertinent to metabolic/ bariatric surgery.     Past Medical History:   Diagnosis Date    ADD (attention deficit disorder)     Adenomatous colon polyp 2024    Colonoscopy 2024.  Repeat 5 years      Anxiety     Arthritis     Asthma     on Dupixent, follows w/ Pulmonary    Bipolar 1 disorder 2009    Depression     Episcleritis     Fatigue     H/O alcohol abuse     Clean date 2020    H/O drug abuse (CMS/[facility]) - methamphetamine     Clean date 2020    Heartburn     chronic/episodic, prn Omeprazole, denies prior eval    Hyperlipidemia     Lung nodule     stable - followed by pulmonary    Morbid obesity     Prediabetes 2022    A1C 5.7    Restless leg syndrome 2022    Seasonal allergic rhinitis due to pollen 10/12/2022    Tobacco use     Trichimoniasis 2017    Trichimoniasis 2020    Trichimoniasis 2020     Past Surgical History:   Procedure  Laterality Date    CERVICAL CONIZATION  2007     SECTION WITH TUBAL  2012    uncomplicated - twin delivery    LAPAROSCOPIC APPENDECTOMY      LAPAROSCOPIC CHOLECYSTECTOMY  2014    dysfunction, no stones    NASAL FRACTURE SURGERY  2017    ORIF TIBIA FRACTURE Right 2016    talar fx    SEPTOPLASTY  2011    SINUS SURGERY  2011    debridement    TIBIAL TALAR FUSION Right 2017    w/ bone grafting    TONSILLECTOMY AND ADENOIDECTOMY         Allergies   Allergen Reactions    Pecan Nut Anaphylaxis     Hives, swelling of lips and throat, difficulties breathing. Has epipen    Sulfa Antibiotics Rash    Sulfamethoxazole-Trimethoprim Rash       Current Outpatient Medications:     albuterol (PROVENTIL) (2.5 MG/3ML) 0.083% nebulizer solution, Take 2.5 mg by nebulization 4 (Four) Times a Day As Needed for Wheezing., Disp: 120 each, Rfl: 5    albuterol sulfate  (90 Base) MCG/ACT inhaler, Inhale 2 puffs 2 (Two) Times a Day., Disp: 18 g, Rfl: 5    amphetamine-dextroamphetamine (Adderall) 20 MG tablet, Take 20 mg orally every afternoon., Disp: 30 tablet, Rfl: 0    amphetamine-dextroamphetamine XR (Adderall XR) 20 MG 24 hr capsule, Take 2 capsules by mouth Every Morning, Disp: 60 capsule, Rfl: 0    atorvastatin (LIPITOR) 10 MG tablet, Take 1 tablet by mouth Daily., Disp: 30 tablet, Rfl: 5    budesonide (Pulmicort) 0.5 MG/2ML nebulizer solution, Take 2 mL by nebulization 2 (Two) Times a Day., Disp: 120 each, Rfl: 11    buPROPion XL (WELLBUTRIN XL) 300 MG 24 hr tablet, Take 1 tablet by mouth Daily., Disp: 30 tablet, Rfl: 6    Cariprazine HCl (Vraylar) 3 MG capsule capsule, Take 1 capsule by mouth Daily., Disp: 30 capsule, Rfl: 6    Dupilumab 300 MG/2ML solution pen-injector, Inject 2 mL under the skin into the appropriate area as directed Every 14 (Fourteen) Days., Disp: 4 mL, Rfl: 1    EPINEPHrine (EPIPEN) 0.3 MG/0.3ML solution auto-injector injection, Inject 1 pen in the muscle as directed PRN allergic  reaction, Disp: 2 each, Rfl: 1    eszopiclone (Lunesta) 3 MG tablet, Take 1 tablet by mouth At Night As Needed for Sleep. Take immediately before bedtime, Disp: 30 tablet, Rfl: 2    FLUoxetine (PROzac) 40 MG capsule, Take 2 capsules by mouth Daily., Disp: 60 capsule, Rfl: 6    Fluticasone Furoate-Vilanterol (BREO ELLIPTA) 200-25 MCG/ACT inhaler, Inhale 1 puff Daily. 1 inhalation once a day (Patient taking differently: Inhale 1 puff Daily.), Disp: 60 each, Rfl: 5    furosemide (LASIX) 20 MG tablet, Take 1 tablet by mouth 2 (Two) Times a Week. PRN for leg/ankle swelling., Disp: 30 tablet, Rfl: 1    hydrOXYzine pamoate (VISTARIL) 25 MG capsule, Take 1 capsule by mouth 3 (Three) Times a Day As Needed for Anxiety., Disp: 90 capsule, Rfl: 6    lamoTRIgine (LaMICtal) 100 MG tablet, Take 1 tablet by mouth Daily., Disp: 30 tablet, Rfl: 2    lamoTRIgine (LaMICtal) 25 MG tablet, Take 1 tablet by mouth Daily for 14 days, THEN 2 tablets Daily for 14 days. Then next script, Disp: 42 tablet, Rfl: 0    loratadine (CLARITIN) 10 MG tablet, Take 1 tablet by mouth Daily., Disp: 30 tablet, Rfl: 5    Omeprazole 20 MG tablet delayed-release, Take 20 mg by mouth Daily., Disp: 30 each, Rfl: 4    Social History     Socioeconomic History    Marital status:    Tobacco Use    Smoking status: Every Day     Current packs/day: 0.25     Average packs/day: 0.3 packs/day for 21.5 years (5.4 ttl pk-yrs)     Types: Cigarettes     Start date: 1/1/2003     Passive exposure: Yes    Smokeless tobacco: Never   Vaping Use    Vaping status: Former    Start date: 9/15/2022   Substance and Sexual Activity    Alcohol use: Not Currently     Comment: quit 1/28/20    Drug use: Not Currently     Types: Amphetamines, Marijuana, Methamphetamines     Comment: Clean ~ 1/28/2020    Sexual activity: Yes     Partners: Male     Birth control/protection: Other     Social History     Social History Narrative    Lives in [facility].   w/ 3 children.   [place] for [employer].        Family History   Problem Relation Age of Onset    Diabetes Mother 50    Hyperlipidemia Mother     Mental illness Mother     Arthritis Mother     Osteoporosis Mother     Asthma Mother     Depression Mother     Endometriosis Mother     Hypertension Father     Obesity Father     Depression Sister     Thyroid disease Maternal Grandmother     Hyperlipidemia Maternal Grandmother     Stroke Maternal Grandmother 50    Hypertension Maternal Grandmother     Obesity Maternal Grandmother     Developmental Disability Maternal Grandmother     Mental illness Maternal Grandmother     Liver disease Maternal Grandmother     Arthritis Maternal Grandmother     Migraines Maternal Grandmother     Diabetes Maternal Grandmother     Anxiety disorder Maternal Grandmother     Depression Maternal Grandmother     Hypertension Maternal Grandfather     Heart attack Maternal Grandfather     Developmental Disability Maternal Grandfather     Arthritis Maternal Grandfather     Asthma Maternal Grandfather     Diabetes Maternal Grandfather     Heart disease Maternal Grandfather     Heart disease Paternal Grandmother     Hypertension Paternal Grandmother     Obesity Paternal Grandmother     Developmental Disability Paternal Grandmother     Arthritis Paternal Grandmother     Heart attack Paternal Grandmother     Heart failure Paternal Grandmother     Diabetes Paternal Grandmother     Stroke Paternal Grandmother     Heart disease Paternal Grandfather     Heart attack Paternal Grandfather     Hypertension Paternal Grandfather     Obesity Paternal Grandfather     Developmental Disability Paternal Grandfather     Arthritis Paternal Grandfather     Diabetes Paternal Grandfather        Review of Systems:  Constitutional:  reports fatigue, weight gain and denies fevers, chills.  HEENT:  denies headache, ear pain or loss of hearing, blurred or double vision, nasal discharge or sore throat.  Cardiovascular:   reports edema and denies HTN, hx heart disease, hx MI, palpitations, hx DVT.  Respiratory:  reports asthma and denies sleep apnea, COPD, hx PE.  Gastrointestinal:  reports heartburn and denies dysphagia, nausea, vomiting, abdominal pain, liver disease.  Genitourinary:  denies history of  frequent UTI, hematuria, dysuria, polyuria, polydipsia, renal insufficiency.    Musculoskeletal:  reports joint pain, back pain, arthritis and denies autoimmune disease.  Neurological:  denies numbness /tingling, dizziness, confusion, seizure, stroke.  Psychiatric:  reports depressed mood, feeling anxious, bipolar disorder  Endocrine:  denies diabetes, thyroid disease.  Hematologic:   denies bruising, bleeding disorder, hx anemia, hx blood transfusion.  Skin:   denies rashes, hx MRSA.    Physical Exam:  Vital Signs:  Weight: 134 kg (295 lb 6.4 oz)   Body mass index is 46.96 kg/m².  Temp: 97.8 °F (36.6 °C)   Heart Rate: 85   BP: 124/68     Physical Exam  Vitals reviewed.   Constitutional:       Appearance: She is well-developed.   HENT:      Head: Normocephalic and atraumatic.   Eyes:      General: No scleral icterus.     Conjunctiva/sclera: Conjunctivae normal.   Neck:      Thyroid: No thyromegaly.   Cardiovascular:      Rate and Rhythm: Normal rate and regular rhythm.      Heart sounds: No murmur heard.  Pulmonary:      Effort: Pulmonary effort is normal. No respiratory distress.      Breath sounds: Wheezing present. No rales.      Comments: abnormal breath sounds  Abdominal:      General: Bowel sounds are normal. There is no distension.      Palpations: Abdomen is soft. There is no mass.      Tenderness: There is no abdominal tenderness.      Hernia: No hernia is present.      Comments: Scars: lap saranya, lap appy, low transverse   Musculoskeletal:         General: Normal range of motion.      Cervical back: Neck supple.   Skin:     General: Skin is warm and dry.      Findings: No rash.   Neurological:      Mental Status: She is  alert and oriented to person, place, and time.      Gait: Gait normal.   Psychiatric:         Judgment: Judgment normal.         Patient Active Problem List   Diagnosis    Annual GYN exam w/o problem    Bipolar 1 disorder    Personal history of smoking    Mixed hyperlipidemia    Mixed incontinence urge and stress    Morbid obesity    Severe persistent asthma without complication    Restless leg syndrome    Attention deficit hyperactivity disorder (ADHD), combined type    14 mm left upper lobe lung nodule (3/2021)    History of substance use disorder    Seasonal allergic rhinitis due to pollen    Prediabetes    Episcleritis of right eye    Moderate persistent asthma without complication    Adenomatous colon polyp    Anxiety    Depression    Asthma    Fatigue    Heartburn       Assessment:  39 y.o. female with medically complicated obesity pursuing sleeve gastrectomy.    Metabolic & Bariatric Surgery is deemed medically necessary given the following:  Class 3 Severe Obesity (BMI >=40). Obesity-related health conditions include the following: dyslipidemias, prediabetes, asthma, joint pain, heartburn, anxiety/depression/bipolar.  Obesity is worsening. BMI is is above average; BMI management plan is completed. We discussed consulting a Bariatric surgeon.          Plan:  Further evaluation will include: CBC, CMP, Lipids, TSH, HgA1C, H.Pylori UBT, and EGD.    Additional clearances needed prior to surgery will include: Cardiology and Pulmonary.     Patient understands that bariatric surgery is not cosmetic surgery but rather a tool to help make a lifelong commitment to lifestyle changes including diet, exercise and behavior modifications.  The patient has been educated today on those expected postoperative lifestyle changes.  Psychological and Nutritional consultations will be completed prior to surgery.  Instructions on how to access [employer] (an internet based site w/ educational surgical videos) were given to the patient.   Recommended perioperative vitamin supplementation was reviewed.  The importance of avoiding ASA/ NSAIDS/ steroids/ tobacco/nicotine/ hormones/ immunomodulators perioperatively was discussed in detail.  All questions/concerns have been addressed.      Further input to follow pending the above.           ELVIRA Grissom

## 2024-07-18 NOTE — PROGRESS NOTES
PROGRESS NOTE    Data:    Clare Turner is a 39 y.o. female who met with the undersigned for a scheduled psychological evaluation from 9:00 - 9:45 am.      Clinical Maneuvering/Intervention:      Chief complaint and history of presenting illness/Problems: struggling with obesity for several years. Despite trying different weight loss plans and diets, the pt reported being unsuccessful in losing weight. A psychological evaluation was conducted in order to assess past and current level of functioning. Areas assessed included, but were not limited to: perception of social support, perception of ability to face and deal with challenges in life (positive functioning), anxiety symptoms, depressive symptoms, perspective on beliefs/belief system, coping skills for stress, intelligence level, addiction issues, etc. Therapeutic rapport was established. Interventions conducted today were geared towards assessing the pt's readiness for weight loss surgery and identifying and psychological contraindications for undergoing such a major life change. Social support was deemed strong (specific to weight loss surgery/weight loss in this manner and in a general sense): co-workers, friends, friends in recovery with her, and her boyfriend. Current psychological struggles were described as low, but included bipolar type I and ADHD (per pt, (well-mangaged with medications), and frustrations with being overweight. She did endorse experiences in the past and show current symptoms of bipolar type I and ADHD throughout the evaluation (observational data). 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 She talked about getting clean and sober 4 years ago after hitting an emotional rock bottom and staying clean/sober since then. She works in recovery herself ([facility] [employer] [facility] in [place]), which she described as a fulfilling job. She is nicotine dependent, stated that she is currently  using nicotine pouches. Coping skills for distress and related to undergoing a major life change such as weight loss surgery/weight loss were deemed strong and included choosing to see good in life, asking for help, finding humor in things, makes a point to do quality work, maintains quality relationships with others, and makes a point to learn what will help her be successful with weight loss surgery. The pt endorsed having characteristics of readiness to undergo major life changes inherent in the journey of weight loss surgery. She could speak to having 'suffered enough,' and the decision to have weight loss surgery has 'clicked' for her. The pt expressed gratitude for today's visit.     Past Family and Social History:      History of family mental health problems: mother (depression), sister (depression)    Psychosocial history: treatment of psychiatric care in the past (in-patient rehabilitation treatment facility 4 years ago for substance/alcohol abuse),  inpatient psychiatric other than substance/alcohol abuse (N/A).    Mental Status Exam (MSE):  Hygiene:  good  Dress: normal  Attitude:  cooperative and proactive  Motor Activity: normal  Speech: normal  Mood:   nervous and excited  Affect:  congruent  Thought Processes: normal  Thought Content:  normal  Suicidal Thoughts:  not endorsed  Homicidal Thoughts:  not endorsed  Crisis Safety Plan: not needed   Hallucinations:  none      Patient's Support Network Includes:  family, friends      Progress toward goal: there is evidence to suggest that she is taking measures to improve the quality of her life including seeking weight loss surgery      Functional Status: moderate to high      Prognosis: good with weight loss surgery    Evaluation, Diagnoses, and Ability/Capacity to Respond to Treatment:      The pt presented to be struggling with bipolar disorder type I and ADHD (both presented as well-treated with current psychotropic medications) and frustrations with  being overweight (BMI = 46.96, morbid obesity). She is likely functioning close to baseline in terms of psychiatric disorders, anxiety and rate of speech being slightly elevated today due to nervous of the visit. She is nicotine dependent (nicotine pouches), but seems motivated to quit. She is 4 years clean and sober. Results of MSE demonstrated a functional status of moderate to high. Strengths: belief in self that she will be successful with weight loss surgery, etc (see detailed list of coping skills above). Needed for growth (CPT code requirement for Weaknesses): weight loss.      From a psychological standpoint, the pt presents as a good candidate for bariatric surgery. She is motivated for the surgery, has showed readiness for the lifestyle change in terms of starting to adjust her eating habits, and seems to have appropriate expectations of how to prepare and how to live after surgery in order to lose weight successfully.    Treatment Plan:      Short term goals: Quit any and all nicotine use. Continue working her program of recovery. Continue taking psychotropic medications as prescribed. Start improving her health by following up with her bariatric surgeon in order to receive weight loss surgery as soon as feasible/appropriate and demonstrate success with compliance to adhering to the recommended diet. Long term goals: reach a healthy weight and alleviation of depression via taking control over her health. Continue to abstain from any and all nicotine use. Continue working her program of recovery.  Continue taking psychotropic medications as prescribed.     Shara Juarez, PhD, LP

## 2024-07-19 LAB
25(OH)D3+25(OH)D2 SERPL-MCNC: 33 NG/ML (ref 30–100)
ALBUMIN SERPL-MCNC: 4.2 G/DL (ref 3.9–4.9)
ALP SERPL-CCNC: 144 IU/L (ref 44–121)
ALT SERPL-CCNC: 30 IU/L (ref 0–32)
AST SERPL-CCNC: 23 IU/L (ref 0–40)
BASOPHILS # BLD AUTO: 0.1 X10E3/UL (ref 0–0.2)
BASOPHILS NFR BLD AUTO: 1 %
BILIRUB SERPL-MCNC: 0.2 MG/DL (ref 0–1.2)
BUN SERPL-MCNC: 7 MG/DL (ref 6–20)
BUN/CREAT SERPL: 8 (ref 9–23)
CALCIUM SERPL-MCNC: 10 MG/DL (ref 8.7–10.2)
CHLORIDE SERPL-SCNC: 99 MMOL/L (ref 96–106)
CHOLEST SERPL-MCNC: 186 MG/DL (ref 100–199)
CO2 SERPL-SCNC: 24 MMOL/L (ref 20–29)
CREAT SERPL-MCNC: 0.88 MG/DL (ref 0.57–1)
EGFRCR SERPLBLD CKD-EPI 2021: 86 ML/MIN/1.73
EOSINOPHIL # BLD AUTO: 0.5 X10E3/UL (ref 0–0.4)
EOSINOPHIL NFR BLD AUTO: 3 %
ERYTHROCYTE [DISTWIDTH] IN BLOOD BY AUTOMATED COUNT: 13.1 % (ref 11.7–15.4)
FERRITIN SERPL-MCNC: 66 NG/ML (ref 15–150)
FOLATE SERPL-MCNC: 3.6 NG/ML
GLOBULIN SER CALC-MCNC: 3.5 G/DL (ref 1.5–4.5)
GLUCOSE SERPL-MCNC: 89 MG/DL (ref 70–99)
HBA1C MFR BLD: 5.9 % (ref 4.8–5.6)
HCT VFR BLD AUTO: 46.3 % (ref 34–46.6)
HDLC SERPL-MCNC: 43 MG/DL
HGB BLD-MCNC: 14.9 G/DL (ref 11.1–15.9)
IMM GRANULOCYTES # BLD AUTO: 0.1 X10E3/UL (ref 0–0.1)
IMM GRANULOCYTES NFR BLD AUTO: 1 %
IRON SERPL-MCNC: 65 UG/DL (ref 27–159)
LDLC SERPL CALC-MCNC: 122 MG/DL (ref 0–99)
LYMPHOCYTES # BLD AUTO: 3.4 X10E3/UL (ref 0.7–3.1)
LYMPHOCYTES NFR BLD AUTO: 24 %
MCH RBC QN AUTO: 29.2 PG (ref 26.6–33)
MCHC RBC AUTO-ENTMCNC: 32.2 G/DL (ref 31.5–35.7)
MCV RBC AUTO: 91 FL (ref 79–97)
MONOCYTES # BLD AUTO: 0.9 X10E3/UL (ref 0.1–0.9)
MONOCYTES NFR BLD AUTO: 7 %
NEUTROPHILS # BLD AUTO: 9.3 X10E3/UL (ref 1.4–7)
NEUTROPHILS NFR BLD AUTO: 64 %
PLATELET # BLD AUTO: 500 X10E3/UL (ref 150–450)
POTASSIUM SERPL-SCNC: 4.9 MMOL/L (ref 3.5–5.2)
PROT SERPL-MCNC: 7.7 G/DL (ref 6–8.5)
RBC # BLD AUTO: 5.1 X10E6/UL (ref 3.77–5.28)
SODIUM SERPL-SCNC: 136 MMOL/L (ref 134–144)
TRIGL SERPL-MCNC: 118 MG/DL (ref 0–149)
TSH SERPL DL<=0.005 MIU/L-ACNC: 1.23 UIU/ML (ref 0.45–4.5)
UREA BREATH TEST QL: NEGATIVE
VIT B12 SERPL-MCNC: 419 PG/ML (ref 232–1245)
VLDLC SERPL CALC-MCNC: 21 MG/DL (ref 5–40)
WBC # BLD AUTO: 14.3 X10E3/UL (ref 3.4–10.8)

## 2024-07-23 ENCOUNTER — OFFICE VISIT (OUTPATIENT)
Dept: INTERNAL MEDICINE | Facility: CLINIC | Age: 39
End: 2024-07-23
Payer: COMMERCIAL

## 2024-07-23 VITALS
HEIGHT: 66 IN | DIASTOLIC BLOOD PRESSURE: 60 MMHG | TEMPERATURE: 97.3 F | HEART RATE: 68 BPM | BODY MASS INDEX: 47.09 KG/M2 | SYSTOLIC BLOOD PRESSURE: 108 MMHG | WEIGHT: 293 LBS

## 2024-07-23 DIAGNOSIS — J45.40 MODERATE PERSISTENT ASTHMA WITHOUT COMPLICATION: Chronic | ICD-10-CM

## 2024-07-23 DIAGNOSIS — E66.01 SEVERE OBESITY (BMI >= 40): Primary | ICD-10-CM

## 2024-07-23 DIAGNOSIS — F31.9 BIPOLAR 1 DISORDER: Chronic | ICD-10-CM

## 2024-07-23 DIAGNOSIS — F17.210 CIGARETTE SMOKER: ICD-10-CM

## 2024-07-23 PROCEDURE — 99214 OFFICE O/P EST MOD 30 MIN: CPT | Performed by: STUDENT IN AN ORGANIZED HEALTH CARE EDUCATION/TRAINING PROGRAM

## 2024-07-23 PROCEDURE — 1125F AMNT PAIN NOTED PAIN PRSNT: CPT | Performed by: STUDENT IN AN ORGANIZED HEALTH CARE EDUCATION/TRAINING PROGRAM

## 2024-07-23 PROCEDURE — 1160F RVW MEDS BY RX/DR IN RCRD: CPT | Performed by: STUDENT IN AN ORGANIZED HEALTH CARE EDUCATION/TRAINING PROGRAM

## 2024-07-23 PROCEDURE — 1159F MED LIST DOCD IN RCRD: CPT | Performed by: STUDENT IN AN ORGANIZED HEALTH CARE EDUCATION/TRAINING PROGRAM

## 2024-07-23 NOTE — PROGRESS NOTES
Chief Complaint  Clare Turner is a 39 y.o. female presenting for Weight loss surgery.     From [place]. . 3 children. 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     Patient has a past medical history of hyperlipidemia, bipolar 1 disorder w/depression and Hx of jack, ADHD, GERD, episcleritis, restless legs, moderate persistent asthma (since childhood, f/u pulm), lung nodule stable for years, history of alcohol and substance abuse, urge/stress incontinence and severe obesity.    History of Present Illness  Patient is here for follow-up.    She has establish care with bariatric surgery and is being evaluated for surgery.  She has made some changes to her diet, eating more  protein, cut back on carbs.  She also cut back on Mountain Dew, she used to drink 3-4 Mountain Dew's every day, now down to 1.  She tells me they do require a total of 6 visits for weight counseling, she already had 1 visit with me in April, and also her visit with bariatric white count.  This would be her second visit with me today.  This has to be within 1 year.  Unfortunately GLP-1 antagonist were not covered for weight loss by her insurance.    She also has cutting back on her cigarette smoking.  She uses nicotine patches.  Her goal is to quit smoking.  They also require her to be smoke-free for 2 weeks before surgery.  Today she has only had 1 cigarette.  She also feels that her breathing is better, less cough and uses inhaler less.    She has also had close follow-up with psychiatry, and was started on Vraylar.  She reports this has really helped her, her anxiety level is improved and her depressive symptoms are improved.    Of note she is doing some exercise, but limited due to her ankle/Achilles.  She is still using boot and following with physical therapist.    The following portions of the patient's history were reviewed and  "updated as appropriate: allergies, current medications, past family history, past medical history, past social history, past surgical history, and problem list.    Objective  /60 (BP Location: Left arm, Patient Position: Sitting, Cuff Size: Large Adult)   Pulse 68   Temp 97.3 °F (36.3 °C) (Temporal)   Ht 168.9 cm (66.5\")   Wt (!) 137 kg (301 lb 6.4 oz)   BMI 47.92 kg/m²     Physical Exam  Vitals reviewed.   Constitutional:       Appearance: Normal appearance.   HENT:      Head: Normocephalic and atraumatic.      Nose: Nose normal. No congestion.      Mouth/Throat:      Mouth: Mucous membranes are moist.   Eyes:      Extraocular Movements: Extraocular movements intact.      Conjunctiva/sclera: Conjunctivae normal.   Neck:      Comments: Neck circumference measured at 16 inches.  Cardiovascular:      Rate and Rhythm: Normal rate and regular rhythm.      Heart sounds: Normal heart sounds. No murmur heard.  Pulmonary:      Effort: Pulmonary effort is normal. No respiratory distress.      Breath sounds: Normal breath sounds. No wheezing.   Musculoskeletal:      Cervical back: Neck supple.   Skin:     General: Skin is warm and dry.   Neurological:      Mental Status: She is alert and oriented to person, place, and time. Mental status is at baseline.   Psychiatric:         Behavior: Behavior normal.         Thought Content: Thought content normal.         Assessment/Plan   1. Severe obesity (BMI >= 40)     Counseled on diet, cutting back on carbohydrates, eating more vegetables like broccoli, green beans, carrots.  Ideally water for thirst instead of sugar containing drinks.  Protein can be a good source of nutrients, but would still be careful with portion sizes.  Counseled on diet.  Follow-up in 4 weeks.    2. Moderate persistent asthma without complication  Symptoms improved after cutting back smoking.  Normal lung exam today.    3. Bipolar 1 disorder  Doing better after starting Vraylar.  Continue on Selin " and continue follow-up with psychiatry.    4. Cigarette smoker  Counseled on cessation.  Continue on nicotine patches.      Return in about 4 weeks (around 8/20/2024) for Recheck.    Future Appointments           Provider Department Center     7/24/2024 1:15 PM RAD TECH [facility] PULMONARY & CRITICAL CARE MEDICINE [facility]     7/24/2024 1:30 PM [facility], PFT LAB 1 [facility] PULMONARY & CRITICAL CARE MEDICINE [facility]     7/24/2024 2:00 PM Khadra Clements, [facility] PULMONARY & CRITICAL CARE MEDICINE [facility]     8/1/2024 11:45 AM [facility]     8/5/2024 7:45 AM Derrick Spencer MD [facility] BARIATRIC SURGERY      8/6/2024 9:00 AM Nilson Luis III, MD [facility] CARDIOLOGY [facility]     8/16/2024 10:00 AM Stevenson Coker, [facility]     8/20/2024 8:30 AM Nani Ball, APRVANNA [facility]     8/20/2024 4:15 PM Michael Miner MD [facility] INTERNAL MEDICINE [facility]     9/13/2024 10:00 AM Stevenson Coker, [facility]     9/17/2024 4:15 PM Michael Miner MD [facility] INTERNAL MEDICINE [facility]     10/11/2024 11:00 AM Stevenson Coker, [facility]     10/17/2024 8:00 AM Michael Miner MD [facility] INTERNAL MEDICINE [facility]     10/18/2024 9:00 AM Michael Miner MD [facility] INTER[facility] MEDICINE [facility]     11/14/2024 8:00 AM Michael Miner MD [facility] INTERNAL MEDICINE [facility]              Michael Miner MD  Family Medicine  07/23/2024

## 2024-07-24 ENCOUNTER — OFFICE VISIT (OUTPATIENT)
Dept: PULMONOLOGY | Facility: CLINIC | Age: 39
End: 2024-07-24
Payer: COMMERCIAL

## 2024-07-24 VITALS
SYSTOLIC BLOOD PRESSURE: 130 MMHG | HEART RATE: 76 BPM | BODY MASS INDEX: 45.99 KG/M2 | TEMPERATURE: 97.7 F | DIASTOLIC BLOOD PRESSURE: 80 MMHG | WEIGHT: 293 LBS | OXYGEN SATURATION: 96 % | HEIGHT: 67 IN

## 2024-07-24 DIAGNOSIS — F17.210 CIGARETTE SMOKER: ICD-10-CM

## 2024-07-24 DIAGNOSIS — Z01.811 PREOPERATIVE RESPIRATORY EXAMINATION: Primary | ICD-10-CM

## 2024-07-24 DIAGNOSIS — J45.40 MODERATE PERSISTENT ASTHMA WITHOUT COMPLICATION: Chronic | ICD-10-CM

## 2024-07-24 DIAGNOSIS — R91.1 LUNG NODULE: ICD-10-CM

## 2024-07-24 PROCEDURE — 94729 DIFFUSING CAPACITY: CPT | Performed by: NURSE PRACTITIONER

## 2024-07-24 PROCEDURE — 94375 RESPIRATORY FLOW VOLUME LOOP: CPT | Performed by: NURSE PRACTITIONER

## 2024-07-24 PROCEDURE — 94726 PLETHYSMOGRAPHY LUNG VOLUMES: CPT | Performed by: NURSE PRACTITIONER

## 2024-07-24 PROCEDURE — 1159F MED LIST DOCD IN RCRD: CPT | Performed by: NURSE PRACTITIONER

## 2024-07-24 PROCEDURE — 99214 OFFICE O/P EST MOD 30 MIN: CPT | Performed by: NURSE PRACTITIONER

## 2024-07-24 PROCEDURE — 1160F RVW MEDS BY RX/DR IN RCRD: CPT | Performed by: NURSE PRACTITIONER

## 2024-07-24 RX ORDER — FLUTICASONE FUROATE AND VILANTEROL 200; 25 UG/1; UG/1
1 POWDER RESPIRATORY (INHALATION)
Qty: 60 EACH | Refills: 5 | Status: SHIPPED | OUTPATIENT
Start: 2024-07-24

## 2024-07-24 NOTE — PROGRESS NOTES
"[facility] Pulmonary Follow up      Chief Complaint  Asthma and Follow-up    Subjective          Clare Turner presents to [facility] PULMONARY [facility]ITICAL CARE MEDICINE for follow-up on her asthma and surgery clearance.  I follow her here in the office for moderate persistent asthma, she started back on her Dupixent and is doing much better again.  She stopped it due to some insurance coverage and was quite symptomatic.  Now she is back to having no shortness of breath, chest tightness or wheezing.  No cough or sputum production.  She does continue to use her Breo, usually she uses it every other day but because of cost.  The Breo is quite expensive.  She has not had to use any nebulizers or rescue inhaler.    She does have a stable 1.7 x 1.4 cm left upper lobe nodule as well as a stable 6 mm right upper lobe nodule.  She has her annual follow-up on August 1.  She is a smoker.        Objective     Vital Signs:   /80   Pulse 76   Temp 97.7 °F (36.5 °C)   Ht 168.9 cm (66.5\")   Wt 135 kg (297 lb)   SpO2 96% Comment: resting, room air  BMI 47.22 kg/m²       Immunization History   Administered Date(s) Administered    COVID-19 (PFIZER) BIVALENT 12+YRS 10/12/2022    COVID-19 (PFIZER) Purple Cap Monovalent 04/01/2021, 04/21/2021, 11/15/2021    Fluzone (or Fluarix & Flulaval for VFC) >6mos 03/11/2022, 10/12/2022    Pneumococcal Conjugate 20-Valent (PCV20) 11/15/2022    Pneumococcal Polysaccharide (PPSV23) 06/06/2016    Tdap 04/12/2024       Physical Exam  Vitals reviewed.   Constitutional:       Appearance: She is well-developed.   HENT:      Head: Normocephalic and atraumatic.   Eyes:      Pupils: Pupils are equal, round, and reactive to light.   Cardiovascular:      Rate and Rhythm: Normal rate and regular rhythm.      Heart sounds: No murmur heard.  Pulmonary:      Effort: Pulmonary effort is normal. No respiratory distress.      Breath sounds: Normal breath sounds. No wheezing or rales. "   Abdominal:      General: Bowel sounds are normal. There is no distension.      Palpations: Abdomen is soft.   Musculoskeletal:         General: Normal range of motion.      Cervical back: Normal range of motion and neck supple.   Skin:     General: Skin is warm and dry.      Findings: No erythema.   Neurological:      Mental Status: She is alert and oriented to person, place, and time.   Psychiatric:         Behavior: Behavior normal.          Result Review :            PFTs done in the office today:  Moderate obstruction obstruction FEV1 is 2.42, 74% predicted with ratio of 70%.  Evidence of air trapping with an elevated residual volume normal DLCO.  No changes from last year.      PA and lateral chest x-ray done the office today reviewed by me  Awaiting final MD interpretation                 Assessment and Plan    Problem List Items Addressed This Visit          Pulmonary and Pneumonias    Moderate persistent asthma without complication (Chronic)    Relevant Medications    Fluticasone Furoate-Vilanterol (BREO ELLIPTA) 200-25 MCG/ACT inhaler    14 mm left upper lobe lung nodule (3/2021)    Overview     Per patient for years, stable         Relevant Medications    Fluticasone Furoate-Vilanterol (BREO ELLIPTA) 200-25 MCG/ACT inhaler       Tobacco    Cigarette smoker     Other Visit Diagnoses       Preoperative respiratory examination    -  Primary    Relevant Orders    XR Chest PA & Lateral          Mrs. Turner is here today for preoperative evaluation for bariatric surgery.  We did review her PFTs which show some mild obstruction has not changed over the last couple of years.  At this time her asthma is very well-controlled on her Dupixent.  Continue on her Breo daily.    She does have her annual CT follow-up for her nodule scheduled on August 1.    She has had surgeries in the past without difficulty.  We discussed the importance of ambulation as well a pulmonary hygiene postoperatively.      LAILA  Preoperative Pulmonary Risk Index.    Age [x]   <= 50 years old (0 points)    []   51-80 years old (3 points)    []   >80 years old (16 points)       Preoperative Oxygen Saturation [x]   >= 96% (0 points)    []   91-95% (8 points)    []   <= 90% (24 points)       Other Clinical Risk Factors []   Respiratory Infection in the last month (17 points)    []   Pre-operative anemia: Hb < 10 g/dL (11 points)    []   Emergency Surgery (8 points)       Surgical Incision [x]   Upper abdominal (15 points)    []   Intrathoracic (24 points)       Duration of Surgery [x]   < 2 hours (0 points)    []   2-3 hours (16 points)    []   >3 hours (23 points)       Total Criteria Point Count: 15     ARISCAT risk index interpretation:      0-25 points: Low risk  1.6 % pulmonary complication rate.   26-44 points: Intermediate risk 13.3% pulmonary complication rate.    points: High risk 42.1 % pulmonary complication rate.     Postoperative Pulmonary Complications include but are not limited to: Respiratory Failure, Pulmonary Infection, Pleural Effusion, Atelectasis, Pneumothorax Bronchospasm, Aspiration Pneumonia.      Follow Up     Return in about 6 months (around 1/24/2025).  Patient was given instructions and counseling regarding her condition or for health maintenance advice. Please see specific information pulled into the AVS if appropriate.     I spent 34 minutes caring for Clare on this date of service. This time includes time spent by me in the following activities:preparing for the visit, reviewing tests, obtaining and/or reviewing a separately obtained history, performing a medically appropriate examination and/or evaluation , counseling and educating the patient/family/caregiver, ordering medications, tests, or procedures, referring and communicating with other health care professionals , documenting information in the medical record, and independently interpreting results and communicating that information with the  patient/family/caregiver    Excluding time spent on other separate services such as performing procedures or test interpretation, if applicable    Moderate level of Medical Decision Making complexity based on 2 or more chronic stable illnesses and prescription drug management.    DALLAS Fox, ACNP  [facility]  Electronically signed

## 2024-07-25 ENCOUNTER — TELEPHONE (OUTPATIENT)
Dept: PULMONOLOGY | Facility: CLINIC | Age: 39
End: 2024-07-25
Payer: COMMERCIAL

## 2024-07-25 NOTE — TELEPHONE ENCOUNTER
PA is needed for BREO, this has been started on covermymeds, waiting determination.     The request has been approved. The authorization is effective from 07/25/2024 to 07/25/2025. Pharmacy has been notified

## 2024-08-01 ENCOUNTER — HOSPITAL ENCOUNTER (OUTPATIENT)
Dept: CT IMAGING | Facility: HOSPITAL | Age: 39
Discharge: HOME | End: 2024-08-01
Admitting: INTERNAL MEDICINE
Payer: COMMERCIAL

## 2024-08-01 DIAGNOSIS — R91.1 RIGHT UPPER LOBE PULMONARY NODULE: ICD-10-CM

## 2024-08-01 PROCEDURE — 71250 CT THORAX DX C-: CPT

## 2024-08-05 ENCOUNTER — LAB REQUISITION (OUTPATIENT)
Dept: LAB | Facility: HOSPITAL | Age: 39
End: 2024-08-05
Payer: COMMERCIAL

## 2024-08-05 ENCOUNTER — OUTSIDE FACILITY SERVICE (OUTPATIENT)
Dept: BARIATRICS/WEIGHT MGMT | Facility: CLINIC | Age: 39
End: 2024-08-05
Payer: COMMERCIAL

## 2024-08-05 DIAGNOSIS — R12 HEARTBURN: ICD-10-CM

## 2024-08-05 PROCEDURE — 43239 EGD BIOPSY SINGLE/MULTIPLE: CPT | Performed by: SURGERY

## 2024-08-05 PROCEDURE — 88305 TISSUE EXAM BY PATHOLOGIST: CPT | Performed by: SURGERY

## 2024-08-06 ENCOUNTER — TELEPHONE (OUTPATIENT)
Dept: PULMONOLOGY | Facility: CLINIC | Age: 39
End: 2024-08-06
Payer: COMMERCIAL

## 2024-08-06 ENCOUNTER — OFFICE VISIT (OUTPATIENT)
Dept: CARDIOLOGY | Facility: CLINIC | Age: 39
End: 2024-08-06
Payer: COMMERCIAL

## 2024-08-06 VITALS
OXYGEN SATURATION: 96 % | DIASTOLIC BLOOD PRESSURE: 70 MMHG | HEIGHT: 67 IN | WEIGHT: 293 LBS | BODY MASS INDEX: 45.99 KG/M2 | SYSTOLIC BLOOD PRESSURE: 124 MMHG | HEART RATE: 89 BPM

## 2024-08-06 DIAGNOSIS — E78.2 MIXED HYPERLIPIDEMIA: Primary | ICD-10-CM

## 2024-08-06 DIAGNOSIS — F17.210 CIGARETTE SMOKER: ICD-10-CM

## 2024-08-06 DIAGNOSIS — E66.01 MORBID OBESITY: ICD-10-CM

## 2024-08-06 LAB
CYTO UR: NORMAL
LAB AP CASE REPORT: NORMAL
LAB AP CLINICAL INFORMATION: NORMAL
PATH REPORT.FINAL DX SPEC: NORMAL
PATH REPORT.GROSS SPEC: NORMAL

## 2024-08-06 PROCEDURE — 99204 OFFICE O/P NEW MOD 45 MIN: CPT | Performed by: INTERNAL MEDICINE

## 2024-08-06 PROCEDURE — 93000 ELECTROCARDIOGRAM COMPLETE: CPT | Performed by: INTERNAL MEDICINE

## 2024-08-06 NOTE — PROGRESS NOTES
"[facility] Cardiology  Consultation H&P  Clare Turner  1985  [phone]  There is no work phone number on file..    VISIT DATE:  08/06/2024    PCP: Michael Miner MD  [address] [facility] [address]  [facility] [id number]    CC:  Chief Complaint   Patient presents with    Cardiac Clearance     New Patient             ASSESSMENT:   Diagnosis Plan   1. Mixed hyperlipidemia        2. Cigarette smoker        3. Morbid obesity        BMI 48      PLAN:  Perioperative cardiovascular risk assessment: Low risk for major perioperative cardiovascular complications.  No further cardiac testing or medication titration indicated prior to surgery.    Hyperlipidemia: Goal LDL less than 130, ideally less than 100.  Agree with current medical therapy.    Nicotine abuse: Discussed complete smoking cessation.    History of Present Illness   39-year-old prediabetic female with morbid obesity, and dyslipidemia being considered for bariatric surgery with sleeve gastrectomy.  Underlying asthmatic with ongoing smoking, has been able to cut back to 2 cigarettes/day.  Started smoking at the age of 17.  Currently denies exertional chest discomfort or limiting dyspnea.  Is able to complete at least 4-6 METS of exertion on a regular basis without symptoms concerning for angina or CHF.  Denies PND orthopnea.  No sustained palpitations.  She is compliant with medical therapy.    PHYSICAL EXAMINATION:  Vitals:    08/06/24 0831   BP: 124/70   BP Location: Right arm   Patient Position: Sitting   Pulse: 89   SpO2: 96%   Weight: (!) 137 kg (303 lb)   Height: 168.9 cm (66.5\")     General Appearance:    Alert, cooperative, no distress, appears stated age   Head:    Normocephalic, without obvious abnormality, atraumatic   Eyes:    conjunctiva/corneas clear, EOM's intact, fundi     benign, both eyes   Ears:    Normal TM's and external ear canals, both ears   Nose:   Nares normal, septum midline, mucosa normal, no " drainage    or sinus tenderness   Throat:   Lips, mucosa, and tongue normal; teeth and gums normal   Neck:   Supple, symmetrical, trachea midline, no adenopathy;     thyroid:  no enlargement/tenderness/nodules; no carotid    bruit or JVD   Back:     Symmetric, no curvature, ROM normal, no CVA tenderness   Lungs:     Clear to auscultation bilaterally, respirations unlabored   Chest Wall:    No tenderness or deformity    Heart:    Regular rate and rhythm, S1 and S2 normal, no murmur, rub   or gallop, normal carotid impulse bilaterally without bruit.   Abdomen:     Soft, non-tender, bowel sounds active all four quadrants,     no masses, no organomegaly   Extremities:   Extremities normal, atraumatic, no cyanosis or edema   Pulses:   2+ and symmetric all extremities   Skin:   Skin color, texture, turgor normal, no rashes or lesions   Lymph nodes:   Cervical, supraclavicular, and axillary nodes normal   Neurologic:   normal strength, sensation intact     throughout       Diagnostic Data:    ECG 12 Lead    Date/Time: 8/6/2024 8:56 AM  Performed by: Nilson Luis III, MD    Authorized by: Nilson Luis III, MD  Previous ECG: no previous ECG available  Rhythm: sinus rhythm  QRS axis: right    Clinical impression: non-specific ECG        Lab Results   Component Value Date    CHLPL 186 07/18/2024    TRIG 118 07/18/2024    HDL 43 07/18/2024     Lab Results   Component Value Date    GLUCOSE 89 07/18/2024    BUN 7 07/18/2024    CREATININE 0.88 07/18/2024     07/18/2024    K 4.9 07/18/2024    CL 99 07/18/2024    CO2 24 07/18/2024     Lab Results   Component Value Date    HGBA1C 5.9 (H) 07/18/2024     Lab Results   Component Value Date    WBC 14.3 (H) 07/18/2024    HGB 14.9 07/18/2024    HCT 46.3 07/18/2024     (H) 07/18/2024       PROBLEM LIST:  Patient Active Problem List   Diagnosis    Annual GYN exam w/o problem    Bipolar 1 disorder    Personal history of smoking    Mixed hyperlipidemia    Mixed incontinence urge  and stress    Morbid obesity    Severe persistent asthma without complication    Restless leg syndrome    Attention deficit hyperactivity disorder (ADHD), combined type    14 mm left upper lobe lung nodule (3/2021)    History of substance use disorder    Seasonal allergic rhinitis due to pollen    Prediabetes    Episcleritis of right eye    Moderate persistent asthma without complication    Adenomatous colon polyp    Anxiety    Depression    Asthma    Fatigue    Heartburn    Cigarette smoker       PAST MEDICAL HX  Past Medical History:   Diagnosis Date    ADD (attention deficit disorder) 2014    Adenomatous colon polyp 05/20/2024    Colonoscopy 5/2024.  Repeat 5 years      Anxiety     Arthritis     Asthma     on Dupixent, follows w/ Pulmonary    Bipolar 1 disorder 2009    Depression     Episcleritis     Fatigue     GERD (gastroesophageal reflux disease)     H/O alcohol abuse 2007    Clean date 1/28/2020    H/O drug abuse (CMS/[facility]) - methamphetamine 2016    Clean date 1/28/2020    Heartburn     chronic/episodic, prn Omeprazole, denies prior eval    Hyperlipidemia 2019    Lung nodule     stable - followed by pulmonary    Morbid obesity     Pneumonia January 2022    Prediabetes 11/04/2022    A1C 5.7    Restless leg syndrome 03/11/2022    Seasonal allergic rhinitis due to pollen 10/12/2022    Tobacco use     Trichimoniasis 11/2017    Trichimoniasis 01/2020    Trichimoniasis 11/2020    Urinary tract infection        Allergies  Allergies   Allergen Reactions    Pecan Nut Anaphylaxis     Hives, swelling of lips and throat, difficulties breathing. Has epipen    Sulfa Antibiotics Rash    Sulfamethoxazole-Trimethoprim Rash       Current Medications    Current Outpatient Medications:     albuterol (PROVENTIL) (2.5 MG/3ML) 0.083% nebulizer solution, Take 2.5 mg by nebulization 4 (Four) Times a Day As Needed for Wheezing., Disp: 120 each, Rfl: 5    albuterol sulfate  (90 Base) MCG/ACT inhaler, Inhale 2 puffs 2 (Two) Times  a Day., Disp: 18 g, Rfl: 5    amphetamine-dextroamphetamine (Adderall) 20 MG tablet, Take 20 mg orally every afternoon., Disp: 30 tablet, Rfl: 0    amphetamine-dextroamphetamine XR (Adderall XR) 20 MG 24 hr capsule, Take 2 capsules by mouth Every Morning, Disp: 60 capsule, Rfl: 0    atorvastatin (LIPITOR) 10 MG tablet, Take 1 tablet by mouth Daily., Disp: 30 tablet, Rfl: 5    budesonide (Pulmicort) 0.5 MG/2ML nebulizer solution, Take 2 mL by nebulization 2 (Two) Times a Day., Disp: 120 each, Rfl: 11    buPROPion XL (WELLBUTRIN XL) 300 MG 24 hr tablet, Take 1 tablet by mouth Daily., Disp: 30 tablet, Rfl: 6    Cariprazine HCl (Vraylar) 3 MG capsule capsule, Take 1 capsule by mouth Daily., Disp: 30 capsule, Rfl: 6    Dupilumab 300 MG/2ML solution pen-injector, Inject 2 mL under the skin into the appropriate area as directed Every 14 (Fourteen) Days. (Patient taking differently: Inject 2 mL under the skin into the appropriate area as directed Every 14 (Fourteen) Days. DUPIXENT), Disp: 4 mL, Rfl: 1    EPINEPHrine (EPIPEN) 0.3 MG/0.3ML solution auto-injector injection, Inject 1 pen in the muscle as directed PRN allergic reaction, Disp: 2 each, Rfl: 1    eszopiclone (Lunesta) 3 MG tablet, Take 1 tablet by mouth At Night As Needed for Sleep. Take immediately before bedtime, Disp: 30 tablet, Rfl: 2    FLUoxetine (PROzac) 40 MG capsule, Take 2 capsules by mouth Daily., Disp: 60 capsule, Rfl: 6    Fluticasone Furoate-Vilanterol (BREO ELLIPTA) 200-25 MCG/ACT inhaler, Inhale 1 puff Daily. 1 inhalation once a day, Disp: 60 each, Rfl: 5    furosemide (LASIX) 20 MG tablet, Take 1 tablet by mouth 2 (Two) Times a Week. PRN for leg/ankle swelling., Disp: 30 tablet, Rfl: 1    hydrOXYzine pamoate (VISTARIL) 25 MG capsule, Take 1 capsule by mouth 3 (Three) Times a Day As Needed for Anxiety., Disp: 90 capsule, Rfl: 6    lamoTRIgine (LaMICtal) 100 MG tablet, Take 1 tablet by mouth Daily., Disp: 30 tablet, Rfl: 2    lamoTRIgine (LaMICtal)  25 MG tablet, Take 1 tablet by mouth Daily for 14 days, THEN 2 tablets Daily for 14 days. Then next script, Disp: 42 tablet, Rfl: 0    loratadine (CLARITIN) 10 MG tablet, Take 1 tablet by mouth Daily., Disp: 30 tablet, Rfl: 5    Omeprazole 20 MG tablet delayed-release, Take 20 mg by mouth Daily., Disp: 30 each, Rfl: 4         ROS  ROS      SOCIAL HX  Social History     Socioeconomic History    Marital status:    Tobacco Use    Smoking status: Some Days     Current packs/day: 0.25     Average packs/day: 0.3 packs/day for 30.8 years (10.0 ttl pk-yrs)     Types: Cigarettes     Start date: 1/1/2003     Passive exposure: Yes    Smokeless tobacco: Never    Tobacco comments:     Still smokes once in awhile   Vaping Use    Vaping status: Former    Start date: 9/15/2022   Substance and Sexual Activity    Alcohol use: Not Currently     Comment: quit 1/28/20    Drug use: Not Currently     Types: Amphetamines, Marijuana, Methamphetamines     Comment: Clean ~ 1/28/2020    Sexual activity: Yes     Partners: Male     Birth control/protection: Other       FAMILY HX  Family History   Problem Relation Age of Onset    Diabetes Mother 50    Hyperlipidemia Mother     Mental illness Mother     Arthritis Mother     Osteoporosis Mother     Asthma Mother     Depression Mother     Endometriosis Mother     Cancer Mother     Hearing loss Mother     Hypertension Father     Obesity Father     Alcohol abuse Father     Early death Father     Depression Sister     Thyroid disease Maternal Grandmother     Hyperlipidemia Maternal Grandmother     Stroke Maternal Grandmother 50    Hypertension Maternal Grandmother     Obesity Maternal Grandmother     Developmental Disability Maternal Grandmother     Mental illness Maternal Grandmother     Liver disease Maternal Grandmother     Arthritis Maternal Grandmother     Migraines Maternal Grandmother     Diabetes Maternal Grandmother     Anxiety disorder Maternal Grandmother     Depression Maternal  Grandmother     Hearing loss Maternal Grandmother     Miscarriages / Stillbirths Maternal Grandmother     Hypertension Maternal Grandfather     Heart attack Maternal Grandfather     Developmental Disability Maternal Grandfather     Arthritis Maternal Grandfather     Asthma Maternal Grandfather     Diabetes Maternal Grandfather     Heart disease Maternal Grandfather     Hearing loss Maternal Grandfather     Heart disease Paternal Grandmother     Hypertension Paternal Grandmother     Obesity Paternal Grandmother     Developmental Disability Paternal Grandmother     Arthritis Paternal Grandmother     Heart attack Paternal Grandmother     Heart failure Paternal Grandmother     Diabetes Paternal Grandmother     Stroke Paternal Grandmother     Heart disease Paternal Grandfather     Heart attack Paternal Grandfather     Hypertension Paternal Grandfather     Obesity Paternal Grandfather     Developmental Disability Paternal Grandfather     Arthritis Paternal Grandfather     Diabetes Paternal Grandfather     Cancer Maternal Uncle         Leukemia    Hearing loss Maternal Aunt              Nilson Luis III, MD, FACC

## 2024-08-06 NOTE — TELEPHONE ENCOUNTER
Patient called stating that her CT results she received from her Mychart stated she has PNA. She has been going to work, and wants to know the next step. Khadra stated she could go to work, un symptomatic, and follow-up within a few days. Clare states that she has been coughing up a lot, and feels heavy in her chest, and somewhat short of breath (denies fever). She is going home from work, and I sent her to the PAC to make the first available appt.

## 2024-08-07 DIAGNOSIS — R12 HEARTBURN: ICD-10-CM

## 2024-08-08 ENCOUNTER — OFFICE VISIT (OUTPATIENT)
Dept: PULMONOLOGY | Facility: CLINIC | Age: 39
End: 2024-08-08
Payer: COMMERCIAL

## 2024-08-08 VITALS
DIASTOLIC BLOOD PRESSURE: 90 MMHG | SYSTOLIC BLOOD PRESSURE: 120 MMHG | TEMPERATURE: 98.5 F | HEART RATE: 85 BPM | BODY MASS INDEX: 47.09 KG/M2 | WEIGHT: 293 LBS | OXYGEN SATURATION: 90 % | HEIGHT: 66 IN

## 2024-08-08 DIAGNOSIS — R06.02 SHORTNESS OF BREATH: Primary | ICD-10-CM

## 2024-08-08 DIAGNOSIS — J45.50 SEVERE PERSISTENT ASTHMA WITHOUT COMPLICATION: ICD-10-CM

## 2024-08-08 DIAGNOSIS — F17.210 CIGARETTE SMOKER: ICD-10-CM

## 2024-08-08 DIAGNOSIS — R91.1 LUNG NODULE: ICD-10-CM

## 2024-08-08 PROCEDURE — 1159F MED LIST DOCD IN RCRD: CPT | Performed by: NURSE PRACTITIONER

## 2024-08-08 PROCEDURE — 1160F RVW MEDS BY RX/DR IN RCRD: CPT | Performed by: NURSE PRACTITIONER

## 2024-08-08 PROCEDURE — 99214 OFFICE O/P EST MOD 30 MIN: CPT | Performed by: NURSE PRACTITIONER

## 2024-08-08 RX ORDER — PREDNISONE 10 MG/1
TABLET ORAL
Qty: 31 TABLET | Refills: 0 | Status: SHIPPED | OUTPATIENT
Start: 2024-08-08

## 2024-08-08 RX ORDER — DOXYCYCLINE HYCLATE 100 MG/1
100 CAPSULE ORAL 2 TIMES DAILY
Qty: 20 CAPSULE | Refills: 0 | Status: SHIPPED | OUTPATIENT
Start: 2024-08-08

## 2024-08-08 NOTE — PROGRESS NOTES
"[facility] [place] Pulmonary Follow up      Chief Complaint  Pneumonia, Wheezing, Cough, and Shortness of Breath    Subjective          Clare Turner presents to [facility] for follow-up on her CT scan.  I saw her a couple weeks ago for her routine follow-up on her asthma as well as surgery clearance.  She was due her annual CT scan of her chest for a stable pulmonary nodule.    She had her follow-up scan they are nodule was stable but she had some patchy groundglass changes bilaterally.    She has been having some cough and congestion lately with some chest heaviness.  She been short of breath last few days.  Last Friday she had she tested herself for COVID because she was feeling bad but was negative.  She has been using her nebulizers to help with the heaviness and secretion clearance.        Objective     Vital Signs:   /90   Pulse 85   Temp 98.5 °F (36.9 °C)   Ht 168.9 cm (66.5\")   Wt (!) 137 kg (301 lb 9.6 oz)   SpO2 90% Comment: room air at rest  BMI 47.96 kg/m²       Immunization History   Administered Date(s) Administered    COVID-19 (PFIZER) BIVALENT 12+YRS 10/12/2022    COVID-19 (PFIZER) Purple Cap Monovalent 04/01/2021, 04/21/2021, 11/15/2021    Fluzone (or Fluarix & Flulaval for VFC) >6mos 03/11/2022, 10/12/2022    Pneumococcal Conjugate 20-Valent (PCV20) 11/15/2022    Pneumococcal Polysaccharide (PPSV23) 06/06/2016    Tdap 04/12/2024       Physical Exam     Result Review :       Data reviewed : Radiologic studies CT chest 08/01/2024        Findings:  The central airways are patent. There are patchy bilateral groundglass opacities, greater in the left lung. No pleural effusion or pneumothorax. There is stable size of the left upper lobe nodule measuring 1.8 cm on series 4 image 42. There is stable   size of the additional more anterior left upper lobe pulmonary nodule measuring 1.2 cm on series 4 image 27. Stable right upper lobe pulmonary " nodule measuring 6 mm on series 4 image 31. There are scattered calcified granulomas. No new suspicious   pulmonary nodule or mass.     Enlarged mediastinal lymph nodes are present. For reference, there is a AP window lymph node measuring 1.3 cm in short axis on series 2 image 36. There are benign calcified mediastinal and right hilar lymph nodes. The heart is unchanged in size. There is   no pericardial effusion. No evidence of significant coronary artery calcification. Thoracic esophagus is unchanged.     Chest wall soft tissues show no acute abnormality. Cholecystectomy. No fracture or suspicious osseous lesion.    IMPRESSION:  Impression:  Multifocal pneumonia. Appearance favors viral/atypical etiology.     Multiple pulmonary nodules as indexed above, stable from prior studies. No new suspicious pulmonary nodule or mass.                 Assessment and Plan    Problem List Items Addressed This Visit          Pulmonary and Pneumonias    Severe persistent asthma without complication    Relevant Medications    Dupilumab 300 MG/2ML solution pen-injector    14 mm left upper lobe lung nodule (3/2021)    Overview     Per patient for years, stable            Tobacco    Cigarette smoker     Other Visit Diagnoses       Shortness of breath    -  Primary    Relevant Orders    RSV POC TEST - Miscellaneous Test          With her CT scan and symptoms we will go ahead and treat her with doxycycline and a prednisone taper.  She has had a negative COVID test and we did her RSV today which was negative.    Continue on her nebulizers and supportive care.    Routine follow-up here in the office as scheduled.      Follow Up     Return for Next scheduled follow up.  Patient was given instructions and counseling regarding her condition or for health maintenance advice. Please see specific information pulled into the AVS if appropriate.       Moderate level of Medical Decision Making complexity based on 2 or more chronic stable illnesses  and prescription drug management.    Khadra Clements APRN, ACNP  [facility]  Electronically signed

## 2024-08-16 NOTE — TELEPHONE ENCOUNTER
Filled Rx Nystatin per chart via fax sent to [facility] per pt's request due to having thrush. Pt denies fever/chest pain/nausea. Pt verbalized appreciation.

## 2024-08-20 ENCOUNTER — TELEMEDICINE (OUTPATIENT)
Dept: PSYCHIATRY | Facility: CLINIC | Age: 39
End: 2024-08-20
Payer: COMMERCIAL

## 2024-08-20 ENCOUNTER — OFFICE VISIT (OUTPATIENT)
Dept: INTERNAL MEDICINE | Facility: CLINIC | Age: 39
End: 2024-08-20
Payer: COMMERCIAL

## 2024-08-20 VITALS
TEMPERATURE: 98.4 F | HEART RATE: 80 BPM | WEIGHT: 293 LBS | DIASTOLIC BLOOD PRESSURE: 70 MMHG | SYSTOLIC BLOOD PRESSURE: 138 MMHG | BODY MASS INDEX: 47.09 KG/M2 | HEIGHT: 66 IN

## 2024-08-20 DIAGNOSIS — J45.40 MODERATE PERSISTENT ASTHMA WITHOUT COMPLICATION: Chronic | ICD-10-CM

## 2024-08-20 DIAGNOSIS — E66.01 SEVERE OBESITY (BMI >= 40): Primary | ICD-10-CM

## 2024-08-20 DIAGNOSIS — F51.04 PSYCHOPHYSIOLOGICAL INSOMNIA: Primary | ICD-10-CM

## 2024-08-20 DIAGNOSIS — F90.2 ATTENTION DEFICIT HYPERACTIVITY DISORDER (ADHD), COMBINED TYPE: Chronic | ICD-10-CM

## 2024-08-20 DIAGNOSIS — F90.2 ATTENTION DEFICIT HYPERACTIVITY DISORDER, COMBINED TYPE: ICD-10-CM

## 2024-08-20 DIAGNOSIS — R03.0 BORDERLINE HYPERTENSION: ICD-10-CM

## 2024-08-20 DIAGNOSIS — Z87.891 PERSONAL HISTORY OF SMOKING: ICD-10-CM

## 2024-08-20 DIAGNOSIS — F41.1 GENERALIZED ANXIETY DISORDER: ICD-10-CM

## 2024-08-20 DIAGNOSIS — F31.75 BIPOLAR 1 DISORDER, DEPRESSED, PARTIAL REMISSION: ICD-10-CM

## 2024-08-20 PROBLEM — F17.210 CIGARETTE SMOKER: Status: RESOLVED | Noted: 2024-07-18 | Resolved: 2024-08-20

## 2024-08-20 PROCEDURE — 1160F RVW MEDS BY RX/DR IN RCRD: CPT | Performed by: STUDENT IN AN ORGANIZED HEALTH CARE EDUCATION/TRAINING PROGRAM

## 2024-08-20 PROCEDURE — 1125F AMNT PAIN NOTED PAIN PRSNT: CPT | Performed by: STUDENT IN AN ORGANIZED HEALTH CARE EDUCATION/TRAINING PROGRAM

## 2024-08-20 PROCEDURE — 1159F MED LIST DOCD IN RCRD: CPT | Performed by: STUDENT IN AN ORGANIZED HEALTH CARE EDUCATION/TRAINING PROGRAM

## 2024-08-20 PROCEDURE — 99214 OFFICE O/P EST MOD 30 MIN: CPT | Performed by: NURSE PRACTITIONER

## 2024-08-20 PROCEDURE — 99214 OFFICE O/P EST MOD 30 MIN: CPT | Performed by: STUDENT IN AN ORGANIZED HEALTH CARE EDUCATION/TRAINING PROGRAM

## 2024-08-20 RX ORDER — DEXTROAMPHETAMINE SACCHARATE, AMPHETAMINE ASPARTATE, DEXTROAMPHETAMINE SULFATE AND AMPHETAMINE SULFATE 5; 5; 5; 5 MG/1; MG/1; MG/1; MG/1
TABLET ORAL
Qty: 30 TABLET | Refills: 0 | Status: SHIPPED | OUTPATIENT
Start: 2024-08-20

## 2024-08-20 RX ORDER — DOXEPIN HYDROCHLORIDE 10 MG/1
10 CAPSULE ORAL NIGHTLY
Qty: 30 CAPSULE | Refills: 2 | Status: SHIPPED | OUTPATIENT
Start: 2024-08-20

## 2024-08-20 RX ORDER — DEXTROAMPHETAMINE SACCHARATE, AMPHETAMINE ASPARTATE MONOHYDRATE, DEXTROAMPHETAMINE SULFATE AND AMPHETAMINE SULFATE 5; 5; 5; 5 MG/1; MG/1; MG/1; MG/1
40 CAPSULE, EXTENDED RELEASE ORAL EVERY MORNING
Qty: 60 CAPSULE | Refills: 0 | Status: SHIPPED | OUTPATIENT
Start: 2024-08-20

## 2024-08-20 NOTE — PROGRESS NOTES
"Chief Complaint  Clare Turner is a 39 y.o. female presenting for Severe obesity .     From [place]. . 3 children. 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     Patient has a past medical history of hyperlipidemia, bipolar 1 disorder w/depression and Hx of jack, ADHD, GERD, episcleritis, restless legs, moderate persistent asthma (since childhood, f/u pulm), lung nodule stable for years, history of alcohol and substance abuse, urge/stress incontinence and severe obesity.    History of Present Illness  Patient is here for follow-up.    She tells me she just completed 10-day course of doxycycline yesterday for pneumonia and asthma exacerbation.  She also was on 2 steroid courses.  She did gain 14 pounds since her last visit 4 weeks ago.  She tells me she drinks about 3 cans of pop every day, and additionally 2 red bowls cans every day.  She continues to drink protein drinks that were recommended by the nutritionist.    Patient tells me she had her last cigarette yesterday and is hopeful she will be able to remain tobacco free.    The following portions of the patient's history were reviewed and updated as appropriate: allergies, current medications, past family history, past medical history, past social history, past surgical history, and problem list.    Objective  /70 (BP Location: Left arm, Patient Position: Sitting, Cuff Size: Large Adult)   Pulse 80   Temp 98.4 °F (36.9 °C) (Temporal)   Ht 168.9 cm (66.5\")   Wt (!) 141 kg (311 lb 3.2 oz)   BMI 49.48 kg/m²     Physical Exam  Vitals reviewed.   Constitutional:       Appearance: Normal appearance.   HENT:      Head: Normocephalic and atraumatic.      Nose: Nose normal. No congestion.      Mouth/Throat:      Mouth: Mucous membranes are moist.   Eyes:      Extraocular Movements: Extraocular movements intact.      Conjunctiva/sclera: Conjunctivae " normal.   Cardiovascular:      Rate and Rhythm: Normal rate and regular rhythm.      Heart sounds: Normal heart sounds. No murmur heard.  Pulmonary:      Effort: Pulmonary effort is normal. No respiratory distress.      Breath sounds: Normal breath sounds. No wheezing.   Abdominal:      Palpations: Abdomen is soft.   Musculoskeletal:      Cervical back: Normal range of motion and neck supple.      Right lower leg: No edema.      Left lower leg: No edema.   Skin:     General: Skin is warm and dry.   Neurological:      Mental Status: She is alert and oriented to person, place, and time. Mental status is at baseline.   Psychiatric:         Behavior: Behavior normal.         Thought Content: Thought content normal.         Assessment/Plan   1. Severe obesity (BMI >= 40)     Counseled on diet.  Counseled on cutting back on soda and red bull.  Recommend water for thirst.  Follow-up in 4 weeks for weight check and continued counseling.    2. Moderate persistent asthma without complication  Appears to be recovering well.  No wheezes heard on exam today.    3. Borderline hypertension  BP Readings from Last 3 Encounters:   08/20/24 138/70   08/08/24 120/90   08/06/24 124/70   Blood pressure borderline.  May be in setting of red bull/caffeine versus side effects of Adderall and recent steroid use.  Will continue to monitor.    4. Attention deficit hyperactivity disorder (ADHD), combined type  Continue on Adderall.  Continue follow-up with psychiatry    5. Personal history of smoking  Counseled on continued smoking cessation, which will possibility of postoperative complication after potentially upcoming bariatric surgery.      Return in about 4 weeks (around 9/17/2024) for Recheck.    Future Appointments         Provider Department Center    9/13/2024 10:00 AM Stevenson Coker LCSW [facility]    9/17/2024 4:15 PM Michael Miner MD [facility] [place]     9/19/2024 3:30 PM Nani Ball, [place] [facility]    10/11/2024 11:00 AM Stevenson Coker, [facility]    10/17/2024 8:00 AM Michael Miner MD [facility] [place]    10/18/2024 9:00 AM Michael Miner MD [facility] [place]    11/14/2024 8:00 AM Michael Miner MD [facility] [place]    1/24/2025 8:30 AM Khadra Clements APRN [facility] [place]              Michael Miner MD  Family Medicine  08/20/2024

## 2024-08-20 NOTE — PROGRESS NOTES
Patient Name: Clare Turner  MRN: [id number]   :  1985     This provider is located at her home office through the [facility] (through [facility]), [address], [address] using a secure [employer] Video Visit through [employer]. Patient is being seen remotely via telehealth at their home address in [place], and stated they are in a secure environment for this session. The patient's condition being diagnosed/treated is appropriate for telemedicine. The provider identified herself as well as her credentials.   The patient, and/or patients guardian, consent to be seen remotely, and when consent is given they understand that the consent allows for patient identifiable information to be sent to a third party as needed.   They may refuse to be seen remotely at any time. The electronic data is encrypted and password protected, and the patient and/or guardian has been advised of the potential risks to privacy not withstanding such measures.    You have chosen to receive care through a telehealth visit.  Do you consent to use a video/audio connection for your medical care today? Yes    Chief Complaint:      ICD-10-CM ICD-9-CM   1. Psychophysiological insomnia  F51.04 307.42   2. Bipolar 1 disorder, depressed, partial remission  F31.75 296.55   3. Generalized anxiety disorder  F41.1 300.02   4. Attention deficit hyperactivity disorder, combined type  F90.2 314.01       History of Present Illness: Clare Turner is a 39 y.o. female is here today for medication management follow up.  Patient states the Lunesta helps her fall asleep however she cannot stay asleep.  Did notice she had a manic episode.  Mood overall is good now.  Is considering getting bariatric surgery and gastric sleeve.    The following portions of the patient's history were reviewed and updated as appropriate: allergies, current medications, past family history, past medical history, past  social history, past surgical history, and problem list.    Review of Systems;;  Review of Systems   Constitutional:  Negative for activity change, appetite change, fatigue, unexpected weight gain and unexpected weight loss.   Respiratory:  Negative for shortness of breath and wheezing.    Gastrointestinal:  Negative for constipation, diarrhea, nausea and vomiting.   Musculoskeletal:  Negative for gait problem.   Skin:  Negative for dry skin and rash.   Neurological:  Negative for dizziness, speech difficulty, weakness, light-headedness, headache, memory problem and confusion.   Psychiatric/Behavioral:  Positive for sleep disturbance and stress. Negative for agitation, behavioral problems, decreased concentration, dysphoric mood, hallucinations, self-injury, suicidal ideas, negative for hyperactivity and depressed mood. The patient is not nervous/anxious.        Physical Exam;;  Physical Exam  Constitutional:       General: She is not in acute distress.     Appearance: She is well-developed. She is not diaphoretic.   HENT:      Head: Normocephalic and atraumatic.   Eyes:      Conjunctiva/sclera: Conjunctivae normal.   Pulmonary:      Effort: Pulmonary effort is normal. No respiratory distress.   Musculoskeletal:         General: Normal range of motion.      Cervical back: Full passive range of motion without pain and normal range of motion.   Neurological:      Mental Status: She is alert and oriented to person, place, and time.   Psychiatric:         Mood and Affect: Mood is not anxious or depressed. Affect is not labile, blunt, angry or inappropriate.         Speech: Speech is not rapid and pressured or tangential.         Behavior: Behavior normal. Behavior is not agitated, slowed, aggressive, withdrawn, hyperactive or combative. Behavior is cooperative.         Thought Content: Thought content normal. Thought content is not paranoid or delusional. Thought content does not include homicidal or suicidal ideation.  Thought content does not include homicidal or suicidal plan.         Judgment: Judgment normal.       not currently breastfeeding.  There is no height or weight on file to calculate BMI. Video appt unable to obtain.     Current Medications;;    Current Outpatient Medications:     amphetamine-dextroamphetamine (Adderall) 20 MG tablet, Take 20 mg orally every afternoon., Disp: 30 tablet, Rfl: 0    amphetamine-dextroamphetamine XR (Adderall XR) 20 MG 24 hr capsule, Take 2 capsules by mouth Every Morning, Disp: 60 capsule, Rfl: 0    albuterol (PROVENTIL) (2.5 MG/3ML) 0.083% nebulizer solution, Take 2.5 mg by nebulization 4 (Four) Times a Day As Needed for Wheezing., Disp: 120 each, Rfl: 5    albuterol sulfate  (90 Base) MCG/ACT inhaler, Inhale 2 puffs 2 (Two) Times a Day., Disp: 18 g, Rfl: 5    atorvastatin (LIPITOR) 10 MG tablet, Take 1 tablet by mouth Daily., Disp: 30 tablet, Rfl: 5    budesonide (Pulmicort) 0.5 MG/2ML nebulizer solution, Take 2 mL by nebulization 2 (Two) Times a Day., Disp: 120 each, Rfl: 11    buPROPion XL (WELLBUTRIN XL) 300 MG 24 hr tablet, Take 1 tablet by mouth Daily., Disp: 30 tablet, Rfl: 6    Cariprazine HCl (Vraylar) 3 MG capsule capsule, Take 1 capsule by mouth Daily., Disp: 30 capsule, Rfl: 6    doxepin (SINEquan) 10 MG capsule, Take 1 capsule by mouth Every Night., Disp: 30 capsule, Rfl: 2    doxycycline (VIBRAMYCIN) 100 MG capsule, Take 1 capsule by mouth 2 (Two) Times a Day., Disp: 20 capsule, Rfl: 0    Dupilumab 300 MG/2ML solution pen-injector, Inject 2 mL under the skin into the appropriate area as directed Every 14 (Fourteen) Days., Disp: 2 mL, Rfl: 11    EPINEPHrine (EPIPEN) 0.3 MG/0.3ML solution auto-injector injection, Inject 1 pen in the muscle as directed PRN allergic reaction, Disp: 2 each, Rfl: 1    eszopiclone (Lunesta) 3 MG tablet, Take 1 tablet by mouth At Night As Needed for Sleep. Take immediately before bedtime, Disp: 30 tablet, Rfl: 2    FLUoxetine (PROzac) 40  MG capsule, Take 2 capsules by mouth Daily., Disp: 60 capsule, Rfl: 6    Fluticasone Furoate-Vilanterol (BREO ELLIPTA) 200-25 MCG/ACT inhaler, Inhale 1 puff Daily. 1 inhalation once a day, Disp: 60 each, Rfl: 5    furosemide (LASIX) 20 MG tablet, Take 1 tablet by mouth 2 (Two) Times a Week. PRN for leg/ankle swelling., Disp: 30 tablet, Rfl: 1    hydrOXYzine pamoate (VISTARIL) 25 MG capsule, Take 1 capsule by mouth 3 (Three) Times a Day As Needed for Anxiety., Disp: 90 capsule, Rfl: 6    lamoTRIgine (LaMICtal) 100 MG tablet, Take 1 tablet by mouth Daily., Disp: 30 tablet, Rfl: 2    loratadine (CLARITIN) 10 MG tablet, Take 1 tablet by mouth Daily., Disp: 30 tablet, Rfl: 5    nystatin (MYCOSTATIN) 100,000 unit/mL suspension, Take 5 mL by mouth 4 (Four) Times a Day., Disp: 280 mL, Rfl: 0    Omeprazole 20 MG tablet delayed-release, Take 20 mg by mouth Daily., Disp: 30 each, Rfl: 4    predniSONE (DELTASONE) 10 MG tablet, Take 4 tabs daily x 3 days, then take 3 tabs daily x 3 days, then take 2 tabs daily x 3 days, then take 1 tab daily x 3 days, Disp: 31 tablet, Rfl: 0    Lab Results:   Lab Requisition on 08/05/2024   Component Date Value Ref Range Status    Case Report 08/05/2024    Final                    Value:Surgical Pathology Report                         Case: [id number]                                  Authorizing Provider:  Derrick Spencer MD    Collected:           08/05/2024 07:50 AM          Ordering Location:     [facility]   Received:            08/05/2024 12:00 PM                                 LABORATORY                                                                   Pathologist:           Rogelio Villanueva MD                                                       Specimens:   1) - Gastric, Antrum                                                                                2) - Esophagus, Distal                                                                     Clinical  Information 08/05/2024    Final                    Value:This result contains rich text formatting which cannot be displayed here.    Final Diagnosis 08/05/2024    Final                    Value:This result contains rich text formatting which cannot be displayed here.    Gross Description 08/05/2024    Final                    Value:This result contains rich text formatting which cannot be displayed here.    Microscopic Description 08/05/2024    Final                    Value:This result contains rich text formatting which cannot be displayed here.   Office Visit on 07/18/2024   Component Date Value Ref Range Status    WBC 07/18/2024 14.3 (H)  3.4 - 10.8 x10E3/uL Final    RBC 07/18/2024 5.10  3.77 - 5.28 x10E6/uL Final    Hemoglobin 07/18/2024 14.9  11.1 - 15.9 g/dL Final    Hematocrit 07/18/2024 46.3  34.0 - 46.6 % Final    MCV 07/18/2024 91  79 - 97 fL Final    MCH 07/18/2024 29.2  26.6 - 33.0 pg Final    MCHC 07/18/2024 32.2  31.5 - 35.7 g/dL Final    RDW 07/18/2024 13.1  11.7 - 15.4 % Final    Platelets 07/18/2024 500 (H)  150 - 450 x10E3/uL Final    Neutrophil Rel % 07/18/2024 64  Not Estab. % Final    Lymphocyte Rel % 07/18/2024 24  Not Estab. % Final    Monocyte Rel % 07/18/2024 7  Not Estab. % Final    Eosinophil Rel % 07/18/2024 3  Not Estab. % Final    Basophil Rel % 07/18/2024 1  Not Estab. % Final    Neutrophils Absolute 07/18/2024 9.3 (H)  1.4 - 7.0 x10E3/uL Final    Lymphocytes Absolute 07/18/2024 3.4 (H)  0.7 - 3.1 x10E3/uL Final    Monocytes Absolute 07/18/2024 0.9  0.1 - 0.9 x10E3/uL Final    Eosinophils Absolute 07/18/2024 0.5 (H)  0.0 - 0.4 x10E3/uL Final    Basophils Absolute 07/18/2024 0.1  0.0 - 0.2 x10E3/uL Final    Immature Granulocyte Rel % 07/18/2024 1  Not Estab. % Final    Immature Grans Absolute 07/18/2024 0.1  0.0 - 0.1 x10E3/uL Final    Glucose 07/18/2024 89  70 - 99 mg/dL Final    BUN 07/18/2024 7  6 - 20 mg/dL Final    Creatinine 07/18/2024 0.88  0.57 - 1.00 mg/dL Final    EGFR  Result 07/18/2024 86  >59 mL/min/1.73 Final    BUN/Creatinine Ratio 07/18/2024 8 (L)  9 - 23 Final    Sodium 07/18/2024 136  134 - 144 mmol/L Final    Potassium 07/18/2024 4.9  3.5 - 5.2 mmol/L Final    Chloride 07/18/2024 99  96 - 106 mmol/L Final    Total CO2 07/18/2024 24  20 - 29 mmol/L Final    Calcium 07/18/2024 10.0  8.7 - 10.2 mg/dL Final    Total Protein 07/18/2024 7.7  6.0 - 8.5 g/dL Final    Albumin 07/18/2024 4.2  3.9 - 4.9 g/dL Final    Globulin 07/18/2024 3.5  1.5 - 4.5 g/dL Final    Total Bilirubin 07/18/2024 0.2  0.0 - 1.2 mg/dL Final    Alkaline Phosphatase 07/18/2024 144 (H)  44 - 121 IU/L Final    AST (SGOT) 07/18/2024 23  0 - 40 IU/L Final    ALT (SGPT) 07/18/2024 30  0 - 32 IU/L Final    Hemoglobin A1C 07/18/2024 5.9 (H)  4.8 - 5.6 % Final    Comment:          Prediabetes: 5.7 - 6.4           Diabetes: >6.4           Glycemic control for adults with diabetes: <7.0      Ferritin 07/18/2024 66  15 - 150 ng/mL Final    Iron 07/18/2024 65  27 - 159 ug/dL Final    Total Cholesterol 07/18/2024 186  100 - 199 mg/dL Final    Triglycerides 07/18/2024 118  0 - 149 mg/dL Final    HDL Cholesterol 07/18/2024 43  >39 mg/dL Final    VLDL Cholesterol Naveen 07/18/2024 21  5 - 40 mg/dL Final    LDL Chol Calc (NIH) 07/18/2024 122 (H)  0 - 99 mg/dL Final    TSH 07/18/2024 1.230  0.450 - 4.500 uIU/mL Final    Vitamin B-12 07/18/2024 419  232 - 1,245 pg/mL Final    Folate 07/18/2024 3.6  >3.0 ng/mL Final    Comment: A serum folate concentration of less than 3.1 ng/mL is  considered to represent clinical deficiency.      25 Hydroxy, Vitamin D 07/18/2024 33.0  30.0 - 100.0 ng/mL Final    Comment: Vitamin D deficiency has been defined by the [place] of  Medicine and an Endocrine Society practice guideline as a  level of serum 25-OH vitamin D less than 20 ng/mL (1,2).  The Endocrine Society went on to further define vitamin D  insufficiency as a level between 21 and 29 ng/mL (2).  1. IOM ([place] of Medicine). 2010.  Dietary reference     intakes for calcium and D. [place]: The     National Academies Press.  2. Mine MF, Dalia RANGEL, Tamera RUIZ, et al.     Evaluation, treatment, and prevention of vitamin D     deficiency: an Endocrine Society clinical practice     guideline. JCEM. 2011 Jul; 96(7):1911-30.      H. pylori Breath Test 07/18/2024 Negative  Negative Final       Mental Status Exam:   Hygiene:   good  Cooperation:  Cooperative  Eye Contact:  Good  Psychomotor Behavior:  Appropriate  Mood:dysthymic  Affect:  Appropriate  Hopelessness: Denies  Speech:  Normal  Thought Process:  Goal directed  Thought Content:  Normal  Suicidal:  None  Homicidal:  None  Hallucinations:  None  Delusion:  None  Memory:  Intact  Orientation:  Person, Place, Time, and Situation  Reliability:  good  Insight:  Good  Judgement:  Good  Impulse Control:  Good    PHQ-9 Depression Screening  Little interest or pleasure in doing things? (P) 1-->several days   Feeling down, depressed, or hopeless? (P) 1-->several days   Trouble falling or staying asleep, or sleeping too much? (P) 1-->several days   Feeling tired or having little energy? (P) 1-->several days   Poor appetite or overeating? (P) 1-->several days   Feeling bad about yourself - or that you are a failure or have let yourself or your family down? (P) 1-->several days   Trouble concentrating on things, such as reading the newspaper or watching television? (P) 1-->several days   Moving or speaking so slowly that other people could have noticed? Or the opposite - being so fidgety or restless that you have been moving around a lot more than usual? (P) 0-->not at all   Thoughts that you would be better off dead, or of hurting yourself in some way? (P) 0-->not at all   PHQ-9 Total Score (P) 7   If you checked off any problems, how difficult have these problems made it for you to do your work, take care of things at home, or get along with other people? (P) somewhat difficult         Assessment/Plan:  Diagnoses and all orders for this visit:    1. Psychophysiological insomnia (Primary)  -     doxepin (SINEquan) 10 MG capsule; Take 1 capsule by mouth Every Night.  Dispense: 30 capsule; Refill: 2    2. Bipolar 1 disorder, depressed, partial remission    3. Generalized anxiety disorder    4. Attention deficit hyperactivity disorder, combined type  -     amphetamine-dextroamphetamine (Adderall) 20 MG tablet; Take 20 mg orally every afternoon.  Dispense: 30 tablet; Refill: 0  -     amphetamine-dextroamphetamine XR (Adderall XR) 20 MG 24 hr capsule; Take 2 capsules by mouth Every Morning  Dispense: 60 capsule; Refill: 0      Patient states Lunesta is good for falling asleep however she is having difficulty staying asleep.  We will add doxepin 10 mg at bedtime.  We will continue other medications.    A psychological evaluation was conducted in order to assess past and current level of functioning. Areas assessed included, but were not limited to: perception of social support, perception of ability to face and deal with challenges in life (positive functioning), anxiety symptoms, depressive symptoms, perspective on beliefs/belief system, coping skills for stress, intelligence level,  Therapeutic rapport was established. Interventions conducted today were geared towards incorporating medication management along with support for continued therapy. Education was also provided as to the med management with this provider and what to expect in subsequent sessions.    We discussed risks, benefits,goals and side effects of the above medication and the patient was agreeable with the plan.Patient was educated on the importance of compliance with treatment and follow-up appointments. Patient is aware to contact the [facility] [phone] with any worsening of symptoms. To call for questions or concerns and return early if necessary. Patent is agreeable to go to the Emergency  Department or call 911 should they begin SI/HI.     Part of this note may be an electronic transcription/translation of spoken language to printed text using the Dragon Dictation System.    Return in about 4 weeks (around 9/17/2024) for Follow Up 30 min, Video visit.    Nani Ball, APRN

## 2024-09-17 ENCOUNTER — OFFICE VISIT (OUTPATIENT)
Dept: INTERNAL MEDICINE | Facility: CLINIC | Age: 39
End: 2024-09-17
Payer: COMMERCIAL

## 2024-09-17 VITALS
BODY MASS INDEX: 47.09 KG/M2 | TEMPERATURE: 98.2 F | SYSTOLIC BLOOD PRESSURE: 134 MMHG | WEIGHT: 293 LBS | HEIGHT: 66 IN | HEART RATE: 88 BPM | DIASTOLIC BLOOD PRESSURE: 62 MMHG

## 2024-09-17 DIAGNOSIS — Z23 NEED FOR INFLUENZA VACCINATION: Primary | ICD-10-CM

## 2024-09-17 DIAGNOSIS — R73.03 PREDIABETES: Chronic | ICD-10-CM

## 2024-09-17 DIAGNOSIS — E66.01 MORBID OBESITY WITH BODY MASS INDEX OF 50 OR HIGHER: Chronic | ICD-10-CM

## 2024-09-17 DIAGNOSIS — R03.0 BORDERLINE HYPERTENSION: ICD-10-CM

## 2024-09-17 DIAGNOSIS — M79.89 LEG SWELLING: ICD-10-CM

## 2024-09-17 DIAGNOSIS — Z87.891 PERSONAL HISTORY OF SMOKING: ICD-10-CM

## 2024-09-17 DIAGNOSIS — J45.40 MODERATE PERSISTENT ASTHMA WITHOUT COMPLICATION: Chronic | ICD-10-CM

## 2024-09-17 PROCEDURE — 99214 OFFICE O/P EST MOD 30 MIN: CPT | Performed by: STUDENT IN AN ORGANIZED HEALTH CARE EDUCATION/TRAINING PROGRAM

## 2024-09-17 PROCEDURE — 90471 IMMUNIZATION ADMIN: CPT | Performed by: STUDENT IN AN ORGANIZED HEALTH CARE EDUCATION/TRAINING PROGRAM

## 2024-09-17 PROCEDURE — 90656 IIV3 VACC NO PRSV 0.5 ML IM: CPT | Performed by: STUDENT IN AN ORGANIZED HEALTH CARE EDUCATION/TRAINING PROGRAM

## 2024-09-17 PROCEDURE — 1159F MED LIST DOCD IN RCRD: CPT | Performed by: STUDENT IN AN ORGANIZED HEALTH CARE EDUCATION/TRAINING PROGRAM

## 2024-09-17 PROCEDURE — 1125F AMNT PAIN NOTED PAIN PRSNT: CPT | Performed by: STUDENT IN AN ORGANIZED HEALTH CARE EDUCATION/TRAINING PROGRAM

## 2024-09-17 PROCEDURE — 1160F RVW MEDS BY RX/DR IN RCRD: CPT | Performed by: STUDENT IN AN ORGANIZED HEALTH CARE EDUCATION/TRAINING PROGRAM

## 2024-09-17 RX ORDER — SPIRONOLACTONE 25 MG/1
25 TABLET ORAL DAILY
Qty: 30 TABLET | Refills: 1 | Status: SHIPPED | OUTPATIENT
Start: 2024-09-17

## 2024-09-19 ENCOUNTER — TELEMEDICINE (OUTPATIENT)
Dept: PSYCHIATRY | Facility: CLINIC | Age: 39
End: 2024-09-19
Payer: COMMERCIAL

## 2024-09-19 DIAGNOSIS — F51.04 PSYCHOPHYSIOLOGICAL INSOMNIA: ICD-10-CM

## 2024-09-19 DIAGNOSIS — F90.2 ATTENTION DEFICIT HYPERACTIVITY DISORDER, COMBINED TYPE: ICD-10-CM

## 2024-09-19 DIAGNOSIS — F41.1 GENERALIZED ANXIETY DISORDER: ICD-10-CM

## 2024-09-19 DIAGNOSIS — F31.75 BIPOLAR 1 DISORDER, DEPRESSED, PARTIAL REMISSION: Primary | ICD-10-CM

## 2024-09-19 PROCEDURE — 99214 OFFICE O/P EST MOD 30 MIN: CPT | Performed by: NURSE PRACTITIONER

## 2024-09-19 PROCEDURE — 96127 BRIEF EMOTIONAL/BEHAV ASSMT: CPT | Performed by: NURSE PRACTITIONER

## 2024-09-19 RX ORDER — DEXTROAMPHETAMINE SACCHARATE, AMPHETAMINE ASPARTATE, DEXTROAMPHETAMINE SULFATE AND AMPHETAMINE SULFATE 5; 5; 5; 5 MG/1; MG/1; MG/1; MG/1
TABLET ORAL
Qty: 30 TABLET | Refills: 0 | Status: SHIPPED | OUTPATIENT
Start: 2024-09-19 | End: 2024-10-21 | Stop reason: SDUPTHER

## 2024-09-19 RX ORDER — ESZOPICLONE 3 MG/1
3 TABLET, FILM COATED ORAL NIGHTLY PRN
Qty: 30 TABLET | Refills: 2 | Status: SHIPPED | OUTPATIENT
Start: 2024-09-19 | End: 2025-09-19

## 2024-09-19 RX ORDER — BUPROPION HYDROCHLORIDE 300 MG/1
300 TABLET ORAL DAILY
Qty: 30 TABLET | Refills: 6 | Status: SHIPPED | OUTPATIENT
Start: 2024-09-19

## 2024-09-19 RX ORDER — LAMOTRIGINE 100 MG/1
100 TABLET ORAL DAILY
Qty: 30 TABLET | Refills: 2 | Status: SHIPPED | OUTPATIENT
Start: 2024-09-19 | End: 2025-09-19

## 2024-09-19 RX ORDER — DOXEPIN HYDROCHLORIDE 10 MG/1
10 CAPSULE ORAL NIGHTLY
Qty: 30 CAPSULE | Refills: 2 | Status: SHIPPED | OUTPATIENT
Start: 2024-09-19

## 2024-09-19 RX ORDER — HYDROXYZINE PAMOATE 25 MG/1
25 CAPSULE ORAL 3 TIMES DAILY PRN
Qty: 90 CAPSULE | Refills: 6 | Status: SHIPPED | OUTPATIENT
Start: 2024-09-19

## 2024-09-19 RX ORDER — DEXTROAMPHETAMINE SACCHARATE, AMPHETAMINE ASPARTATE MONOHYDRATE, DEXTROAMPHETAMINE SULFATE AND AMPHETAMINE SULFATE 5; 5; 5; 5 MG/1; MG/1; MG/1; MG/1
40 CAPSULE, EXTENDED RELEASE ORAL EVERY MORNING
Qty: 60 CAPSULE | Refills: 0 | Status: SHIPPED | OUTPATIENT
Start: 2024-09-19 | End: 2024-10-21 | Stop reason: SDUPTHER

## 2024-09-19 RX ORDER — FLUOXETINE 40 MG/1
80 CAPSULE ORAL DAILY
Qty: 60 CAPSULE | Refills: 6 | Status: SHIPPED | OUTPATIENT
Start: 2024-09-19 | End: 2025-09-19

## 2024-09-19 NOTE — PROGRESS NOTES
Patient Name: Clare Turner  MRN: [id number]   :  1985     This provider is located at her home office through the [facility] (through [facility]), [address], [address] using a secure ripplrr inchart Video Visit through [employer]. Patient is being seen remotely via telehealth at their home address in [place], and stated they are in a secure environment for this session. The patient's condition being diagnosed/treated is appropriate for telemedicine. The provider identified herself as well as her credentials.   The patient, and/or patients guardian, consent to be seen remotely, and when consent is given they understand that the consent allows for patient identifiable information to be sent to a third party as needed.   They may refuse to be seen remotely at any time. The electronic data is encrypted and password protected, and the patient and/or guardian has been advised of the potential risks to privacy not withstanding such measures.    You have chosen to receive care through a telehealth visit.  Do you consent to use a video/audio connection for your medical care today? Yes    Chief Complaint:      ICD-10-CM ICD-9-CM   1. Bipolar 1 disorder, depressed, partial remission  F31.75 296.55   2. Generalized anxiety disorder  F41.1 300.02   3. Attention deficit hyperactivity disorder, combined type  F90.2 314.01   4. Psychophysiological insomnia  F51.04 307.42       History of Present Illness: Clare Turner is a 39 y.o. female is here today for medication management follow up.  Patient states she started a new job and her anxiety is down greatly now.  Is noting having some leg cramps    The following portions of the patient's history were reviewed and updated as appropriate: allergies, current medications, past family history, past medical history, past social history, past surgical history, and problem list.    Review of Systems;;  Review of Systems    Constitutional:  Negative for activity change, appetite change, fatigue, unexpected weight gain and unexpected weight loss.   Respiratory:  Negative for shortness of breath and wheezing.    Gastrointestinal:  Negative for constipation, diarrhea, nausea and vomiting.   Musculoskeletal:  Negative for gait problem.   Skin:  Negative for dry skin and rash.   Neurological:  Negative for dizziness, speech difficulty, weakness, light-headedness, headache, memory problem and confusion.   Psychiatric/Behavioral:  Negative for agitation, behavioral problems, decreased concentration, dysphoric mood, hallucinations, self-injury, sleep disturbance, suicidal ideas, negative for hyperactivity, depressed mood and stress. The patient is not nervous/anxious.        Physical Exam;;  Physical Exam  Constitutional:       General: She is not in acute distress.     Appearance: She is well-developed. She is not diaphoretic.   HENT:      Head: Normocephalic and atraumatic.   Eyes:      Conjunctiva/sclera: Conjunctivae normal.   Pulmonary:      Effort: Pulmonary effort is normal. No respiratory distress.   Musculoskeletal:         General: Normal range of motion.      Cervical back: Full passive range of motion without pain and normal range of motion.   Neurological:      Mental Status: She is alert and oriented to person, place, and time.   Psychiatric:         Mood and Affect: Mood is not anxious or depressed. Affect is not labile, blunt, angry or inappropriate.         Speech: Speech is not rapid and pressured or tangential.         Behavior: Behavior normal. Behavior is not agitated, slowed, aggressive, withdrawn, hyperactive or combative. Behavior is cooperative.         Thought Content: Thought content normal. Thought content is not paranoid or delusional. Thought content does not include homicidal or suicidal ideation. Thought content does not include homicidal or suicidal plan.         Judgment: Judgment normal.       not currently  breastfeeding.  There is no height or weight on file to calculate BMI. Video appt unable to obtain.     Current Medications;;    Current Outpatient Medications:     amphetamine-dextroamphetamine (Adderall) 20 MG tablet, Take 20 mg orally every afternoon., Disp: 30 tablet, Rfl: 0    amphetamine-dextroamphetamine XR (Adderall XR) 20 MG 24 hr capsule, Take 2 capsules by mouth Every Morning, Disp: 60 capsule, Rfl: 0    buPROPion XL (WELLBUTRIN XL) 300 MG 24 hr tablet, Take 1 tablet by mouth Daily., Disp: 30 tablet, Rfl: 6    Cariprazine HCl (Vraylar) 3 MG capsule capsule, Take 1 capsule by mouth Daily., Disp: 30 capsule, Rfl: 6    doxepin (SINEquan) 10 MG capsule, Take 1 capsule by mouth Every Night., Disp: 30 capsule, Rfl: 2    eszopiclone (Lunesta) 3 MG tablet, Take 1 tablet by mouth At Night As Needed for Sleep. Take immediately before bedtime, Disp: 30 tablet, Rfl: 2    FLUoxetine (PROzac) 40 MG capsule, Take 2 capsules by mouth Daily., Disp: 60 capsule, Rfl: 6    hydrOXYzine pamoate (VISTARIL) 25 MG capsule, Take 1 capsule by mouth 3 (Three) Times a Day As Needed for Anxiety., Disp: 90 capsule, Rfl: 6    lamoTRIgine (LaMICtal) 100 MG tablet, Take 1 tablet by mouth Daily., Disp: 30 tablet, Rfl: 2    albuterol (PROVENTIL) (2.5 MG/3ML) 0.083% nebulizer solution, Take 2.5 mg by nebulization 4 (Four) Times a Day As Needed for Wheezing., Disp: 120 each, Rfl: 5    albuterol sulfate  (90 Base) MCG/ACT inhaler, Inhale 2 puffs 2 (Two) Times a Day., Disp: 18 g, Rfl: 5    atorvastatin (LIPITOR) 10 MG tablet, Take 1 tablet by mouth Daily., Disp: 30 tablet, Rfl: 5    budesonide (Pulmicort) 0.5 MG/2ML nebulizer solution, Take 2 mL by nebulization 2 (Two) Times a Day., Disp: 120 each, Rfl: 11    doxycycline (VIBRAMYCIN) 100 MG capsule, Take 1 capsule by mouth 2 (Two) Times a Day., Disp: 20 capsule, Rfl: 0    Dupilumab 300 MG/2ML solution pen-injector, Inject 2 mL under the skin into the appropriate area as directed Every 14  (Fourteen) Days., Disp: 2 mL, Rfl: 11    EPINEPHrine (EPIPEN) 0.3 MG/0.3ML solution auto-injector injection, Inject 1 pen in the muscle as directed PRN allergic reaction, Disp: 2 each, Rfl: 1    Fluticasone Furoate-Vilanterol (BREO ELLIPTA) 200-25 MCG/ACT inhaler, Inhale 1 puff Daily. 1 inhalation once a day, Disp: 60 each, Rfl: 5    loratadine (CLARITIN) 10 MG tablet, Take 1 tablet by mouth Daily., Disp: 30 tablet, Rfl: 5    nystatin (MYCOSTATIN) 100,000 unit/mL suspension, Take 5 mL by mouth 4 (Four) Times a Day., Disp: 280 mL, Rfl: 0    Omeprazole 20 MG tablet delayed-release, Take 20 mg by mouth Daily., Disp: 30 each, Rfl: 4    predniSONE (DELTASONE) 10 MG tablet, Take 4 tabs daily x 3 days, then take 3 tabs daily x 3 days, then take 2 tabs daily x 3 days, then take 1 tab daily x 3 days, Disp: 31 tablet, Rfl: 0    spironolactone (Aldactone) 25 MG tablet, Take 1 tablet by mouth Daily., Disp: 30 tablet, Rfl: 1    Lab Results:   Lab Requisition on 08/05/2024   Component Date Value Ref Range Status    Case Report 08/05/2024    Final                    Value:Surgical Pathology Report                         Case: [id number]                                  Authorizing Provider:  Derrick Spencer MD    Collected:           08/05/2024 07:50 AM          Ordering Location:     [facility]   Received:            08/05/2024 12:00 PM                                 LABORATORY                                                                   Pathologist:           Rogelio Villanueva MD                                                       Specimens:   1) - Gastric, Antrum                                                                                2) - Esophagus, Distal                                                                     Clinical Information 08/05/2024    Final                    Value:This result contains rich text formatting which cannot be displayed here.    Final Diagnosis 08/05/2024     Final                    Value:This result contains rich text formatting which cannot be displayed here.    Gross Description 08/05/2024    Final                    Value:This result contains rich text formatting which cannot be displayed here.    Microscopic Description 08/05/2024    Final                    Value:This result contains rich text formatting which cannot be displayed here.   Office Visit on 07/18/2024   Component Date Value Ref Range Status    WBC 07/18/2024 14.3 (H)  3.4 - 10.8 x10E3/uL Final    RBC 07/18/2024 5.10  3.77 - 5.28 x10E6/uL Final    Hemoglobin 07/18/2024 14.9  11.1 - 15.9 g/dL Final    Hematocrit 07/18/2024 46.3  34.0 - 46.6 % Final    MCV 07/18/2024 91  79 - 97 fL Final    MCH 07/18/2024 29.2  26.6 - 33.0 pg Final    MCHC 07/18/2024 32.2  31.5 - 35.7 g/dL Final    RDW 07/18/2024 13.1  11.7 - 15.4 % Final    Platelets 07/18/2024 500 (H)  150 - 450 x10E3/uL Final    Neutrophil Rel % 07/18/2024 64  Not Estab. % Final    Lymphocyte Rel % 07/18/2024 24  Not Estab. % Final    Monocyte Rel % 07/18/2024 7  Not Estab. % Final    Eosinophil Rel % 07/18/2024 3  Not Estab. % Final    Basophil Rel % 07/18/2024 1  Not Estab. % Final    Neutrophils Absolute 07/18/2024 9.3 (H)  1.4 - 7.0 x10E3/uL Final    Lymphocytes Absolute 07/18/2024 3.4 (H)  0.7 - 3.1 x10E3/uL Final    Monocytes Absolute 07/18/2024 0.9  0.1 - 0.9 x10E3/uL Final    Eosinophils Absolute 07/18/2024 0.5 (H)  0.0 - 0.4 x10E3/uL Final    Basophils Absolute 07/18/2024 0.1  0.0 - 0.2 x10E3/uL Final    Immature Granulocyte Rel % 07/18/2024 1  Not Estab. % Final    Immature Grans Absolute 07/18/2024 0.1  0.0 - 0.1 x10E3/uL Final    Glucose 07/18/2024 89  70 - 99 mg/dL Final    BUN 07/18/2024 7  6 - 20 mg/dL Final    Creatinine 07/18/2024 0.88  0.57 - 1.00 mg/dL Final    EGFR Result 07/18/2024 86  >59 mL/min/1.73 Final    BUN/Creatinine Ratio 07/18/2024 8 (L)  9 - 23 Final    Sodium 07/18/2024 136  134 - 144 mmol/L Final    Potassium  07/18/2024 4.9  3.5 - 5.2 mmol/L Final    Chloride 07/18/2024 99  96 - 106 mmol/L Final    Total CO2 07/18/2024 24  20 - 29 mmol/L Final    Calcium 07/18/2024 10.0  8.7 - 10.2 mg/dL Final    Total Protein 07/18/2024 7.7  6.0 - 8.5 g/dL Final    Albumin 07/18/2024 4.2  3.9 - 4.9 g/dL Final    Globulin 07/18/2024 3.5  1.5 - 4.5 g/dL Final    Total Bilirubin 07/18/2024 0.2  0.0 - 1.2 mg/dL Final    Alkaline Phosphatase 07/18/2024 144 (H)  44 - 121 IU/L Final    AST (SGOT) 07/18/2024 23  0 - 40 IU/L Final    ALT (SGPT) 07/18/2024 30  0 - 32 IU/L Final    Hemoglobin A1C 07/18/2024 5.9 (H)  4.8 - 5.6 % Final    Comment:          Prediabetes: 5.7 - 6.4           Diabetes: >6.4           Glycemic control for adults with diabetes: <7.0      Ferritin 07/18/2024 66  15 - 150 ng/mL Final    Iron 07/18/2024 65  27 - 159 ug/dL Final    Total Cholesterol 07/18/2024 186  100 - 199 mg/dL Final    Triglycerides 07/18/2024 118  0 - 149 mg/dL Final    HDL Cholesterol 07/18/2024 43  >39 mg/dL Final    VLDL Cholesterol Naveen 07/18/2024 21  5 - 40 mg/dL Final    LDL Chol Calc (NIH) 07/18/2024 122 (H)  0 - 99 mg/dL Final    TSH 07/18/2024 1.230  0.450 - 4.500 uIU/mL Final    Vitamin B-12 07/18/2024 419  232 - 1,245 pg/mL Final    Folate 07/18/2024 3.6  >3.0 ng/mL Final    Comment: A serum folate concentration of less than 3.1 ng/mL is  considered to represent clinical deficiency.      25 Hydroxy, Vitamin D 07/18/2024 33.0  30.0 - 100.0 ng/mL Final    Comment: Vitamin D deficiency has been defined by the [place] of  Medicine and an Endocrine Society practice guideline as a  level of serum 25-OH vitamin D less than 20 ng/mL (1,2).  The Endocrine Society went on to further define vitamin D  insufficiency as a level between 21 and 29 ng/mL (2).  1. IOM ([employer] of [employer]). 2010. Dietary reference     intakes for calcium and D. [place]: The     National Academies Press.  2. Mine MARTINEZ, Dalia RANGEL, Tamera RUIZ, et al.      Evaluation, treatment, and prevention of vitamin D     deficiency: an Endocrine Society clinical practice     guideline. JCEM. 2011 Jul; 96(7):1911-30.      H. pylori Breath Test 07/18/2024 Negative  Negative Final       Mental Status Exam:   Hygiene:   good  Cooperation:  Cooperative  Eye Contact:  Good  Psychomotor Behavior:  Appropriate  Mood:sad  Affect:  Appropriate  Hopelessness: Denies  Speech:  Normal  Thought Process:  Goal directed  Thought Content:  Normal  Suicidal:  None  Homicidal:  None  Hallucinations:  None  Delusion:  None  Memory:  Intact  Orientation:  Person, Place, Time, and Situation  Reliability:  good  Insight:  Good  Judgement:  Good  Impulse Control:  Good    PHQ-9 Depression Screening  Little interest or pleasure in doing things? (Patient-Rptd) (P) 1-->several days   Feeling down, depressed, or hopeless? (Patient-Rptd) (P) 1-->several days   Trouble falling or staying asleep, or sleeping too much? (Patient-Rptd) (P) 1-->several days   Feeling tired or having little energy? (Patient-Rptd) (P) 1-->several days   Poor appetite or overeating? (Patient-Rptd) (P) 2-->more than half the days   Feeling bad about yourself - or that you are a failure or have let yourself or your family down? (Patient-Rptd) (P) 1-->several days   Trouble concentrating on things, such as reading the newspaper or watching television? (Patient-Rptd) (P) 1-->several days   Moving or speaking so slowly that other people could have noticed? Or the opposite - being so fidgety or restless that you have been moving around a lot more than usual? (Patient-Rptd) (P) 1-->several days   Thoughts that you would be better off dead, or of hurting yourself in some way? (Patient-Rptd) (P) 0-->not at all   PHQ-9 Total Score (P) 9   If you checked off any problems, how difficult have these problems made it for you to do your work, take care of things at home, or get along with other people? (Patient-Rptd) (P) somewhat difficult         Assessment/Plan:  Diagnoses and all orders for this visit:    1. Bipolar 1 disorder, depressed, partial remission (Primary)  -     buPROPion XL (WELLBUTRIN XL) 300 MG 24 hr tablet; Take 1 tablet by mouth Daily.  Dispense: 30 tablet; Refill: 6  -     Cariprazine HCl (Vraylar) 3 MG capsule capsule; Take 1 capsule by mouth Daily.  Dispense: 30 capsule; Refill: 6  -     FLUoxetine (PROzac) 40 MG capsule; Take 2 capsules by mouth Daily.  Dispense: 60 capsule; Refill: 6  -     lamoTRIgine (LaMICtal) 100 MG tablet; Take 1 tablet by mouth Daily.  Dispense: 30 tablet; Refill: 2    2. Generalized anxiety disorder  -     hydrOXYzine pamoate (VISTARIL) 25 MG capsule; Take 1 capsule by mouth 3 (Three) Times a Day As Needed for Anxiety.  Dispense: 90 capsule; Refill: 6    3. Attention deficit hyperactivity disorder, combined type  -     amphetamine-dextroamphetamine (Adderall) 20 MG tablet; Take 20 mg orally every afternoon.  Dispense: 30 tablet; Refill: 0  -     amphetamine-dextroamphetamine XR (Adderall XR) 20 MG 24 hr capsule; Take 2 capsules by mouth Every Morning  Dispense: 60 capsule; Refill: 0    4. Psychophysiological insomnia  -     doxepin (SINEquan) 10 MG capsule; Take 1 capsule by mouth Every Night.  Dispense: 30 capsule; Refill: 2  -     eszopiclone (Lunesta) 3 MG tablet; Take 1 tablet by mouth At Night As Needed for Sleep. Take immediately before bedtime  Dispense: 30 tablet; Refill: 2      Patient overall is doing well on her medication.  Anxiety is down secondary to a new job.  We will continue medication as ordered and follow-up in a month.    A psychological evaluation was conducted in order to assess past and current level of functioning. Areas assessed included, but were not limited to: perception of social support, perception of ability to face and deal with challenges in life (positive functioning), anxiety symptoms, depressive symptoms, perspective on beliefs/belief system, coping skills for stress,  intelligence level,  Therapeutic rapport was established. Interventions conducted today were geared towards incorporating medication management along with support for continued therapy. Education was also provided as to the med management with this provider and what to expect in subsequent sessions.    We discussed risks, benefits,goals and side effects of the above medication and the patient was agreeable with the plan.Patient was educated on the importance of compliance with treatment and follow-up appointments. Patient is aware to contact the [facility] [phone] with any worsening of symptoms. To call for questions or concerns and return early if necessary. Patent is agreeable to go to the Emergency Department or call 911 should they begin SI/HI.     Part of this note may be an electronic transcription/translation of spoken language to printed text using the Dragon Dictation System.    Return in about 4 weeks (around 10/17/2024) for Follow Up 30 min, Video visit.    DALLAS Hoover

## 2024-10-03 ENCOUNTER — LAB (OUTPATIENT)
Dept: LAB | Facility: HOSPITAL | Age: 39
End: 2024-10-03
Payer: COMMERCIAL

## 2024-10-03 DIAGNOSIS — R73.03 PREDIABETES: Chronic | ICD-10-CM

## 2024-10-03 DIAGNOSIS — R03.0 BORDERLINE HYPERTENSION: ICD-10-CM

## 2024-10-03 PROCEDURE — 80053 COMPREHEN METABOLIC PANEL: CPT

## 2024-10-03 PROCEDURE — 83036 HEMOGLOBIN GLYCOSYLATED A1C: CPT

## 2024-10-04 LAB
ALBUMIN SERPL-MCNC: 4 G/DL (ref 3.5–5.2)
ALBUMIN/GLOB SERPL: 1.2 G/DL
ALP SERPL-CCNC: 127 U/L (ref 39–117)
ALT SERPL W P-5'-P-CCNC: 62 U/L (ref 1–33)
ANION GAP SERPL CALCULATED.3IONS-SCNC: 12.4 MMOL/L (ref 5–15)
AST SERPL-CCNC: 26 U/L (ref 1–32)
BILIRUB SERPL-MCNC: <0.2 MG/DL (ref 0–1.2)
BUN SERPL-MCNC: 11 MG/DL (ref 6–20)
BUN/CREAT SERPL: 11 (ref 7–25)
CALCIUM SPEC-SCNC: 9.3 MG/DL (ref 8.6–10.5)
CHLORIDE SERPL-SCNC: 101 MMOL/L (ref 98–107)
CO2 SERPL-SCNC: 24.6 MMOL/L (ref 22–29)
CREAT SERPL-MCNC: 1 MG/DL (ref 0.57–1)
EGFRCR SERPLBLD CKD-EPI 2021: 74.1 ML/MIN/1.73
GLOBULIN UR ELPH-MCNC: 3.4 GM/DL
GLUCOSE SERPL-MCNC: 91 MG/DL (ref 65–99)
HBA1C MFR BLD: 6 % (ref 4.8–5.6)
POTASSIUM SERPL-SCNC: 4.1 MMOL/L (ref 3.5–5.2)
PROT SERPL-MCNC: 7.4 G/DL (ref 6–8.5)
SODIUM SERPL-SCNC: 138 MMOL/L (ref 136–145)

## 2024-10-11 ENCOUNTER — TELEMEDICINE (OUTPATIENT)
Dept: PSYCHIATRY | Facility: CLINIC | Age: 39
End: 2024-10-11
Payer: COMMERCIAL

## 2024-10-11 DIAGNOSIS — F90.2 ATTENTION DEFICIT HYPERACTIVITY DISORDER, COMBINED TYPE: ICD-10-CM

## 2024-10-11 DIAGNOSIS — F31.75 BIPOLAR 1 DISORDER, DEPRESSED, PARTIAL REMISSION: Primary | ICD-10-CM

## 2024-10-11 DIAGNOSIS — F41.1 GENERALIZED ANXIETY DISORDER: ICD-10-CM

## 2024-10-11 NOTE — PROGRESS NOTES
"[facility]   Follow-up Progress Note     Date: October 11, 2024  Time In: 11:08  Time Out: 11:55      PROGRESS NOTE  Data:  Clare Turner is a 39 y.o. female presenting to [facility] (through [facility]), [address] [facility] [place], [address] using a secure MyChart Video Visit through [facility] for assessment with Stevenson Coker LCSW. The patient is seen remotely in their  car  using [facility] My Chart. Patient is being seen via telehealth and stated they are in a secure environment for this session. The patient's condition being diagnosed/treated is appropriate for telemedicine. The provider identified herself as well as her credentials. The patient and/or patients guardian consent to be seen remotely, and when consent is given they understand that the consent allows for patient identifiable information to be sent to a third party as needed. They may refuse to be seen remotely at any time. The electronic data is encrypted and password protected, and the patient has been advised of the potential risks to privacy not withstanding such measures.    Today Patient stated she has been doing well. Patient stated she got a new job and is \" a lot happier.\" Patient expressed she feels better about getting up and going to work. Patient stated her job responsibilities are more define that has helped decrease stress. Patient stated she has noticed she is less stressed outside of work and able to spend time with her family and friends and be fully present in those interactions. Patient stated she has her final dietician appointment this month and once all the paperwork is submitted to patient's insurance she will be able to schedule her surgery. Patient expressed she feel this surgery will help her to continue improving her life in there areas. Patient expressed she is also excited that she has been \"cigarette free for 2 " "months.\" Patient stated she does still vape occasionally. Patient expressed she never thought she would be able to stop but feels proud she was able to stop. Patient expressed she focused on replacing \"a bad habit with a good habit so when I felt like smoking I would get up and clean or chew gum.\" Patient stated she feels stopping smoking has also decreased her anxiety. Patient was engaged in reviewing treatment progress. Discussed with patient's barriers to maintaining scheduled appointments. Patient requested to make appoints less frequent to not feel like it is a chore or not attainable schedule to maintain.       Clinical Maneuvering/Intervention:    Chief Complaint: mood instability, anxiety, ADHD    (Scales based on 0 - 10 with 10 being the worst)  Depression: 5 Anxiety: 4     Assisted Patient in processing above session content; acknowledged and normalized patient’s thoughts, feelings, and concerns.  Rationalized patient thought process regarding quitting smoking.  Discussed triggers associated with patient's anxiety.  Also discussed coping skills for patient to implement such as drinking water.    Allowed Patient to freely discuss issues  without interruption or judgement with unconditional positive regard, active listening skills, and empathy. Therapist provided a safe, confidential environment to facilitate the development of a positive therapeutic relationship and encouraged open, honest communication. Assisted Patient in identifying risk factors which would indicate the need for higher level of care including thoughts to harm self or others and/or self-harming behavior and encouraged Patient to contact this office, call 911, or present to the nearest emergency room should any of these events occur. Discussed crisis intervention services and means to access. Patient adamantly and convincingly denies current suicidal or homicidal ideation or perceptual disturbance. Assisted Patient in processing session " content; acknowledged and normalized Patient’s thoughts, feelings, and concerns by utilizing a person-centered approach in efforts to build appropriate rapport and a positive therapeutic relationship with open and honest communication. Therapist utilized dialectical behavior techniques to teach and model emotional regulation and relaxation methods. Therapist assisted Patient with identifying and implementing healthier coping strategies.     Assessment   Patient appears to be experiencing heightened anxiety and depression in response to COVID-19 epidemic  As a result, they can be reasonably expected to continue to benefit from treatment and would likely be at increased risk for decompensation otherwise.    Mental Status Exam:   Hygiene:   good  Cooperation:  Cooperative  Eye Contact:  Good  Psychomotor Behavior:  Appropriate  Affect:  Full range  Mood:  optimistic, happy  Speech:  Normal  Thought Process:  Goal directed  Thought Content:  Mood congruent  Suicidal:  None  Homicidal:  None  Hallucinations:  None  Delusion:  None  Memory:  Intact  Orientation:  Person, Place, Time, and Situation  Reliability:  good  Insight:  Good  Judgement:  Good  Impulse Control:  Good  Physical/Medical Issues:  No      PHQ-Score Total:  PHQ-9 Total Score:        Patient's Support Network Includes:  significant other, children, mother, extended family, and friends    Functional Status: Moderate impairment     Progress toward goal: Not at goal    Prognosis: Good with Ongoing Treatment            Impression/Formulation:    VISIT DIAGNOSIS:     ICD-10-CM ICD-9-CM   1. Bipolar 1 disorder, depressed, partial remission  F31.75 296.55   2. Generalized anxiety disorder  F41.1 300.02   3. Attention deficit hyperactivity disorder, combined type  F90.2 314.01        Patient appeared alert and oriented.  Patient is voluntarily requesting to continue outpatient therapy at [facility].  Patient is receptive to  assistance with maintaining a stable lifestyle.  Patient presents with history of mood instability, anxiety, and ADHD.  Patient is agreeable to attend routine therapy sessions.  Patient expressed desire to maintain stability and participate in the therapeutic process.        Crisis Plan:  Symptoms and/or behaviors to indicate a crisis: Isolation and Increased hunger or lack of appetite    What calming techniques or other strategies will patient use to de-esclate and stay safe: slow down, breathe, visualize calming self, think it though, listen to music, change focus, take a walk    Who is one person patient can contact to assist with de-escalation? sister    If symptoms/behaviors persist, Patient will present to the nearest hospital for an assessment. Advised patient of [facility] ER and assessment services.     Plan:   Patient will continue in individual outpatient therapy with focus on improved functioning and coping skills, maintaining stability, and avoiding decompensation and the need for higher level of care.    Patient will contact this office ([facility] at [phone]), call 911 or present to the nearest emergency room should suicidal or homicidal ideations occur. Provide Cognitive Behavioral Therapy and Solution Focused Therapy to improve functioning, maintain stability, and avoid decompensation and the need for higher level of care.     Return in about 4 weeks, or earlier if symptoms worsen or fail to improve.    Recommended Referrals: none        This document has been electronically signed by Stevenson Coker LCSW  October 11, 2024 11:08 EDT        Part of this note may be an electronic transcription/translation of spoken language to printed text using the Dragon Dictation System.

## 2024-10-11 NOTE — PLAN OF CARE
"Patient was engaged in reviewing treatment progress. Patient expressed she did \"backslide\" on treatment and felt like it was becoming \"a chore.\" Patient expressed she would like to continue replacing behaviors that were facilitating her anxiety with behaviors that decrease anxiety. Patient stated she also would like to continue increasing her awareness of feeling manic to be able to apply coping skills to deescalate her mood.     Problem: Anxiety 3  Goal: Patient will develop and implement behavioral and cognitive strategies to reduce anxiety and irrational fears.  Outcome: Progressing     Problem: Mood Instability 3  Goal: Patient will achieve mood stability as evidenced by controlled behavior and a more deliberate thought process  Outcome: Progressing     "

## 2024-10-18 ENCOUNTER — OFFICE VISIT (OUTPATIENT)
Dept: INTERNAL MEDICINE | Facility: CLINIC | Age: 39
End: 2024-10-18
Payer: COMMERCIAL

## 2024-10-18 VITALS
HEART RATE: 68 BPM | WEIGHT: 293 LBS | TEMPERATURE: 98 F | HEIGHT: 66 IN | BODY MASS INDEX: 47.09 KG/M2 | DIASTOLIC BLOOD PRESSURE: 64 MMHG | SYSTOLIC BLOOD PRESSURE: 122 MMHG

## 2024-10-18 DIAGNOSIS — R73.03 PREDIABETES: Primary | Chronic | ICD-10-CM

## 2024-10-18 DIAGNOSIS — M79.89 LEG SWELLING: ICD-10-CM

## 2024-10-18 DIAGNOSIS — E66.01 MORBID OBESITY WITH BODY MASS INDEX OF 50 OR HIGHER: Chronic | ICD-10-CM

## 2024-10-18 DIAGNOSIS — G25.81 RESTLESS LEG SYNDROME: Chronic | ICD-10-CM

## 2024-10-18 PROCEDURE — 99214 OFFICE O/P EST MOD 30 MIN: CPT | Performed by: STUDENT IN AN ORGANIZED HEALTH CARE EDUCATION/TRAINING PROGRAM

## 2024-10-18 PROCEDURE — 1125F AMNT PAIN NOTED PAIN PRSNT: CPT | Performed by: STUDENT IN AN ORGANIZED HEALTH CARE EDUCATION/TRAINING PROGRAM

## 2024-10-18 PROCEDURE — 1160F RVW MEDS BY RX/DR IN RCRD: CPT | Performed by: STUDENT IN AN ORGANIZED HEALTH CARE EDUCATION/TRAINING PROGRAM

## 2024-10-18 PROCEDURE — 1159F MED LIST DOCD IN RCRD: CPT | Performed by: STUDENT IN AN ORGANIZED HEALTH CARE EDUCATION/TRAINING PROGRAM

## 2024-10-18 NOTE — PROGRESS NOTES
"Chief Complaint  Clare Turner is a 39 y.o. female presenting for Severe obesity .     From [place]. . 3 children. 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     Patient has a past medical history of hyperlipidemia, bipolar 1 disorder w/depression and Hx of jack, ADHD, GERD, episcleritis, restless legs, moderate persistent asthma (since childhood, f/u pulm), lung nodule stable for years, history of alcohol and substance abuse, urge/stress incontinence and severe obesity.    History of Present Illness  Patient is here for monthly follow-up.    We did start on spironolactone due to leg swelling.  She did not have any side effects, and reports her ankles have been less tight, also her legs feel better.    She reports having some leg cramps at night, she has been taking magnesium, which seems to help her.  She is asking if she is safe to continue.    Patient remains tobacco free, but she continues to gain weight since she stopped smoking.    She is now planning to get in touch with the bariatric surgery again, she is hopeful that she has met criteria for approval for bariatric surgery.    The following portions of the patient's history were reviewed and updated as appropriate: allergies, current medications, past family history, past medical history, past social history, past surgical history, and problem list.    Objective  /64 (BP Location: Left arm, Patient Position: Sitting, Cuff Size: Large Adult)   Pulse 68   Temp 98 °F (36.7 °C) (Temporal)   Ht 168.9 cm (66.5\")   Wt (!) 150 kg (330 lb 6.4 oz)   BMI 52.54 kg/m²     Physical Exam  Constitutional:       Appearance: Normal appearance.   HENT:      Head: Normocephalic and atraumatic.   Eyes:      Extraocular Movements: Extraocular movements intact.      Conjunctiva/sclera: Conjunctivae normal.   Pulmonary:      Effort: Pulmonary effort is " normal. No respiratory distress.   Musculoskeletal:      Cervical back: Normal range of motion and neck supple.   Neurological:      Mental Status: She is alert and oriented to person, place, and time. Mental status is at baseline.   Psychiatric:         Behavior: Behavior normal.         Thought Content: Thought content normal.         Assessment/Plan   1. Prediabetes  Hemoglobin A1C   Date Value Ref Range Status   10/03/2024 6.00 (H) 4.80 - 5.60 % Final   07/18/2024 5.9 (H) 4.8 - 5.6 % Final     Comment:              Prediabetes: 5.7 - 6.4           Diabetes: >6.4           Glycemic control for adults with diabetes: <7.0     04/18/2024 5.70 (H) 4.80 - 5.60 % Final   05/04/2023 5.40 4.80 - 5.60 % Final   Worsening glycemic control.  Still in the prediabetic range.  Hopefully the bariatric surgery will be approved so she can lose weight and likely will improve glycemic control    2. Morbid obesity with body mass index of 50 or higher  Wt Readings from Last 3 Encounters:   10/18/24 (!) 150 kg (330 lb 6.4 oz)   09/17/24 (!) 144 kg (318 lb 6.4 oz)   08/20/24 (!) 141 kg (311 lb 3.2 oz)   She has gained another 12 pounds since her last visit, close to 30 pounds since July.  Encouraged continued lifestyle activities, watching portion sizes, exercising.    3. Leg swelling  Symptoms improved on spironolactone.  Will continue to monitor on this medication.    4. Restless leg syndrome  Safe to continue magnesium.  Will continue to monitor.      Return in about 4 weeks (around 11/15/2024) for Recheck.    Future Appointments         Provider Department Center    10/21/2024 3:00 PM Nani Ball APRN [facility]    11/14/2024 8:00 AM Michael Miner MD [facility] [place]    11/15/2024 4:15 PM Michael Miner MD [facility] [place]    11/25/2024 10:00 AM Stevenson Coker, [facility]  [facility]    1/6/2025 3:30 PM Stevenson Coker, LCSW [facility]    1/24/2025 8:30 AM Khadra Clements, APRN [facility] [place]              Michael Miner MD  Family Medicine  10/18/2024

## 2024-10-21 ENCOUNTER — TELEMEDICINE (OUTPATIENT)
Dept: PSYCHIATRY | Facility: CLINIC | Age: 39
End: 2024-10-21
Payer: COMMERCIAL

## 2024-10-21 ENCOUNTER — PRIOR AUTHORIZATION (OUTPATIENT)
Dept: PSYCHIATRY | Facility: CLINIC | Age: 39
End: 2024-10-21

## 2024-10-21 DIAGNOSIS — F31.75 BIPOLAR 1 DISORDER, DEPRESSED, PARTIAL REMISSION: Primary | ICD-10-CM

## 2024-10-21 DIAGNOSIS — F51.04 PSYCHOPHYSIOLOGICAL INSOMNIA: ICD-10-CM

## 2024-10-21 DIAGNOSIS — F90.2 ATTENTION DEFICIT HYPERACTIVITY DISORDER, COMBINED TYPE: ICD-10-CM

## 2024-10-21 DIAGNOSIS — F41.1 GENERALIZED ANXIETY DISORDER: ICD-10-CM

## 2024-10-21 RX ORDER — DEXTROAMPHETAMINE SACCHARATE, AMPHETAMINE ASPARTATE MONOHYDRATE, DEXTROAMPHETAMINE SULFATE AND AMPHETAMINE SULFATE 5; 5; 5; 5 MG/1; MG/1; MG/1; MG/1
40 CAPSULE, EXTENDED RELEASE ORAL EVERY MORNING
Qty: 60 CAPSULE | Refills: 0 | Status: SHIPPED | OUTPATIENT
Start: 2024-10-21

## 2024-10-21 RX ORDER — DEXTROAMPHETAMINE SACCHARATE, AMPHETAMINE ASPARTATE, DEXTROAMPHETAMINE SULFATE AND AMPHETAMINE SULFATE 5; 5; 5; 5 MG/1; MG/1; MG/1; MG/1
TABLET ORAL
Qty: 30 TABLET | Refills: 0 | Status: SHIPPED | OUTPATIENT
Start: 2024-10-21

## 2024-10-21 NOTE — PROGRESS NOTES
Patient Name: Clare Turner  MRN: [id number]   :  1985     This provider is located at her home office through the [facility] (through [facility]), [address], [address] using a secure [employer] Video Visit through [employer]. Patient is being seen remotely via telehealth at their home address in [place], and stated they are in a secure environment for this session. The patient's condition being diagnosed/treated is appropriate for telemedicine. The provider identified herself as well as her credentials.   The patient, and/or patients guardian, consent to be seen remotely, and when consent is given they understand that the consent allows for patient identifiable information to be sent to a third party as needed.   They may refuse to be seen remotely at any time. The electronic data is encrypted and password protected, and the patient and/or guardian has been advised of the potential risks to privacy not withstanding such measures.    You have chosen to receive care through a telehealth visit.  Do you consent to use a video/audio connection for your medical care today? Yes    Chief Complaint:      ICD-10-CM ICD-9-CM   1. Bipolar 1 disorder, depressed, partial remission  F31.75 296.55   2. Generalized anxiety disorder  F41.1 300.02   3. Attention deficit hyperactivity disorder, combined type  F90.2 314.01   4. Psychophysiological insomnia  F51.04 307.42       History of Present Illness: Clare Turner is a 39 y.o. female is here today for medication management follow up.  Patient has had some swings into jack.  Has also had some crashes into depression.    The following portions of the patient's history were reviewed and updated as appropriate: allergies, current medications, past family history, past medical history, past social history, past surgical history, and problem list.    Review of Systems;;  Review of Systems   Constitutional:  Negative  for activity change, appetite change, fatigue, unexpected weight gain and unexpected weight loss.   Respiratory:  Negative for shortness of breath and wheezing.    Gastrointestinal:  Negative for constipation, diarrhea, nausea and vomiting.   Musculoskeletal:  Negative for gait problem.   Skin:  Negative for dry skin and rash.   Neurological:  Negative for dizziness, speech difficulty, weakness, light-headedness, headache, memory problem and confusion.   Psychiatric/Behavioral:  Positive for positive for hyperactivity and stress. Negative for agitation, behavioral problems, decreased concentration, dysphoric mood, hallucinations, self-injury, sleep disturbance, suicidal ideas and depressed mood. The patient is not nervous/anxious.        Physical Exam;;  Physical Exam  Constitutional:       General: She is not in acute distress.     Appearance: She is well-developed. She is not diaphoretic.   HENT:      Head: Normocephalic and atraumatic.   Eyes:      Conjunctiva/sclera: Conjunctivae normal.   Pulmonary:      Effort: Pulmonary effort is normal. No respiratory distress.   Musculoskeletal:         General: Normal range of motion.      Cervical back: Full passive range of motion without pain and normal range of motion.   Neurological:      Mental Status: She is alert and oriented to person, place, and time.   Psychiatric:         Mood and Affect: Mood is not anxious or depressed. Affect is not labile, blunt, angry or inappropriate.         Speech: Speech is not rapid and pressured or tangential.         Behavior: Behavior normal. Behavior is hyperactive. Behavior is not agitated, slowed, aggressive, withdrawn or combative. Behavior is cooperative.         Thought Content: Thought content normal. Thought content is not paranoid or delusional. Thought content does not include homicidal or suicidal ideation. Thought content does not include homicidal or suicidal plan.         Judgment: Judgment normal. Judgment is  impulsive.       not currently breastfeeding.  There is no height or weight on file to calculate BMI. Video appt unable to obtain.     Current Medications;;    Current Outpatient Medications:     amphetamine-dextroamphetamine (Adderall) 20 MG tablet, Take 20 mg orally every afternoon., Disp: 30 tablet, Rfl: 0    amphetamine-dextroamphetamine XR (Adderall XR) 20 MG 24 hr capsule, Take 2 capsules by mouth Every Morning, Disp: 60 capsule, Rfl: 0    albuterol (PROVENTIL) (2.5 MG/3ML) 0.083% nebulizer solution, Take 2.5 mg by nebulization 4 (Four) Times a Day As Needed for Wheezing., Disp: 120 each, Rfl: 5    albuterol sulfate  (90 Base) MCG/ACT inhaler, Inhale 2 puffs 2 (Two) Times a Day., Disp: 18 g, Rfl: 5    atorvastatin (LIPITOR) 10 MG tablet, Take 1 tablet by mouth Daily., Disp: 30 tablet, Rfl: 5    budesonide (Pulmicort) 0.5 MG/2ML nebulizer solution, Take 2 mL by nebulization 2 (Two) Times a Day., Disp: 120 each, Rfl: 11    buPROPion XL (WELLBUTRIN XL) 300 MG 24 hr tablet, Take 1 tablet by mouth Daily., Disp: 30 tablet, Rfl: 6    Cariprazine HCl (Vraylar) 4.5 MG capsule capsule, Take 1 capsule by mouth Daily., Disp: 30 capsule, Rfl: 2    doxepin (SINEquan) 10 MG capsule, Take 1 capsule by mouth Every Night., Disp: 30 capsule, Rfl: 2    Dupilumab 300 MG/2ML solution pen-injector, Inject 2 mL under the skin into the appropriate area as directed Every 14 (Fourteen) Days., Disp: 2 mL, Rfl: 11    EPINEPHrine (EPIPEN) 0.3 MG/0.3ML solution auto-injector injection, Inject 1 pen in the muscle as directed PRN allergic reaction, Disp: 2 each, Rfl: 1    eszopiclone (Lunesta) 3 MG tablet, Take 1 tablet by mouth At Night As Needed for Sleep. Take immediately before bedtime, Disp: 30 tablet, Rfl: 2    FLUoxetine (PROzac) 40 MG capsule, Take 2 capsules by mouth Daily., Disp: 60 capsule, Rfl: 6    Fluticasone Furoate-Vilanterol (BREO ELLIPTA) 200-25 MCG/ACT inhaler, Inhale 1 puff Daily. 1 inhalation once a day, Disp: 60  each, Rfl: 5    hydrOXYzine pamoate (VISTARIL) 25 MG capsule, Take 1 capsule by mouth 3 (Three) Times a Day As Needed for Anxiety., Disp: 90 capsule, Rfl: 6    lamoTRIgine (LaMICtal) 100 MG tablet, Take 1 tablet by mouth Daily., Disp: 30 tablet, Rfl: 2    loratadine (CLARITIN) 10 MG tablet, Take 1 tablet by mouth Daily., Disp: 30 tablet, Rfl: 5    Omeprazole 20 MG tablet delayed-release, Take 20 mg by mouth Daily., Disp: 30 each, Rfl: 4    spironolactone (Aldactone) 25 MG tablet, Take 1 tablet by mouth Daily., Disp: 30 tablet, Rfl: 1    Lab Results:   Lab on 10/03/2024   Component Date Value Ref Range Status    Hemoglobin A1C 10/03/2024 6.00 (H)  4.80 - 5.60 % Final    Glucose 10/03/2024 91  65 - 99 mg/dL Final    BUN 10/03/2024 11  6 - 20 mg/dL Final    Creatinine 10/03/2024 1.00  0.57 - 1.00 mg/dL Final    Sodium 10/03/2024 138  136 - 145 mmol/L Final    Potassium 10/03/2024 4.1  3.5 - 5.2 mmol/L Final    Chloride 10/03/2024 101  98 - 107 mmol/L Final    CO2 10/03/2024 24.6  22.0 - 29.0 mmol/L Final    Calcium 10/03/2024 9.3  8.6 - 10.5 mg/dL Final    Total Protein 10/03/2024 7.4  6.0 - 8.5 g/dL Final    Albumin 10/03/2024 4.0  3.5 - 5.2 g/dL Final    ALT (SGPT) 10/03/2024 62 (H)  1 - 33 U/L Final    AST (SGOT) 10/03/2024 26  1 - 32 U/L Final    Alkaline Phosphatase 10/03/2024 127 (H)  39 - 117 U/L Final    Total Bilirubin 10/03/2024 <0.2  0.0 - 1.2 mg/dL Final    Globulin 10/03/2024 3.4  gm/dL Final    A/G Ratio 10/03/2024 1.2  g/dL Final    BUN/Creatinine Ratio 10/03/2024 11.0  7.0 - 25.0 Final    Anion Gap 10/03/2024 12.4  5.0 - 15.0 mmol/L Final    eGFR 10/03/2024 74.1  >60.0 mL/min/1.73 Final   Lab Requisition on 08/05/2024   Component Date Value Ref Range Status    Case Report 08/05/2024    Final                    Value:Surgical Pathology Report                         Case: [id number]                                  Authorizing Provider:  Derrick Spencer MD    Collected:           08/05/2024 07:50 AM           Ordering Location:     [facility]   Received:            08/05/2024 12:00 PM                                 LABORATORY                                                                   Pathologist:           Rogelio Villanueva MD                                                       Specimens:   1) - Gastric, Antrum                                                                                2) - Esophagus, Distal                                                                     Clinical Information 08/05/2024    Final                    Value:This result contains rich text formatting which cannot be displayed here.    Final Diagnosis 08/05/2024    Final                    Value:This result contains rich text formatting which cannot be displayed here.    Gross Description 08/05/2024    Final                    Value:This result contains rich text formatting which cannot be displayed here.    Microscopic Description 08/05/2024    Final                    Value:This result contains rich text formatting which cannot be displayed here.       Mental Status Exam:   Hygiene:   good  Cooperation:  Cooperative  Eye Contact:  Good  Psychomotor Behavior:  Appropriate  Mood:dysthymic  Affect:  Appropriate  Hopelessness: Denies  Speech:  Normal  Thought Process:  Goal directed  Thought Content:  Normal  Suicidal:  None  Homicidal:  None  Hallucinations:  None  Delusion:  None  Memory:  Intact  Orientation:  Person, Place, Time, and Situation  Reliability:  good  Insight:  Good  Judgement:  Good  Impulse Control:  Good    PHQ-9 Depression Screening  Little interest or pleasure in doing things? (Patient-Rptd) Several days   Feeling down, depressed, or hopeless? (Patient-Rptd) Several days   PHQ-2 Total Score (Patient-Rptd) 2   Trouble falling or staying asleep, or sleeping too much? (Patient-Rptd) Several days   Feeling tired or having little energy? (Patient-Rptd) Several days   Poor appetite or  overeating? (Patient-Rptd) Over half   Feeling bad about yourself - or that you are a failure or have let yourself or your family down? (Patient-Rptd) Several days   Trouble concentrating on things, such as reading the newspaper or watching television? (Patient-Rptd) Several days   Moving or speaking so slowly that other people could have noticed? Or the opposite - being so fidgety or restless that you have been moving around a lot more than usual? (Patient-Rptd) Several days   Thoughts that you would be better off dead, or of hurting yourself in some way? (Patient-Rptd) Not at all   PHQ-9 Total Score (Patient-Rptd) 9   If you checked off any problems, how difficult have these problems made it for you to do your work, take care of things at home, or get along with other people? (Patient-Rptd) Somewhat difficult         Assessment/Plan:  Diagnoses and all orders for this visit:    1. Bipolar 1 disorder, depressed, partial remission (Primary)  -     Cariprazine HCl (Vraylar) 4.5 MG capsule capsule; Take 1 capsule by mouth Daily.  Dispense: 30 capsule; Refill: 2    2. Generalized anxiety disorder    3. Attention deficit hyperactivity disorder, combined type  -     amphetamine-dextroamphetamine (Adderall) 20 MG tablet; Take 20 mg orally every afternoon.  Dispense: 30 tablet; Refill: 0  -     amphetamine-dextroamphetamine XR (Adderall XR) 20 MG 24 hr capsule; Take 2 capsules by mouth Every Morning  Dispense: 60 capsule; Refill: 0    4. Psychophysiological insomnia      Patient has had some jack.  We will increase Vraylar to 4.5 mg daily.  We will follow-up in a month.    A psychological evaluation was conducted in order to assess past and current level of functioning. Areas assessed included, but were not limited to: perception of social support, perception of ability to face and deal with challenges in life (positive functioning), anxiety symptoms, depressive symptoms, perspective on beliefs/belief system, coping  skills for stress, intelligence level,  Therapeutic rapport was established. Interventions conducted today were geared towards incorporating medication management along with support for continued therapy. Education was also provided as to the med management with this provider and what to expect in subsequent sessions.    We discussed risks, benefits,goals and side effects of the above medication and the patient was agreeable with the plan.Patient was educated on the importance of compliance with treatment and follow-up appointments. Patient is aware to contact the [facility] [phone] with any worsening of symptoms. To call for questions or concerns and return early if necessary. Patent is agreeable to go to the Emergency Department or call 911 should they begin SI/HI.     Part of this note may be an electronic transcription/translation of spoken language to printed text using the Dragon Dictation System.    Return in about 4 weeks (around 11/18/2024) for Follow Up 30 min, Video visit.    DALLAS Hoover

## 2024-10-25 DIAGNOSIS — R06.00 DYSPNEA, UNSPECIFIED TYPE: ICD-10-CM

## 2024-10-25 DIAGNOSIS — R53.83 FATIGUE, UNSPECIFIED TYPE: Primary | ICD-10-CM

## 2024-11-04 ENCOUNTER — LAB (OUTPATIENT)
Dept: LAB | Facility: HOSPITAL | Age: 39
End: 2024-11-04
Payer: COMMERCIAL

## 2024-11-04 DIAGNOSIS — R06.00 DYSPNEA, UNSPECIFIED TYPE: ICD-10-CM

## 2024-11-04 DIAGNOSIS — R53.83 FATIGUE, UNSPECIFIED TYPE: ICD-10-CM

## 2024-11-04 LAB
DEPRECATED RDW RBC AUTO: 40.8 FL (ref 37–54)
ERYTHROCYTE [DISTWIDTH] IN BLOOD BY AUTOMATED COUNT: 12.8 % (ref 12.3–15.4)
HCT VFR BLD AUTO: 40.5 % (ref 34–46.6)
HGB BLD-MCNC: 13.7 G/DL (ref 12–15.9)
MCH RBC QN AUTO: 29.7 PG (ref 26.6–33)
MCHC RBC AUTO-ENTMCNC: 33.8 G/DL (ref 31.5–35.7)
MCV RBC AUTO: 87.9 FL (ref 79–97)
PLATELET # BLD AUTO: 464 10*3/MM3 (ref 140–450)
PMV BLD AUTO: 10.5 FL (ref 6–12)
RBC # BLD AUTO: 4.61 10*6/MM3 (ref 3.77–5.28)
WBC NRBC COR # BLD AUTO: 16.34 10*3/MM3 (ref 3.4–10.8)

## 2024-11-04 PROCEDURE — 85027 COMPLETE CBC AUTOMATED: CPT

## 2024-11-04 PROCEDURE — 36415 COLL VENOUS BLD VENIPUNCTURE: CPT

## 2024-11-04 PROCEDURE — 80053 COMPREHEN METABOLIC PANEL: CPT

## 2024-11-05 LAB
ALBUMIN SERPL-MCNC: 4 G/DL (ref 3.5–5.2)
ALBUMIN/GLOB SERPL: 1.3 G/DL
ALP SERPL-CCNC: 116 U/L (ref 39–117)
ALT SERPL W P-5'-P-CCNC: 61 U/L (ref 1–33)
ANION GAP SERPL CALCULATED.3IONS-SCNC: 10.4 MMOL/L (ref 5–15)
AST SERPL-CCNC: 34 U/L (ref 1–32)
BILIRUB SERPL-MCNC: <0.2 MG/DL (ref 0–1.2)
BUN SERPL-MCNC: 8 MG/DL (ref 6–20)
BUN/CREAT SERPL: 10.1 (ref 7–25)
CALCIUM SPEC-SCNC: 9.4 MG/DL (ref 8.6–10.5)
CHLORIDE SERPL-SCNC: 103 MMOL/L (ref 98–107)
CO2 SERPL-SCNC: 22.6 MMOL/L (ref 22–29)
CREAT SERPL-MCNC: 0.79 MG/DL (ref 0.57–1)
EGFRCR SERPLBLD CKD-EPI 2021: 98.3 ML/MIN/1.73
GLOBULIN UR ELPH-MCNC: 3.2 GM/DL
GLUCOSE SERPL-MCNC: 87 MG/DL (ref 65–99)
POTASSIUM SERPL-SCNC: 4.1 MMOL/L (ref 3.5–5.2)
PROT SERPL-MCNC: 7.2 G/DL (ref 6–8.5)
SODIUM SERPL-SCNC: 136 MMOL/L (ref 136–145)

## 2024-11-12 ENCOUNTER — DOCUMENTATION (OUTPATIENT)
Dept: BARIATRICS/WEIGHT MGMT | Facility: CLINIC | Age: 39
End: 2024-11-12
Payer: COMMERCIAL

## 2024-11-12 ENCOUNTER — CONSULT (OUTPATIENT)
Dept: BARIATRICS/WEIGHT MGMT | Facility: CLINIC | Age: 39
End: 2024-11-12
Payer: COMMERCIAL

## 2024-11-12 VITALS
RESPIRATION RATE: 18 BRPM | WEIGHT: 293 LBS | TEMPERATURE: 97.3 F | SYSTOLIC BLOOD PRESSURE: 124 MMHG | HEIGHT: 67 IN | DIASTOLIC BLOOD PRESSURE: 74 MMHG | HEART RATE: 105 BPM | BODY MASS INDEX: 45.99 KG/M2

## 2024-11-12 DIAGNOSIS — K44.9 HIATAL HERNIA WITH GASTROESOPHAGEAL REFLUX: ICD-10-CM

## 2024-11-12 DIAGNOSIS — K21.9 HIATAL HERNIA WITH GASTROESOPHAGEAL REFLUX: ICD-10-CM

## 2024-11-12 PROBLEM — E66.01 MORBID OBESITY WITH BODY MASS INDEX (BMI) OF 50.0 TO 59.9 IN ADULT: Status: ACTIVE | Noted: 2024-11-12

## 2024-11-12 RX ORDER — SODIUM CHLORIDE 9 MG/ML
150 INJECTION, SOLUTION INTRAVENOUS CONTINUOUS
OUTPATIENT
Start: 2024-11-12 | End: 2024-11-13

## 2024-11-12 RX ORDER — ENOXAPARIN SODIUM 100 MG/ML
40 INJECTION SUBCUTANEOUS ONCE
OUTPATIENT
Start: 2024-11-12

## 2024-11-12 RX ORDER — SODIUM CHLORIDE 9 MG/ML
40 INJECTION, SOLUTION INTRAVENOUS AS NEEDED
OUTPATIENT
Start: 2024-11-12

## 2024-11-12 RX ORDER — SODIUM CHLORIDE 0.9 % (FLUSH) 0.9 %
3 SYRINGE (ML) INJECTION EVERY 12 HOURS SCHEDULED
OUTPATIENT
Start: 2024-11-12

## 2024-11-12 RX ORDER — GABAPENTIN 100 MG/1
600 CAPSULE ORAL ONCE
OUTPATIENT
Start: 2024-11-12 | End: 2024-11-12

## 2024-11-12 RX ORDER — CHLORHEXIDINE GLUCONATE ORAL RINSE 1.2 MG/ML
30 SOLUTION DENTAL
OUTPATIENT
Start: 2024-11-12 | End: 2024-11-12

## 2024-11-12 RX ORDER — SODIUM CHLORIDE 0.9 % (FLUSH) 0.9 %
3-10 SYRINGE (ML) INJECTION AS NEEDED
OUTPATIENT
Start: 2024-11-12

## 2024-11-12 RX ORDER — SCOLOPAMINE TRANSDERMAL SYSTEM 1 MG/1
1 PATCH, EXTENDED RELEASE TRANSDERMAL CONTINUOUS
OUTPATIENT
Start: 2024-11-12 | End: 2024-11-15

## 2024-11-12 RX ORDER — ACETAMINOPHEN 500 MG
1000 TABLET ORAL ONCE
OUTPATIENT
Start: 2024-11-12 | End: 2024-11-12

## 2024-11-12 RX ORDER — PANTOPRAZOLE SODIUM 40 MG/10ML
40 INJECTION, POWDER, LYOPHILIZED, FOR SOLUTION INTRAVENOUS ONCE
OUTPATIENT
Start: 2024-11-12 | End: 2024-11-12

## 2024-11-12 NOTE — PROGRESS NOTES
[facility]  [address]    [facility] [address]  [phone]      Patient  Name:  Clare Turner  :  1985      Date of Visit: 2024    Chief Complaint:  weight gain; unable to maintain weight loss.   Evaluate for possible metabolic and bariatric surgery    History of Present Illness:  Clare Turner is a 39 y.o. female who presents today for evaluation, education and consultation regarding metabolic and bariatric surgery (MBS).  Since last seen 10/18/2024 she has essentially maintained her weight.  The patient returns for final visit prior to metabolic and bariatric surgery specifically the sleeve gastrectomy.  Original intake evaluation Cherrie RIDLEY PA-C dated 2024 reviewed.  She notes the patient's maximum lifetime weight is 296 pounds and that she has a history of drug and substance abuse clean and sober since  and 100 pound weight gain since that time.      The patient has had issues with morbid obesity for years and only temporary success with non-surgical methods of weight loss.  The patient is seeking LSG to help with the morbid obesity related conditions of adenomatous colonic polyps, prediabetes with elevated hemoglobin A1c, restless leg syndrome, hyperlipidemia, history of methamphetamine use sober since 2020 ADD, bipolar 1 disorder, history of alcohol abuse clean date 2020, anxiety and depression, arthritis, asthma, elevated transaminases, fatigue, hiatal hernia with GE reflux disease and Schatzki's ring, hidradenitis suppurativa, leukocytosis, stable lung nodule, thrombocytosis, former smoker and vaping, current non-nicotine vaping.    39-year-old female from [facility].  In recovery from drug and alcohol abuse for the last 4-1/2 years.  Smoker and nicotine vapor in early remission, currently using nicotine free vape.  ADD.  Multiple antidepressants.  She said informed consent was very helpful and since then  she has quit products with high fructose corn syrup such as her Mountain Dew and is encouraged that she has lost 6 pounds in the last week since the talk.  She feels her over 30 pound weight gain in the last few months is related to stress, coming off her long-term Seroquel, and possibly some water weight as well as admitting to having her last eating hurrah prior to metabolic and bariatric surgery.  She understands this has put her BMI over 50 consistent with super morbid obesity and a higher risk surgical category.  She is aware at her BMI and extremely poor dietary habits noted on dietitian evaluation that sleeve gastrectomy at least theoretically is likely a first stage procedure.  She says her PCP is aware of her chronic leukocytosis and is not concerned.  Her tachycardia in the office today is asymptomatic.  She is on multiple pulmonary medications and thinks her breathing has improved since coming off of cigarettes but still has allergy related pulmonary issues.  She says she saw a dermatologist regarding her hidradenitis suppurativa which mostly affects her axilla and groin region.  She says she does not have sleep apnea.  She had 3 pregnancies, 1 miscarriage, first delivery vaginal, second delivery twins by .  Denies complications of pregnancy such as preeclampsia or gestational diabetes or VTE or bleeding issues.  She denies personal or family history of bleeding or clotting disorders.  No complications with her previous surgeries and says she was never sent home on anticoagulation after her surgeries or pregnancies.      Past Medical History:   Diagnosis Date    ADD (attention deficit disorder)     Adenomatous colon polyp 2024    Colonoscopy 2024.  Repeat 5 years      Allergic     Anxiety     Arthritis     Asthma     on Dupixent, follows w/ Pulmonary    Bipolar 1 disorder     Current every day non-nicotine vaping     Depression     Elevated hemoglobin A1c     Elevated transaminase  measurement     Episcleritis     Fatigue     Former smoker     GERD (gastroesophageal reflux disease)     H/O alcohol abuse     Clean date 2020    H/O drug abuse (CMS/[facility]) - methamphetamine     Clean date 2020    Heartburn     chronic/episodic, prn Omeprazole, EGD Dr. Spencer  small hiatal hernia with Schatzki's ring    Hidradenitis suppurativa     History of nicotine vaping     Hyperlipidemia     Leukocytosis     Lung nodule     stable - followed by pulmonary    Morbid obesity     Pneumonia 2022    Prediabetes 2022    A1C 5.7    Restless leg syndrome 2022    Seasonal allergic rhinitis due to pollen 10/12/2022    Thrombocytosis     Trichimoniasis 2017    Trichimoniasis 2020    Trichimoniasis 2020    Urinary tract infection      Past Surgical History:   Procedure Laterality Date    APPENDECTOMY      CERVICAL CONIZATION       SECTION       SECTION WITH TUBAL  2012    uncomplicated - twin delivery    COLONOSCOPY      FRACTURE SURGERY  2016    Closed talus bone fracture with bone graph    LAPAROSCOPIC APPENDECTOMY      LAPAROSCOPIC CHOLECYSTECTOMY  2014    dysfunction, no stones    NASAL FRACTURE SURGERY  2017    ORIF TIBIA FRACTURE Right 2016    talar fx    SEPTOPLASTY  2011    SINUS SURGERY  2011    debridement    TIBIAL TALAR FUSION Right 2017    w/ bone grafting    TONSILLECTOMY AND ADENOIDECTOMY      TUBAL ABDOMINAL LIGATION         Allergies   Allergen Reactions    Pecan Nut Anaphylaxis     Hives, swelling of lips and throat, difficulties breathing. Has epipen    Sulfa Antibiotics Rash    Sulfamethoxazole-Trimethoprim Rash       Current Outpatient Medications:     albuterol (PROVENTIL) (2.5 MG/3ML) 0.083% nebulizer solution, Take 2.5 mg by nebulization 4 (Four) Times a Day As Needed for Wheezing., Disp: 120 each, Rfl: 5    albuterol sulfate  (90 Base) MCG/ACT inhaler, Inhale 2 puffs 2 (Two) Times a Day., Disp: 18 g,  Rfl: 5    amphetamine-dextroamphetamine (Adderall) 20 MG tablet, Take 20 mg orally every afternoon., Disp: 30 tablet, Rfl: 0    amphetamine-dextroamphetamine XR (Adderall XR) 20 MG 24 hr capsule, Take 2 capsules by mouth Every Morning, Disp: 60 capsule, Rfl: 0    atorvastatin (LIPITOR) 10 MG tablet, Take 1 tablet by mouth Daily., Disp: 30 tablet, Rfl: 5    budesonide (Pulmicort) 0.5 MG/2ML nebulizer solution, Take 2 mL by nebulization 2 (Two) Times a Day., Disp: 120 each, Rfl: 11    buPROPion XL (WELLBUTRIN XL) 300 MG 24 hr tablet, Take 1 tablet by mouth Daily., Disp: 30 tablet, Rfl: 6    Cariprazine HCl (Vraylar) 4.5 MG capsule capsule, Take 1 capsule by mouth Daily., Disp: 30 capsule, Rfl: 2    doxepin (SINEquan) 10 MG capsule, Take 1 capsule by mouth Every Night., Disp: 30 capsule, Rfl: 2    Dupilumab 300 MG/2ML solution pen-injector, Inject 2 mL under the skin into the appropriate area as directed Every 14 (Fourteen) Days., Disp: 2 mL, Rfl: 11    eszopiclone (Lunesta) 3 MG tablet, Take 1 tablet by mouth At Night As Needed for Sleep. Take immediately before bedtime, Disp: 30 tablet, Rfl: 2    FLUoxetine (PROzac) 40 MG capsule, Take 2 capsules by mouth Daily., Disp: 60 capsule, Rfl: 6    Fluticasone Furoate-Vilanterol (BREO ELLIPTA) 200-25 MCG/ACT inhaler, Inhale 1 puff Daily. 1 inhalation once a day, Disp: 60 each, Rfl: 5    hydrOXYzine pamoate (VISTARIL) 25 MG capsule, Take 1 capsule by mouth 3 (Three) Times a Day As Needed for Anxiety., Disp: 90 capsule, Rfl: 6    lamoTRIgine (LaMICtal) 100 MG tablet, Take 1 tablet by mouth Daily., Disp: 30 tablet, Rfl: 2    Omeprazole 20 MG tablet delayed-release, Take 20 mg by mouth Daily., Disp: 30 each, Rfl: 4    spironolactone (Aldactone) 25 MG tablet, Take 1 tablet by mouth Daily., Disp: 30 tablet, Rfl: 1    apixaban (Eliquis) 2.5 MG tablet tablet, Take 1 tablet by mouth Every 12 (Twelve) Hours for 42 doses. (Patient not taking: Reported on 11/14/2024), Disp: 42 tablet,  Rfl: 0    EPINEPHrine (EPIPEN) 0.3 MG/0.3ML solution auto-injector injection, Inject 1 pen in the muscle as directed PRN allergic reaction, Disp: 2 each, Rfl: 1    loratadine (CLARITIN) 10 MG tablet, Take 1 tablet by mouth Daily., Disp: 30 tablet, Rfl: 5    valACYclovir (Valtrex) 1000 MG tablet, Take 1 tablet by mouth 3 (Three) Times a Day for 7 days., Disp: 21 tablet, Rfl: 0    Social History     Socioeconomic History    Marital status:    Tobacco Use    Smoking status: Former     Current packs/day: 0.00     Average packs/day: 0.3 packs/day for 30.8 years (10.0 ttl pk-yrs)     Types: Cigarettes     Start date: 2003     Quit date: 2024     Years since quittin.2     Passive exposure: Yes    Smokeless tobacco: Never    Tobacco comments:     Stopped smoking cigarettes and started vaping   Vaping Use    Vaping status: Former    Start date: 9/15/2022   Substance and Sexual Activity    Alcohol use: Not Currently     Comment: quit 20    Drug use: Not Currently     Types: Amphetamines, Marijuana, Methamphetamines     Comment: Clean ~ 2020    Sexual activity: Yes     Partners: Male     Birth control/protection: Other, Tubal ligation     Family History   Problem Relation Age of Onset    Diabetes Mother 50    Hyperlipidemia Mother     Mental illness Mother     Arthritis Mother     Osteoporosis Mother     Asthma Mother     Depression Mother     Endometriosis Mother     Cancer Mother     Hearing loss Mother     Hypertension Father     Obesity Father     Alcohol abuse Father     Early death Father     Depression Sister     Thyroid disease Maternal Grandmother     Hyperlipidemia Maternal Grandmother     Stroke Maternal Grandmother 50    Hypertension Maternal Grandmother     Obesity Maternal Grandmother     Developmental Disability Maternal Grandmother     Mental illness Maternal Grandmother     Liver disease Maternal Grandmother     Arthritis Maternal Grandmother     Migraines Maternal Grandmother      Diabetes Maternal Grandmother     Anxiety disorder Maternal Grandmother     Depression Maternal Grandmother     Hearing loss Maternal Grandmother     Miscarriages / Stillbirths Maternal Grandmother     Hypertension Maternal Grandfather     Heart attack Maternal Grandfather     Developmental Disability Maternal Grandfather     Arthritis Maternal Grandfather     Asthma Maternal Grandfather     Diabetes Maternal Grandfather     Heart disease Maternal Grandfather     Hearing loss Maternal Grandfather     Heart disease Paternal Grandmother     Hypertension Paternal Grandmother     Obesity Paternal Grandmother     Developmental Disability Paternal Grandmother     Arthritis Paternal Grandmother     Heart attack Paternal Grandmother     Heart failure Paternal Grandmother     Diabetes Paternal Grandmother     Stroke Paternal Grandmother     Heart disease Paternal Grandfather     Heart attack Paternal Grandfather     Hypertension Paternal Grandfather     Obesity Paternal Grandfather     Developmental Disability Paternal Grandfather     Arthritis Paternal Grandfather     Diabetes Paternal Grandfather     Cancer Maternal Uncle         Leukemia    Hearing loss Maternal Aunt        Review of Systems   Constitutional:  Positive for fatigue and unexpected weight gain. Negative for chills, diaphoresis, fever and unexpected weight loss.   HENT:  Negative for congestion and facial swelling.    Eyes:  Negative for blurred vision, double vision and discharge.   Respiratory:  Negative for chest tightness, shortness of breath and stridor.    Cardiovascular:  Negative for chest pain, palpitations and leg swelling.   Gastrointestinal:  Positive for GERD. Negative for blood in stool.   Endocrine: Negative for polydipsia.   Genitourinary:  Negative for hematuria.   Musculoskeletal:  Positive for arthralgias.   Skin:  Positive for skin lesions. Negative for color change.   Allergic/Immunologic: Negative for immunocompromised state.    Neurological:  Negative for confusion.   Psychiatric/Behavioral:  Positive for sleep disturbance. Negative for self-injury. The patient is nervous/anxious.        I have reviewed the ROS and confirm that it's accurate today.    Physical Exam:  Vital Signs:  Weight: (!) 151 kg (333 lb 6.4 oz)   Body mass index is 53.01 kg/m².  Temp: 97.3 °F (36.3 °C)   Heart Rate: 105   BP: 124/74     Physical Exam  Vitals reviewed.   Constitutional:       Appearance: She is well-developed.   HENT:      Head: Normocephalic and atraumatic.      Nose: Nose normal.      Comments: Nasal piercing  Eyes:      Conjunctiva/sclera: Conjunctivae normal.      Pupils: Pupils are equal, round, and reactive to light.   Neck:      Thyroid: No thyromegaly.      Vascular: No carotid bruit.      Trachea: No tracheal deviation.   Cardiovascular:      Rate and Rhythm: Regular rhythm. Tachycardia present.      Heart sounds: Normal heart sounds.   Pulmonary:      Effort: Pulmonary effort is normal. No respiratory distress.      Breath sounds: Normal breath sounds.   Abdominal:      General: There is no distension.      Palpations: Abdomen is soft.      Tenderness: There is no abdominal tenderness.      Comments: Laparoscopy scars, low transverse scar   Musculoskeletal:         General: No deformity. Normal range of motion.      Cervical back: Normal range of motion and neck supple.   Skin:     General: Skin is warm and dry.      Findings: No rash.      Comments: Tattoos and piercings   Neurological:      Mental Status: She is alert and oriented to person, place, and time.      Cranial Nerves: No cranial nerve deficit.      Coordination: Coordination normal.   Psychiatric:         Behavior: Behavior normal.         Thought Content: Thought content normal.         Judgment: Judgment normal.         Patient Active Problem List   Diagnosis    Annual GYN exam w/o problem    Bipolar 1 disorder    Personal history of smoking    Mixed hyperlipidemia    Mixed  incontinence urge and stress    Morbid obesity with body mass index of 50 or higher    Severe persistent asthma without complication    Restless leg syndrome    Attention deficit hyperactivity disorder (ADHD), combined type    14 mm left upper lobe lung nodule (3/2021)    History of substance use disorder    Seasonal allergic rhinitis due to pollen    Prediabetes    Episcleritis of right eye    Moderate persistent asthma without complication    Adenomatous colon polyp    Anxiety    Depression    Asthma    Fatigue    Heartburn    Borderline hypertension    Body mass index (BMI) of 50-59.9 in adult    Hiatal hernia with gastroesophageal reflux    Morbid obesity with body mass index (BMI) of 50.0 to 59.9 in adult    Hidradenitis suppurativa       Assessment:    Clare Turner is a 39 y.o. year old female with medically complicated obesity.    Metabolic and bariatric surgery is deemed medically necessary given the following obesity related comorbidities including adenomatous colonic polyps, prediabetes with elevated hemoglobin A1c, restless leg syndrome, hyperlipidemia, history of methamphetamine use sober since 1/28/2020 ADD, bipolar 1 disorder, history of alcohol abuse clean date 1/28/2020, anxiety and depression, arthritis, asthma, elevated transaminases, fatigue, hiatal hernia with GE reflux disease and Schatzki's ring, hidradenitis suppurativa, leukocytosis, stable lung nodule, thrombocytosis, former smoker and vaping, current non-nicotine vaping with current Weight: (!) 151 kg (333 lb 6.4 oz) and Body mass index is 53.01 kg/m²..    Encounter Diagnoses   Name Primary?    Body mass index (BMI) of 50-59.9 in adult Yes    Hiatal hernia with gastroesophageal reflux       Patient is aware that surgery is a tool, and that weight loss and improvement in comorbidities is not guaranteed but only seen in the context of appropriate use, follow up and physical activity.    The patient was present for an approximately a  2.5 hour discussion of the purpose of MBS, how MBS is a tool to assist in achieving weight loss goals, the most common complications and how best to avoid them, and the strategies for short and long term weight loss and improvement in comorbidities.  Ample opportunity to discuss questions was available both in group and during the time of individual examination.    I reviewed her Rodney report showing  Eszopiclone 3 mg # 30/mos, amphetamine  #90/mos, Adderall  #60/mos. labs dated 11/4/2024 unremarkable CMP except for an ALT of 61 AST of 34 CBC unremarkable several white count of 16.34 and a platelet count of 464 no differential performed of note hemoglobin 13.7.  Pulmonary function test dated 7/24/2024 FVC 87 FEV1 74 DLCO 86 no bronchodilators given.  EGD Dr. Spencer dated 8/5/2024 showing a small sliding hiatal hernia with a widemouth Schatzki's ring and distal esophageal spasm Z-line roughly 39 cm.  I noted that she was there in preop with her fiancé and both her current tobacco smokers and the patient and her fiancé voiced understanding that she will need to be tobacco nicotine and secondhand smoke free 2 weeks prior and 6 weeks after sleeve gastrectomy to minimize the risk of delayed leak and if they cannot comply the risk is prohibitive and she should not proceed with surgery and they voiced understanding she has heartburn controlled with daily omeprazole some substernal dysphagia for certain foods denied previous upper GI evaluation.  Pathology of the antrum showed mild reactive gastropathy negative for H. pylori distal esophageal biopsy showed squamous mucosa with nonspecific chronic inflammation.  Cardiology clearance dated 8/6/2024 Nilson Luis III, MD at low risk.  He did not hear a murmur on exam.  Pulmonary clearance dated 7/24/2024 Khadra HAYNES at low risk with a total criteria point count of 15 and a 1.6% pulmonary complication rate.  Psychosocial evaluation dated 7/18/2024 Shara FRAGOSO PhD good candidate  "notes she needs to quit any and all nicotine use.  Dietitian evaluation dated 7/18/2024 Patrica SAWYER JOSEMANUEL noting that protein intake is deficient to ensure successful weight loss that intake of processed and simple carbohydrates is extremely high her balance and variety of fruits and vegetables is fair reliance on restaurant food is very high and her ingestion of sweet beverages is extremely high.  Labs dated 7/18/2024 negative H. pylori breath test unremarkable CBC except for white count of 14.3 and platelet count of 500 differential unremarkable.  Unremarkable CMP except for an alkaline phosphatase of 144 hemoglobin A1c elevated 5.9 ferritin normal iron normal lipid panel normal except for an LDL of 122 TSH normal vitamin B12 and folate and vitamin D 25-hydroxy normal.  STOP-BANG sleep apnea questionnaire total score 1.  Please see scanned records that I have reviewed and signed off on today.  All of this in addition to the patient's unique history and exam has been taken into consideration in determining their appropriate candidacy for MBS.    Complications  of laparoscopic/possible robotic gastric sleeve were discussed. The patient is well aware of the potential complications of surgery that include but not limited to bleeding, infections, deep venous thrombosis, pulmonary embolism, pulmonary complications such as pneumonia, cardiac events, hernias, small bowel obstruction, damage to the spleen or other organs, bowel injury, disfiguring scars, failure to lose weight, need for additional surgery, conversion to an open procedure, and death. Patient is also aware of complications which apply in this particular procedure that can include but are not limited to a \"leak\" at the staple line which in some instances may require conversion to gastric bypass.    R/B/A Rx to concomitant hiatal hernia repair were discussed as outlined in our long consent form.  Briefly risks in addition to those for LSG include recurrent hernia, " ENMANUEL, dysphagia, esophageal injury, pneumothorax, injury to the vagus nerves, injury to the thoracic duct, aorta or vena cava.    As above, I discussed avoiding all tobacco products, nicotine,  and second hand smoke at least 2 weeks pre-operatively and 6 weeks post-operatively to minimize the risk of sleeve leak.  This included discussing the importance of avoiding even secondhand smoke as the risk of leak is increased.  Examples discussed:  Avoid going in a house or riding in a car where someone has previously smoked in the last 2 weeks and for 6 weeks postoperatively.  Avoid living in a house where someone smokes (even if it's in a separate room/patio/attached garage, etc.).   Avoid congregating with a group of people who are smoking even if it's outside.  It is OK to be around wood burning fires and barbecue.  I explained that I do not know if marijuana has a same effects but my overall recommendation is to avoid it for 2 weeks prior in 6 weeks after surgery.     Discussed the risks, benefits and alternative therapies at great length as outlined in our extensive consent forms, consent videos, and educational teaching process under the direction of the center's .    A copy of the patient's signed informed consent is on file.    R/B/A Rx discussed to postop anticoagulation incl but not limited to bleeding, drug reaction, venothromboembolic events, etc. and agreeable to taking post op  Eliquis 2.5 mg po Q 12 hrs #42        Plan:    After evaluation today I think the patient is a reasonable candidate for laparoscopic sleeve gastrectomy, hiatal hernia repair, and EGD.  Once again given her BMI and extremely poor dietary habits noted on dietitian evaluation sleeve gastrectomy at least theoretically may be a first stage procedure as above.  Increased risk for delayed leak from potential nicotine or secondhand smoke exposure as above.    Other issues include:  adenomatous colonic polyps, prediabetes  with elevated hemoglobin A1c, restless leg syndrome, hyperlipidemia, history of methamphetamine use sober since 1/28/2020 ADD, bipolar 1 disorder, history of alcohol abuse clean date 1/28/2020, anxiety and depression, arthritis, asthma, elevated transaminases, fatigue, hiatal hernia with GE reflux disease and Schatzki's ring, hidradenitis suppurativa, leukocytosis, stable lung nodule, thrombocytosis, former smoker and vaping, current non-nicotine vaping    Thank you Dr. Miner for the opportunity to evaluate Ms. Turner.          Derrick Spencer MD              Answers submitted by the patient for this visit:  Other (Submitted on 11/9/2024)  Please describe your symptoms.: Morbid Obesity  Have you had these symptoms before?: Yes  How long have you been having these symptoms?: Greater than 2 weeks  Primary Reason for Visit (Submitted on 11/9/2024)  What is the primary reason for your visit?: Problem Not Listed

## 2024-11-12 NOTE — H&P (VIEW-ONLY)
[facility] GROUP BARIATRIC SURGERY  [address]    [facility] [id number]  [phone]      Patient  Name:  Clare Turner  :  1985      Date of Visit: 2024    Chief Complaint:  weight gain; unable to maintain weight loss.   Evaluate for possible metabolic and bariatric surgery    History of Present Illness:  Clare Turner is a 39 y.o. female who presents today for evaluation, education and consultation regarding metabolic and bariatric surgery (MBS).  Since last seen 10/18/2024 she has essentially maintained her weight.  The patient returns for final visit prior to metabolic and bariatric surgery specifically the sleeve gastrectomy.  Original intake evaluation Cherrie RIDLEY PA-C dated 2024 reviewed.  She notes the patient's maximum lifetime weight is 296 pounds and that she has a history of drug and substance abuse clean and sober since  and 100 pound weight gain since that time.      The patient has had issues with morbid obesity for years and only temporary success with non-surgical methods of weight loss.  The patient is seeking LSG to help with the morbid obesity related conditions of adenomatous colonic polyps, prediabetes with elevated hemoglobin A1c, restless leg syndrome, hyperlipidemia, history of methamphetamine use sober since 2020 ADD, bipolar 1 disorder, history of alcohol abuse clean date 2020, anxiety and depression, arthritis, asthma, elevated transaminases, fatigue, hiatal hernia with GE reflux disease and Schatzki's ring, hidradenitis suppurativa, leukocytosis, stable lung nodule, thrombocytosis, former smoker and vaping, current non-nicotine vaping.    39-year-old female from [facility] [place].  In recovery from drug and alcohol abuse for the last 4-1/2 years.  Smoker and nicotine vapor in early remission, currently using nicotine free vape.  ADD.  Multiple antidepressants.  She said informed consent was very helpful and since then  she has quit products with high fructose corn syrup such as her Mountain Dew and is encouraged that she has lost 6 pounds in the last week since the talk.  She feels her over 30 pound weight gain in the last few months is related to stress, coming off her long-term Seroquel, and possibly some water weight as well as admitting to having her last eating hurrah prior to metabolic and bariatric surgery.  She understands this has put her BMI over 50 consistent with super morbid obesity and a higher risk surgical category.  She is aware at her BMI and extremely poor dietary habits noted on dietitian evaluation that sleeve gastrectomy at least theoretically is likely a first stage procedure.  She says her PCP is aware of her chronic leukocytosis and is not concerned.  Her tachycardia in the office today is asymptomatic.  She is on multiple pulmonary medications and thinks her breathing has improved since coming off of cigarettes but still has allergy related pulmonary issues.  She says she saw a dermatologist regarding her hidradenitis suppurativa which mostly affects her axilla and groin region.  She says she does not have sleep apnea.  She had 3 pregnancies, 1 miscarriage, first delivery vaginal, second delivery twins by .  Denies complications of pregnancy such as preeclampsia or gestational diabetes or VTE or bleeding issues.  She denies personal or family history of bleeding or clotting disorders.  No complications with her previous surgeries and says she was never sent home on anticoagulation after her surgeries or pregnancies.      Past Medical History:   Diagnosis Date    ADD (attention deficit disorder)     Adenomatous colon polyp 2024    Colonoscopy 2024.  Repeat 5 years      Allergic     Anxiety     Arthritis     Asthma     on Dupixent, follows w/ Pulmonary    Bipolar 1 disorder     Current every day non-nicotine vaping     Depression     Elevated hemoglobin A1c     Elevated transaminase  measurement     Episcleritis     Fatigue     Former smoker     GERD (gastroesophageal reflux disease)     H/O alcohol abuse     Clean date 2020    H/O drug abuse (CMS/[facility]) - methamphetamine     Clean date 2020    Heartburn     chronic/episodic, prn Omeprazole, EGD Dr. Spencer  small hiatal hernia with Schatzki's ring    Hidradenitis suppurativa     History of nicotine vaping     Hyperlipidemia     Leukocytosis     Lung nodule     stable - followed by pulmonary    Morbid obesity     Pneumonia 2022    Prediabetes 2022    A1C 5.7    Restless leg syndrome 2022    Seasonal allergic rhinitis due to pollen 10/12/2022    Thrombocytosis     Trichimoniasis 2017    Trichimoniasis 2020    Trichimoniasis 2020    Urinary tract infection      Past Surgical History:   Procedure Laterality Date    APPENDECTOMY      CERVICAL CONIZATION       SECTION       SECTION WITH TUBAL  2012    uncomplicated - twin delivery    COLONOSCOPY      FRACTURE SURGERY  2016    Closed talus bone fracture with bone graph    LAPAROSCOPIC APPENDECTOMY      LAPAROSCOPIC CHOLECYSTECTOMY  2014    dysfunction, no stones    NASAL FRACTURE SURGERY  2017    ORIF TIBIA FRACTURE Right 2016    talar fx    SEPTOPLASTY  2011    SINUS SURGERY  2011    debridement    TIBIAL TALAR FUSION Right 2017    w/ bone grafting    TONSILLECTOMY AND ADENOIDECTOMY      TUBAL ABDOMINAL LIGATION         Allergies   Allergen Reactions    Pecan Nut Anaphylaxis     Hives, swelling of lips and throat, difficulties breathing. Has epipen    Sulfa Antibiotics Rash    Sulfamethoxazole-Trimethoprim Rash       Current Outpatient Medications:     albuterol (PROVENTIL) (2.5 MG/3ML) 0.083% nebulizer solution, Take 2.5 mg by nebulization 4 (Four) Times a Day As Needed for Wheezing., Disp: 120 each, Rfl: 5    albuterol sulfate  (90 Base) MCG/ACT inhaler, Inhale 2 puffs 2 (Two) Times a Day., Disp: 18 g,  Rfl: 5    amphetamine-dextroamphetamine (Adderall) 20 MG tablet, Take 20 mg orally every afternoon., Disp: 30 tablet, Rfl: 0    amphetamine-dextroamphetamine XR (Adderall XR) 20 MG 24 hr capsule, Take 2 capsules by mouth Every Morning, Disp: 60 capsule, Rfl: 0    atorvastatin (LIPITOR) 10 MG tablet, Take 1 tablet by mouth Daily., Disp: 30 tablet, Rfl: 5    budesonide (Pulmicort) 0.5 MG/2ML nebulizer solution, Take 2 mL by nebulization 2 (Two) Times a Day., Disp: 120 each, Rfl: 11    buPROPion XL (WELLBUTRIN XL) 300 MG 24 hr tablet, Take 1 tablet by mouth Daily., Disp: 30 tablet, Rfl: 6    Cariprazine HCl (Vraylar) 4.5 MG capsule capsule, Take 1 capsule by mouth Daily., Disp: 30 capsule, Rfl: 2    doxepin (SINEquan) 10 MG capsule, Take 1 capsule by mouth Every Night., Disp: 30 capsule, Rfl: 2    Dupilumab 300 MG/2ML solution pen-injector, Inject 2 mL under the skin into the appropriate area as directed Every 14 (Fourteen) Days., Disp: 2 mL, Rfl: 11    eszopiclone (Lunesta) 3 MG tablet, Take 1 tablet by mouth At Night As Needed for Sleep. Take immediately before bedtime, Disp: 30 tablet, Rfl: 2    FLUoxetine (PROzac) 40 MG capsule, Take 2 capsules by mouth Daily., Disp: 60 capsule, Rfl: 6    Fluticasone Furoate-Vilanterol (BREO ELLIPTA) 200-25 MCG/ACT inhaler, Inhale 1 puff Daily. 1 inhalation once a day, Disp: 60 each, Rfl: 5    hydrOXYzine pamoate (VISTARIL) 25 MG capsule, Take 1 capsule by mouth 3 (Three) Times a Day As Needed for Anxiety., Disp: 90 capsule, Rfl: 6    lamoTRIgine (LaMICtal) 100 MG tablet, Take 1 tablet by mouth Daily., Disp: 30 tablet, Rfl: 2    Omeprazole 20 MG tablet delayed-release, Take 20 mg by mouth Daily., Disp: 30 each, Rfl: 4    spironolactone (Aldactone) 25 MG tablet, Take 1 tablet by mouth Daily., Disp: 30 tablet, Rfl: 1    apixaban (Eliquis) 2.5 MG tablet tablet, Take 1 tablet by mouth Every 12 (Twelve) Hours for 42 doses. (Patient not taking: Reported on 11/14/2024), Disp: 42 tablet,  Rfl: 0    EPINEPHrine (EPIPEN) 0.3 MG/0.3ML solution auto-injector injection, Inject 1 pen in the muscle as directed PRN allergic reaction, Disp: 2 each, Rfl: 1    loratadine (CLARITIN) 10 MG tablet, Take 1 tablet by mouth Daily., Disp: 30 tablet, Rfl: 5    valACYclovir (Valtrex) 1000 MG tablet, Take 1 tablet by mouth 3 (Three) Times a Day for 7 days., Disp: 21 tablet, Rfl: 0    Social History     Socioeconomic History    Marital status:    Tobacco Use    Smoking status: Former     Current packs/day: 0.00     Average packs/day: 0.3 packs/day for 30.8 years (10.0 ttl pk-yrs)     Types: Cigarettes     Start date: 2003     Quit date: 2024     Years since quittin.2     Passive exposure: Yes    Smokeless tobacco: Never    Tobacco comments:     Stopped smoking cigarettes and started vaping   Vaping Use    Vaping status: Former    Start date: 9/15/2022   Substance and Sexual Activity    Alcohol use: Not Currently     Comment: quit 21    Drug use: Not Currently     Types: Amphetamines, Marijuana, Methamphetamines     Comment: Clean ~ 2020    Sexual activity: Yes     Partners: Male     Birth control/protection: Other, Tubal ligation     Family History   Problem Relation Age of Onset    Diabetes Mother 50    Hyperlipidemia Mother     Mental illness Mother     Arthritis Mother     Osteoporosis Mother     Asthma Mother     Depression Mother     Endometriosis Mother     Cancer Mother     Hearing loss Mother     Hypertension Father     Obesity Father     Alcohol abuse Father     Early death Father     Depression Sister     Thyroid disease Maternal Grandmother     Hyperlipidemia Maternal Grandmother     Stroke Maternal Grandmother 50    Hypertension Maternal Grandmother     Obesity Maternal Grandmother     Developmental Disability Maternal Grandmother     Mental illness Maternal Grandmother     Liver disease Maternal Grandmother     Arthritis Maternal Grandmother     Migraines Maternal Grandmother      Diabetes Maternal Grandmother     Anxiety disorder Maternal Grandmother     Depression Maternal Grandmother     Hearing loss Maternal Grandmother     Miscarriages / Stillbirths Maternal Grandmother     Hypertension Maternal Grandfather     Heart attack Maternal Grandfather     Developmental Disability Maternal Grandfather     Arthritis Maternal Grandfather     Asthma Maternal Grandfather     Diabetes Maternal Grandfather     Heart disease Maternal Grandfather     Hearing loss Maternal Grandfather     Heart disease Paternal Grandmother     Hypertension Paternal Grandmother     Obesity Paternal Grandmother     Developmental Disability Paternal Grandmother     Arthritis Paternal Grandmother     Heart attack Paternal Grandmother     Heart failure Paternal Grandmother     Diabetes Paternal Grandmother     Stroke Paternal Grandmother     Heart disease Paternal Grandfather     Heart attack Paternal Grandfather     Hypertension Paternal Grandfather     Obesity Paternal Grandfather     Developmental Disability Paternal Grandfather     Arthritis Paternal Grandfather     Diabetes Paternal Grandfather     Cancer Maternal Uncle         Leukemia    Hearing loss Maternal Aunt        Review of Systems   Constitutional:  Positive for fatigue and unexpected weight gain. Negative for chills, diaphoresis, fever and unexpected weight loss.   HENT:  Negative for congestion and facial swelling.    Eyes:  Negative for blurred vision, double vision and discharge.   Respiratory:  Negative for chest tightness, shortness of breath and stridor.    Cardiovascular:  Negative for chest pain, palpitations and leg swelling.   Gastrointestinal:  Positive for GERD. Negative for blood in stool.   Endocrine: Negative for polydipsia.   Genitourinary:  Negative for hematuria.   Musculoskeletal:  Positive for arthralgias.   Skin:  Positive for skin lesions. Negative for color change.   Allergic/Immunologic: Negative for immunocompromised state.    Neurological:  Negative for confusion.   Psychiatric/Behavioral:  Positive for sleep disturbance. Negative for self-injury. The patient is nervous/anxious.        I have reviewed the ROS and confirm that it's accurate today.    Physical Exam:  Vital Signs:  Weight: (!) 151 kg (333 lb 6.4 oz)   Body mass index is 53.01 kg/m².  Temp: 97.3 °F (36.3 °C)   Heart Rate: 105   BP: 124/74     Physical Exam  Vitals reviewed.   Constitutional:       Appearance: She is well-developed.   HENT:      Head: Normocephalic and atraumatic.      Nose: Nose normal.      Comments: Nasal piercing  Eyes:      Conjunctiva/sclera: Conjunctivae normal.      Pupils: Pupils are equal, round, and reactive to light.   Neck:      Thyroid: No thyromegaly.      Vascular: No carotid bruit.      Trachea: No tracheal deviation.   Cardiovascular:      Rate and Rhythm: Regular rhythm. Tachycardia present.      Heart sounds: Normal heart sounds.   Pulmonary:      Effort: Pulmonary effort is normal. No respiratory distress.      Breath sounds: Normal breath sounds.   Abdominal:      General: There is no distension.      Palpations: Abdomen is soft.      Tenderness: There is no abdominal tenderness.      Comments: Laparoscopy scars, low transverse scar   Musculoskeletal:         General: No deformity. Normal range of motion.      Cervical back: Normal range of motion and neck supple.   Skin:     General: Skin is warm and dry.      Findings: No rash.      Comments: Tattoos and piercings   Neurological:      Mental Status: She is alert and oriented to person, place, and time.      Cranial Nerves: No cranial nerve deficit.      Coordination: Coordination normal.   Psychiatric:         Behavior: Behavior normal.         Thought Content: Thought content normal.         Judgment: Judgment normal.         Patient Active Problem List   Diagnosis    Annual GYN exam w/o problem    Bipolar 1 disorder    Personal history of smoking    Mixed hyperlipidemia    Mixed  incontinence urge and stress    Morbid obesity with body mass index of 50 or higher    Severe persistent asthma without complication    Restless leg syndrome    Attention deficit hyperactivity disorder (ADHD), combined type    14 mm left upper lobe lung nodule (3/2021)    History of substance use disorder    Seasonal allergic rhinitis due to pollen    Prediabetes    Episcleritis of right eye    Moderate persistent asthma without complication    Adenomatous colon polyp    Anxiety    Depression    Asthma    Fatigue    Heartburn    Borderline hypertension    Body mass index (BMI) of 50-59.9 in adult    Hiatal hernia with gastroesophageal reflux    Morbid obesity with body mass index (BMI) of 50.0 to 59.9 in adult    Hidradenitis suppurativa       Assessment:    Clare Turner is a 39 y.o. year old female with medically complicated obesity.    Metabolic and bariatric surgery is deemed medically necessary given the following obesity related comorbidities including adenomatous colonic polyps, prediabetes with elevated hemoglobin A1c, restless leg syndrome, hyperlipidemia, history of methamphetamine use sober since 1/28/2020 ADD, bipolar 1 disorder, history of alcohol abuse clean date 1/28/2020, anxiety and depression, arthritis, asthma, elevated transaminases, fatigue, hiatal hernia with GE reflux disease and Schatzki's ring, hidradenitis suppurativa, leukocytosis, stable lung nodule, thrombocytosis, former smoker and vaping, current non-nicotine vaping with current Weight: (!) 151 kg (333 lb 6.4 oz) and Body mass index is 53.01 kg/m²..    Encounter Diagnoses   Name Primary?    Body mass index (BMI) of 50-59.9 in adult Yes    Hiatal hernia with gastroesophageal reflux       Patient is aware that surgery is a tool, and that weight loss and improvement in comorbidities is not guaranteed but only seen in the context of appropriate use, follow up and physical activity.    The patient was present for an approximately a  2.5 hour discussion of the purpose of MBS, how MBS is a tool to assist in achieving weight loss goals, the most common complications and how best to avoid them, and the strategies for short and long term weight loss and improvement in comorbidities.  Ample opportunity to discuss questions was available both in group and during the time of individual examination.    I reviewed her Rodney report showing  Eszopiclone 3 mg # 30/mos, amphetamine  #90/mos, Adderall  #60/mos. labs dated 11/4/2024 unremarkable CMP except for an ALT of 61 AST of 34 CBC unremarkable several white count of 16.34 and a platelet count of 464 no differential performed of note hemoglobin 13.7.  Pulmonary function test dated 7/24/2024 FVC 87 FEV1 74 DLCO 86 no bronchodilators given.  EGD Dr. Spencer dated 8/5/2024 showing a small sliding hiatal hernia with a widemouth Schatzki's ring and distal esophageal spasm Z-line roughly 39 cm.  I noted that she was there in preop with her fiancé and both her current tobacco smokers and the patient and her fiancé voiced understanding that she will need to be tobacco nicotine and secondhand smoke free 2 weeks prior and 6 weeks after sleeve gastrectomy to minimize the risk of delayed leak and if they cannot comply the risk is prohibitive and she should not proceed with surgery and they voiced understanding she has heartburn controlled with daily omeprazole some substernal dysphagia for certain foods denied previous upper GI evaluation.  Pathology of the antrum showed mild reactive gastropathy negative for H. pylori distal esophageal biopsy showed squamous mucosa with nonspecific chronic inflammation.  Cardiology clearance dated 8/6/2024 Nilson Luis III, MD at low risk.  He did not hear a murmur on exam.  Pulmonary clearance dated 7/24/2024 Khadra HAYNES at low risk with a total criteria point count of 15 and a 1.6% pulmonary complication rate.  Psychosocial evaluation dated 7/18/2024 Shara FRAGOSO PhD good candidate  "notes she needs to quit any and all nicotine use.  Dietitian evaluation dated 7/18/2024 Patrica SAWYER JOSEMANUEL noting that protein intake is deficient to ensure successful weight loss that intake of processed and simple carbohydrates is extremely high her balance and variety of fruits and vegetables is fair reliance on restaurant food is very high and her ingestion of sweet beverages is extremely high.  Labs dated 7/18/2024 negative H. pylori breath test unremarkable CBC except for white count of 14.3 and platelet count of 500 differential unremarkable.  Unremarkable CMP except for an alkaline phosphatase of 144 hemoglobin A1c elevated 5.9 ferritin normal iron normal lipid panel normal except for an LDL of 122 TSH normal vitamin B12 and folate and vitamin D 25-hydroxy normal.  STOP-BANG sleep apnea questionnaire total score 1.  Please see scanned records that I have reviewed and signed off on today.  All of this in addition to the patient's unique history and exam has been taken into consideration in determining their appropriate candidacy for MBS.    Complications  of laparoscopic/possible robotic gastric sleeve were discussed. The patient is well aware of the potential complications of surgery that include but not limited to bleeding, infections, deep venous thrombosis, pulmonary embolism, pulmonary complications such as pneumonia, cardiac events, hernias, small bowel obstruction, damage to the spleen or other organs, bowel injury, disfiguring scars, failure to lose weight, need for additional surgery, conversion to an open procedure, and death. Patient is also aware of complications which apply in this particular procedure that can include but are not limited to a \"leak\" at the staple line which in some instances may require conversion to gastric bypass.    R/B/A Rx to concomitant hiatal hernia repair were discussed as outlined in our long consent form.  Briefly risks in addition to those for LSG include recurrent hernia, " ENMANUEL, dysphagia, esophageal injury, pneumothorax, injury to the vagus nerves, injury to the thoracic duct, aorta or vena cava.    As above, I discussed avoiding all tobacco products, nicotine,  and second hand smoke at least 2 weeks pre-operatively and 6 weeks post-operatively to minimize the risk of sleeve leak.  This included discussing the importance of avoiding even secondhand smoke as the risk of leak is increased.  Examples discussed:  Avoid going in a house or riding in a car where someone has previously smoked in the last 2 weeks and for 6 weeks postoperatively.  Avoid living in a house where someone smokes (even if it's in a separate room/patio/attached garage, etc.).   Avoid congregating with a group of people who are smoking even if it's outside.  It is OK to be around wood burning fires and barbecue.  I explained that I do not know if marijuana has a same effects but my overall recommendation is to avoid it for 2 weeks prior in 6 weeks after surgery.     Discussed the risks, benefits and alternative therapies at great length as outlined in our extensive consent forms, consent videos, and educational teaching process under the direction of the center's .    A copy of the patient's signed informed consent is on file.    R/B/A Rx discussed to postop anticoagulation incl but not limited to bleeding, drug reaction, venothromboembolic events, etc. and agreeable to taking post op  Eliquis 2.5 mg po Q 12 hrs #42        Plan:    After evaluation today I think the patient is a reasonable candidate for laparoscopic sleeve gastrectomy, hiatal hernia repair, and EGD.  Once again given her BMI and extremely poor dietary habits noted on dietitian evaluation sleeve gastrectomy at least theoretically may be a first stage procedure as above.  Increased risk for delayed leak from potential nicotine or secondhand smoke exposure as above.    Other issues include:  adenomatous colonic polyps, prediabetes  with elevated hemoglobin A1c, restless leg syndrome, hyperlipidemia, history of methamphetamine use sober since 1/28/2020 ADD, bipolar 1 disorder, history of alcohol abuse clean date 1/28/2020, anxiety and depression, arthritis, asthma, elevated transaminases, fatigue, hiatal hernia with GE reflux disease and Schatzki's ring, hidradenitis suppurativa, leukocytosis, stable lung nodule, thrombocytosis, former smoker and vaping, current non-nicotine vaping    Thank you Dr. Miner for the opportunity to evaluate Ms. Turner.          Derrick Spencer MD              Answers submitted by the patient for this visit:  Other (Submitted on 11/9/2024)  Please describe your symptoms.: Morbid Obesity  Have you had these symptoms before?: Yes  How long have you been having these symptoms?: Greater than 2 weeks  Primary Reason for Visit (Submitted on 11/9/2024)  What is the primary reason for your visit?: Problem Not Listed

## 2024-11-12 NOTE — PROGRESS NOTES
Pre op sleeve  Completed informed consent today  All queries answered  Pt has completed her pre op education process

## 2024-11-12 NOTE — LETTER
2024     Michael Miner MD   [address]  [address]  [facility] [address]    Patient: Clare Turner   YOB: 1985   Date of Visit: 2024     Dear Michael Miner MD:       Thank you for referring Clare Tunrer to me for evaluation. Below are the relevant portions of my assessment and plan of care.    If you have questions, please do not hesitate to call me. I look forward to following Clare along with you.         Sincerely,        Derrick Spencer MD        CC: No Recipients    Derrick Spencer MD  [phone]  Sign when Signing Visit  [facility]  [address]  [address]  [facility] [id number] [phone]      Patient  Name:  Clare Turner  :  1985      Date of Visit: 2024    Chief Complaint:  weight gain; unable to maintain weight loss.   Evaluate for possible metabolic and bariatric surgery    History of Present Illness:  Clare Turner is a 39 y.o. female who presents today for evaluation, education and consultation regarding metabolic and bariatric surgery (MBS).  Since last seen 10/18/2024 she has essentially maintained her weight.  The patient returns for final visit prior to metabolic and bariatric surgery specifically the sleeve gastrectomy.  Original intake evaluation Cherrie RIDLEY PA-C dated 2024 reviewed.  She notes the patient's maximum lifetime weight is 296 pounds and that she has a history of drug and substance abuse clean and sober since  and 100 pound weight gain since that time.      The patient has had issues with morbid obesity for years and only temporary success with non-surgical methods of weight loss.  The patient is seeking LSG to help with the morbid obesity related conditions of adenomatous colonic polyps, prediabetes with elevated hemoglobin A1c, restless leg syndrome, hyperlipidemia, history of methamphetamine use sober since 2020 ADD, bipolar 1 disorder,  history of alcohol abuse clean date 2020, anxiety and depression, arthritis, asthma, elevated transaminases, fatigue, hiatal hernia with GE reflux disease and Schatzki's ring, hidradenitis suppurativa, leukocytosis, stable lung nodule, thrombocytosis, former smoker and vaping, current non-nicotine vaping.    39-year-old female from [facility].  In recovery from drug and alcohol abuse for the last 4-1/2 years.  Smoker and nicotine vapor in early remission, currently using nicotine free vape.  ADD.  Multiple antidepressants.  She said informed consent was very helpful and since then she has quit products with high fructose corn syrup such as her Mountain Dew and is encouraged that she has lost 6 pounds in the last week since the talk.  She feels her over 30 pound weight gain in the last few months is related to stress, coming off her long-term Seroquel, and possibly some water weight as well as admitting to having her last eating hurrah prior to metabolic and bariatric surgery.  She understands this has put her BMI over 50 consistent with super morbid obesity and a higher risk surgical category.  She is aware at her BMI and extremely poor dietary habits noted on dietitian evaluation that sleeve gastrectomy at least theoretically is likely a first stage procedure.  She says her PCP is aware of her chronic leukocytosis and is not concerned.  Her tachycardia in the office today is asymptomatic.  She is on multiple pulmonary medications and thinks her breathing has improved since coming off of cigarettes but still has allergy related pulmonary issues.  She says she saw a dermatologist regarding her hidradenitis suppurativa which mostly affects her axilla and groin region.  She says she does not have sleep apnea.  She had 3 pregnancies, 1 miscarriage, first delivery vaginal, second delivery twins by .  Denies complications of pregnancy such as preeclampsia or gestational diabetes or VTE or bleeding  issues.  She denies personal or family history of bleeding or clotting disorders.  No complications with her previous surgeries and says she was never sent home on anticoagulation after her surgeries or pregnancies.      Past Medical History:   Diagnosis Date   • ADD (attention deficit disorder)    • Adenomatous colon polyp 2024    Colonoscopy 2024.  Repeat 5 years     • Allergic    • Anxiety    • Arthritis    • Asthma     on Dupixent, follows w/ Pulmonary   • Bipolar 1 disorder    • Current every day non-nicotine vaping    • Depression    • Elevated hemoglobin A1c    • Elevated transaminase measurement    • Episcleritis    • Fatigue    • Former smoker    • GERD (gastroesophageal reflux disease)    • H/O alcohol abuse     Clean date 2020   • H/O drug abuse (CMS/HCC) - methamphetamine     Clean date 2020   • Heartburn     chronic/episodic, prn Omeprazole, EGD Dr. Spencer  small hiatal hernia with Schatzki's ring   • Hidradenitis suppurativa    • History of nicotine vaping    • Hyperlipidemia    • Leukocytosis    • Lung nodule     stable - followed by pulmonary   • Morbid obesity    • Pneumonia 2022   • Prediabetes 2022    A1C 5.7   • Restless leg syndrome 2022   • Seasonal allergic rhinitis due to pollen 10/12/2022   • Thrombocytosis    • Trichimoniasis 2017   • Trichimoniasis 2020   • Trichimoniasis 2020   • Urinary tract infection      Past Surgical History:   Procedure Laterality Date   • APPENDECTOMY     • CERVICAL CONIZATION     •  SECTION     •  SECTION WITH TUBAL  2012    uncomplicated - twin delivery   • COLONOSCOPY     • FRACTURE SURGERY  2016    Closed talus bone fracture with bone graph   • LAPAROSCOPIC APPENDECTOMY     • LAPAROSCOPIC CHOLECYSTECTOMY  2014    dysfunction, no stones   • NASAL FRACTURE SURGERY     • ORIF TIBIA FRACTURE Right 2016    talar fx   • SEPTOPLASTY     • SINUS SURGERY       debridement   • TIBIAL TALAR FUSION Right 2017    w/ bone grafting   • TONSILLECTOMY AND ADENOIDECTOMY  1991   • TUBAL ABDOMINAL LIGATION         Allergies   Allergen Reactions   • Pecan Nut Anaphylaxis     Hives, swelling of lips and throat, difficulties breathing. Has epipen   • Sulfa Antibiotics Rash   • Sulfamethoxazole-Trimethoprim Rash       Current Outpatient Medications:   •  albuterol (PROVENTIL) (2.5 MG/3ML) 0.083% nebulizer solution, Take 2.5 mg by nebulization 4 (Four) Times a Day As Needed for Wheezing., Disp: 120 each, Rfl: 5  •  albuterol sulfate  (90 Base) MCG/ACT inhaler, Inhale 2 puffs 2 (Two) Times a Day., Disp: 18 g, Rfl: 5  •  amphetamine-dextroamphetamine (Adderall) 20 MG tablet, Take 20 mg orally every afternoon., Disp: 30 tablet, Rfl: 0  •  amphetamine-dextroamphetamine XR (Adderall XR) 20 MG 24 hr capsule, Take 2 capsules by mouth Every Morning, Disp: 60 capsule, Rfl: 0  •  atorvastatin (LIPITOR) 10 MG tablet, Take 1 tablet by mouth Daily., Disp: 30 tablet, Rfl: 5  •  budesonide (Pulmicort) 0.5 MG/2ML nebulizer solution, Take 2 mL by nebulization 2 (Two) Times a Day., Disp: 120 each, Rfl: 11  •  buPROPion XL (WELLBUTRIN XL) 300 MG 24 hr tablet, Take 1 tablet by mouth Daily., Disp: 30 tablet, Rfl: 6  •  Cariprazine HCl (Vraylar) 4.5 MG capsule capsule, Take 1 capsule by mouth Daily., Disp: 30 capsule, Rfl: 2  •  doxepin (SINEquan) 10 MG capsule, Take 1 capsule by mouth Every Night., Disp: 30 capsule, Rfl: 2  •  Dupilumab 300 MG/2ML solution pen-injector, Inject 2 mL under the skin into the appropriate area as directed Every 14 (Fourteen) Days., Disp: 2 mL, Rfl: 11  •  eszopiclone (Lunesta) 3 MG tablet, Take 1 tablet by mouth At Night As Needed for Sleep. Take immediately before bedtime, Disp: 30 tablet, Rfl: 2  •  FLUoxetine (PROzac) 40 MG capsule, Take 2 capsules by mouth Daily., Disp: 60 capsule, Rfl: 6  •  Fluticasone Furoate-Vilanterol (BREO ELLIPTA) 200-25 MCG/ACT inhaler,  Inhale 1 puff Daily. 1 inhalation once a day, Disp: 60 each, Rfl: 5  •  hydrOXYzine pamoate (VISTARIL) 25 MG capsule, Take 1 capsule by mouth 3 (Three) Times a Day As Needed for Anxiety., Disp: 90 capsule, Rfl: 6  •  lamoTRIgine (LaMICtal) 100 MG tablet, Take 1 tablet by mouth Daily., Disp: 30 tablet, Rfl: 2  •  Omeprazole 20 MG tablet delayed-release, Take 20 mg by mouth Daily., Disp: 30 each, Rfl: 4  •  spironolactone (Aldactone) 25 MG tablet, Take 1 tablet by mouth Daily., Disp: 30 tablet, Rfl: 1  •  apixaban (Eliquis) 2.5 MG tablet tablet, Take 1 tablet by mouth Every 12 (Twelve) Hours for 42 doses. (Patient not taking: Reported on 2024), Disp: 42 tablet, Rfl: 0  •  EPINEPHrine (EPIPEN) 0.3 MG/0.3ML solution auto-injector injection, Inject 1 pen in the muscle as directed PRN allergic reaction, Disp: 2 each, Rfl: 1  •  loratadine (CLARITIN) 10 MG tablet, Take 1 tablet by mouth Daily., Disp: 30 tablet, Rfl: 5  •  valACYclovir (Valtrex) 1000 MG tablet, Take 1 tablet by mouth 3 (Three) Times a Day for 7 days., Disp: 21 tablet, Rfl: 0    Social History     Socioeconomic History   • Marital status:    Tobacco Use   • Smoking status: Former     Current packs/day: 0.00     Average packs/day: 0.3 packs/day for 30.8 years (10.0 ttl pk-yrs)     Types: Cigarettes     Start date: 2003     Quit date: 2024     Years since quittin.2     Passive exposure: Yes   • Smokeless tobacco: Never   • Tobacco comments:     Stopped smoking cigarettes and started vaping   Vaping Use   • Vaping status: Former   • Start date: 9/15/2022   Substance and Sexual Activity   • Alcohol use: Not Currently     Comment: quit 20   • Drug use: Not Currently     Types: Amphetamines, Marijuana, Methamphetamines     Comment: Clean ~ 2020   • Sexual activity: Yes     Partners: Male     Birth control/protection: Other, Tubal ligation     Family History   Problem Relation Age of Onset   • Diabetes Mother 50   •  Hyperlipidemia Mother    • Mental illness Mother    • Arthritis Mother    • Osteoporosis Mother    • Asthma Mother    • Depression Mother    • Endometriosis Mother    • Cancer Mother    • Hearing loss Mother    • Hypertension Father    • Obesity Father    • Alcohol abuse Father    • Early death Father    • Depression Sister    • Thyroid disease Maternal Grandmother    • Hyperlipidemia Maternal Grandmother    • Stroke Maternal Grandmother 50   • Hypertension Maternal Grandmother    • Obesity Maternal Grandmother    • Developmental Disability Maternal Grandmother    • Mental illness Maternal Grandmother    • Liver disease Maternal Grandmother    • Arthritis Maternal Grandmother    • Migraines Maternal Grandmother    • Diabetes Maternal Grandmother    • Anxiety disorder Maternal Grandmother    • Depression Maternal Grandmother    • Hearing loss Maternal Grandmother    • Miscarriages / Stillbirths Maternal Grandmother    • Hypertension Maternal Grandfather    • Heart attack Maternal Grandfather    • Developmental Disability Maternal Grandfather    • Arthritis Maternal Grandfather    • Asthma Maternal Grandfather    • Diabetes Maternal Grandfather    • Heart disease Maternal Grandfather    • Hearing loss Maternal Grandfather    • Heart disease Paternal Grandmother    • Hypertension Paternal Grandmother    • Obesity Paternal Grandmother    • Developmental Disability Paternal Grandmother    • Arthritis Paternal Grandmother    • Heart attack Paternal Grandmother    • Heart failure Paternal Grandmother    • Diabetes Paternal Grandmother    • Stroke Paternal Grandmother    • Heart disease Paternal Grandfather    • Heart attack Paternal Grandfather    • Hypertension Paternal Grandfather    • Obesity Paternal Grandfather    • Developmental Disability Paternal Grandfather    • Arthritis Paternal Grandfather    • Diabetes Paternal Grandfather    • Cancer Maternal Uncle         Leukemia   • Hearing loss Maternal Aunt        Review  of Systems   Constitutional:  Positive for fatigue and unexpected weight gain. Negative for chills, diaphoresis, fever and unexpected weight loss.   HENT:  Negative for congestion and facial swelling.    Eyes:  Negative for blurred vision, double vision and discharge.   Respiratory:  Negative for chest tightness, shortness of breath and stridor.    Cardiovascular:  Negative for chest pain, palpitations and leg swelling.   Gastrointestinal:  Positive for GERD. Negative for blood in stool.   Endocrine: Negative for polydipsia.   Genitourinary:  Negative for hematuria.   Musculoskeletal:  Positive for arthralgias.   Skin:  Positive for skin lesions. Negative for color change.   Allergic/Immunologic: Negative for immunocompromised state.   Neurological:  Negative for confusion.   Psychiatric/Behavioral:  Positive for sleep disturbance. Negative for self-injury. The patient is nervous/anxious.        I have reviewed the ROS and confirm that it's accurate today.    Physical Exam:  Vital Signs:  Weight: (!) 151 kg (333 lb 6.4 oz)   Body mass index is 53.01 kg/m².  Temp: 97.3 °F (36.3 °C)   Heart Rate: 105   BP: 124/74     Physical Exam  Vitals reviewed.   Constitutional:       Appearance: She is well-developed.   HENT:      Head: Normocephalic and atraumatic.      Nose: Nose normal.      Comments: Nasal piercing  Eyes:      Conjunctiva/sclera: Conjunctivae normal.      Pupils: Pupils are equal, round, and reactive to light.   Neck:      Thyroid: No thyromegaly.      Vascular: No carotid bruit.      Trachea: No tracheal deviation.   Cardiovascular:      Rate and Rhythm: Regular rhythm. Tachycardia present.      Heart sounds: Normal heart sounds.   Pulmonary:      Effort: Pulmonary effort is normal. No respiratory distress.      Breath sounds: Normal breath sounds.   Abdominal:      General: There is no distension.      Palpations: Abdomen is soft.      Tenderness: There is no abdominal tenderness.      Comments: Laparoscopy  scars, low transverse scar   Musculoskeletal:         General: No deformity. Normal range of motion.      Cervical back: Normal range of motion and neck supple.   Skin:     General: Skin is warm and dry.      Findings: No rash.      Comments: Tattoos and piercings   Neurological:      Mental Status: She is alert and oriented to person, place, and time.      Cranial Nerves: No cranial nerve deficit.      Coordination: Coordination normal.   Psychiatric:         Behavior: Behavior normal.         Thought Content: Thought content normal.         Judgment: Judgment normal.         Patient Active Problem List   Diagnosis   • Annual GYN exam w/o problem   • Bipolar 1 disorder   • Personal history of smoking   • Mixed hyperlipidemia   • Mixed incontinence urge and stress   • Morbid obesity with body mass index of 50 or higher   • Severe persistent asthma without complication   • Restless leg syndrome   • Attention deficit hyperactivity disorder (ADHD), combined type   • 14 mm left upper lobe lung nodule (3/2021)   • History of substance use disorder   • Seasonal allergic rhinitis due to pollen   • Prediabetes   • Episcleritis of right eye   • Moderate persistent asthma without complication   • Adenomatous colon polyp   • Anxiety   • Depression   • Asthma   • Fatigue   • Heartburn   • Borderline hypertension   • Body mass index (BMI) of 50-59.9 in adult   • Hiatal hernia with gastroesophageal reflux   • Morbid obesity with body mass index (BMI) of 50.0 to 59.9 in adult   • Hidradenitis suppurativa       Assessment:    Clare Turner is a 39 y.o. year old female with medically complicated obesity.    Metabolic and bariatric surgery is deemed medically necessary given the following obesity related comorbidities including adenomatous colonic polyps, prediabetes with elevated hemoglobin A1c, restless leg syndrome, hyperlipidemia, history of methamphetamine use sober since 1/28/2020 ADD, bipolar 1 disorder, history of  alcohol abuse clean date 1/28/2020, anxiety and depression, arthritis, asthma, elevated transaminases, fatigue, hiatal hernia with GE reflux disease and Schatzki's ring, hidradenitis suppurativa, leukocytosis, stable lung nodule, thrombocytosis, former smoker and vaping, current non-nicotine vaping with current Weight: (!) 151 kg (333 lb 6.4 oz) and Body mass index is 53.01 kg/m²..    Encounter Diagnoses   Name Primary?   • Body mass index (BMI) of 50-59.9 in adult Yes   • Hiatal hernia with gastroesophageal reflux       Patient is aware that surgery is a tool, and that weight loss and improvement in comorbidities is not guaranteed but only seen in the context of appropriate use, follow up and physical activity.    The patient was present for an approximately a 2.5 hour discussion of the purpose of MBS, how MBS is a tool to assist in achieving weight loss goals, the most common complications and how best to avoid them, and the strategies for short and long term weight loss and improvement in comorbidities.  Ample opportunity to discuss questions was available both in group and during the time of individual examination.    I reviewed her Rodney report showing  Eszopiclone 3 mg # 30/mos, amphetamine  #90/mos, Adderall  #60/mos. labs dated 11/4/2024 unremarkable CMP except for an ALT of 61 AST of 34 CBC unremarkable several white count of 16.34 and a platelet count of 464 no differential performed of note hemoglobin 13.7.  Pulmonary function test dated 7/24/2024 FVC 87 FEV1 74 DLCO 86 no bronchodilators given.  EGD Dr. Spencer dated 8/5/2024 showing a small sliding hiatal hernia with a widemouth Schatzki's ring and distal esophageal spasm Z-line roughly 39 cm.  I noted that she was there in preop with her fiancé and both her current tobacco smokers and the patient and her fiancé voiced understanding that she will need to be tobacco nicotine and secondhand smoke free 2 weeks prior and 6 weeks after sleeve gastrectomy to  minimize the risk of delayed leak and if they cannot comply the risk is prohibitive and she should not proceed with surgery and they voiced understanding she has heartburn controlled with daily omeprazole some substernal dysphagia for certain foods denied previous upper GI evaluation.  Pathology of the antrum showed mild reactive gastropathy negative for H. pylori distal esophageal biopsy showed squamous mucosa with nonspecific chronic inflammation.  Cardiology clearance dated 8/6/2024 Nilson Luis III, MD at low risk.  He did not hear a murmur on exam.  Pulmonary clearance dated 7/24/2024 Khadra FOUZIA DALLAS at low risk with a total criteria point count of 15 and a 1.6% pulmonary complication rate.  Psychosocial evaluation dated 7/18/2024 Shara FRAGOSO PhD good candidate notes she needs to quit any and all nicotine use.  Dietitian evaluation dated 7/18/2024 Patrica SAWYER RD noting that protein intake is deficient to ensure successful weight loss that intake of processed and simple carbohydrates is extremely high her balance and variety of fruits and vegetables is fair reliance on restaurant food is very high and her ingestion of sweet beverages is extremely high.  Labs dated 7/18/2024 negative H. pylori breath test unremarkable CBC except for white count of 14.3 and platelet count of 500 differential unremarkable.  Unremarkable CMP except for an alkaline phosphatase of 144 hemoglobin A1c elevated 5.9 ferritin normal iron normal lipid panel normal except for an LDL of 122 TSH normal vitamin B12 and folate and vitamin D 25-hydroxy normal.  STOP-BANG sleep apnea questionnaire total score 1.  Please see scanned records that I have reviewed and signed off on today.  All of this in addition to the patient's unique history and exam has been taken into consideration in determining their appropriate candidacy for MBS.    Complications  of laparoscopic/possible robotic gastric sleeve were discussed. The patient is well aware of the  "potential complications of surgery that include but not limited to bleeding, infections, deep venous thrombosis, pulmonary embolism, pulmonary complications such as pneumonia, cardiac events, hernias, small bowel obstruction, damage to the spleen or other organs, bowel injury, disfiguring scars, failure to lose weight, need for additional surgery, conversion to an open procedure, and death. Patient is also aware of complications which apply in this particular procedure that can include but are not limited to a \"leak\" at the staple line which in some instances may require conversion to gastric bypass.    R/B/A Rx to concomitant hiatal hernia repair were discussed as outlined in our long consent form.  Briefly risks in addition to those for LSG include recurrent hernia, ENMANUEL, dysphagia, esophageal injury, pneumothorax, injury to the vagus nerves, injury to the thoracic duct, aorta or vena cava.    As above, I discussed avoiding all tobacco products, nicotine,  and second hand smoke at least 2 weeks pre-operatively and 6 weeks post-operatively to minimize the risk of sleeve leak.  This included discussing the importance of avoiding even secondhand smoke as the risk of leak is increased.  Examples discussed:  Avoid going in a house or riding in a car where someone has previously smoked in the last 2 weeks and for 6 weeks postoperatively.  Avoid living in a house where someone smokes (even if it's in a separate room/patio/attached garage, etc.).   Avoid congregating with a group of people who are smoking even if it's outside.  It is OK to be around wood burning fires and barbecue.  I explained that I do not know if marijuana has a same effects but my overall recommendation is to avoid it for 2 weeks prior in 6 weeks after surgery.     Discussed the risks, benefits and alternative therapies at great length as outlined in our extensive consent forms, consent videos, and educational teaching process under the direction of " the center's .    A copy of the patient's signed informed consent is on file.    R/B/A Rx discussed to postop anticoagulation incl but not limited to bleeding, drug reaction, venothromboembolic events, etc. and agreeable to taking post op  Eliquis 2.5 mg po Q 12 hrs #42        Plan:    After evaluation today I think the patient is a reasonable candidate for laparoscopic sleeve gastrectomy, hiatal hernia repair, and EGD.  Once again given her BMI and extremely poor dietary habits noted on dietitian evaluation sleeve gastrectomy at least theoretically may be a first stage procedure as above.  Increased risk for delayed leak from potential nicotine or secondhand smoke exposure as above.    Other issues include:  adenomatous colonic polyps, prediabetes with elevated hemoglobin A1c, restless leg syndrome, hyperlipidemia, history of methamphetamine use sober since 1/28/2020 ADD, bipolar 1 disorder, history of alcohol abuse clean date 1/28/2020, anxiety and depression, arthritis, asthma, elevated transaminases, fatigue, hiatal hernia with GE reflux disease and Schatzki's ring, hidradenitis suppurativa, leukocytosis, stable lung nodule, thrombocytosis, former smoker and vaping, current non-nicotine vaping    Thank you Dr. Miner for the opportunity to evaluate Ms. Turner.          Derrick Spencer MD              Answers submitted by the patient for this visit:  Other (Submitted on 11/9/2024)  Please describe your symptoms.: Morbid Obesity  Have you had these symptoms before?: Yes  How long have you been having these symptoms?: Greater than 2 weeks  Primary Reason for Visit (Submitted on 11/9/2024)  What is the primary reason for your visit?: Problem Not Listed

## 2024-11-14 ENCOUNTER — OFFICE VISIT (OUTPATIENT)
Dept: INTERNAL MEDICINE | Facility: CLINIC | Age: 39
End: 2024-11-14
Payer: COMMERCIAL

## 2024-11-14 VITALS
DIASTOLIC BLOOD PRESSURE: 80 MMHG | SYSTOLIC BLOOD PRESSURE: 118 MMHG | BODY MASS INDEX: 47.09 KG/M2 | HEIGHT: 66 IN | HEART RATE: 68 BPM | TEMPERATURE: 97.7 F | WEIGHT: 293 LBS

## 2024-11-14 DIAGNOSIS — E66.01 MORBID OBESITY WITH BODY MASS INDEX (BMI) OF 50.0 TO 59.9 IN ADULT: ICD-10-CM

## 2024-11-14 DIAGNOSIS — L73.2 HIDRADENITIS SUPPURATIVA: Chronic | ICD-10-CM

## 2024-11-14 DIAGNOSIS — B02.9 HERPES ZOSTER WITHOUT COMPLICATION: Primary | ICD-10-CM

## 2024-11-14 DIAGNOSIS — R79.89 ELEVATED PLATELET COUNT: ICD-10-CM

## 2024-11-14 DIAGNOSIS — E78.2 MIXED HYPERLIPIDEMIA: Chronic | ICD-10-CM

## 2024-11-14 DIAGNOSIS — D72.829 LEUKOCYTOSIS, UNSPECIFIED TYPE: ICD-10-CM

## 2024-11-14 DIAGNOSIS — R73.03 PREDIABETES: Chronic | ICD-10-CM

## 2024-11-14 PROCEDURE — 1159F MED LIST DOCD IN RCRD: CPT | Performed by: STUDENT IN AN ORGANIZED HEALTH CARE EDUCATION/TRAINING PROGRAM

## 2024-11-14 PROCEDURE — 1125F AMNT PAIN NOTED PAIN PRSNT: CPT | Performed by: STUDENT IN AN ORGANIZED HEALTH CARE EDUCATION/TRAINING PROGRAM

## 2024-11-14 PROCEDURE — 1160F RVW MEDS BY RX/DR IN RCRD: CPT | Performed by: STUDENT IN AN ORGANIZED HEALTH CARE EDUCATION/TRAINING PROGRAM

## 2024-11-14 PROCEDURE — 99214 OFFICE O/P EST MOD 30 MIN: CPT | Performed by: STUDENT IN AN ORGANIZED HEALTH CARE EDUCATION/TRAINING PROGRAM

## 2024-11-14 RX ORDER — VALACYCLOVIR HYDROCHLORIDE 1 G/1
1000 TABLET, FILM COATED ORAL 3 TIMES DAILY
Qty: 21 TABLET | Refills: 0 | Status: SHIPPED | OUTPATIENT
Start: 2024-11-14 | End: 2024-11-21

## 2024-11-14 NOTE — PROGRESS NOTES
"Chief Complaint  Clare Turner is a 39 y.o. female presenting for Rash.     From [place]. . 3 children. 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     Patient has a past medical history of hyperlipidemia, bipolar 1 disorder w/depression and Hx of jack, ADHD, hidradenitis suppurativa (f/u derm), GERD, episcleritis, restless legs, moderate persistent asthma (since childhood, f/u pulm), lung nodule stable for years, history of alcohol and substance abuse, urge/stress incontinence and severe obesity.    History of Present Illness  Patient is here for follow-up.  She was erroneously scheduled for appointment this morning, and also tomorrow afternoon.    She is here today also for rash that developed over the last 1-2 days.  Symptoms initially started on Monday, 11/11/2024 with irritation and itching of her right flank.  This has gotten progressively worse.    Of note she is scheduled for bariatric surgery on 12/11/2024.  They did comment on her white blood cell count that was elevated.  Patient tells me she does have a history of hidradenitis suppurativa and has follow-up with dermatology.  she has rash of her groin areas and underarms, but is not currently on medication.    The following portions of the patient's history were reviewed and updated as appropriate: allergies, current medications, past family history, past medical history, past social history, past surgical history, and problem list.    Image of right flank/abdomen captured per patient verbal consent:          Objective  /80 (BP Location: Left arm, Patient Position: Sitting, Cuff Size: Large Adult)   Pulse 68   Temp 97.7 °F (36.5 °C) (Temporal)   Ht 168.9 cm (66.5\")   Wt (!) 153 kg (336 lb 6.4 oz)   LMP 10/06/2024 (Approximate)   BMI 53.49 kg/m²     Physical Exam  Constitutional:       Appearance: Normal appearance.   HENT:      " Head: Normocephalic and atraumatic.   Eyes:      Extraocular Movements: Extraocular movements intact.      Conjunctiva/sclera: Conjunctivae normal.   Musculoskeletal:      Cervical back: Normal range of motion and neck supple.   Skin:     General: Skin is warm and dry.      Findings: Rash present.   Neurological:      Mental Status: She is alert and oriented to person, place, and time. Mental status is at baseline.   Psychiatric:         Behavior: Behavior normal.         Thought Content: Thought content normal.         Assessment/Plan   1. Herpes zoster without complication  Rash typical for shingles.  She is still within treatment window, so I recommend Valtrex for 7 days.  Recommend starting right away today since she can pick the medication up from the pharmacy.  As long as skin is intact she can safely use lidocaine patch or topical, but if there is any broken skin, crusting or scabbing, she has to wait until skin again is intact and scabs have fallen off.  Shingles is not very contagious, but direct contact by nonimmune individual can cause illness.  - valACYclovir (Valtrex) 1000 MG tablet; Take 1 tablet by mouth 3 (Three) Times a Day for 7 days.  Dispense: 21 tablet; Refill: 0    2. Leukocytosis, unspecified type  3. Elevated platelet count  Her white blood cell count has trended from around 13-20, even higher a few years ago.  Differential shows mostly mildly elevated neutrophil count.  From my point of view I have not been too concerned based on this mildly elevated level, but we will do a recheck on her levels again before next visit, and also do a peripheral smear.  Consider referral to hematologist, but I think the possibility of hematologic disease is low given that her levels are not trending upwards.  - CBC & Differential; Future  - Peripheral Blood Smear; Future    4. Hidradenitis suppurativa  Could be contributing factor to her elevated white blood cell count with neutrophils.  Will continue to  monitor.  Follow-up with dermatology    5. Prediabetes  Hemoglobin A1C   Date Value Ref Range Status   10/03/2024 6.00 (H) 4.80 - 5.60 % Final   07/18/2024 5.9 (H) 4.8 - 5.6 % Final     Comment:              Prediabetes: 5.7 - 6.4           Diabetes: >6.4           Glycemic control for adults with diabetes: <7.0     04/18/2024 5.70 (H) 4.80 - 5.60 % Final   05/04/2023 5.40 4.80 - 5.60 % Final   Will recheck A1c before next visit  - Hemoglobin A1c; Future    6. Mixed hyperlipidemia  Will recheck blood work before next visit in 3 months, after bariatric surgery  - Comprehensive Metabolic Panel; Future  - Lipid Panel; Future    7. Morbid obesity with body mass index (BMI) of 50.0 to 59.9 in adult  Follow-up with bariatric surgeon for planned surgery      Return in about 13 weeks (around 2/13/2025) for Recheck.    Future Appointments         Provider Department Center    11/20/2024 3:00 PM Nani Ball APRVANNA [facility]    11/25/2024 10:00 AM Stevenson Coker, [facility]    12/17/2024 9:30 AM Derrick Spencer MD [facility] BARIATRIC SURGERY     1/6/2025 3:30 PM Stevenson Coker, [facility]    1/14/2025 8:30 AM Cherrie Landry PA [facility] BARIATRIC SURGERY     1/24/2025 8:30 AM Khadra Clements APRN [facility]    2/11/2025 4:15 PM Michael Miner MD [facility]              Michael Miner MD  Family Medicine  11/14/2024

## 2024-11-15 ENCOUNTER — TELEPHONE (OUTPATIENT)
Dept: INTERNAL MEDICINE | Facility: CLINIC | Age: 39
End: 2024-11-15

## 2024-11-15 NOTE — TELEPHONE ENCOUNTER
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 Her work stated she needs an excuse for however number of days her antibiotic is for which is 7 days

## 2024-11-15 NOTE — LETTER
November 15, 2024     Patient: Clare Turner   YOB: 1985   Date of Visit: 11/14/2024       To Whom It May Concern:    Patient was seen in my office on 11/14/2024 due to an illness.  Please excuse her absence from work.  It is my medical opinion that Clare Turner may return to work 11/22/2024.       Sincerely,        Michael Miner MD    CC: No Recipients

## 2024-11-15 NOTE — TELEPHONE ENCOUNTER
These are the CDC recommendations:    Covering the shingles rash can lower the risk of spreading the shingles virus to others. People with shingles cannot spread the virus before the blisters appear or after the rash scabs over.    To prevent spreading the virus to others:  Cover the rash  Avoid touching or scratching the rash  Wash your hands often for at least 20 seconds  Avoid contact with the following people until your rash scabs over  Pregnant people who never had chickenpox or chickenpox vaccine  Premature or low birth weight infants  People with weakened immune systems    Does any of this apply to her workplace?  If not, that she should be able to work.  However if the [employer] she works at has specific restrictions for people with shingles, she should check with her employee health.  Please check this with patient and I can give her excuse letter depending on the conclusion.  Thank you

## 2024-11-15 NOTE — TELEPHONE ENCOUNTER
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 IF NOT THEY ARE REQUESTING A NOTE EXCUSING HER FROM WORK FOR 7 DAYS.     Is it okay if the provider responds through [facility]: YES

## 2024-11-20 ENCOUNTER — OFFICE VISIT (OUTPATIENT)
Dept: INTERNAL MEDICINE | Facility: CLINIC | Age: 39
End: 2024-11-20
Payer: COMMERCIAL

## 2024-11-20 ENCOUNTER — TELEMEDICINE (OUTPATIENT)
Dept: PSYCHIATRY | Facility: CLINIC | Age: 39
End: 2024-11-20
Payer: COMMERCIAL

## 2024-11-20 VITALS
DIASTOLIC BLOOD PRESSURE: 78 MMHG | BODY MASS INDEX: 47.09 KG/M2 | HEIGHT: 66 IN | HEART RATE: 100 BPM | WEIGHT: 293 LBS | SYSTOLIC BLOOD PRESSURE: 148 MMHG | TEMPERATURE: 98.6 F

## 2024-11-20 DIAGNOSIS — F90.2 ATTENTION DEFICIT HYPERACTIVITY DISORDER (ADHD), COMBINED TYPE: Chronic | ICD-10-CM

## 2024-11-20 DIAGNOSIS — B02.9 HERPES ZOSTER WITHOUT COMPLICATION: ICD-10-CM

## 2024-11-20 DIAGNOSIS — F51.04 PSYCHOPHYSIOLOGICAL INSOMNIA: ICD-10-CM

## 2024-11-20 DIAGNOSIS — F41.1 GENERALIZED ANXIETY DISORDER: ICD-10-CM

## 2024-11-20 DIAGNOSIS — R03.0 BORDERLINE HYPERTENSION: ICD-10-CM

## 2024-11-20 DIAGNOSIS — N76.0 ACUTE VAGINITIS: Primary | ICD-10-CM

## 2024-11-20 DIAGNOSIS — F90.2 ATTENTION DEFICIT HYPERACTIVITY DISORDER, COMBINED TYPE: ICD-10-CM

## 2024-11-20 DIAGNOSIS — Z11.3 ROUTINE SCREENING FOR STI (SEXUALLY TRANSMITTED INFECTION): ICD-10-CM

## 2024-11-20 DIAGNOSIS — F31.75 BIPOLAR 1 DISORDER, DEPRESSED, PARTIAL REMISSION: Primary | ICD-10-CM

## 2024-11-20 PROCEDURE — 99214 OFFICE O/P EST MOD 30 MIN: CPT | Performed by: STUDENT IN AN ORGANIZED HEALTH CARE EDUCATION/TRAINING PROGRAM

## 2024-11-20 PROCEDURE — 1126F AMNT PAIN NOTED NONE PRSNT: CPT | Performed by: STUDENT IN AN ORGANIZED HEALTH CARE EDUCATION/TRAINING PROGRAM

## 2024-11-20 PROCEDURE — 1159F MED LIST DOCD IN RCRD: CPT | Performed by: STUDENT IN AN ORGANIZED HEALTH CARE EDUCATION/TRAINING PROGRAM

## 2024-11-20 PROCEDURE — 1160F RVW MEDS BY RX/DR IN RCRD: CPT | Performed by: STUDENT IN AN ORGANIZED HEALTH CARE EDUCATION/TRAINING PROGRAM

## 2024-11-20 RX ORDER — ESZOPICLONE 3 MG/1
3 TABLET, FILM COATED ORAL NIGHTLY PRN
Qty: 30 TABLET | Refills: 2 | Status: SHIPPED | OUTPATIENT
Start: 2024-11-20 | End: 2025-01-13 | Stop reason: SDUPTHER

## 2024-11-20 RX ORDER — DEXTROAMPHETAMINE SACCHARATE, AMPHETAMINE ASPARTATE, DEXTROAMPHETAMINE SULFATE AND AMPHETAMINE SULFATE 5; 5; 5; 5 MG/1; MG/1; MG/1; MG/1
TABLET ORAL
Qty: 30 TABLET | Refills: 0 | Status: SHIPPED | OUTPATIENT
Start: 2024-11-20 | End: 2024-12-30 | Stop reason: SDUPTHER

## 2024-11-20 RX ORDER — FLUCONAZOLE 150 MG/1
150 TABLET ORAL
Qty: 2 TABLET | Refills: 0 | Status: SHIPPED | OUTPATIENT
Start: 2024-11-20 | End: 2024-11-24

## 2024-11-20 RX ORDER — DEXTROAMPHETAMINE SACCHARATE, AMPHETAMINE ASPARTATE MONOHYDRATE, DEXTROAMPHETAMINE SULFATE AND AMPHETAMINE SULFATE 5; 5; 5; 5 MG/1; MG/1; MG/1; MG/1
40 CAPSULE, EXTENDED RELEASE ORAL EVERY MORNING
Qty: 60 CAPSULE | Refills: 0 | Status: SHIPPED | OUTPATIENT
Start: 2024-11-20 | End: 2024-12-30 | Stop reason: SDUPTHER

## 2024-11-20 RX ORDER — DOXEPIN HYDROCHLORIDE 10 MG/1
10 CAPSULE ORAL NIGHTLY
Qty: 30 CAPSULE | Refills: 6 | Status: SHIPPED | OUTPATIENT
Start: 2024-11-20

## 2024-11-20 NOTE — PROGRESS NOTES
Chief Complaint  Clare Turner is a 39 y.o. female presenting for Vaginal Itching and Dysuria.     From [place]. . 3 children. 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     Patient has a past medical history of hyperlipidemia, bipolar 1 disorder w/depression and Hx of jack, ADHD, hidradenitis suppurativa (f/u derm), GERD, episcleritis, restless legs, moderate persistent asthma (since childhood, f/u pulm), lung nodule stable for years, history of alcohol and substance abuse, urge/stress incontinence and severe obesity.    History of Present Illness  Patient is here for acute visit.  She noticed nondeficient odor started about 2 weeks ago.  The last 2 days she has been having itching and burning, burning is the first.  It has even been keeping her up at night.  White discharge.  No increased urinary frequency, no painful urination.  She has been drinking a lot of water.    Otherwise her shingles is resolving.    Answers submitted by the patient for this visit:  Primary Reason for Visit (Submitted on 11/19/2024)  What is the primary reason for your visit?: Vaginal Discharge/Irritation  Female Genital Questionnaire (Submitted on 11/19/2024)  Chief Complaint: Female genitourinary complaint  Chronicity: recurrent  genital itching: Yes  genital lesions: No  genital odor: Yes  genital rash: No  missed menses: No  pelvic pain: No  vaginal bleeding: No  vaginal discharge: Yes  Onset: in the past 7 days  Frequency: rarely  Progression since onset: unchanged  Pregnant now: no  abdominal pain: No  back pain: Yes  dysuria: No  flank pain: No  frequency: Yes  hematuria: No  painful intercourse: No  urgency: Yes  Sexual activity: sexually active  Partner with STD symptoms: no  Birth control: tubal ligation  Menstrual history: regular  Discharge characteristics: copious, malodorous, milky, thick  Passing clots?:  "No  Passing tissue?: No    The following portions of the patient's history were reviewed and updated as appropriate: allergies, current medications, past family history, past medical history, past social history, past surgical history, and problem list.    Objective  /78 (BP Location: Left arm, Patient Position: Sitting, Cuff Size: Large Adult)   Pulse 100   Temp 98.6 °F (37 °C) (Temporal)   Ht 168.9 cm (66.5\")   Wt (!) 150 kg (331 lb)   LMP 10/06/2024 (Approximate)   BMI 52.63 kg/m²     Physical Exam  Constitutional:       Appearance: Normal appearance.   HENT:      Head: Normocephalic and atraumatic.   Eyes:      Extraocular Movements: Extraocular movements intact.      Conjunctiva/sclera: Conjunctivae normal.   Pulmonary:      Effort: Pulmonary effort is normal. No respiratory distress.   Genitourinary:     Vagina: Vaginal discharge present.      Comments: Chaperone by Lora/Tatyana Reyes.  External vagina inspected, no significant redness.  No redness of introitus.  Vaginal swab collected deep into the vagina.  Speculum exam not performed.  Musculoskeletal:      Cervical back: Normal range of motion and neck supple.   Skin:     General: Skin is warm and dry.   Neurological:      Mental Status: She is alert and oriented to person, place, and time. Mental status is at baseline.   Psychiatric:         Behavior: Behavior normal.         Thought Content: Thought content normal.         Assessment/Plan   1. Acute vaginitis  2. Routine screening for STI (sexually transmitted infection)  Not clear if this is caused by yeast or BV.  White discharge and itching indicates possible yeast.  Burning could indicate BV.  Malodor could indicate BV.  We have done swab, also STI testing.  For now we will treat for yeast, and if anything else is positive and needs treatment, I will let her know.  Unless STI or trichomonas, typically apartment treatment would not be recommended.  - OneSwab - Kit, Vagina; Future  - " fluconazole (Diflucan) 150 MG tablet; Take 1 tablet by mouth Every 3 (Three) Days for 2 doses.  Dispense: 2 tablet; Refill: 0    3. Herpes zoster without complication  Rash resolving, puncture with crusts of the right flank.  Reduced inflammation.    4. Borderline hypertension  5. Attention deficit hyperactivity disorder (ADHD), combined type  BP Readings from Last 3 Encounters:   11/20/24 148/78   11/14/24 118/80   11/12/24 124/74   Elevated blood pressure in acute setting.  Will continue to monitor and recheck on next visit.  Also potential side effect from Adderall taken in the morning.        Return if symptoms worsen or fail to improve, for Next scheduled follow up.    Future Appointments         Provider Department Center    11/20/2024 3:00 PM Nani Ball, APR[facility]NA [facility]    11/25/2024 10:00 AM Stevenson Coker, [facility]    12/17/2024 9:30 AM Derrick Spencer MD [facility] BARIATRIC SURGERY     1/6/2025 3:30 PM Stevenson Coker, [facility]    1/14/2025 8:30 AM Cherrie Landry PA [facility] BARIATRIC SURGERY     1/24/2025 8:30 AM Khadra Clements APRVANNA [facility]    2/11/2025 4:15 PM Michael Miner MD [facility]              Michael Miner MD  Family Medicine  11/20/2024

## 2024-11-20 NOTE — PROGRESS NOTES
Patient Name: Clare Turner  MRN: [id number]   :  1985     This provider is located at her home office through the [facility] (through [facility]), [address] [place], [address] using a secure [employer] Video Visit through [employer]. Patient is being seen remotely via telehealth at their home address in [place], and stated they are in a secure environment for this session. The patient's condition being diagnosed/treated is appropriate for telemedicine. The provider identified herself as well as her credentials.   The patient, and/or patients guardian, consent to be seen remotely, and when consent is given they understand that the consent allows for patient identifiable information to be sent to a third party as needed.   They may refuse to be seen remotely at any time. The electronic data is encrypted and password protected, and the patient and/or guardian has been advised of the potential risks to privacy not withstanding such measures.    You have chosen to receive care through a telehealth visit.  Do you consent to use a video/audio connection for your medical care today? Yes    Chief Complaint:      ICD-10-CM ICD-9-CM   1. Bipolar 1 disorder, depressed, partial remission  F31.75 296.55   2. Generalized anxiety disorder  F41.1 300.02   3. Attention deficit hyperactivity disorder, combined type  F90.2 314.01   4. Psychophysiological insomnia  F51.04 307.42       History of Present Illness: Clare Turner is a 39 y.o. female is here today for medication management follow up.  Patient states she is feeling really good.  Does have some trouble sleeping.  Feels high strung.  Had shingles recently.  Had the gastric sleeve on .  Off nicotine totally.    The following portions of the patient's history were reviewed and updated as appropriate: allergies, current medications, past family history, past medical history, past social history, past  surgical history, and problem list.    Review of Systems;;  Review of Systems   Constitutional:  Negative for activity change, appetite change, fatigue, unexpected weight gain and unexpected weight loss.   Respiratory:  Negative for shortness of breath and wheezing.    Gastrointestinal:  Negative for constipation, diarrhea, nausea and vomiting.   Musculoskeletal:  Negative for gait problem.   Skin:  Negative for dry skin and rash.   Neurological:  Negative for dizziness, speech difficulty, weakness, light-headedness, headache, memory problem and confusion.   Psychiatric/Behavioral:  Negative for agitation, behavioral problems, decreased concentration, dysphoric mood, hallucinations, self-injury, sleep disturbance, suicidal ideas, negative for hyperactivity, depressed mood and stress. The patient is not nervous/anxious.        Physical Exam;;  Physical Exam  Constitutional:       General: She is not in acute distress.     Appearance: She is well-developed. She is not diaphoretic.   HENT:      Head: Normocephalic and atraumatic.   Eyes:      Conjunctiva/sclera: Conjunctivae normal.   Pulmonary:      Effort: Pulmonary effort is normal. No respiratory distress.   Musculoskeletal:         General: Normal range of motion.      Cervical back: Full passive range of motion without pain and normal range of motion.   Neurological:      Mental Status: She is alert and oriented to person, place, and time.   Psychiatric:         Mood and Affect: Mood is not anxious or depressed. Affect is not labile, blunt, angry or inappropriate.         Speech: Speech is not rapid and pressured or tangential.         Behavior: Behavior normal. Behavior is not agitated, slowed, aggressive, withdrawn, hyperactive or combative. Behavior is cooperative.         Thought Content: Thought content normal. Thought content is not paranoid or delusional. Thought content does not include homicidal or suicidal ideation. Thought content does not include  homicidal or suicidal plan.         Judgment: Judgment normal.       Last menstrual period 10/06/2024, not currently breastfeeding.  There is no height or weight on file to calculate BMI. Video appt unable to obtain.     Current Medications;;    Current Outpatient Medications:     Cariprazine HCl (Vraylar) 4.5 MG capsule capsule, Take 1 capsule by mouth Daily., Disp: 30 capsule, Rfl: 6    doxepin (SINEquan) 10 MG capsule, Take 1 capsule by mouth Every Night., Disp: 30 capsule, Rfl: 6    albuterol (PROVENTIL) (2.5 MG/3ML) 0.083% nebulizer solution, Take 2.5 mg by nebulization 4 (Four) Times a Day As Needed for Wheezing., Disp: 120 each, Rfl: 5    albuterol sulfate  (90 Base) MCG/ACT inhaler, Inhale 2 puffs 2 (Two) Times a Day., Disp: 18 g, Rfl: 5    amphetamine-dextroamphetamine (Adderall) 20 MG tablet, Take 20 mg orally every afternoon., Disp: 30 tablet, Rfl: 0    amphetamine-dextroamphetamine XR (Adderall XR) 20 MG 24 hr capsule, Take 2 capsules by mouth Every Morning, Disp: 60 capsule, Rfl: 0    budesonide (Pulmicort) 0.5 MG/2ML nebulizer solution, Take 2 mL by nebulization 2 (Two) Times a Day. (Patient taking differently: Take 2 mL by nebulization 2 (Two) Times a Day As Needed.), Disp: 120 each, Rfl: 11    buPROPion XL (Wellbutrin XL) 150 MG 24 hr tablet, Take 1 tablet by mouth Every Morning., Disp: 30 tablet, Rfl: 2    Calcium Carbonate-Vit D-Min (Calcium 1200) 8049-6922 MG-UNIT chewable tablet, Chew 1,200 mg Daily., Disp: 90 each, Rfl: 2    Cyanocobalamin (Vitamin B-12) 1000 MCG sublingual tablet, Place 1 tablet under the tongue Daily., Disp: 90 each, Rfl: 2    Dupilumab 300 MG/2ML solution pen-injector, Inject 2 mL under the skin into the appropriate area as directed Every 14 (Fourteen) Days. (Patient not taking: Reported on 1/14/2025), Disp: 2 mL, Rfl: 11    EPINEPHrine (EPIPEN) 0.3 MG/0.3ML solution auto-injector injection, Inject 1 pen in the muscle as directed PRN allergic reaction (Patient not  taking: Reported on 1/14/2025), Disp: 2 each, Rfl: 1    eszopiclone (Lunesta) 3 MG tablet, Take 1 tablet by mouth At Night As Needed for Sleep. Take immediately before bedtime, Disp: 30 tablet, Rfl: 2    FLUoxetine (PROzac) 40 MG capsule, Take 2 capsules by mouth Daily., Disp: 60 capsule, Rfl: 6    Fluticasone Furoate-Vilanterol (BREO ELLIPTA) 200-25 MCG/ACT inhaler, Inhale 1 puff Daily. 1 inhalation once a day, Disp: 60 each, Rfl: 5    hydrOXYzine pamoate (VISTARIL) 25 MG capsule, Take 1 capsule by mouth 3 (Three) Times a Day As Needed for Anxiety., Disp: 90 capsule, Rfl: 6    Iron-Vitamin C (Vitron-C)  MG tablet, Take 1 tablet by mouth Daily., Disp: 90 tablet, Rfl: 2    lamoTRIgine (LaMICtal) 100 MG tablet, Take 1 tablet by mouth Daily., Disp: 30 tablet, Rfl: 2    multivitamin tablet tablet, Take 1 tablet by mouth Daily., Disp: 90 tablet, Rfl: 2    omeprazole (priLOSEC) 40 MG capsule, Take 1 capsule by mouth Daily for 60 days., Disp: 60 capsule, Rfl: 0    ondansetron ODT (ZOFRAN-ODT) 4 MG disintegrating tablet, Place 1 tablet on the tongue Every 8 (Eight) Hours As Needed for Nausea or Vomiting., Disp: 10 tablet, Rfl: 0    thiamine (VITAMIN B-1) 100 MG tablet, Take 1 tablet by mouth Daily., Disp: 30 tablet, Rfl: 2    vitamin D3 125 MCG (5000 UT) capsule capsule, Take 1 capsule by mouth Daily., Disp: 90 capsule, Rfl: 2    Lab Results:   Admission on 11/06/2024, Discharged on 11/06/2024   Component Date Value Ref Range Status    SARS Antigen 11/06/2024 Not Detected  Not Detected, Presumptive Negative Final    Influenza A Antigen IZABEL 11/06/2024 Not Detected  Not Detected Final    Influenza B Antigen IZABEL 11/06/2024 Not Detected  Not Detected Final    Internal Control 11/06/2024 Passed  Passed Final    Lot Number 11/06/2024 [id number]   Final    Expiration Date 11/06/2024 11/14/25   Final    Rapid Strep A Screen 11/06/2024 Negative   Final    Internal Control 11/06/2024 Passed   Final    Lot Number 11/06/2024  [id number]   Final    Expiration Date 11/06/2024 01/03/27   Final   Lab on 11/04/2024   Component Date Value Ref Range Status    WBC 11/04/2024 16.34 (H)  3.40 - 10.80 10*3/mm3 Final    RBC 11/04/2024 4.61  3.77 - 5.28 10*6/mm3 Final    Hemoglobin 11/04/2024 13.7  12.0 - 15.9 g/dL Final    Hematocrit 11/04/2024 40.5  34.0 - 46.6 % Final    MCV 11/04/2024 87.9  79.0 - 97.0 fL Final    MCH 11/04/2024 29.7  26.6 - 33.0 pg Final    MCHC 11/04/2024 33.8  31.5 - 35.7 g/dL Final    RDW 11/04/2024 12.8  12.3 - 15.4 % Final    RDW-SD 11/04/2024 40.8  37.0 - 54.0 fl Final    MPV 11/04/2024 10.5  6.0 - 12.0 fL Final    Platelets 11/04/2024 464 (H)  140 - 450 10*3/mm3 Final    Glucose 11/04/2024 87  65 - 99 mg/dL Final    BUN 11/04/2024 8  6 - 20 mg/dL Final    Creatinine 11/04/2024 0.79  0.57 - 1.00 mg/dL Final    Sodium 11/04/2024 136  136 - 145 mmol/L Final    Potassium 11/04/2024 4.1  3.5 - 5.2 mmol/L Final    Chloride 11/04/2024 103  98 - 107 mmol/L Final    CO2 11/04/2024 22.6  22.0 - 29.0 mmol/L Final    Calcium 11/04/2024 9.4  8.6 - 10.5 mg/dL Final    Total Protein 11/04/2024 7.2  6.0 - 8.5 g/dL Final    Albumin 11/04/2024 4.0  3.5 - 5.2 g/dL Final    ALT (SGPT) 11/04/2024 61 (H)  1 - 33 U/L Final    AST (SGOT) 11/04/2024 34 (H)  1 - 32 U/L Final    Alkaline Phosphatase 11/04/2024 116  39 - 117 U/L Final    Total Bilirubin 11/04/2024 <0.2  0.0 - 1.2 mg/dL Final    Globulin 11/04/2024 3.2  gm/dL Final    A/G Ratio 11/04/2024 1.3  g/dL Final    BUN/Creatinine Ratio 11/04/2024 10.1  7.0 - 25.0 Final    Anion Gap 11/04/2024 10.4  5.0 - 15.0 mmol/L Final    eGFR 11/04/2024 98.3  >60.0 mL/min/1.73 Final   Lab on 10/03/2024   Component Date Value Ref Range Status    Hemoglobin A1C 10/03/2024 6.00 (H)  4.80 - 5.60 % Final    Glucose 10/03/2024 91  65 - 99 mg/dL Final    BUN 10/03/2024 11  6 - 20 mg/dL Final    Creatinine 10/03/2024 1.00  0.57 - 1.00 mg/dL Final    Sodium 10/03/2024 138  136 - 145 mmol/L Final    Potassium  10/03/2024 4.1  3.5 - 5.2 mmol/L Final    Chloride 10/03/2024 101  98 - 107 mmol/L Final    CO2 10/03/2024 24.6  22.0 - 29.0 mmol/L Final    Calcium 10/03/2024 9.3  8.6 - 10.5 mg/dL Final    Total Protein 10/03/2024 7.4  6.0 - 8.5 g/dL Final    Albumin 10/03/2024 4.0  3.5 - 5.2 g/dL Final    ALT (SGPT) 10/03/2024 62 (H)  1 - 33 U/L Final    AST (SGOT) 10/03/2024 26  1 - 32 U/L Final    Alkaline Phosphatase 10/03/2024 127 (H)  39 - 117 U/L Final    Total Bilirubin 10/03/2024 <0.2  0.0 - 1.2 mg/dL Final    Globulin 10/03/2024 3.4  gm/dL Final    A/G Ratio 10/03/2024 1.2  g/dL Final    BUN/Creatinine Ratio 10/03/2024 11.0  7.0 - 25.0 Final    Anion Gap 10/03/2024 12.4  5.0 - 15.0 mmol/L Final    eGFR 10/03/2024 74.1  >60.0 mL/min/1.73 Final       Mental Status Exam:   Hygiene:   good  Cooperation:  Cooperative  Eye Contact:  Good  Psychomotor Behavior:  Appropriate  Mood:within normal limits  Affect:  Appropriate  Hopelessness: Denies  Speech:  Normal  Thought Process:  Goal directed  Thought Content:  Normal  Suicidal:  None  Homicidal:  None  Hallucinations:  None  Delusion:  None  Memory:  Intact  Orientation:  Person, Place, Time, and Situation  Reliability:  good  Insight:  Good  Judgement:  Good  Impulse Control:  Good    PHQ-9 Depression Screening  Little interest or pleasure in doing things? (Patient-Rptd) Several days   Feeling down, depressed, or hopeless? (Patient-Rptd) Not at all   PHQ-2 Total Score (Patient-Rptd) 1   Trouble falling or staying asleep, or sleeping too much? (Patient-Rptd) Not at all   Feeling tired or having little energy? (Patient-Rptd) Several days   Poor appetite or overeating? (Patient-Rptd) Not at all   Feeling bad about yourself - or that you are a failure or have let yourself or your family down? (Patient-Rptd) Not at all   Trouble concentrating on things, such as reading the newspaper or watching television? (Patient-Rptd) Several days   Moving or speaking so slowly that other  people could have noticed? Or the opposite - being so fidgety or restless that you have been moving around a lot more than usual? (Patient-Rptd) Several days   Thoughts that you would be better off dead, or of hurting yourself in some way? (Patient-Rptd) Not at all   PHQ-9 Total Score (Patient-Rptd) 4   If you checked off any problems, how difficult have these problems made it for you to do your work, take care of things at home, or get along with other people? (Patient-Rptd) Somewhat difficult         Assessment/Plan:  Diagnoses and all orders for this visit:    1. Bipolar 1 disorder, depressed, partial remission (Primary)  -     Cariprazine HCl (Vraylar) 4.5 MG capsule capsule; Take 1 capsule by mouth Daily.  Dispense: 30 capsule; Refill: 6    2. Generalized anxiety disorder    3. Attention deficit hyperactivity disorder, combined type  -     Discontinue: amphetamine-dextroamphetamine (Adderall) 20 MG tablet; Take 20 mg orally every afternoon.  Dispense: 30 tablet; Refill: 0  -     Discontinue: amphetamine-dextroamphetamine XR (Adderall XR) 20 MG 24 hr capsule; Take 2 capsules by mouth Every Morning  Dispense: 60 capsule; Refill: 0    4. Psychophysiological insomnia  -     doxepin (SINEquan) 10 MG capsule; Take 1 capsule by mouth Every Night.  Dispense: 30 capsule; Refill: 6  -     Discontinue: eszopiclone (Lunesta) 3 MG tablet; Take 1 tablet by mouth At Night As Needed for Sleep. Take immediately before bedtime  Dispense: 30 tablet; Refill: 2      Overall patient doing well.  We will continue medication as ordered and follow-up in 2 months.    A psychological evaluation was conducted in order to assess past and current level of functioning. Areas assessed included, but were not limited to: perception of social support, perception of ability to face and deal with challenges in life (positive functioning), anxiety symptoms, depressive symptoms, perspective on beliefs/belief system, coping skills for stress,  intelligence level,  Therapeutic rapport was established. Interventions conducted today were geared towards incorporating medication management along with support for continued therapy. Education was also provided as to the med management with this provider and what to expect in subsequent sessions.    We discussed risks, benefits,goals and side effects of the above medication and the patient was agreeable with the plan.Patient was educated on the importance of compliance with treatment and follow-up appointments. Patient is aware to contact the [facility] [phone] with any worsening of symptoms. To call for questions or concerns and return early if necessary. Patent is agreeable to go to the Emergency Department or call 911 should they begin SI/HI.     Part of this note may be an electronic transcription/translation of spoken language to printed text using the Dragon Dictation System.    Return in about 2 months (around 1/20/2025) for Follow Up 30 min, Video visit.    DALLAS Hoover

## 2024-11-26 DIAGNOSIS — A59.01 TRICHOMONAL VAGINITIS: Primary | ICD-10-CM

## 2024-11-26 RX ORDER — METRONIDAZOLE 500 MG/1
500 TABLET ORAL 2 TIMES DAILY
Qty: 14 TABLET | Refills: 0 | Status: SHIPPED | OUTPATIENT
Start: 2024-11-26 | End: 2024-12-03

## 2024-12-04 ENCOUNTER — PRE-ADMISSION TESTING (OUTPATIENT)
Dept: PREADMISSION TESTING | Facility: HOSPITAL | Age: 39
End: 2024-12-04
Payer: COMMERCIAL

## 2024-12-04 DIAGNOSIS — K21.9 HIATAL HERNIA WITH GASTROESOPHAGEAL REFLUX: ICD-10-CM

## 2024-12-04 DIAGNOSIS — K44.9 HIATAL HERNIA WITH GASTROESOPHAGEAL REFLUX: ICD-10-CM

## 2024-12-04 LAB
ABO GROUP BLD: NORMAL
ANION GAP SERPL CALCULATED.3IONS-SCNC: 9.4 MMOL/L (ref 5–15)
BASOPHILS # BLD AUTO: 0.04 10*3/MM3 (ref 0–0.2)
BASOPHILS NFR BLD AUTO: 0.3 % (ref 0–1.5)
BUN SERPL-MCNC: 10 MG/DL (ref 6–20)
BUN/CREAT SERPL: 12.5 (ref 7–25)
CALCIUM SPEC-SCNC: 9.3 MG/DL (ref 8.6–10.5)
CHLORIDE SERPL-SCNC: 101 MMOL/L (ref 98–107)
CO2 SERPL-SCNC: 24.6 MMOL/L (ref 22–29)
CREAT SERPL-MCNC: 0.8 MG/DL (ref 0.57–1)
DEPRECATED RDW RBC AUTO: 45.2 FL (ref 37–54)
EGFRCR SERPLBLD CKD-EPI 2021: 96.9 ML/MIN/1.73
EOSINOPHIL # BLD AUTO: 0.24 10*3/MM3 (ref 0–0.4)
EOSINOPHIL NFR BLD AUTO: 2 % (ref 0.3–6.2)
ERYTHROCYTE [DISTWIDTH] IN BLOOD BY AUTOMATED COUNT: 13.8 % (ref 12.3–15.4)
GLUCOSE SERPL-MCNC: 88 MG/DL (ref 65–99)
HCT VFR BLD AUTO: 42.7 % (ref 34–46.6)
HGB BLD-MCNC: 14.4 G/DL (ref 12–15.9)
IMM GRANULOCYTES # BLD AUTO: 0.04 10*3/MM3 (ref 0–0.05)
IMM GRANULOCYTES NFR BLD AUTO: 0.3 % (ref 0–0.5)
LYMPHOCYTES # BLD AUTO: 2.99 10*3/MM3 (ref 0.7–3.1)
LYMPHOCYTES NFR BLD AUTO: 25.1 % (ref 19.6–45.3)
MCH RBC QN AUTO: 30.1 PG (ref 26.6–33)
MCHC RBC AUTO-ENTMCNC: 33.7 G/DL (ref 31.5–35.7)
MCV RBC AUTO: 89.1 FL (ref 79–97)
MONOCYTES # BLD AUTO: 1.09 10*3/MM3 (ref 0.1–0.9)
MONOCYTES NFR BLD AUTO: 9.1 % (ref 5–12)
NEUTROPHILS NFR BLD AUTO: 63.2 % (ref 42.7–76)
NEUTROPHILS NFR BLD AUTO: 7.52 10*3/MM3 (ref 1.7–7)
NRBC BLD AUTO-RTO: 0 /100 WBC (ref 0–0.2)
PLATELET # BLD AUTO: 476 10*3/MM3 (ref 140–450)
PMV BLD AUTO: 10.3 FL (ref 6–12)
POTASSIUM SERPL-SCNC: 4.6 MMOL/L (ref 3.5–5.2)
RBC # BLD AUTO: 4.79 10*6/MM3 (ref 3.77–5.28)
RH BLD: POSITIVE
SODIUM SERPL-SCNC: 135 MMOL/L (ref 136–145)
WBC NRBC COR # BLD AUTO: 11.92 10*3/MM3 (ref 3.4–10.8)

## 2024-12-04 PROCEDURE — 85025 COMPLETE CBC W/AUTO DIFF WBC: CPT

## 2024-12-04 PROCEDURE — 87081 CULTURE SCREEN ONLY: CPT

## 2024-12-04 PROCEDURE — 86900 BLOOD TYPING SEROLOGIC ABO: CPT

## 2024-12-04 PROCEDURE — 86901 BLOOD TYPING SEROLOGIC RH(D): CPT

## 2024-12-04 PROCEDURE — 36415 COLL VENOUS BLD VENIPUNCTURE: CPT

## 2024-12-04 PROCEDURE — 80048 BASIC METABOLIC PNL TOTAL CA: CPT

## 2024-12-04 NOTE — DISCHARGE INSTRUCTIONS
Pre-Admission testing appointment completed today for patient's upcoming procedure here at [facility].    PAT PASS reviewed with patient and they verbalize understanding of the following:     Do not eat or drink anything after midnight the night before procedure unless otherwise instructed by physician/surgeon's office, this includes no gum, candy, mints, tobacco products or e-cigarettes.  Do not shave the area to be operated on at least 48 hours prior to procedure.  Do not wear makeup, lotion, hair products, or nail polish.  Do not wear any jewelry and remove all piercings.  Do not wear any adhesive if you wear dentures.  Do not wear contacts; bring in glasses if needed.  Only take medications on the morning of procedure as instructed by PAT nurse per anesthesia guidelines or as instructed by physician's office.  If you are on any blood thinners reach out to the physician/surgeon's office for instructions on when/if they will need to be stopped prior to procedure.  Bring in picture ID and insurance card, advanced directive copies if applicable, CPAP/BIPAP/Inhalers if indicated morning of procedure, leave any other valuables at home.  Ensure you have arranged for someone to drive you home the day of your procedure and someone to care for you at home afterwards. It is recommended that you do not drive, drink alcohol, or make any major legal decisions for at least 24 hours after your procedure is complete.  Chlorhexadine sponge along with instruction/information sheet given to patient. Readybath wipes given in lieu of CHG if patient has CHG allergy. Instructed patient to date, time, and initial the verification sheet once skin prep has been completed, and to return to [facility] the day of the procedure.  ERAS instructions given to patient unless otherwise instructed per surgeon's orders.     Introduction to anesthesia video watched by patient during appointment and anesthesia FAQ tip sheet given to  patient.  Instructions and information given to patient about parking, hospital entrance, and registration location.

## 2024-12-05 LAB — MRSA SPEC QL CULT: NO GROWTH

## 2024-12-07 DIAGNOSIS — R03.0 BORDERLINE HYPERTENSION: ICD-10-CM

## 2024-12-07 DIAGNOSIS — M79.89 LEG SWELLING: ICD-10-CM

## 2024-12-09 RX ORDER — SPIRONOLACTONE 25 MG/1
25 TABLET ORAL DAILY
Qty: 30 TABLET | Refills: 1 | Status: SHIPPED | OUTPATIENT
Start: 2024-12-09 | End: 2024-12-12 | Stop reason: HOSPADM

## 2024-12-10 ENCOUNTER — ANESTHESIA EVENT (OUTPATIENT)
Dept: PERIOP | Facility: HOSPITAL | Age: 39
End: 2024-12-10
Payer: COMMERCIAL

## 2024-12-10 NOTE — ANESTHESIA PREPROCEDURE EVALUATION
Anesthesia Evaluation     Patient summary reviewed and Nursing notes reviewed   no history of anesthetic complications:   NPO Solid Status: > 8 hours  NPO Liquid Status: > 8 hours           Airway   Mallampati: II  TM distance: >3 FB  Neck ROM: full  Possible difficult intubation  Dental - normal exam     Pulmonary - normal exam   (+) a smoker (quit 8/2024) Former, asthma,  Cardiovascular - normal exam  Exercise tolerance: good (4-7 METS)    ECG reviewed  PT is on anticoagulation therapy    (+) hypertension well controlled, hyperlipidemia    ROS comment: EKG: SR     Neuro/Psych  (+) psychiatric history Bipolar and Anxiety  GI/Hepatic/Renal/Endo    (+) obesity, morbid obesity, hiatal hernia, GERD well controlled    Musculoskeletal     Abdominal    Substance History   (+) alcohol use (last use 2020), drug use (last use 2020)     OB/GYN          Other   arthritis,     ROS/Med Hx Other: 14.4/42.7  K 4.6    Preoperative risk assessment: low risk               Anesthesia Plan    ASA 3     general with block     (Risks and benefits discussed including risk of aspiration, recall and dental damage. All patient questions answered. Will continue with POC.    TAP: Discussed risk of infection, bruising, tenderness at injection site, possible nerve damage and risk of failed block. All patient questions answered. Will continue with POC.  Discussed realistic expectation of postoperative pain management.  Informed consent obtained from patient preoperatively.  )  intravenous induction     Anesthetic plan, risks, benefits, and alternatives have been provided, discussed and informed consent has been obtained with: patient.    Plan discussed with CRNA.    CODE STATUS:

## 2024-12-11 ENCOUNTER — HOSPITAL ENCOUNTER (INPATIENT)
Facility: HOSPITAL | Age: 39
LOS: 1 days | Discharge: HOME | End: 2024-12-12
Attending: SURGERY | Admitting: SURGERY
Payer: COMMERCIAL

## 2024-12-11 ENCOUNTER — ANESTHESIA (OUTPATIENT)
Dept: PERIOP | Facility: HOSPITAL | Age: 39
End: 2024-12-11
Payer: COMMERCIAL

## 2024-12-11 ENCOUNTER — ANESTHESIA EVENT CONVERTED (OUTPATIENT)
Dept: ANESTHESIOLOGY | Facility: HOSPITAL | Age: 39
End: 2024-12-11
Payer: COMMERCIAL

## 2024-12-11 DIAGNOSIS — R03.0 BORDERLINE HYPERTENSION: ICD-10-CM

## 2024-12-11 DIAGNOSIS — M79.89 LEG SWELLING: ICD-10-CM

## 2024-12-11 DIAGNOSIS — K21.9 HIATAL HERNIA WITH GASTROESOPHAGEAL REFLUX: ICD-10-CM

## 2024-12-11 DIAGNOSIS — K44.9 HIATAL HERNIA WITH GASTROESOPHAGEAL REFLUX: ICD-10-CM

## 2024-12-11 LAB
B-HCG UR QL: NEGATIVE
EXPIRATION DATE: NORMAL
INTERNAL NEGATIVE CONTROL: NORMAL
INTERNAL POSITIVE CONTROL: NORMAL
Lab: NORMAL

## 2024-12-11 PROCEDURE — 25010000002 HYDROMORPHONE 1 MG/ML SOLUTION: Performed by: SURGERY

## 2024-12-11 PROCEDURE — 0DB64Z3 EXCISION OF STOMACH, PERCUTANEOUS ENDOSCOPIC APPROACH, VERTICAL: ICD-10-PCS | Performed by: SURGERY

## 2024-12-11 PROCEDURE — 94799 UNLISTED PULMONARY SVC/PX: CPT

## 2024-12-11 PROCEDURE — 81025 URINE PREGNANCY TEST: CPT | Performed by: SURGERY

## 2024-12-11 PROCEDURE — 25010000002 DEXAMETHASONE PER 1 MG: Performed by: NURSE ANESTHETIST, CERTIFIED REGISTERED

## 2024-12-11 PROCEDURE — 94664 DEMO&/EVAL PT USE INHALER: CPT

## 2024-12-11 PROCEDURE — 25010000002 FENTANYL CITRATE (PF) 50 MCG/ML SOLUTION PREFILLED SYRINGE: Performed by: NURSE ANESTHETIST, CERTIFIED REGISTERED

## 2024-12-11 PROCEDURE — 25010000002 PROPOFOL 200 MG/20ML EMULSION: Performed by: NURSE ANESTHETIST, CERTIFIED REGISTERED

## 2024-12-11 PROCEDURE — 25010000002 HYDROMORPHONE PER 4 MG: Performed by: NURSE ANESTHETIST, CERTIFIED REGISTERED

## 2024-12-11 PROCEDURE — 25010000002 BUPIVACAINE (PF) 0.5 % SOLUTION: Performed by: NURSE ANESTHETIST, CERTIFIED REGISTERED

## 2024-12-11 PROCEDURE — 25010000002 ONDANSETRON PER 1 MG: Performed by: NURSE ANESTHETIST, CERTIFIED REGISTERED

## 2024-12-11 PROCEDURE — 25010000002 LABETALOL 5 MG/ML SOLUTION: Performed by: NURSE ANESTHETIST, CERTIFIED REGISTERED

## 2024-12-11 PROCEDURE — 94640 AIRWAY INHALATION TREATMENT: CPT

## 2024-12-11 PROCEDURE — 25010000002 AMISULPRIDE (ANTIEMETIC) 5 MG/2ML SOLUTION: Performed by: NURSE ANESTHETIST, CERTIFIED REGISTERED

## 2024-12-11 PROCEDURE — 25010000002 DEXAMETHASONE SODIUM PHOSPHATE 10 MG/ML SOLUTION: Performed by: NURSE ANESTHETIST, CERTIFIED REGISTERED

## 2024-12-11 PROCEDURE — 25810000003 SODIUM CHLORIDE 0.9 % SOLUTION: Performed by: SURGERY

## 2024-12-11 PROCEDURE — 94761 N-INVAS EAR/PLS OXIMETRY MLT: CPT

## 2024-12-11 PROCEDURE — 43775 LAP SLEEVE GASTRECTOMY: CPT | Performed by: SURGERY

## 2024-12-11 PROCEDURE — 0BQT4ZZ REPAIR DIAPHRAGM, PERCUTANEOUS ENDOSCOPIC APPROACH: ICD-10-PCS | Performed by: SURGERY

## 2024-12-11 PROCEDURE — 25010000002 ENOXAPARIN PER 10 MG: Performed by: SURGERY

## 2024-12-11 PROCEDURE — 0DJ08ZZ INSPECTION OF UPPER INTESTINAL TRACT, VIA NATURAL OR ARTIFICIAL OPENING ENDOSCOPIC: ICD-10-PCS | Performed by: SURGERY

## 2024-12-11 PROCEDURE — 88307 TISSUE EXAM BY PATHOLOGIST: CPT

## 2024-12-11 PROCEDURE — 25010000002 GLUCAGON (RDNA) PER 1 MG: Performed by: NURSE ANESTHETIST, CERTIFIED REGISTERED

## 2024-12-11 PROCEDURE — 25010000002 CEFAZOLIN 3 G RECONSTITUTED SOLUTION 1 EACH VIAL: Performed by: SURGERY

## 2024-12-11 PROCEDURE — 25010000002 MIDAZOLAM PER 1MG: Performed by: NURSE ANESTHETIST, CERTIFIED REGISTERED

## 2024-12-11 PROCEDURE — 25010000002 HYDRALAZINE PER 20 MG: Performed by: NURSE ANESTHETIST, CERTIFIED REGISTERED

## 2024-12-11 PROCEDURE — 25010000002 SUGAMMADEX 500 MG/5ML SOLUTION: Performed by: NURSE ANESTHETIST, CERTIFIED REGISTERED

## 2024-12-11 PROCEDURE — 25010000002 THIAMINE HCL 200 MG/2ML SOLUTION: Performed by: SURGERY

## 2024-12-11 DEVICE — ABSORBABLE WOUND CLOSURE DEVICE
Type: IMPLANTABLE DEVICE | Site: ABDOMEN | Status: FUNCTIONAL
Brand: SYNETURE

## 2024-12-11 DEVICE — IMPLANTABLE DEVICE
Type: IMPLANTABLE DEVICE | Site: ABDOMEN | Status: FUNCTIONAL
Brand: TITAN SGS STANDARD GASTRIC STAPLER

## 2024-12-11 RX ORDER — ONDANSETRON 4 MG/1
4 TABLET, ORALLY DISINTEGRATING ORAL EVERY 6 HOURS PRN
Status: DISCONTINUED | OUTPATIENT
Start: 2024-12-11 | End: 2024-12-12 | Stop reason: HOSPADM

## 2024-12-11 RX ORDER — HYDRALAZINE HYDROCHLORIDE 20 MG/ML
10 INJECTION INTRAMUSCULAR; INTRAVENOUS
Status: DISCONTINUED | OUTPATIENT
Start: 2024-12-11 | End: 2024-12-12 | Stop reason: HOSPADM

## 2024-12-11 RX ORDER — IBUPROFEN 600 MG/1
TABLET ORAL AS NEEDED
Status: DISCONTINUED | OUTPATIENT
Start: 2024-12-11 | End: 2024-12-11 | Stop reason: SURG

## 2024-12-11 RX ORDER — ALBUTEROL SULFATE 0.83 MG/ML
2.5 SOLUTION RESPIRATORY (INHALATION)
Status: DISCONTINUED | OUTPATIENT
Start: 2024-12-11 | End: 2024-12-12 | Stop reason: HOSPADM

## 2024-12-11 RX ORDER — ENOXAPARIN SODIUM 100 MG/ML
INJECTION SUBCUTANEOUS AS NEEDED
Status: DISCONTINUED | OUTPATIENT
Start: 2024-12-11 | End: 2024-12-11 | Stop reason: HOSPADM

## 2024-12-11 RX ORDER — DEXMEDETOMIDINE HYDROCHLORIDE 100 UG/ML
INJECTION, SOLUTION INTRAVENOUS AS NEEDED
Status: DISCONTINUED | OUTPATIENT
Start: 2024-12-11 | End: 2024-12-11 | Stop reason: SURG

## 2024-12-11 RX ORDER — ESZOPICLONE 3 MG/1
3 TABLET, FILM COATED ORAL NIGHTLY PRN
Status: DISCONTINUED | OUTPATIENT
Start: 2024-12-11 | End: 2024-12-12 | Stop reason: HOSPADM

## 2024-12-11 RX ORDER — DEXTROAMPHETAMINE SACCHARATE, AMPHETAMINE ASPARTATE, DEXTROAMPHETAMINE SULFATE AND AMPHETAMINE SULFATE 5; 5; 5; 5 MG/1; MG/1; MG/1; MG/1
20 TABLET ORAL
Status: DISCONTINUED | OUTPATIENT
Start: 2024-12-11 | End: 2024-12-12 | Stop reason: HOSPADM

## 2024-12-11 RX ORDER — ENOXAPARIN SODIUM 100 MG/ML
40 INJECTION SUBCUTANEOUS EVERY 12 HOURS
Status: DISCONTINUED | OUTPATIENT
Start: 2024-12-12 | End: 2024-12-12 | Stop reason: HOSPADM

## 2024-12-11 RX ORDER — CETIRIZINE HYDROCHLORIDE 10 MG/1
10 TABLET ORAL DAILY
Status: DISCONTINUED | OUTPATIENT
Start: 2024-12-11 | End: 2024-12-12 | Stop reason: HOSPADM

## 2024-12-11 RX ORDER — ENOXAPARIN SODIUM 100 MG/ML
40 INJECTION SUBCUTANEOUS ONCE
Status: DISCONTINUED | OUTPATIENT
Start: 2024-12-11 | End: 2024-12-11 | Stop reason: HOSPADM

## 2024-12-11 RX ORDER — PANTOPRAZOLE SODIUM 40 MG/10ML
40 INJECTION, POWDER, LYOPHILIZED, FOR SOLUTION INTRAVENOUS ONCE
Status: COMPLETED | OUTPATIENT
Start: 2024-12-12 | End: 2024-12-12

## 2024-12-11 RX ORDER — LABETALOL HYDROCHLORIDE 5 MG/ML
10 INJECTION, SOLUTION INTRAVENOUS
Status: DISCONTINUED | OUTPATIENT
Start: 2024-12-11 | End: 2024-12-12 | Stop reason: HOSPADM

## 2024-12-11 RX ORDER — PROPOFOL 10 MG/ML
INJECTION, EMULSION INTRAVENOUS AS NEEDED
Status: DISCONTINUED | OUTPATIENT
Start: 2024-12-11 | End: 2024-12-11 | Stop reason: SURG

## 2024-12-11 RX ORDER — ACETAMINOPHEN 500 MG
1000 TABLET ORAL EVERY 8 HOURS SCHEDULED
Status: DISCONTINUED | OUTPATIENT
Start: 2024-12-11 | End: 2024-12-12 | Stop reason: HOSPADM

## 2024-12-11 RX ORDER — MORPHINE SULFATE 4 MG/ML
4 INJECTION, SOLUTION INTRAMUSCULAR; INTRAVENOUS
Status: DISCONTINUED | OUTPATIENT
Start: 2024-12-11 | End: 2024-12-12 | Stop reason: HOSPADM

## 2024-12-11 RX ORDER — LIDOCAINE HCL/PF 100 MG/5ML
SYRINGE (ML) INJECTION AS NEEDED
Status: DISCONTINUED | OUTPATIENT
Start: 2024-12-11 | End: 2024-12-11 | Stop reason: SURG

## 2024-12-11 RX ORDER — NALOXONE HCL 0.4 MG/ML
0.4 VIAL (ML) INJECTION
Status: DISCONTINUED | OUTPATIENT
Start: 2024-12-11 | End: 2024-12-11

## 2024-12-11 RX ORDER — LORAZEPAM 0.5 MG/1
1 TABLET ORAL EVERY 12 HOURS PRN
Status: DISCONTINUED | OUTPATIENT
Start: 2024-12-11 | End: 2024-12-12 | Stop reason: HOSPADM

## 2024-12-11 RX ORDER — HYDRALAZINE HYDROCHLORIDE 20 MG/ML
5 INJECTION INTRAMUSCULAR; INTRAVENOUS
Status: DISCONTINUED | OUTPATIENT
Start: 2024-12-11 | End: 2024-12-11 | Stop reason: HOSPADM

## 2024-12-11 RX ORDER — MORPHINE SULFATE 1 MG/ML
4 INJECTION, SOLUTION EPIDURAL; INTRATHECAL; INTRAVENOUS
Status: DISCONTINUED | OUTPATIENT
Start: 2024-12-11 | End: 2024-12-11

## 2024-12-11 RX ORDER — LORAZEPAM 2 MG/ML
0.5 INJECTION INTRAMUSCULAR EVERY 12 HOURS PRN
Status: DISCONTINUED | OUTPATIENT
Start: 2024-12-11 | End: 2024-12-12 | Stop reason: HOSPADM

## 2024-12-11 RX ORDER — ONDANSETRON 2 MG/ML
4 INJECTION INTRAMUSCULAR; INTRAVENOUS EVERY 6 HOURS PRN
Status: DISCONTINUED | OUTPATIENT
Start: 2024-12-11 | End: 2024-12-12 | Stop reason: HOSPADM

## 2024-12-11 RX ORDER — LABETALOL HYDROCHLORIDE 5 MG/ML
INJECTION, SOLUTION INTRAVENOUS AS NEEDED
Status: DISCONTINUED | OUTPATIENT
Start: 2024-12-11 | End: 2024-12-11 | Stop reason: SURG

## 2024-12-11 RX ORDER — BUDESONIDE 0.5 MG/2ML
0.5 INHALANT ORAL 2 TIMES DAILY
Status: DISCONTINUED | OUTPATIENT
Start: 2024-12-11 | End: 2024-12-12 | Stop reason: HOSPADM

## 2024-12-11 RX ORDER — SIMETHICONE 80 MG
80 TABLET,CHEWABLE ORAL 4 TIMES DAILY PRN
Status: DISCONTINUED | OUTPATIENT
Start: 2024-12-11 | End: 2024-12-12 | Stop reason: HOSPADM

## 2024-12-11 RX ORDER — SODIUM CHLORIDE AND POTASSIUM CHLORIDE 150; 450 MG/100ML; MG/100ML
125 INJECTION, SOLUTION INTRAVENOUS CONTINUOUS
Status: DISCONTINUED | OUTPATIENT
Start: 2024-12-12 | End: 2024-12-12 | Stop reason: HOSPADM

## 2024-12-11 RX ORDER — ACETAMINOPHEN 500 MG
1000 TABLET ORAL ONCE
Status: COMPLETED | OUTPATIENT
Start: 2024-12-11 | End: 2024-12-11

## 2024-12-11 RX ORDER — SODIUM CHLORIDE 9 MG/ML
150 INJECTION, SOLUTION INTRAVENOUS CONTINUOUS
Status: DISCONTINUED | OUTPATIENT
Start: 2024-12-11 | End: 2024-12-11

## 2024-12-11 RX ORDER — NALOXONE HCL 0.4 MG/ML
0.1 VIAL (ML) INJECTION
Status: DISCONTINUED | OUTPATIENT
Start: 2024-12-11 | End: 2024-12-12 | Stop reason: HOSPADM

## 2024-12-11 RX ORDER — GABAPENTIN 100 MG/1
100 CAPSULE ORAL 3 TIMES DAILY
Status: DISCONTINUED | OUTPATIENT
Start: 2024-12-11 | End: 2024-12-12 | Stop reason: HOSPADM

## 2024-12-11 RX ORDER — PROMETHAZINE HYDROCHLORIDE 12.5 MG/1
12.5 TABLET ORAL EVERY 6 HOURS PRN
Status: DISCONTINUED | OUTPATIENT
Start: 2024-12-11 | End: 2024-12-12 | Stop reason: HOSPADM

## 2024-12-11 RX ORDER — PANTOPRAZOLE SODIUM 40 MG/10ML
40 INJECTION, POWDER, LYOPHILIZED, FOR SOLUTION INTRAVENOUS ONCE
Status: COMPLETED | OUTPATIENT
Start: 2024-12-11 | End: 2024-12-11

## 2024-12-11 RX ORDER — MEPERIDINE HYDROCHLORIDE 25 MG/ML
12.5 INJECTION INTRAMUSCULAR; INTRAVENOUS; SUBCUTANEOUS
Status: DISCONTINUED | OUTPATIENT
Start: 2024-12-11 | End: 2024-12-11 | Stop reason: HOSPADM

## 2024-12-11 RX ORDER — DIPHENHYDRAMINE HYDROCHLORIDE 50 MG/ML
25 INJECTION INTRAMUSCULAR; INTRAVENOUS EVERY 4 HOURS PRN
Status: DISCONTINUED | OUTPATIENT
Start: 2024-12-11 | End: 2024-12-12 | Stop reason: HOSPADM

## 2024-12-11 RX ORDER — DOXEPIN HYDROCHLORIDE 10 MG/1
10 CAPSULE ORAL NIGHTLY
Status: DISCONTINUED | OUTPATIENT
Start: 2024-12-11 | End: 2024-12-12 | Stop reason: HOSPADM

## 2024-12-11 RX ORDER — MIDAZOLAM HYDROCHLORIDE 2 MG/2ML
INJECTION, SOLUTION INTRAMUSCULAR; INTRAVENOUS AS NEEDED
Status: DISCONTINUED | OUTPATIENT
Start: 2024-12-11 | End: 2024-12-11 | Stop reason: SURG

## 2024-12-11 RX ORDER — BUPIVACAINE HYDROCHLORIDE 5 MG/ML
INJECTION, SOLUTION EPIDURAL; INTRACAUDAL
Status: COMPLETED | OUTPATIENT
Start: 2024-12-11 | End: 2024-12-11

## 2024-12-11 RX ORDER — LAMOTRIGINE 100 MG/1
100 TABLET ORAL DAILY
Status: DISCONTINUED | OUTPATIENT
Start: 2024-12-11 | End: 2024-12-12 | Stop reason: HOSPADM

## 2024-12-11 RX ORDER — ACETAMINOPHEN 160 MG/5ML
1000 SOLUTION ORAL EVERY 8 HOURS SCHEDULED
Status: DISCONTINUED | OUTPATIENT
Start: 2024-12-11 | End: 2024-12-12 | Stop reason: HOSPADM

## 2024-12-11 RX ORDER — ONDANSETRON 2 MG/ML
4 INJECTION INTRAMUSCULAR; INTRAVENOUS ONCE AS NEEDED
Status: DISCONTINUED | OUTPATIENT
Start: 2024-12-11 | End: 2024-12-11 | Stop reason: HOSPADM

## 2024-12-11 RX ORDER — BUDESONIDE AND FORMOTEROL FUMARATE DIHYDRATE 160; 4.5 UG/1; UG/1
2 AEROSOL RESPIRATORY (INHALATION)
Status: DISCONTINUED | OUTPATIENT
Start: 2024-12-11 | End: 2024-12-12 | Stop reason: HOSPADM

## 2024-12-11 RX ORDER — ONDANSETRON 2 MG/ML
INJECTION INTRAMUSCULAR; INTRAVENOUS AS NEEDED
Status: DISCONTINUED | OUTPATIENT
Start: 2024-12-11 | End: 2024-12-11 | Stop reason: SURG

## 2024-12-11 RX ORDER — BUPROPION HYDROCHLORIDE 150 MG/1
300 TABLET ORAL DAILY
Status: DISCONTINUED | OUTPATIENT
Start: 2024-12-11 | End: 2024-12-12 | Stop reason: HOSPADM

## 2024-12-11 RX ORDER — DEXAMETHASONE SODIUM PHOSPHATE 4 MG/ML
INJECTION, SOLUTION INTRA-ARTICULAR; INTRALESIONAL; INTRAMUSCULAR; INTRAVENOUS; SOFT TISSUE AS NEEDED
Status: DISCONTINUED | OUTPATIENT
Start: 2024-12-11 | End: 2024-12-11 | Stop reason: SURG

## 2024-12-11 RX ORDER — IPRATROPIUM BROMIDE AND ALBUTEROL SULFATE 2.5; .5 MG/3ML; MG/3ML
3 SOLUTION RESPIRATORY (INHALATION) ONCE AS NEEDED
Status: DISCONTINUED | OUTPATIENT
Start: 2024-12-11 | End: 2024-12-11 | Stop reason: HOSPADM

## 2024-12-11 RX ORDER — BUPIVACAINE HYDROCHLORIDE AND EPINEPHRINE 2.5; 5 MG/ML; UG/ML
INJECTION, SOLUTION EPIDURAL; INFILTRATION; INTRACAUDAL; PERINEURAL AS NEEDED
Status: DISCONTINUED | OUTPATIENT
Start: 2024-12-11 | End: 2024-12-11 | Stop reason: HOSPADM

## 2024-12-11 RX ORDER — GABAPENTIN 250 MG/5ML
100 SOLUTION ORAL 3 TIMES DAILY
Status: DISCONTINUED | OUTPATIENT
Start: 2024-12-11 | End: 2024-12-12 | Stop reason: HOSPADM

## 2024-12-11 RX ORDER — DEXAMETHASONE SODIUM PHOSPHATE 10 MG/ML
INJECTION, SOLUTION INTRAMUSCULAR; INTRAVENOUS AS NEEDED
Status: DISCONTINUED | OUTPATIENT
Start: 2024-12-11 | End: 2024-12-11 | Stop reason: SURG

## 2024-12-11 RX ORDER — CHLORHEXIDINE GLUCONATE ORAL RINSE 1.2 MG/ML
30 SOLUTION DENTAL
Status: COMPLETED | OUTPATIENT
Start: 2024-12-11 | End: 2024-12-11

## 2024-12-11 RX ORDER — SODIUM CHLORIDE 0.9 % (FLUSH) 0.9 %
3-10 SYRINGE (ML) INJECTION AS NEEDED
Status: DISCONTINUED | OUTPATIENT
Start: 2024-12-11 | End: 2024-12-11 | Stop reason: HOSPADM

## 2024-12-11 RX ORDER — HYDROXYZINE PAMOATE 25 MG/1
25 CAPSULE ORAL 3 TIMES DAILY PRN
Status: DISCONTINUED | OUTPATIENT
Start: 2024-12-11 | End: 2024-12-12 | Stop reason: HOSPADM

## 2024-12-11 RX ORDER — MAGNESIUM HYDROXIDE 1200 MG/15ML
LIQUID ORAL AS NEEDED
Status: DISCONTINUED | OUTPATIENT
Start: 2024-12-11 | End: 2024-12-11 | Stop reason: HOSPADM

## 2024-12-11 RX ORDER — ROCURONIUM BROMIDE 50 MG/5 ML
SYRINGE (ML) INTRAVENOUS AS NEEDED
Status: DISCONTINUED | OUTPATIENT
Start: 2024-12-11 | End: 2024-12-11 | Stop reason: SURG

## 2024-12-11 RX ORDER — DEXTROAMPHETAMINE SACCHARATE, AMPHETAMINE ASPARTATE MONOHYDRATE, DEXTROAMPHETAMINE SULFATE AND AMPHETAMINE SULFATE 5; 5; 5; 5 MG/1; MG/1; MG/1; MG/1
40 CAPSULE, EXTENDED RELEASE ORAL EVERY MORNING
Status: DISCONTINUED | OUTPATIENT
Start: 2024-12-11 | End: 2024-12-12 | Stop reason: HOSPADM

## 2024-12-11 RX ORDER — LABETALOL HYDROCHLORIDE 5 MG/ML
5 INJECTION, SOLUTION INTRAVENOUS
Status: DISCONTINUED | OUTPATIENT
Start: 2024-12-11 | End: 2024-12-11 | Stop reason: HOSPADM

## 2024-12-11 RX ORDER — HYDROMORPHONE HYDROCHLORIDE 2 MG/ML
INJECTION, SOLUTION INTRAMUSCULAR; INTRAVENOUS; SUBCUTANEOUS AS NEEDED
Status: DISCONTINUED | OUTPATIENT
Start: 2024-12-11 | End: 2024-12-11 | Stop reason: SURG

## 2024-12-11 RX ORDER — SUCCINYLCHOLINE/SOD CL,ISO/PF 200MG/10ML
SYRINGE (ML) INTRAVENOUS AS NEEDED
Status: DISCONTINUED | OUTPATIENT
Start: 2024-12-11 | End: 2024-12-11 | Stop reason: SURG

## 2024-12-11 RX ORDER — HYDROMORPHONE HYDROCHLORIDE 2 MG/1
2 TABLET ORAL EVERY 4 HOURS PRN
Status: DISCONTINUED | OUTPATIENT
Start: 2024-12-11 | End: 2024-12-12 | Stop reason: HOSPADM

## 2024-12-11 RX ORDER — NALOXONE HCL 0.4 MG/ML
0.4 VIAL (ML) INJECTION
Status: DISCONTINUED | OUTPATIENT
Start: 2024-12-11 | End: 2024-12-12 | Stop reason: HOSPADM

## 2024-12-11 RX ORDER — FENTANYL CITRATE 50 UG/ML
INJECTION, SOLUTION INTRAMUSCULAR; INTRAVENOUS AS NEEDED
Status: DISCONTINUED | OUTPATIENT
Start: 2024-12-11 | End: 2024-12-11 | Stop reason: SURG

## 2024-12-11 RX ORDER — SODIUM CHLORIDE 9 MG/ML
150 INJECTION, SOLUTION INTRAVENOUS CONTINUOUS
Status: ACTIVE | OUTPATIENT
Start: 2024-12-11 | End: 2024-12-12

## 2024-12-11 RX ORDER — PROCHLORPERAZINE EDISYLATE 5 MG/ML
10 INJECTION INTRAMUSCULAR; INTRAVENOUS ONCE AS NEEDED
Status: DISCONTINUED | OUTPATIENT
Start: 2024-12-11 | End: 2024-12-11 | Stop reason: HOSPADM

## 2024-12-11 RX ORDER — SODIUM CHLORIDE 0.9 % (FLUSH) 0.9 %
3 SYRINGE (ML) INJECTION EVERY 12 HOURS SCHEDULED
Status: DISCONTINUED | OUTPATIENT
Start: 2024-12-11 | End: 2024-12-11 | Stop reason: HOSPADM

## 2024-12-11 RX ORDER — PROMETHAZINE HYDROCHLORIDE 25 MG/1
12.5 SUPPOSITORY RECTAL EVERY 6 HOURS PRN
Status: DISCONTINUED | OUTPATIENT
Start: 2024-12-11 | End: 2024-12-12 | Stop reason: HOSPADM

## 2024-12-11 RX ORDER — ALPRAZOLAM 0.25 MG
0.25 TABLET ORAL ONCE AS NEEDED
Status: DISCONTINUED | OUTPATIENT
Start: 2024-12-11 | End: 2024-12-12 | Stop reason: HOSPADM

## 2024-12-11 RX ORDER — GABAPENTIN 300 MG/1
600 CAPSULE ORAL ONCE
Status: COMPLETED | OUTPATIENT
Start: 2024-12-11 | End: 2024-12-11

## 2024-12-11 RX ORDER — CYANOCOBALAMIN 1000 UG/ML
1000 INJECTION, SOLUTION INTRAMUSCULAR; SUBCUTANEOUS ONCE
Status: COMPLETED | OUTPATIENT
Start: 2024-12-12 | End: 2024-12-12

## 2024-12-11 RX ORDER — OXYCODONE HYDROCHLORIDE 5 MG/1
5 TABLET ORAL EVERY 6 HOURS PRN
Status: DISCONTINUED | OUTPATIENT
Start: 2024-12-11 | End: 2024-12-12 | Stop reason: HOSPADM

## 2024-12-11 RX ORDER — SODIUM CHLORIDE 9 MG/ML
40 INJECTION, SOLUTION INTRAVENOUS AS NEEDED
Status: DISCONTINUED | OUTPATIENT
Start: 2024-12-11 | End: 2024-12-11 | Stop reason: HOSPADM

## 2024-12-11 RX ORDER — THIAMINE HYDROCHLORIDE 100 MG/ML
100 INJECTION, SOLUTION INTRAMUSCULAR; INTRAVENOUS ONCE
Status: COMPLETED | OUTPATIENT
Start: 2024-12-11 | End: 2024-12-11

## 2024-12-11 RX ORDER — SCOPOLAMINE 1 MG/3D
1 PATCH, EXTENDED RELEASE TRANSDERMAL CONTINUOUS
Status: DISCONTINUED | OUTPATIENT
Start: 2024-12-11 | End: 2024-12-11

## 2024-12-11 RX ORDER — PANTOPRAZOLE SODIUM 40 MG/1
40 TABLET, DELAYED RELEASE ORAL EVERY MORNING
Status: DISCONTINUED | OUTPATIENT
Start: 2024-12-12 | End: 2024-12-12 | Stop reason: HOSPADM

## 2024-12-11 RX ORDER — HYDRALAZINE HYDROCHLORIDE 20 MG/ML
INJECTION INTRAMUSCULAR; INTRAVENOUS AS NEEDED
Status: DISCONTINUED | OUTPATIENT
Start: 2024-12-11 | End: 2024-12-11 | Stop reason: SURG

## 2024-12-11 RX ADMIN — ALBUTEROL SULFATE 2.5 MG: 2.5 SOLUTION RESPIRATORY (INHALATION) at 13:19

## 2024-12-11 RX ADMIN — ACETAMINOPHEN 1000 MG: 500 TABLET, FILM COATED ORAL at 06:45

## 2024-12-11 RX ADMIN — SODIUM CHLORIDE: 9 INJECTION, SOLUTION INTRAVENOUS at 07:32

## 2024-12-11 RX ADMIN — LAMOTRIGINE 100 MG: 100 TABLET ORAL at 16:25

## 2024-12-11 RX ADMIN — FENTANYL CITRATE 50 MCG: 50 INJECTION, SOLUTION INTRAMUSCULAR; INTRAVENOUS at 07:50

## 2024-12-11 RX ADMIN — HYDROMORPHONE HYDROCHLORIDE 0.4 MG: 2 INJECTION INTRAMUSCULAR; INTRAVENOUS; SUBCUTANEOUS at 08:19

## 2024-12-11 RX ADMIN — SUGAMMADEX 300 MG: 100 INJECTION, SOLUTION INTRAVENOUS at 09:25

## 2024-12-11 RX ADMIN — SODIUM CHLORIDE 1000 ML: 9 INJECTION, SOLUTION INTRAVENOUS at 07:00

## 2024-12-11 RX ADMIN — ACETAMINOPHEN 1000 MG: 500 TABLET, FILM COATED ORAL at 21:00

## 2024-12-11 RX ADMIN — BUDESONIDE 0.5 MG: 0.5 SUSPENSION RESPIRATORY (INHALATION) at 20:41

## 2024-12-11 RX ADMIN — CHLORHEXIDINE GLUCONATE 0.12% ORAL RINSE 30 ML: 1.2 LIQUID ORAL at 06:53

## 2024-12-11 RX ADMIN — SODIUM CHLORIDE 3000 MG: 9 INJECTION, SOLUTION INTRAVENOUS at 16:25

## 2024-12-11 RX ADMIN — Medication 20 MG: at 08:41

## 2024-12-11 RX ADMIN — GABAPENTIN 600 MG: 300 CAPSULE ORAL at 06:45

## 2024-12-11 RX ADMIN — ACETAMINOPHEN 1000 MG: 500 TABLET, FILM COATED ORAL at 12:53

## 2024-12-11 RX ADMIN — GABAPENTIN 100 MG: 100 CAPSULE ORAL at 12:53

## 2024-12-11 RX ADMIN — Medication 40 MG: at 07:41

## 2024-12-11 RX ADMIN — SIMETHICONE 80 MG: 80 TABLET, CHEWABLE ORAL at 12:29

## 2024-12-11 RX ADMIN — SODIUM CHLORIDE 150 ML/HR: 9 INJECTION, SOLUTION INTRAVENOUS at 20:58

## 2024-12-11 RX ADMIN — THIAMINE HYDROCHLORIDE 100 MG: 100 INJECTION, SOLUTION INTRAMUSCULAR; INTRAVENOUS at 12:52

## 2024-12-11 RX ADMIN — CARIPRAZINE 4.5 MG: 1.5 CAPSULE, GELATIN COATED ORAL at 12:29

## 2024-12-11 RX ADMIN — DEXAMETHASONE SODIUM PHOSPHATE 10 MG: 10 INJECTION, SOLUTION INTRAMUSCULAR; INTRAVENOUS at 07:45

## 2024-12-11 RX ADMIN — GABAPENTIN 100 MG: 100 CAPSULE ORAL at 21:00

## 2024-12-11 RX ADMIN — Medication 200 MG: at 07:38

## 2024-12-11 RX ADMIN — HYDRALAZINE HYDROCHLORIDE 5 MG: 20 INJECTION, SOLUTION INTRAMUSCULAR; INTRAVENOUS at 08:47

## 2024-12-11 RX ADMIN — ALBUTEROL SULFATE 2.5 MG: 2.5 SOLUTION RESPIRATORY (INHALATION) at 20:41

## 2024-12-11 RX ADMIN — CETIRIZINE HYDROCHLORIDE 10 MG: 10 TABLET ORAL at 16:22

## 2024-12-11 RX ADMIN — SODIUM CHLORIDE 3 G: 9 INJECTION, SOLUTION INTRAVENOUS at 07:41

## 2024-12-11 RX ADMIN — ONDANSETRON 4 MG: 2 INJECTION INTRAMUSCULAR; INTRAVENOUS at 08:19

## 2024-12-11 RX ADMIN — DEXMEDETOMIDINE HYDROCHLORIDE 20 MCG: 100 INJECTION, SOLUTION INTRAVENOUS at 09:36

## 2024-12-11 RX ADMIN — HYDROMORPHONE HYDROCHLORIDE 0.5 MG: 2 INJECTION INTRAMUSCULAR; INTRAVENOUS; SUBCUTANEOUS at 09:26

## 2024-12-11 RX ADMIN — HYDRALAZINE HYDROCHLORIDE 5 MG: 20 INJECTION, SOLUTION INTRAMUSCULAR; INTRAVENOUS at 08:05

## 2024-12-11 RX ADMIN — CHLORHEXIDINE GLUCONATE 0.12% ORAL RINSE 30 ML: 1.2 LIQUID ORAL at 06:46

## 2024-12-11 RX ADMIN — PROPOFOL 200 MG: 10 INJECTION, EMULSION INTRAVENOUS at 07:38

## 2024-12-11 RX ADMIN — HYDROMORPHONE HYDROCHLORIDE 1 MG: 1 INJECTION, SOLUTION INTRAMUSCULAR; INTRAVENOUS; SUBCUTANEOUS at 16:26

## 2024-12-11 RX ADMIN — MIDAZOLAM HYDROCHLORIDE 2 MG: 1 INJECTION, SOLUTION INTRAMUSCULAR; INTRAVENOUS at 07:36

## 2024-12-11 RX ADMIN — SODIUM CHLORIDE: 9 INJECTION, SOLUTION INTRAVENOUS at 09:30

## 2024-12-11 RX ADMIN — BUDESONIDE 0.5 MG: 0.5 SUSPENSION RESPIRATORY (INHALATION) at 13:18

## 2024-12-11 RX ADMIN — DEXAMETHASONE SODIUM PHOSPHATE 4 MG: 4 INJECTION, SOLUTION INTRA-ARTICULAR; INTRALESIONAL; INTRAMUSCULAR; INTRAVENOUS; SOFT TISSUE at 08:19

## 2024-12-11 RX ADMIN — HYDROMORPHONE HYDROCHLORIDE 0.6 MG: 2 INJECTION INTRAMUSCULAR; INTRAVENOUS; SUBCUTANEOUS at 08:45

## 2024-12-11 RX ADMIN — SCOPOLAMINE 1 PATCH: 1.5 PATCH, EXTENDED RELEASE TRANSDERMAL at 06:43

## 2024-12-11 RX ADMIN — GLUCAGON 1 MG: KIT at 08:33

## 2024-12-11 RX ADMIN — Medication 10 MG: at 07:38

## 2024-12-11 RX ADMIN — Medication 20 MG: at 08:11

## 2024-12-11 RX ADMIN — FLUOXETINE HYDROCHLORIDE 80 MG: 20 CAPSULE ORAL at 12:52

## 2024-12-11 RX ADMIN — Medication 100 MG: at 07:38

## 2024-12-11 RX ADMIN — AMISULPRIDE 10 MG: 2.5 INJECTION, SOLUTION INTRAVENOUS at 09:09

## 2024-12-11 RX ADMIN — LABETALOL HYDROCHLORIDE 10 MG: 5 INJECTION, SOLUTION INTRAVENOUS at 09:29

## 2024-12-11 RX ADMIN — FENTANYL CITRATE 50 MCG: 50 INJECTION, SOLUTION INTRAMUSCULAR; INTRAVENOUS at 07:36

## 2024-12-11 RX ADMIN — PANTOPRAZOLE SODIUM 40 MG: 40 INJECTION, POWDER, FOR SOLUTION INTRAVENOUS at 07:02

## 2024-12-11 RX ADMIN — SODIUM CHLORIDE 1000 MG: 900 INJECTION, SOLUTION INTRAVENOUS at 08:15

## 2024-12-11 RX ADMIN — BUPIVACAINE HYDROCHLORIDE 30 ML: 5 INJECTION, SOLUTION EPIDURAL; INTRACAUDAL; PERINEURAL at 07:45

## 2024-12-11 RX ADMIN — DOXEPIN HYDROCHLORIDE 10 MG: 10 CAPSULE ORAL at 20:59

## 2024-12-11 NOTE — ANESTHESIA PROCEDURE NOTES
Peripheral Block    Pre-sedation assessment completed: 12/11/2024 7:32 AM    Patient reassessed immediately prior to procedure    Patient location during procedure: OR  Start time: 12/11/2024 7:41 AM  Stop time: 12/11/2024 7:45 AM  Reason for block: at surgeon's request and post-op pain management  Performed by  CRNA/CAA: Vincent Guidry, CRNA  Preanesthetic Checklist  Completed: patient identified, IV checked, site marked, risks and benefits discussed, surgical consent, monitors and equipment checked, pre-op evaluation and timeout performed  Prep:  Pt Position: supine  Sterile barriers:gloves, cap, sterile barriers and mask  Prep: ChloraPrep  Patient monitoring: blood pressure monitoring, continuous pulse oximetry and EKG  Procedure    Sedation: yes  Performed under: general  Guidance:ultrasound guided    ULTRASOUND INTERPRETATION.  Using ultrasound guidance a 20 G gauge needle was placed in close proximity to the nerve, at which point, under ultrasound guidance anesthetic was injected in the area of the nerve and spread of the anesthesia was seen on ultrasound in close proximity thereto.  There were no abnormalities seen on ultrasound; a digital image was taken; and the patient tolerated the procedure with no complications. Images:still images not obtained    Laterality:Bilateral  Block Type:TAP  Injection Technique:single-shot  Needle Type:echogenic  Needle Gauge:20 G  Resistance on Injection: none    Medications Used: bupivacaine PF (MARCAINE) injection 0.5% - Injection   30 mL - 12/11/2024 7:45:00 AM      Medications  Preservative Free Saline:30ml  Comment:Block Injection: LA dose divided between Right and Left Block  Adjuncts per total volume of LA:    Decadron 10 mg PSF      If required, intravenous sedation was given -- see meds on anesthesia record.    Post Assessment  Injection Assessment: negative aspiration for heme, no paresthesia on injection and incremental injection  Patient Tolerance:comfortable  throughout block  Complications:no  Additional Notes  Procedure:      BILATERAL TAP BLOCKS                             Patient analgesia was achieved with General Anesthesia    The pt was placed in the Supine Position and under Ultrasound guidance, an echogenic or touhy needle was advanced with Normal Saline hydro dissection of tissue.  The Internal Oblique and Transversus Abdominus muscles were visualized.  At or before the aponeurosis of Internal Oblique, the local anesthetic spread was visualized in the Transversus Abdominus Plane. Injection was made incrementally with aspiration every 5 mls.  There was no intravascular injection;  injection pressure was normal; there was no neural injection; and the procedure was completed without difficulty.    Performed by: Malina Ferreira, MINA

## 2024-12-11 NOTE — ANESTHESIA PROCEDURE NOTES
Airway  Urgency: elective    Date/Time: 12/11/2024 7:39 AM    General Information and Staff    Patient location during procedure: OR  CRNA/CAA: Malina Ferreira CRNA    Indications and Patient Condition  Indications for airway management: airway protection    Preoxygenated: yes  Mask difficulty assessment: 1 - vent by mask    Final Airway Details  Final airway type: endotracheal airway      Successful airway: ETT  Cuffed: yes   Successful intubation technique: direct laryngoscopy  Facilitating devices/methods: intubating stylet  Endotracheal tube insertion site: oral  Blade: Franc  Blade size: 3  ETT size (mm): 7.5  Cormack-Lehane Classification: grade IIa - partial view of glottis  Placement verified by: chest auscultation and capnometry   Cuff volume (mL): 6  Measured from: lips  ETT/EBT  to lips (cm): 22  Number of attempts at approach: 1  Assessment: lips, teeth, and gum same as pre-op and atraumatic intubation    Additional Comments  +ETCO2, BBS,

## 2024-12-11 NOTE — BRIEF OP NOTE
GASTRIC SLEEVE LAPAROSCOPIC, HIATAL HERNIA REPAIR LAPAROSCOPIC, ESOPHAGOGASTRODUODENOSCOPY  Progress Note    Clare Turner  12/11/2024    Pre-op Diagnosis:   Body mass index (BMI) of 50-59.9 in adult [Z68.43]  Hiatal hernia with gastroesophageal reflux [K44.9, K21.9]       Post-Op Diagnosis Codes:     * Body mass index (BMI) of 50-59.9 in adult [Z68.43]     * Hiatal hernia with gastroesophageal reflux [K44.9, K21.9]    Procedure/CPT® Codes:  NY LAPS GSTRC RSTRICTIV PX LONGITUDINAL GASTRECTOMY [57043]  NY LAPS SURG ESOPG/GSTR FUNDOPLASTY [88510]  NY ESOPHAGOGASTRODUODENOSCOPY TRANSORAL DIAGNOSTIC [17127]      Procedure(s):  GASTRIC SLEEVE LAPAROSCOPIC  HIATAL HERNIA REPAIR LAPAROSCOPIC  ESOPHAGOGASTRODUODENOSCOPY              Surgeon(s):  Derrick Spencer MD    Anesthesia: General with Block    Staff:   Circulator: Matt Apodaca RN; Pamela Suarez RN  Scrub Person: Alis Arnold, JOHNNY; Jeana Soto; Emmanuel Trejo         Estimated Blood Loss: minimal    Urine Voided: * No values recorded between 12/11/2024  7:33 AM and 12/11/2024  9:23 AM *    Specimens:                Specimens       ID Source Type Tests Collected By Collected At Frozen?    A Stomach Tissue TISSUE EXAM, [facility] ([facility])   Pamela Suarez RN 12/11/24 [id number] No    Description: SUB-TOTAL GASTRECTOMY    This specimen was not marked as sent.                  Drains:   [REMOVED] NG/OG Tube Orogastric 18 Fr Right mouth (Removed)       Findings:         Complications: None          Derrick Spencer MD     Date: 12/11/2024  Time: 09:23 EST         None known

## 2024-12-11 NOTE — INTERVAL H&P NOTE
H&P updated. The patient was examined and the following changes are noted:  MRSA screen negative.  A woman with her in the room. Pt confirms has been nicotene/2nd hand smoke free at least the last2 weeks and will remain so for 6 more weeks.

## 2024-12-11 NOTE — ANESTHESIA POSTPROCEDURE EVALUATION
Patient: Clare Turner    Procedure Summary       Date: 12/11/24 Room / Location: [facility] OR  /  [facility]    Anesthesia Start: 0732 Anesthesia Stop: 0937    Procedures:       GASTRIC SLEEVE LAPAROSCOPIC (Abdomen)      HIATAL HERNIA REPAIR LAPAROSCOPIC (Abdomen)      ESOPHAGOGASTRODUODENOSCOPY (Esophagus) Diagnosis:       Body mass index (BMI) of 50-59.9 in adult      Hiatal hernia with gastroesophageal reflux      (Body mass index (BMI) of 50-59.9 in adult [Z68.43])      (Hiatal hernia with gastroesophageal reflux [K44.9, K21.9])    Surgeons: Derrick Spencer MD Provider: Malina Ferreira CRNA    Anesthesia Type: general with block ASA Status: 3            Anesthesia Type: general with block    Vitals  Vitals Value Taken Time   /66 12/11/24 1100   Temp 97.5 °F (36.4 °C) 12/11/24 1037   Pulse 74 12/11/24 1103   Resp 15 12/11/24 1037   SpO2 90 % 12/11/24 1103   Vitals shown include unfiled device data.        Post Anesthesia Care and Evaluation    Patient location during evaluation: PACU  Patient participation: complete - patient participated  Level of consciousness: awake and alert  Pain score: 4  Pain management: adequate    Airway patency: patent  Anesthetic complications: No anesthetic complications  PONV Status: none  Cardiovascular status: acceptable and stable  Respiratory status: acceptable  Hydration status: acceptable    Comments: Vitals signs as noted in nursing documentation as per protocol.

## 2024-12-11 NOTE — CASE MANAGEMENT/SOCIAL WORK
Discharge Planning Assessment  [facility]     Patient Name: Clare Turner  MRN: [id number]  Today's Date: 12/11/2024    Admit Date: 12/11/2024    Plan: Dcp initiated at bedside with pt providing demographics. She does not have an AD, POA, or financial hardship. Her only DME is a nebulizer. Dr. Miner is her primary and [facility] is pharmacy used, agreeable to meds to bed. Pt works full time, drives, is independent. She has been at current address for 3 yrs., [place] with her son and significant other. No dc concerns voiced, she plans to return home with family.   Discharge Needs Assessment       Row Name 12/11/24 1423       Living Environment    People in Home child(shanique), dependent;significant other    Current Living Arrangements home    Duration at Residence 3 yrs    Potentially Unsafe Housing Conditions none    In the past 12 months has the electric, gas, oil, or water company threatened to shut off services in your home? No    Primary Care Provided by self    Family Caregiver if Needed significant other    Quality of Family Relationships helpful;involved       Resource/Environmental Concerns    Resource/Environmental Concerns none    Transportation Concerns none       Transportation Needs    In the past 12 months, has lack of transportation kept you from medical appointments or from getting medications? no    In the past 12 months, has lack of transportation kept you from meetings, work, or from getting things needed for daily living? No       Food Insecurity    Within the past 12 months, you worried that your food would run out before you got the money to buy more. Never true    Within the past 12 months, the food you bought just didn't last and you didn't have money to get more. Never true       Transition Planning    Patient/Family Anticipates Transition to home with family    Patient/Family Anticipated Services at Transition none    Transportation Anticipated family or friend will provide        Discharge Needs Assessment    Readmission Within the Last 30 Days no previous admission in last 30 days    Equipment Currently Used at Home nebulizer    Concerns to be Addressed denies needs/concerns at this time;no discharge needs identified    Do you want help finding or keeping work or a job? I do not need or want help    Do you want help with school or training? For example, starting or completing job training or getting a high school diploma, GED or equivalent No    Anticipated Changes Related to Illness none    Equipment Needed After Discharge none                   Discharge Plan       Row Name 12/11/24 0773       Plan    Plan Dcp initiated at bedside with pt providing demographics. She does not have an AD, POA, or financial hardship. Her only DME is a nebulizer. Dr. Miner is her primary and [facility] is pharmacy used, agreeable to meds to bed. Pt works full time, drives, is independent. She has been at current address for 3 yrs., [facility] with her son and significant other. No dc concerns voiced, she plans to return home with family.                  Continued Care and Services - Admitted Since 12/11/2024    No active coordination exists for this encounter.          Demographic Summary       Row Name 12/11/24 1422       General Information    Admission Type inpatient    Arrived From home    Referral Source admission list    Reason for Consult discharge planning    Preferred Language English       Contact Information    Permission Granted to Share Info With family/designee                   Functional Status       Row Name 12/11/24 1421       Functional Status    Usual Activity Tolerance good    Current Activity Tolerance good       Physical Activity    On average, how many days per week do you engage in moderate to strenuous exercise (like a brisk walk)? 5 days    On average, how many minutes do you engage in exercise at this level? 30 min    Number of minutes of exercise per week 150       Functional Status,  IADL    Medications independent    Meal Preparation independent    Housekeeping independent    Laundry independent    Shopping independent    If for any reason you need help with day-to-day activities such as bathing, preparing meals, shopping, managing finances, etc., do you get the help you need? I don't need any help    IADL Comments pt drives, works full time       Mental Status    General Appearance WDL WDL       Mental Status Summary    Recent Changes in Mental Status/Cognitive Functioning no changes       Employment/    Employment Status employed full-time                   Psychosocial    No documentation.                  Abuse/Neglect    No documentation.                  Legal    No documentation.                  Substance Abuse    No documentation.                  Patient Forms    No documentation.                     Ann Marie Espinoza RN

## 2024-12-11 NOTE — OP NOTE
Preoperative Diagnosis:   Super morbid obesity (331 pounds, 150 kg, BMI 52.63) with multiple comorbidities     Hiatal hernia with GE reflux disease    Postoperative Diagnosis:   Same    Procedure:                                                     Laparoscopic Sleeve Gastrectomy (85% subtotal vertical gastrectomy) Titan                                                                          Laparoscopic hiatal hernia repair (not paraesophageal) with falciform ligament reinforcement                                                                                                                                             Esophagogastroduodenoscopy                                                                     Surgeon:                                                       FOREST Spencer MD    Anesthesia:                                                   GETA    EBL:                                                              Minimal    Fluids:                                                           Crystalloid    Specimens:                                                   Subtotal gastrectomy    Drains:                                                           None    Counts:                                                          Correct    Complications:                                               None    Indications:   This is a 39 year-old super morbidly obese female who presents for elective laparoscopic sleeve gastrectomy, hiatal hernia repair, and EGD.  She has undergone our extensive preoperative education teaching and consent process.  Everything is in order and she wishes to proceed.      Operative Technique:     The patient was brought to the operating room and placed supine upon the operating room table. SCD hose were placed. She underwent uneventful general endotracheal anesthesia per the anesthesiology staff. She received IV Ancef and subcutaneous Lovenox.  The anesthesiology staff  performed a TAP block and her abdomen was prepped and draped with ChloraPrep in a sterile fashion. An Ioban was used as well. A Florentino catheter was not placed.    The peritoneal cavity was entered in the left upper quadrant using an 11 mm fascial splitting trocar utilizing an OptiView technique and the abdomen was insufflated to a pressure of 15 mmHg with CO2 gas.  Exploratory laparoscopy revealed no evidence of injury from the entrance technique, a massively enlarged smooth liver that extended down to the entrance site trocar approximately 10 to 15 cm below the costal margin, a moderate rectus diastases, previous cholecystectomy.    Remaining trocars were placed under direct visualization including 5 mm trocars in the right, mid, and left lateral abdomen and after infiltration of the peritoneum with local anesthetic under direct visualization, a 19 mm trocar was placed above and to the right of the umbilicus off the midline through the right medial rectus sheath.    Through a stab incision in the epigastrium lower than usual to compensate for her hepatomegaly a Bautista retractor was used to elevate the left lobe of the liver.   The hiatal hernia was not readily apparent from the anterior view and photodocumentation obtained.  Beginning approximately two thirds of the way around the greater curvature the stomach, the gastrocolic vessels were divided using the Enseal device.  This proceeded proximally taking down all the short gastric vessels and exposing the left eden.  There was a small posterolateral hiatal hernia and photodocumentation obtained.  Some excessive oozing noted, the patient was hypertensive which was addressed by the anesthesiology staff during the procedure, 1 g TXA IV given.    The hernia sac was incised along the base of the left eden and extended up and across the phrenoesophageal membrane.  The pars flaccida was divided, there was not a replaced hepatic vessel.  The hernia sac was incised along  the base of the right eden and also extended up and across the phrenoesophageal membrane.  The hernia sac and its contents were dissected out of the mediastinum and reduced below the level of the crura.  There was not a paraesophageal component.  A latex free drain was used temporarily for esophageal retraction.  The GE junction was lengthened to well below the level of the crura by dissecting loose areolar tissue well into the mediastinum.  The crura were dissected to their meeting point inferiorly.  The anterior and posterior vagus nerves were preserved.  Approximately 3 cm of intra-abdominal esophagus obtained.  The hiatal hernia repair was performed posteriorly using a running nonabsorbable 2-0  V-Loc suture with good result, photodocumentation obtained before and after repair.   The falciform ligament reinforcement was performed after subtotal sleeve gastrectomy (see below).    1 mg glucagon IV given.  Gastrocolic vessels were then divided medially to a few centimeters proximal to the pylorus.  Adhesions of the posterior stomach to the pancreas and retroperitoneum were divided.  The stomach was marked with a Kitner saturated with a marking pen 1 cm lateral to the angle of His, 3 cm away from the angularis, and 6 cm from the pylorus.  A 38 Irish balloon bougie was advanced into the distal antrum and the balloon insufflated with 60 cc of air.    The Titan stapler was positioned along the markings with the calibration balloon at the marking 3 cm from the angularis and the stapler was closed.  The calibration bougie was desufflated and removed.  The 85% subtotal vertical sleeve gastrectomy was then performed with a single firing using the Titan stapler . The Titan stapler was removed.  The subtotal gastrectomy specimen was retrieved through the 19 mm trocar site incision, inspected, and sent unopened to pathology for permanent section.  It was a slightly larger than average size specimen.      The very fatty  falciform ligament was amputated at its base and mobilized up to the level of the liver.  It was then passed behind the esophagus and in front of the crural repair and sutured as a sling over the angle of Hiss using running nonabsorbable  2-0 V-loc suture continuing the suture anteriorly along the GE junction and suturing to the falciform ligament medially along the lesser curvature.  Photodocumentation obtained.      The sleeve was submerged under saline.  Upper endoscopy was performed, and the endoscope was advanced into the duodenal bulb.  No air bubbles or leak seen, no active bleeding at the staple line but some clot which was suctioned free as well as some serosanguineous fluid within the stomach itself, no narrowing at the angularis, no pyloric spasm or deformity, no gastritis, no hiatal hernia or Graham's esophagus, Z-line approximately 40 cm, and the endoscope was withdrawn.  Endoscopic photodocumentation obtained of the GE junction and widely patent pylorus.  Irrigation fluid was suctioned free.  The sleeve was resting nicely and hemostatic.  The sleeve staple line was treated with 10 cc of aerosolized Tisseel fibrin glue.  Photodocumentation of the sleeve obtained before and after endoscopy.  The Bautista retractor was removed.   Fascia at the 19 mm trocar site incision was closed with a horizontal mattress 0 Vicryl suture placed with a suture passer under direct visualization and tying the knot extracorporeally.  Remaining trocars were removed under direct visualization, no bleeding noted from their sites.  Subcutaneous tissue in the 11 mm and 19 mm trocar site incisions were individually closed with 2-0 Vicryl plus figure-of-eight sutures and skin in each incision was closed using 3-0 Monocryl plus in an interrupted subcuticular stitch followed by skin glue.  The patient tolerated the procedure well without complication, was taken to the recovery room in stable condition.

## 2024-12-12 ENCOUNTER — READMISSION MANAGEMENT (OUTPATIENT)
Dept: CALL CENTER | Facility: HOSPITAL | Age: 39
End: 2024-12-12
Payer: COMMERCIAL

## 2024-12-12 ENCOUNTER — APPOINTMENT (OUTPATIENT)
Dept: GENERAL RADIOLOGY | Facility: HOSPITAL | Age: 39
End: 2024-12-12
Payer: COMMERCIAL

## 2024-12-12 VITALS
SYSTOLIC BLOOD PRESSURE: 132 MMHG | HEIGHT: 66 IN | TEMPERATURE: 99 F | BODY MASS INDEX: 47.09 KG/M2 | OXYGEN SATURATION: 94 % | RESPIRATION RATE: 18 BRPM | HEART RATE: 85 BPM | WEIGHT: 293 LBS | DIASTOLIC BLOOD PRESSURE: 71 MMHG

## 2024-12-12 LAB
ALBUMIN SERPL-MCNC: 3.7 G/DL (ref 3.5–5.2)
ALBUMIN/GLOB SERPL: 1.2 G/DL
ALP SERPL-CCNC: 103 U/L (ref 39–117)
ALT SERPL W P-5'-P-CCNC: 42 U/L (ref 1–33)
ANION GAP SERPL CALCULATED.3IONS-SCNC: 11.5 MMOL/L (ref 5–15)
AST SERPL-CCNC: 38 U/L (ref 1–32)
BASOPHILS # BLD AUTO: 0.03 10*3/MM3 (ref 0–0.2)
BASOPHILS NFR BLD AUTO: 0.2 % (ref 0–1.5)
BILIRUB SERPL-MCNC: 0.3 MG/DL (ref 0–1.2)
BUN SERPL-MCNC: 8 MG/DL (ref 6–20)
BUN/CREAT SERPL: 10.3 (ref 7–25)
CALCIUM SPEC-SCNC: 8.1 MG/DL (ref 8.6–10.5)
CHLORIDE SERPL-SCNC: 102 MMOL/L (ref 98–107)
CO2 SERPL-SCNC: 21.5 MMOL/L (ref 22–29)
CREAT SERPL-MCNC: 0.78 MG/DL (ref 0.57–1)
DEPRECATED RDW RBC AUTO: 46.2 FL (ref 37–54)
EGFRCR SERPLBLD CKD-EPI 2021: 99.8 ML/MIN/1.73
EOSINOPHIL # BLD AUTO: 0.03 10*3/MM3 (ref 0–0.4)
EOSINOPHIL NFR BLD AUTO: 0.2 % (ref 0.3–6.2)
ERYTHROCYTE [DISTWIDTH] IN BLOOD BY AUTOMATED COUNT: 13.9 % (ref 12.3–15.4)
GLOBULIN UR ELPH-MCNC: 3.2 GM/DL
GLUCOSE SERPL-MCNC: 104 MG/DL (ref 65–99)
HCT VFR BLD AUTO: 39.7 % (ref 34–46.6)
HGB BLD-MCNC: 13.1 G/DL (ref 12–15.9)
IMM GRANULOCYTES # BLD AUTO: 0.07 10*3/MM3 (ref 0–0.05)
IMM GRANULOCYTES NFR BLD AUTO: 0.4 % (ref 0–0.5)
IRON 24H UR-MRATE: 61 MCG/DL (ref 37–145)
LYMPHOCYTES # BLD AUTO: 2.48 10*3/MM3 (ref 0.7–3.1)
LYMPHOCYTES NFR BLD AUTO: 13.6 % (ref 19.6–45.3)
MCH RBC QN AUTO: 29.8 PG (ref 26.6–33)
MCHC RBC AUTO-ENTMCNC: 33 G/DL (ref 31.5–35.7)
MCV RBC AUTO: 90.2 FL (ref 79–97)
MONOCYTES # BLD AUTO: 1.51 10*3/MM3 (ref 0.1–0.9)
MONOCYTES NFR BLD AUTO: 8.3 % (ref 5–12)
NEUTROPHILS NFR BLD AUTO: 14.05 10*3/MM3 (ref 1.7–7)
NEUTROPHILS NFR BLD AUTO: 77.3 % (ref 42.7–76)
NRBC BLD AUTO-RTO: 0 /100 WBC (ref 0–0.2)
PLATELET # BLD AUTO: 424 10*3/MM3 (ref 140–450)
PMV BLD AUTO: 10.1 FL (ref 6–12)
POTASSIUM SERPL-SCNC: 3.8 MMOL/L (ref 3.5–5.2)
PROT SERPL-MCNC: 6.9 G/DL (ref 6–8.5)
RBC # BLD AUTO: 4.4 10*6/MM3 (ref 3.77–5.28)
SODIUM SERPL-SCNC: 135 MMOL/L (ref 136–145)
WBC NRBC COR # BLD AUTO: 18.17 10*3/MM3 (ref 3.4–10.8)

## 2024-12-12 PROCEDURE — 94799 UNLISTED PULMONARY SVC/PX: CPT

## 2024-12-12 PROCEDURE — 25010000002 THIAMINE HCL 200 MG/2ML SOLUTION 2 ML VIAL: Performed by: SURGERY

## 2024-12-12 PROCEDURE — 25810000003 SODIUM CHLORIDE 0.9 % SOLUTION: Performed by: SURGERY

## 2024-12-12 PROCEDURE — 94664 DEMO&/EVAL PT USE INHALER: CPT

## 2024-12-12 PROCEDURE — 25510000002 DIATRIZOATE MEGLUMINE & SODIUM PER 1 ML: Performed by: SURGERY

## 2024-12-12 PROCEDURE — 99024 POSTOP FOLLOW-UP VISIT: CPT | Performed by: SURGERY

## 2024-12-12 PROCEDURE — 94761 N-INVAS EAR/PLS OXIMETRY MLT: CPT

## 2024-12-12 PROCEDURE — 83540 ASSAY OF IRON: CPT | Performed by: SURGERY

## 2024-12-12 PROCEDURE — 74240 X-RAY XM UPR GI TRC 1CNTRST: CPT

## 2024-12-12 PROCEDURE — 25010000002 CEFAZOLIN 3 G RECONSTITUTED SOLUTION 1 EACH VIAL: Performed by: SURGERY

## 2024-12-12 PROCEDURE — 80053 COMPREHEN METABOLIC PANEL: CPT | Performed by: SURGERY

## 2024-12-12 PROCEDURE — 25010000002 ENOXAPARIN PER 10 MG: Performed by: SURGERY

## 2024-12-12 PROCEDURE — 25010000002 HYDROMORPHONE 1 MG/ML SOLUTION: Performed by: SURGERY

## 2024-12-12 PROCEDURE — 25010000002 CYANOCOBALAMIN PER 1000 MCG: Performed by: SURGERY

## 2024-12-12 PROCEDURE — 25010000002 POTASSIUM CHLORIDE PER 2 MEQ: Performed by: SURGERY

## 2024-12-12 PROCEDURE — 85025 COMPLETE CBC W/AUTO DIFF WBC: CPT | Performed by: SURGERY

## 2024-12-12 PROCEDURE — 25010000002 MORPHINE PER 10 MG: Performed by: SURGERY

## 2024-12-12 PROCEDURE — 25810000003 SODIUM CHLORIDE 0.9 % SOLUTION 1,000 ML FLEX CONT: Performed by: SURGERY

## 2024-12-12 PROCEDURE — 25010000002 ONDANSETRON PER 1 MG: Performed by: SURGERY

## 2024-12-12 RX ORDER — OXYCODONE HYDROCHLORIDE 5 MG/1
5 TABLET ORAL EVERY 6 HOURS PRN
Qty: 10 TABLET | Refills: 0 | Status: SHIPPED | OUTPATIENT
Start: 2024-12-12 | End: 2024-12-17

## 2024-12-12 RX ORDER — OMEPRAZOLE 40 MG/1
40 CAPSULE, DELAYED RELEASE ORAL DAILY
Qty: 60 CAPSULE | Refills: 0 | Status: SHIPPED | OUTPATIENT
Start: 2024-12-12 | End: 2025-02-10

## 2024-12-12 RX ORDER — DIATRIZOATE MEGLUMINE AND DIATRIZOATE SODIUM 660; 100 MG/ML; MG/ML
120 SOLUTION ORAL; RECTAL
Status: COMPLETED | OUTPATIENT
Start: 2024-12-12 | End: 2024-12-12

## 2024-12-12 RX ORDER — ONDANSETRON 4 MG/1
4 TABLET, ORALLY DISINTEGRATING ORAL EVERY 8 HOURS PRN
Qty: 10 TABLET | Refills: 0 | Status: SHIPPED | OUTPATIENT
Start: 2024-12-12

## 2024-12-12 RX ADMIN — BUPROPION HYDROCHLORIDE 300 MG: 150 TABLET, EXTENDED RELEASE ORAL at 08:45

## 2024-12-12 RX ADMIN — OXYCODONE HYDROCHLORIDE 5 MG: 5 TABLET ORAL at 12:49

## 2024-12-12 RX ADMIN — ALBUTEROL SULFATE 2.5 MG: 2.5 SOLUTION RESPIRATORY (INHALATION) at 07:14

## 2024-12-12 RX ADMIN — SODIUM CHLORIDE 3000 MG: 9 INJECTION, SOLUTION INTRAVENOUS at 00:21

## 2024-12-12 RX ADMIN — HYDROMORPHONE HYDROCHLORIDE 1 MG: 1 INJECTION, SOLUTION INTRAMUSCULAR; INTRAVENOUS; SUBCUTANEOUS at 08:54

## 2024-12-12 RX ADMIN — PANTOPRAZOLE SODIUM 40 MG: 40 TABLET, DELAYED RELEASE ORAL at 06:03

## 2024-12-12 RX ADMIN — ALBUTEROL SULFATE 2.5 MG: 2.5 SOLUTION RESPIRATORY (INHALATION) at 12:54

## 2024-12-12 RX ADMIN — GABAPENTIN 100 MG: 100 CAPSULE ORAL at 08:44

## 2024-12-12 RX ADMIN — THIAMINE HYDROCHLORIDE 200 ML/HR: 100 INJECTION, SOLUTION INTRAMUSCULAR; INTRAVENOUS at 03:48

## 2024-12-12 RX ADMIN — POTASSIUM CHLORIDE AND SODIUM CHLORIDE 125 ML/HR: 450; 150 INJECTION, SOLUTION INTRAVENOUS at 10:29

## 2024-12-12 RX ADMIN — ACETAMINOPHEN 1000 MG: 500 TABLET, FILM COATED ORAL at 06:03

## 2024-12-12 RX ADMIN — DIATRIZOATE MEGLUMINE AND DIATRIZOATE SODIUM 120 ML: 660; 100 LIQUID ORAL; RECTAL at 09:48

## 2024-12-12 RX ADMIN — CYANOCOBALAMIN 1000 MCG: 1000 INJECTION, SOLUTION INTRAMUSCULAR at 08:45

## 2024-12-12 RX ADMIN — SIMETHICONE 80 MG: 80 TABLET, CHEWABLE ORAL at 03:48

## 2024-12-12 RX ADMIN — ENOXAPARIN SODIUM 40 MG: 100 INJECTION SUBCUTANEOUS at 08:46

## 2024-12-12 RX ADMIN — MORPHINE SULFATE 4 MG: 4 INJECTION, SOLUTION INTRAMUSCULAR; INTRAVENOUS at 15:36

## 2024-12-12 RX ADMIN — BUDESONIDE 0.5 MG: 0.5 SUSPENSION RESPIRATORY (INHALATION) at 07:15

## 2024-12-12 RX ADMIN — PANTOPRAZOLE SODIUM 40 MG: 40 INJECTION, POWDER, FOR SOLUTION INTRAVENOUS at 00:21

## 2024-12-12 RX ADMIN — ONDANSETRON 4 MG: 2 INJECTION INTRAMUSCULAR; INTRAVENOUS at 10:04

## 2024-12-12 RX ADMIN — LAMOTRIGINE 100 MG: 100 TABLET ORAL at 08:44

## 2024-12-12 RX ADMIN — SODIUM CHLORIDE 150 ML/HR: 9 INJECTION, SOLUTION INTRAVENOUS at 02:58

## 2024-12-12 RX ADMIN — CARIPRAZINE 4.5 MG: 1.5 CAPSULE, GELATIN COATED ORAL at 08:42

## 2024-12-12 RX ADMIN — CETIRIZINE HYDROCHLORIDE 10 MG: 10 TABLET ORAL at 08:43

## 2024-12-12 RX ADMIN — HYDROMORPHONE HYDROCHLORIDE 1 MG: 1 INJECTION, SOLUTION INTRAMUSCULAR; INTRAVENOUS; SUBCUTANEOUS at 01:34

## 2024-12-12 NOTE — CASE MANAGEMENT/SOCIAL WORK
Case Management Discharge Note                Selected Continued Care - Admitted Since 12/11/2024       Destination    No services have been selected for the patient.                Durable Medical Equipment    No services have been selected for the patient.                Dialysis/Infusion    No services have been selected for the patient.                Home Medical Care    No services have been selected for the patient.                Therapy    No services have been selected for the patient.                Community Resources    No services have been selected for the patient.                [facility]    No services have been selected for the patient.                    Transportation Services  Private: Car    Final Discharge Disposition Code: 01 - home or self-care

## 2024-12-12 NOTE — PROGRESS NOTES
Bariatric Surgery     LOS: 1 day   Patient Care Team:  Michael Miner MD as PCP - General (Family Medicine)  Tawanda Mendes MD as Obstetrician (Obstetrics and Gynecology)  Nani Ball APRN as Nurse Practitioner (Nurse Practitioner)  Stevenson Coker LCSW as  (Psychiatry)  Nilson Luis III, MD as Cardiologist (Cardiology)  Robinson Grant DO as Consulting Physician (Pulmonary Disease)  Nani Ball APRN as Nurse Practitioner (Nurse Practitioner)    Chief Complaint:  post op    Subjective     Interval History:  Doing well.  No complaints.  Tolerating PO. Denies N/V.  No fevers.  Pain controlled.  Ambulating.  Voiding.  IS 1500.    Objective     Vital Signs  Blood pressure 132/71, pulse 85, temperature 99 °F (37.2 °C), temperature source Oral, resp. rate 18, SpO2 94%, not currently breastfeeding.    Physical Exam:  General: Alert, NAD  Abdomen: incisions okay with mild bruising  Extremities: (+) SCDs     Results Review:     I reviewed the patient's new clinical results.  I reviewed the patient's new imaging results and agree with the interpretation.    Labs:  Lab Results (last 24 hours)       Procedure Component Value Units Date/Time    Iron [[id number]]  (Normal) Collected: 12/12/24 0636    Specimen: Blood Updated: 12/12/24 0714     Iron 61 mcg/dL     Comprehensive Metabolic Panel [[id number]]  (Abnormal) Collected: 12/12/24 0636    Specimen: Blood Updated: 12/12/24 0714     Glucose 104 mg/dL      BUN 8 mg/dL      Creatinine 0.78 mg/dL      Sodium 135 mmol/L      Potassium 3.8 mmol/L      Chloride 102 mmol/L      CO2 21.5 mmol/L      Calcium 8.1 mg/dL      Total Protein 6.9 g/dL      Albumin 3.7 g/dL      ALT (SGPT) 42 U/L      AST (SGOT) 38 U/L      Alkaline Phosphatase 103 U/L      Total Bilirubin 0.3 mg/dL      Globulin 3.2 gm/dL      A/G Ratio 1.2 g/dL      BUN/Creatinine Ratio 10.3     Anion Gap 11.5 mmol/L      eGFR 99.8 mL/min/1.73     Narrative:       GFR Categories in Chronic Kidney Disease (CKD)      GFR Category          GFR (mL/min/1.73)    Interpretation  G1                     90 or greater         Normal or high (1)  G2                      60-89                Mild decrease (1)  G3a                   45-59                Mild to moderate decrease  G3b                   30-44                Moderate to severe decrease  G4                    15-29                Severe decrease  G5                    14 or less           Kidney failure          (1)In the absence of evidence of kidney disease, neither GFR category G1 or G2 fulfill the criteria for CKD.    eGFR calculation 2021 CKD-EPI creatinine equation, which does not include race as a factor    CBC & Differential [[id number]]  (Abnormal) Collected: 12/12/24 0636    Specimen: Blood Updated: 12/12/24 0648    Narrative:      The following orders were created for panel order CBC & Differential.  Procedure                               Abnormality         Status                     ---------                               -----------         ------                     CBC Auto Differential[[id number]]        Abnormal            Final result                 Please view results for these tests on the individual orders.    CBC Auto Differential [[id number]]  (Abnormal) Collected: 12/12/24 0636    Specimen: Blood Updated: 12/12/24 0648     WBC 18.17 10*3/mm3      RBC 4.40 10*6/mm3      Hemoglobin 13.1 g/dL      Hematocrit 39.7 %      MCV 90.2 fL      MCH 29.8 pg      MCHC 33.0 g/dL      RDW 13.9 %      RDW-SD 46.2 fl      MPV 10.1 fL      Platelets 424 10*3/mm3      Neutrophil % 77.3 %      Lymphocyte % 13.6 %      Monocyte % 8.3 %      Eosinophil % 0.2 %      Basophil % 0.2 %      Immature Grans % 0.4 %      Neutrophils, Absolute 14.05 10*3/mm3      Lymphocytes, Absolute 2.48 10*3/mm3      Monocytes, Absolute 1.51 10*3/mm3      Eosinophils, Absolute 0.03 10*3/mm3      Basophils, Absolute 0.03 10*3/mm3       Immature Grans, Absolute 0.07 10*3/mm3      nRBC 0.0 /100 WBC             Imaging:  Imaging Results (Last 24 Hours)       Procedure Component Value Units Date/Time    FL Upper GI Single Contrast With KUB [[id number]] Collected: 12/12/24 0956     Updated: 12/12/24 1041    Addenda:        ADDENDUM:  DOSE AREA PRODUCT: 17.99 gycm2     This report was signed and finalized on 12/12/2024 10:39 AM by Isa Power MD.     Signed: 12/12/24 1039 by Isa Power MD    Narrative:      UPPER GI     HISTORY: Patient is one day status post gastric sleeve surgery with  hiatal hernia repair..     FLUORO TIME: 0.9 minutes. There are 28 fluoroscopic spot images.     PROCEDURE: The patient ingested Gastrografin. Spot images were obtained.     FINDINGS: The esophagus is normal. There is no hiatal hernia. There is  no gastroesophageal reflux. Peristalsis is normal. Postoperative change  of hiatal hernia repair and gastric sleeve noted.. The duodenal bulb is  normal. There are metallic surgical clips in the right upper quadrant  consistent with previous cholecystectomy.       Impression:      No evidence of obstruction or leak identified in the stomach  postoperatively.              This report was signed and finalized on 12/12/2024 10:00 AM by Isa Power MD.                 Assessment & Plan     POD # 1 s/p LSG/HHR with falciform ligament reinforcement.    Doing well.  UGI normal post-op, images and report reviewed.      Patient feels well and has met discharge criteria.  Will discharge home with follow up in 1 week with PA.  Rx given for oxy, zofran, PPI--meds to bed.   Pt has Eliquis, will start tomorrow.  Discharge instructions reviewed with patient and all questions answered.         The patient presents today for telehealth service.  The service was conducted via HIPAA compliant Epic video platform.  The provider is located at her work address.  The patient is located at [facility] in [place] and stated that they are in a secure  environment for the session.  The patient's condition being diagnosed/treated is appropriate for telemedicine.       The use of a video visit has been reviewed with the patient and verbal informed consent has been obtained.         Lynda Doran MD  12/12/24  15:11 EST

## 2024-12-12 NOTE — PROGRESS NOTES
Patient: Clare Turner  Procedure(s) with comments:  GASTRIC SLEEVE LAPAROSCOPIC  HIATAL HERNIA REPAIR LAPAROSCOPIC - start 0818 pause 0833 restart 0848 stop 0902  ESOPHAGOGASTRODUODENOSCOPY  Anesthesia type: general with block    Patient location: [facility] Surgical Floor  Last vitals:   Vitals:    12/12/24 1254   BP:    Pulse:    Resp: 18   Temp:    SpO2:      Level of consciousness: awake, alert, and oriented    Post-anesthesia pain: adequate analgesia  Airway patency: patent  Respiratory: unassisted  Cardiovascular: stable and blood pressure at baseline  Hydration: euvolemic    Anesthetic complications: no

## 2024-12-12 NOTE — OUTREACH NOTE
Prep Survey      Flowsheet Row Responses   [facility] facility patient discharged from? [place]   Is LACE score < 7 ? Yes   Eligibility [facility]   Date of Admission 12/11/24   Date of Discharge 12/12/24   Discharge Disposition Home or Self Care   Discharge diagnosis GASTRIC SLEEVE LAPAROSCOPIC   Does the patient have one of the following disease processes/diagnoses(primary or secondary)? General Surgery   Does the patient have Home health ordered? No   Is there a DME ordered? No   Prep survey completed? Yes            CÉSAR NOE - Registered Nurse

## 2024-12-12 NOTE — PLAN OF CARE
Problem: Adult Inpatient Plan of Care  Goal: Plan of Care Review  Outcome: Progressing  Goal: Patient-Specific Goal (Individualized)  Outcome: Progressing  Goal: Absence of Hospital-Acquired Illness or Injury  Outcome: Progressing  Intervention: Identify and Manage Fall Risk  Recent Flowsheet Documentation  Taken 12/12/2024 0000 by Jessiac Ruiz RN  Safety Promotion/Fall Prevention:   activity supervised   assistive device/personal items within reach   clutter free environment maintained   safety round/check completed  Taken 12/11/2024 2200 by Jessica Ruiz RN  Safety Promotion/Fall Prevention:   activity supervised   assistive device/personal items within reach   clutter free environment maintained   safety round/check completed  Taken 12/11/2024 2045 by Jessica Ruiz RN  Safety Promotion/Fall Prevention:   activity supervised   assistive device/personal items within reach   clutter free environment maintained   safety round/check completed  Intervention: Prevent Skin Injury  Recent Flowsheet Documentation  Taken 12/12/2024 0000 by Jessica Ruiz RN  Body Position:   supine   legs elevated  Taken 12/11/2024 2200 by Jessica Ruiz RN  Body Position:   position changed independently   side-lying   left  Taken 12/11/2024 2045 by Jessica Ruiz RN  Body Position:   position changed independently   sitting up in bed  Intervention: Prevent Infection  Recent Flowsheet Documentation  Taken 12/12/2024 0000 by Jessica Ruiz RN  Infection Prevention:   environmental surveillance performed   rest/sleep promoted  Taken 12/11/2024 2200 by Jessica Ruiz RN  Infection Prevention:   environmental surveillance performed   rest/sleep promoted  Goal: Optimal Comfort and Wellbeing  Outcome: Progressing  Goal: Readiness for Transition of Care  Outcome: Progressing     Problem: Comorbidity Management  Goal: Blood Pressure in Desired Range  Outcome: Progressing  Intervention: Maintain Blood Pressure  Management  Recent Flowsheet Documentation  Taken 12/11/2024 2045 by Jessica Ruiz RN  Medication Review/Management: medications reviewed     Problem: Bariatric Surgery  Goal: Optimal Coping with Surgery  Outcome: Progressing  Goal: Absence of Bleeding  Outcome: Progressing  Goal: Fluid and Electrolyte Balance  Outcome: Progressing  Goal: Effective Gastrointestinal Motility and Elimination  Outcome: Progressing  Goal: Blood Glucose Level Within Desired Range  Outcome: Progressing  Goal: Absence of Infection Signs and Symptoms  Outcome: Progressing  Goal: Anesthesia/Sedation Recovery  Outcome: Progressing  Intervention: Optimize Anesthesia Recovery  Recent Flowsheet Documentation  Taken 12/12/2024 0000 by Jessica Ruiz RN  Safety Promotion/Fall Prevention:   activity supervised   assistive device/personal items within reach   clutter free environment maintained   safety round/check completed  Taken 12/11/2024 2200 by Jessica Ruiz RN  Safety Promotion/Fall Prevention:   activity supervised   assistive device/personal items within reach   clutter free environment maintained   safety round/check completed  Taken 12/11/2024 2045 by Jessica Ruiz RN  Patient Tolerance (IS): good  Safety Promotion/Fall Prevention:   activity supervised   assistive device/personal items within reach   clutter free environment maintained   safety round/check completed  Goal: Optimal Pain Control and Function  Outcome: Progressing  Goal: Nausea and Vomiting Relief  Outcome: Progressing  Goal: Effective Urinary Elimination  Outcome: Progressing  Goal: Effective Oxygenation and Ventilation  Outcome: Progressing  Intervention: Optimize Oxygenation and Ventilation  Recent Flowsheet Documentation  Taken 12/12/2024 0000 by Jessica Ruiz RN  Head of Bed (HOB) Positioning: HOB elevated  Taken 12/11/2024 2200 by Jessica Ruiz RN  Head of Bed (HOB) Positioning: HOB elevated  Taken 12/11/2024 2045 by Jessica Ruiz  RN  Head of Bed (HOB) Positioning: HOB at 30-45 degrees   Goal Outcome Evaluation:      Plan of care reviewed with patient. Patient has rested between care tonight. Pain has been managed with PRN and scheduled regimen, see eMAR. Patient has ambulated in the [facility] and in her room this shift. + flatus and belching. No acute events this shift.

## 2024-12-12 NOTE — PLAN OF CARE
Goal Outcome Evaluation:           Progress: improving            Problem: Adult Inpatient Plan of Care  Goal: Plan of Care Review  Outcome: Progressing  Flowsheets  Taken 12/11/2024 2000 by China Morales RN  Progress: improving  Taken 12/11/2024 1037 by Rosi Apple RN  Plan of Care Reviewed With: patient  Goal: Patient-Specific Goal (Individualized)  Outcome: Progressing  Goal: Absence of Hospital-Acquired Illness or Injury  Outcome: Progressing  Intervention: Identify and Manage Fall Risk  Description: Perform standard risk assessment on admission using a validated tool or comprehensive approach appropriate to the patient; reassess fall risk frequently, with change in status or transfer to another level of care.  Communicate risk to interprofessional healthcare team; ensure fall risk visible cue.  Determine need for increased observation, equipment and environmental modification, as well as use of supportive, nonskid footwear.  Adjust safety measures to individual needs and identified risk factors.  Reinforce the importance of active participation with fall risk prevention, safety, and physical activity with the patient and family.  Perform regular intentional rounding to assess need for position change, pain assessment and personal needs, including assistance with toileting.  Recent Flowsheet Documentation  Taken 12/11/2024 1800 by China Morales, RN  Safety Promotion/Fall Prevention:   safety round/check completed   room organization consistent   nonskid shoes/slippers when out of bed   fall prevention program maintained   clutter free environment maintained   assistive device/personal items within reach   activity supervised  Taken 12/11/2024 1600 by China Morales, RN  Safety Promotion/Fall Prevention:   safety round/check completed   room organization consistent   nonskid shoes/slippers when out of bed   fall prevention program maintained   clutter free environment maintained   assistive  device/personal items within reach   activity supervised  Taken 12/11/2024 1400 by China Morales RN  Safety Promotion/Fall Prevention:   safety round/check completed   room organization consistent   nonskid shoes/slippers when out of bed   fall prevention program maintained   clutter free environment maintained   assistive device/personal items within reach   activity supervised  Taken 12/11/2024 1354 by China Morales RN  Safety Promotion/Fall Prevention:   safety round/check completed   room organization consistent   nonskid shoes/slippers when out of bed   fall prevention program maintained   clutter free environment maintained   assistive device/personal items within reach   activity supervised  Intervention: Prevent Skin Injury  Description: Perform a screening for skin injury risk, such as pressure or moisture-associated skin damage on admission and at regular intervals throughout hospital stay.  Keep all areas of skin (especially folds) clean and dry.  Maintain adequate skin hydration.  Relieve and redistribute pressure and protect bony prominences and skin at risk for injury; implement measures based on patient-specific risk factors.  Match turning and repositioning schedule to clinical condition.  Encourage weight shift frequently; assist with reposition if unable to complete independently.  Float heels off bed; avoid pressure on the Achilles tendon.  Keep skin free from extended contact with medical devices.  Optimize nutrition and hydration.  Encourage functional activity and mobility, as early as tolerated.  Use aids (e.g., slide boards, mechanical lift) during transfer.  Recent Flowsheet Documentation  Taken 12/11/2024 1800 by China Morales RN  Body Position: position changed independently  Taken 12/11/2024 1600 by China Morales RN  Body Position: position changed independently  Taken 12/11/2024 1400 by China Morales RN  Body Position: position changed  independently  Taken 12/11/2024 1354 by China Morales, RN  Body Position: position changed independently  Goal: Optimal Comfort and Wellbeing  Outcome: Progressing  Intervention: Provide Person-Centered Care  Description: Use a family-focused approach to care; encourage support system presence and participation.  Develop trust and rapport by proactively providing information, encouraging questions, addressing concerns and offering reassurance.  Acknowledge emotional response to hospitalization.  Recognize and utilize personal coping strategies and strengths; develop goals via shared decision-making.  Honor spiritual and cultural preferences.  Recent Flowsheet Documentation  Taken 12/11/2024 1354 by China Morales, RN  Trust Relationship/Rapport:   care explained   choices provided   questions answered   questions encouraged   thoughts/feelings acknowledged  Goal: Readiness for Transition of Care  Outcome: Progressing  Intervention: Mutually Develop Transition Plan  Description: Identify available resources for support (e.g., family, friends, community).  Identify and address barriers to ongoing treatment and home management (e.g., environmental, financial).  Provide opportunities to practice self-management skills.  Assess and monitor emotional readiness for transition.  Establish or reconnect linkage with outpatient providers or community-based services.  Recent Flowsheet Documentation  Taken 12/11/2024 1521 by China Morales, RN  Equipment Currently Used at Home: none     Problem: Comorbidity Management  Goal: Blood Pressure in Desired Range  Outcome: Progressing  Intervention: Maintain Blood Pressure Management  Description: Evaluate adherence to home antihypertensive regimen (e.g., exercise and activity, diet modification, medication).  Provide scheduled antihypertensive medication; consider administration time and effects (e.g., avoid giving diuretic prior to bedtime).  Monitor response to  antihypertensive medication therapy (e.g., blood pressure, electrolyte levels, medication effects).  Minimize risk of orthostatic hypotension; encourage caution with position changes, particularly if elderly.  Recent Flowsheet Documentation  Taken 12/11/2024 6274 by China Morales RN  Medication Review/Management: medications reviewed     Problem: Bariatric Surgery  Goal: Optimal Coping with Surgery  Outcome: Progressing  Goal: Absence of Bleeding  Outcome: Progressing  Goal: Fluid and Electrolyte Balance  Outcome: Progressing  Goal: Effective Gastrointestinal Motility and Elimination  Outcome: Progressing  Goal: Blood Glucose Level Within Desired Range  Outcome: Progressing  Goal: Absence of Infection Signs and Symptoms  Outcome: Progressing  Goal: Anesthesia/Sedation Recovery  Outcome: Progressing  Intervention: Optimize Anesthesia Recovery  Description: Assess and monitor airway, breathing and circulation; maintain close surveillance for deterioration.  Implement continuous monitoring, such as cardiorespiratory, blood pressure, temperature, pulse oximetry and capnography.  Elevate head of bed, if able; facilitate regular position changes.  Assess neurocognitive function and for risks that may lead to postoperative delirium, such as pain, agitation and decreased level of consciousness; offer reassurance; answer questions.  Assess and monitor neurovascular and neuromuscular function, such as motor strength, muscle tone, posture, peripheral pulses and extremity sensation; protect areas of decreased sensation from heat, cold, medical devices or other objects.  Individualize frequency and intensity of monitoring based on sedation or anesthesia administered, identified risk factors, ongoing assessment and organizational protocol.  Prepare for administration of pharmacologic therapy, such as reversal agent, antiemetic or antipruritic medication, to manage sedation or anesthesia effects.  Adjust environment to  maintain safety (e.g., fall precautions, safety equipment).  Recent Flowsheet Documentation  Taken 12/11/2024 1800 by China Morales RN  Safety Promotion/Fall Prevention:   safety round/check completed   room organization consistent   nonskid shoes/slippers when out of bed   fall prevention program maintained   clutter free environment maintained   assistive device/personal items within reach   activity supervised  Administration (IS): instruction provided, follow-up  Taken 12/11/2024 1700 by China Morales RN  Administration (IS): instruction provided, follow-up  Taken 12/11/2024 1600 by China Morales RN  Patient Tolerance (IS): good  Safety Promotion/Fall Prevention:   safety round/check completed   room organization consistent   nonskid shoes/slippers when out of bed   fall prevention program maintained   clutter free environment maintained   assistive device/personal items within reach   activity supervised  Administration (IS): instruction provided, follow-up  Taken 12/11/2024 1500 by China Morales RN  Administration (IS): (flutter valve at bedside) self-administered  Taken 12/11/2024 1400 by China Morales RN  Safety Promotion/Fall Prevention:   safety round/check completed   room organization consistent   nonskid shoes/slippers when out of bed   fall prevention program maintained   clutter free environment maintained   assistive device/personal items within reach   activity supervised  Administration (IS): instruction provided, follow-up  Taken 12/11/2024 1354 by China Morales RN  Patient Tolerance (IS): good  Safety Promotion/Fall Prevention:   safety round/check completed   room organization consistent   nonskid shoes/slippers when out of bed   fall prevention program maintained   clutter free environment maintained   assistive device/personal items within reach   activity supervised  Taken 12/11/2024 1300 by China Morales RN  Administration (IS): instruction  provided, follow-up  Taken 12/11/2024 1200 by China Morales RN  Administration (IS): instruction provided, follow-up  Goal: Optimal Pain Control and Function  Outcome: Progressing  Intervention: Prevent or Manage Pain  Description: Set pain management goals; mutually determine pain management plan and review plan regularly.  Use a consistent, validated tool for pain assessment including function and quality of life; evaluate pain level, effect of treatment and patient's response at regular intervals.  Match pharmacologic analgesia to severity and type of pain mechanism; evaluate risk for opioid use and dependence; consider multimodal approach and titrate to patient response.  Provide around-the-clock dosing of pain medication to keep pain levels in control.  Manage medication-induced effects, such as constipation, nausea, pruritus, urinary retention, somnolence and dizziness.  Provide multimodal interventions, such as physical activity, therapeutic exercise, yoga, TENS (transcutaneous electrical nerve stimulation) and manual therapy; consider addition of complementary or alternative therapy.  Consider and address emotional response to pain.  Modify pain perception by using techniques, such as distraction, mindfulness, guided imagery, meditation or music.  Recent Flowsheet Documentation  Taken 12/11/2024 1354 by China Morales, RN  Diversional Activities: television  Goal: Nausea and Vomiting Relief  Outcome: Progressing  Goal: Effective Urinary Elimination  Outcome: Progressing  Goal: Effective Oxygenation and Ventilation  Outcome: Progressing  Intervention: Optimize Oxygenation and Ventilation  Description: Recognize risk for obstructive sleep apnea; anticipate the need for continuous pulse oximetry and positive airway pressure.  Maintain patent airway with positioning, airway adjuncts, secretion clearance.  Encourage pulmonary hygiene, such as cough-enhancement and airway-clearance techniques, that may  include use of incentive spirometry, deep breathing and cough.  Anticipate the need for splinting with cough to minimize discomfort; assist if needed.  Promote early mobility or ambulation; match activity to ability and tolerance.  Provide oxygen therapy judiciously, if hypoxemia present.  Consider pharmacologic therapy that may improve mucus clearance and reduce bronchospasm, laryngospasm, swelling or stridor.  Consider the need for positive pressure ventilation for longer recovery needs; use lung protective measures.  Recent Flowsheet Documentation  Taken 12/11/2024 1354 by China Morales, RN  Head of Bed (HOB) Positioning: HOB at 45 degrees

## 2024-12-13 ENCOUNTER — TRANSITIONAL CARE MANAGEMENT TELEPHONE ENCOUNTER (OUTPATIENT)
Dept: CALL CENTER | Facility: HOSPITAL | Age: 39
End: 2024-12-13
Payer: COMMERCIAL

## 2024-12-13 LAB — REF LAB TEST METHOD: NORMAL

## 2024-12-13 NOTE — PROGRESS NOTES
Please offer TCM visit with me next week Monday, Tuesday or Wednesday if she is interested.  Okay to override schedule or use same-day appointment where schedule allows for 30 minutes.  Thank you

## 2024-12-13 NOTE — OUTREACH NOTE
Call Center TCM Note      Flowsheet Row Responses   [facility] patient discharged from? [facility]   Does the patient have one of the following disease processes/diagnoses(primary or secondary)? General Surgery   TCM attempt successful? Yes  [VR lists sister]   Call start time 1354   Call end time 1412   Discharge diagnosis GASTRIC SLEEVE LAPAROSCOPIC   Is patient permission given to speak with other caregiver? Yes   List who call center can speak with Toyin   Person spoke with today (if not patient) and relationship Toyin and pt   Comments Pt is eligible for TCM f/u call within 14 days of DC. Pt prefers to see her PCP, NA appts within 14 day time frame.   Does the patient have an appointment with their PCP within 7-14 days of discharge? Other   Nursing Interventions PCP office requested to make appointment - message sent   Comments Pt reports doing well, pain controlled, c/o nausea intermittently. Pt is tolerating protein shakes and po fluids WNL, she reports. Laproscopic abd. incision sites are without s/sx of infection, pt reports. Pt reports no issues urinating, she has not passed stool since DC but is passing flatus. RN educated pt on frequent ambulation and use of IS while awake.   Did the patient receive a copy of their discharge instructions? Yes   Nursing interventions Reviewed instructions with patient   What is the patient's perception of their health status since discharge? Improving   Nursing interventions Nurse provided patient education   Is the patient /caregiver able to teach back basic post-op care? Continue use of incentive spirometry at least 1 week post discharge, Practice 'cough and deep breath', Keep incision areas clean,dry and protected   Is the patient/caregiver able to teach back signs and symptoms of incisional infection? Increased redness, swelling or pain at the incisonal site, Increased drainage or bleeding, Pus or odor from incision, Fever   Is the patient/caregiver able to  teach back steps to recovery at home? Rest and rebuild strength, gradually increase activity   Is the patient/caregiver able to teach back the hierarchy of who to call/visit for symptoms/problems? PCP, Specialist, Home health nurse, Urgent Care, ED, 911 Yes   TCM call completed? Yes   Call end time 1412            Abigail Johnson RN    12/13/2024, 14:12 EST

## 2024-12-16 ENCOUNTER — OFFICE VISIT (OUTPATIENT)
Dept: INTERNAL MEDICINE | Facility: CLINIC | Age: 39
End: 2024-12-16
Payer: COMMERCIAL

## 2024-12-16 VITALS
BODY MASS INDEX: 47.09 KG/M2 | DIASTOLIC BLOOD PRESSURE: 80 MMHG | WEIGHT: 293 LBS | HEART RATE: 64 BPM | HEIGHT: 66 IN | SYSTOLIC BLOOD PRESSURE: 116 MMHG | TEMPERATURE: 97.7 F

## 2024-12-16 DIAGNOSIS — K44.9 HIATAL HERNIA: ICD-10-CM

## 2024-12-16 DIAGNOSIS — A59.01 TRICHOMONAL VAGINITIS: ICD-10-CM

## 2024-12-16 DIAGNOSIS — Z98.84 BARIATRIC SURGERY STATUS: Primary | ICD-10-CM

## 2024-12-16 PROCEDURE — 1159F MED LIST DOCD IN RCRD: CPT | Performed by: STUDENT IN AN ORGANIZED HEALTH CARE EDUCATION/TRAINING PROGRAM

## 2024-12-16 PROCEDURE — 1125F AMNT PAIN NOTED PAIN PRSNT: CPT | Performed by: STUDENT IN AN ORGANIZED HEALTH CARE EDUCATION/TRAINING PROGRAM

## 2024-12-16 PROCEDURE — 1160F RVW MEDS BY RX/DR IN RCRD: CPT | Performed by: STUDENT IN AN ORGANIZED HEALTH CARE EDUCATION/TRAINING PROGRAM

## 2024-12-16 PROCEDURE — 99495 TRANSJ CARE MGMT MOD F2F 14D: CPT | Performed by: STUDENT IN AN ORGANIZED HEALTH CARE EDUCATION/TRAINING PROGRAM

## 2024-12-16 NOTE — PROGRESS NOTES
"Chief Complaint  Clare Turner is a 39 y.o. female presenting for Transitional Care Management.     History of Present Illness  ***    The following portions of the patient's history were reviewed and updated as appropriate: {history reviewed:20406::\"allergies\",\"current medications\",\"past family history\",\"past medical history\",\"past social history\",\"past surgical history\",\"problem list\"}.    Objective  Ht 168.9 cm (66.5\")   LMP 10/04/2024 (Approximate)   BMI 53.04 kg/m²     Physical Exam    Assessment/Plan   There are no diagnoses linked to this encounter.    No follow-ups on file.    Gabriela Church MA  Family Medicine  12/16/2024  "

## 2024-12-16 NOTE — PROGRESS NOTES
Transitional Care Follow Up Visit    From [place]. . 3 children. 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     Patient has a past medical history of hyperlipidemia, bipolar 1 disorder w/depression and Hx of jack, ADHD, hidradenitis suppurativa (f/u derm), bariatric surgery (12/11/24 gastric sleeve with 85% subtotal gastrectomy with hiatal hernia repair), GERD, episcleritis, restless legs, moderate persistent asthma (since childhood, f/u pulm), lung nodule stable for years, history of alcohol and substance abuse, urge/stress incontinence and severe obesity.    Subjective     Clare Turner is a 39 y.o. female who presents for a transitional care management visit.    Within 48 business hours after discharge our office contacted her via telephone to coordinate her care and needs.      I reviewed and discussed the details of that call along with the discharge summary, hospital problems, inpatient lab results, inpatient diagnostic studies, and consultation reports with Clare.     Current outpatient and discharge medications have been reconciled for the patient.  Reviewed by: Michael Miner MD          12/12/2024     6:13 PM   Date of TCM Phone Call   [facility]   Date of Admission 12/11/2024   Date of Discharge 12/12/2024   Discharge Disposition Home or Self Care     Risk for Readmission (LACE) Score: 6 (12/12/2024  6:00 AM)      History of Present Illness   Course During Hospital Stay: Due to failed weight loss attempts, patient proceeded with gastric sleeve surgery on 12/11/2024, with 85% subtotal gastrectomy and hiatal hernia repair.    Since discharge she has been doing well.  She is on a liquid diet and will follow-up with bariatrics tomorrow.  She is having some soreness of her abdomen, but no significant pain.  Some discomfort at night.  She has been taking oxycodone and Tylenol  "sparingly.  No fever or chills.    Of note she was treated for trichomonas vaginalis, and symptoms resolved.  Her  also went to get tested and did not test positive for trichomonas, but did test positive for mycoplasma genitalium.  However patient tested negative for this and is now asymptomatic.         The following portions of the patient's history were reviewed and updated as appropriate: allergies, current medications, past family history, past medical history, past social history, past surgical history, and problem list.    Review of Systems    Objective   /80 (BP Location: Left arm, Patient Position: Sitting, Cuff Size: Large Adult)   Pulse 64   Temp 97.7 °F (36.5 °C) (Temporal)   Ht 168.9 cm (66.5\")   Wt (!) 142 kg (312 lb 9.6 oz)   BMI 49.71 kg/m²   Physical Exam  Vitals reviewed.   Constitutional:       Appearance: Normal appearance.   HENT:      Head: Normocephalic and atraumatic.      Nose: Nose normal. No congestion.      Mouth/Throat:      Mouth: Mucous membranes are moist.   Eyes:      Extraocular Movements: Extraocular movements intact.      Conjunctiva/sclera: Conjunctivae normal.   Cardiovascular:      Rate and Rhythm: Normal rate and regular rhythm.      Heart sounds: Normal heart sounds. No murmur heard.  Pulmonary:      Effort: Pulmonary effort is normal.      Breath sounds: Normal breath sounds.   Abdominal:      General: There is no distension.      Palpations: Abdomen is soft. There is no mass.      Tenderness: There is abdominal tenderness. There is no guarding.      Comments: Nicely healing incisions of the abdomen.  Mildly tender, no guarding towards the right side of her abdomen.   Musculoskeletal:      Cervical back: Neck supple.      Right lower leg: No edema.      Left lower leg: No edema.   Skin:     General: Skin is warm and dry.   Neurological:      Mental Status: She is alert and oriented to person, place, and time. Mental status is at baseline.   Psychiatric:    "      Behavior: Behavior normal.         Thought Content: Thought content normal.         Assessment & Plan   Diagnoses and all orders for this visit:    1. Bariatric surgery status  2. Hiatal hernia  Recovering well.  Continue on liquid diet until advanced by bariatrics.  Follow-up with bariatrics, continue on recommended vitamins.    3. Trichomonal vaginitis  Symptoms resolved.  If there is flareup of symptoms consider rechecking including testing for mycoplasma genitalium.      Future Appointments         Provider Department Center    12/17/2024 9:30 AM Cherrie Landry PA [facility] BARIATRIC SURGERY     1/6/2025 3:30 PM Stevenson Coker LCSW [facility]    1/13/2025 3:00 PM Nani Ball APRN [facility]    1/14/2025 8:30 AM Cherrie Landry PA [facility] BARIATRIC SURGERY     1/24/2025 8:30 AM Khadra Clements APRN [facility]    2/11/2025 4:15 PM Michael Miner MD [facility] INTERNAL MEDICINE [facility]    4/16/2025 10:30 AM Michael Miner MD [facility]                   Michael Miner MD

## 2024-12-17 ENCOUNTER — OFFICE VISIT (OUTPATIENT)
Dept: BARIATRICS/WEIGHT MGMT | Facility: CLINIC | Age: 39
End: 2024-12-17
Payer: COMMERCIAL

## 2024-12-17 VITALS
OXYGEN SATURATION: 96 % | SYSTOLIC BLOOD PRESSURE: 130 MMHG | HEIGHT: 67 IN | BODY MASS INDEX: 45.99 KG/M2 | WEIGHT: 293 LBS | DIASTOLIC BLOOD PRESSURE: 84 MMHG | TEMPERATURE: 97.9 F | RESPIRATION RATE: 18 BRPM | HEART RATE: 91 BPM

## 2024-12-17 DIAGNOSIS — E66.01 MORBID OBESITY WITH BMI OF 45.0-49.9, ADULT: Primary | ICD-10-CM

## 2024-12-17 PROCEDURE — 1160F RVW MEDS BY RX/DR IN RCRD: CPT | Performed by: NURSE PRACTITIONER

## 2024-12-17 PROCEDURE — 99024 POSTOP FOLLOW-UP VISIT: CPT | Performed by: NURSE PRACTITIONER

## 2024-12-17 PROCEDURE — 1159F MED LIST DOCD IN RCRD: CPT | Performed by: NURSE PRACTITIONER

## 2024-12-17 RX ORDER — DIPHENOXYLATE HYDROCHLORIDE AND ATROPINE SULFATE 2.5; .025 MG/1; MG/1
1 TABLET ORAL DAILY
Qty: 90 TABLET | Refills: 2 | Status: SHIPPED | OUTPATIENT
Start: 2024-12-17

## 2024-12-17 RX ORDER — BACILLUS COAGULANS 1B CELL
1 CAPSULE ORAL DAILY
Qty: 90 TABLET | Refills: 2 | Status: SHIPPED | OUTPATIENT
Start: 2024-12-17

## 2024-12-17 RX ORDER — THIAMINE MONONITRATE (VIT B1) 100 MG
100 TABLET ORAL DAILY
Qty: 30 TABLET | Refills: 2 | Status: SHIPPED | OUTPATIENT
Start: 2024-12-17

## 2024-12-17 RX ORDER — MAGNESIUM 200 MG
1000 TABLET ORAL DAILY
Qty: 90 EACH | Refills: 2 | Status: SHIPPED | OUTPATIENT
Start: 2024-12-17

## 2024-12-17 RX ORDER — ACETAMINOPHEN 500 MG
1200 TABLET ORAL DAILY
Qty: 90 EACH | Refills: 2 | Status: SHIPPED | OUTPATIENT
Start: 2024-12-17

## 2024-12-17 NOTE — LETTER
December 17, 2024     Patient: Clare Turner   YOB: 1985   Date of Visit: 12/17/2024       To Whom It May Concern:    It is my medical opinion that Clare Turner may return to work on 12/18/2024 . Please excuse from 12/10/2024-12/17/2024.           Sincerely,        DALLAS Saunders    CC: No Recipients

## 2024-12-17 NOTE — PROGRESS NOTES
[facility] Bariatric Surgery  [address]    [facility] [address] [phone]      Patient Name:  Clare Turner  :  1985      Date of Visit: 2024      Reason for Visit:  POD #6    HPI:  Clare Turner is a 39 y.o. female s/p LSG/HHR 24 GDW    Discharged on POD#1.  Doing well.  No issues/concerns.  Denies dysphagia, reflux, nausea, and vomiting.  Some right incisional pain-burning. Tolerating diet progression - on stage 1. Drinking protein shakes, protein shakes, broth. Getting 110g prot/day.  Drinking 64 fluid oz/day.  Voiding well.  Not yet taking vitamins.  On Omeprazole  and Eliquis .  Holding  spironolactone .  Ambulating frequently.     Presurgery weight: 333 pounds.  Today's weight is (!) 141 kg (310 lb 6.4 oz) pounds, today's  Body mass index is 49.35 kg/m²., and weight loss since surgery is 23 pounds.       DIAGNOSIS:   STOMACH, PARTIAL GASTRECTOMY:   Stomach with reactive oxyntic mucosa   No Helicobacter pylori-like organisms seen   Negative for dysplasia or malignancy     Past Medical History:   Diagnosis Date    ADD (attention deficit disorder)     Adenomatous colon polyp 2024    Colonoscopy 2024.  Repeat 5 years      Allergic     Anxiety     Arthritis     Asthma     on Dupixent, follows w/ Pulmonary    Bariatric surgery status 2024 with 85% subtotal gastrectomy with hiatal hernia repair      Bipolar 1 disorder 2009    Current every day non-nicotine vaping     Depression     Elevated hemoglobin A1c     Elevated transaminase measurement     Episcleritis     Fatigue     Former smoker     Full dentures     upper only    GERD (gastroesophageal reflux disease)     H/O alcohol abuse 2007    Clean date 2020    H/O drug abuse (CMS/[facility]) - methamphetamine 2016    Clean date 2020    Heartburn     chronic/episodic, prn Omeprazole, EGD Dr. Spencer  small hiatal hernia with Schatzki's ring    Hidradenitis  suppurativa     History of emergence delirium     cries after anesthesia    History of nicotine vaping     Hyperlipidemia     Leukocytosis     Lung nodule     stable - followed by pulmonary    Morbid obesity     Pneumonia 2022    Prediabetes 2022    A1C 5.7    Restless leg syndrome 2022    Seasonal allergic rhinitis due to pollen 10/12/2022    Thrombocytosis     Trichimoniasis 2017    Trichimoniasis 2020    Trichimoniasis 2020    Urinary tract infection      Past Surgical History:   Procedure Laterality Date    APPENDECTOMY      BARIATRIC SURGERY  2024    CERVICAL CONIZATION  2007     SECTION WITH TUBAL  2012    uncomplicated - twin delivery    COLONOSCOPY      ENDOSCOPY N/A 2024    Procedure: ESOPHAGOGASTRODUODENOSCOPY;  Surgeon: Derrick Spencer MD;  Location: [facility] OR;  Service: Bariatric;  Laterality: N/A;    FRACTURE SURGERY Right 2016    Closed talus bone fracture with bone graph    GASTRIC SLEEVE LAPAROSCOPIC N/A 2024    Procedure: GASTRIC SLEEVE LAPAROSCOPIC;  Surgeon: Derrick Spencer MD;  Location: [facility] OR;  Service: Bariatric;  Laterality: N/A;    HIATAL HERNIA REPAIR N/A 2024    Procedure: HIATAL HERNIA REPAIR LAPAROSCOPIC;  Surgeon: Derrick Spencer MD;  Location: [facility] OR;  Service: Bariatric;  Laterality: N/A;  start 0818 pause 0833 restart 0848 stop 0902    LAPAROSCOPIC APPENDECTOMY      LAPAROSCOPIC CHOLECYSTECTOMY  2014    dysfunction, no stones    NASAL FRACTURE SURGERY  2017    SEPTOPLASTY  2011    SINUS SURGERY      debridement    TIBIAL TALAR FUSION Right     w/ bone grafting- 3 total surgeries    TONSILLECTOMY AND ADENOIDECTOMY       Outpatient Medications Marked as Taking for the 24 encounter (Office Visit) with Genny Olmstead APRN   Medication Sig Dispense Refill    albuterol (PROVENTIL) (2.5 MG/3ML) 0.083% nebulizer solution Take 2.5 mg by nebulization 4 (Four) Times a Day As  Needed for Wheezing. 120 each 5    albuterol sulfate  (90 Base) MCG/ACT inhaler Inhale 2 puffs 2 (Two) Times a Day. 18 g 5    amphetamine-dextroamphetamine (Adderall) 20 MG tablet Take 20 mg orally every afternoon. 30 tablet 0    amphetamine-dextroamphetamine XR (Adderall XR) 20 MG 24 hr capsule Take 2 capsules by mouth Every Morning 60 capsule 0    apixaban (Eliquis) 2.5 MG tablet tablet Take 1 tablet by mouth 2 (Two) Times a Day.      budesonide (Pulmicort) 0.5 MG/2ML nebulizer solution Take 2 mL by nebulization 2 (Two) Times a Day. (Patient taking differently: Take 2 mL by nebulization 2 (Two) Times a Day As Needed.) 120 each 11    Cariprazine HCl (Vraylar) 4.5 MG capsule capsule Take 1 capsule by mouth Daily. 30 capsule 6    doxepin (SINEquan) 10 MG capsule Take 1 capsule by mouth Every Night. 30 capsule 6    eszopiclone (Lunesta) 3 MG tablet Take 1 tablet by mouth At Night As Needed for Sleep. Take immediately before bedtime 30 tablet 2    FLUoxetine (PROzac) 40 MG capsule Take 2 capsules by mouth Daily. 60 capsule 6    Fluticasone Furoate-Vilanterol (BREO ELLIPTA) 200-25 MCG/ACT inhaler Inhale 1 puff Daily. 1 inhalation once a day 60 each 5    hydrOXYzine pamoate (VISTARIL) 25 MG capsule Take 1 capsule by mouth 3 (Three) Times a Day As Needed for Anxiety. 90 capsule 6    lamoTRIgine (LaMICtal) 100 MG tablet Take 1 tablet by mouth Daily. 30 tablet 2    omeprazole (priLOSEC) 40 MG capsule Take 1 capsule by mouth Daily for 60 days. 60 capsule 0    ondansetron ODT (ZOFRAN-ODT) 4 MG disintegrating tablet Place 1 tablet on the tongue Every 8 (Eight) Hours As Needed for Nausea or Vomiting. 10 tablet 0     Allergies   Allergen Reactions    Pecan Nut Anaphylaxis     Hives, swelling of lips and throat, difficulties breathing. Has epipen    Sulfa Antibiotics Rash       Social History     Socioeconomic History    Marital status:    Tobacco Use    Smoking status: Former     Current packs/day: 0.00      "Average packs/day: 0.3 packs/day for 30.8 years (10.0 ttl pk-yrs)     Types: Cigarettes     Start date: 2003     Quit date: 2024     Years since quittin.3     Passive exposure: Yes    Smokeless tobacco: Never    Tobacco comments:     Stopped smoking   Vaping Use    Vaping status: Former    Start date: 9/15/2022   Substance and Sexual Activity    Alcohol use: Not Currently     Comment: quit 20    Drug use: Not Currently     Types: Amphetamines, Marijuana, Methamphetamines     Comment: Clean ~ 2020    Sexual activity: Yes     Partners: Male     Birth control/protection: Other, Tubal ligation     Social History     Social History Narrative    Lives in [facility].   w/ 3 children.  [employer]MA for [employer].        /84 (BP Location: Left arm, Patient Position: Sitting)   Pulse 91   Temp 97.9 °F (36.6 °C)   Resp 18   Ht 168.9 cm (66.5\")   Wt (!) 141 kg (310 lb 6.4 oz)   SpO2 96%   BMI 49.35 kg/m²     Physical Exam  Constitutional:       Appearance: She is well-developed. She is not ill-appearing.   Eyes:      General: No scleral icterus.  Cardiovascular:      Rate and Rhythm: Normal rate.   Pulmonary:      Effort: Pulmonary effort is normal.   Abdominal:      Palpations: Abdomen is soft.      Tenderness: There is no abdominal tenderness.      Comments: incisions healing well   Musculoskeletal:         General: Normal range of motion.   Skin:     General: Skin is warm and dry.      Findings: No rash.   Neurological:      Mental Status: She is alert.   Psychiatric:         Behavior: Behavior is cooperative.         Judgment: Judgment normal.         Assessment:   POD #6 s/p LSG/HHR 24 GDW           Plan:  Doing well.  Continue to advance diet per manual.  Continue protein 100g/day.  Increase exercise/activity as tolerated.  Reviewed lifting restrictions, nothing >25 lbs x 2 more weeks.  Start vitamins as discussed, RX sent.  Continue PPI.  Continue to " avoid ASA/NSAIDs/tobacco x 6 weeks postop, steroids x 8 weeks postop.  Call w/ problems/concerns.    The patient was instructed to follow up in 3 weeks, sooner if needed.     Genny Olmstead APRN

## 2024-12-30 DIAGNOSIS — F90.2 ATTENTION DEFICIT HYPERACTIVITY DISORDER, COMBINED TYPE: ICD-10-CM

## 2024-12-30 DIAGNOSIS — F51.04 PSYCHOPHYSIOLOGICAL INSOMNIA: ICD-10-CM

## 2024-12-30 RX ORDER — ESZOPICLONE 3 MG/1
3 TABLET, FILM COATED ORAL NIGHTLY PRN
Qty: 30 TABLET | Refills: 2 | OUTPATIENT
Start: 2024-12-30 | End: 2025-12-30

## 2024-12-30 RX ORDER — DEXTROAMPHETAMINE SACCHARATE, AMPHETAMINE ASPARTATE MONOHYDRATE, DEXTROAMPHETAMINE SULFATE AND AMPHETAMINE SULFATE 5; 5; 5; 5 MG/1; MG/1; MG/1; MG/1
40 CAPSULE, EXTENDED RELEASE ORAL EVERY MORNING
Qty: 60 CAPSULE | Refills: 0 | Status: SHIPPED | OUTPATIENT
Start: 2024-12-30

## 2024-12-30 RX ORDER — DEXTROAMPHETAMINE SACCHARATE, AMPHETAMINE ASPARTATE, DEXTROAMPHETAMINE SULFATE AND AMPHETAMINE SULFATE 5; 5; 5; 5 MG/1; MG/1; MG/1; MG/1
TABLET ORAL
Qty: 30 TABLET | Refills: 0 | Status: SHIPPED | OUTPATIENT
Start: 2024-12-30

## 2024-12-30 NOTE — TELEPHONE ENCOUNTER
Most recent urine drug screen reviewed.  PDMP reviewed today, 12/30/2024.  Refill for Adderall and Adderall XR have been sent in to cover for provider who is currently out of the office.  The Lunesta was not sent in today as requested, as the patient's provider sent a 30-day supply of Lunesta, with 2 refills, on 11/20/2024, and refills are not needed/due at this time.

## 2025-01-06 ENCOUNTER — TELEMEDICINE (OUTPATIENT)
Dept: PSYCHIATRY | Facility: CLINIC | Age: 40
End: 2025-01-06
Payer: COMMERCIAL

## 2025-01-06 DIAGNOSIS — F41.1 GENERALIZED ANXIETY DISORDER: ICD-10-CM

## 2025-01-06 DIAGNOSIS — F90.2 ATTENTION DEFICIT HYPERACTIVITY DISORDER, COMBINED TYPE: ICD-10-CM

## 2025-01-06 DIAGNOSIS — F31.75 BIPOLAR 1 DISORDER, DEPRESSED, PARTIAL REMISSION: Primary | ICD-10-CM

## 2025-01-06 NOTE — PROGRESS NOTES
[facility]   Follow-up Progress Note     Date: January 6, 2025  Time In: 3:31  Time Out: 3:59      PROGRESS NOTE  Data:  Clare Turner is a 39 y.o. female presenting to [facility] (through [facility]), [address] [facility] [place], [address] using a secure MyChart Video Visit through [facility] for assessment with Stevenson Coker LCSW. The patient is seen remotely in their  home  using [facility] My Chart. Patient is being seen via telehealth and stated they are in a secure environment for this session. The patient's condition being diagnosed/treated is appropriate for telemedicine. The provider identified herself as well as her credentials. The patient and/or patients guardian consent to be seen remotely, and when consent is given they understand that the consent allows for patient identifiable information to be sent to a third party as needed. They may refuse to be seen remotely at any time. The electronic data is encrypted and password protected, and the patient has been advised of the potential risks to privacy not withstanding such measures.    Today Patient stated she has been doing well. Patient reported she completed her surgery for a gastric sleeve. Patient stated she is about a month post operation and has lost nearly 40 lbs. Patient stated her children had a good Janae. Patient stated her surgery has positively impacted her mood. Patient stated her surgery has forced her to no longer utilize food as a coping strategy. Patient stated she struggled through the first couple of weeks to rewire her relationship with food. Patient expressed she is eating  and drinking a lot of water. Patient stated she has started going to the gym to walk. Patient stated she has increased the amount of time she spends in the gym slowly. Patient was engaged in reviewing and updating treatment goals. Patient expressed she  would like to discuss her relationship but did not feel comfortable due to being at home but would like to discuss her concerns during the next follow up appointment.       Clinical Maneuvering/Intervention:    Chief Complaint: mood instability, anxiety, ADHD    (Scales based on 0 - 10 with 10 being the worst)  Depression: 2 Anxiety: 5     Assisted Patient in processing above session content; acknowledged and normalized patient’s thoughts, feelings, and concerns.  Rationalized patient thought process regarding recovering from gastric surgery.  Discussed triggers associated with patient's anxiety.  Also discussed coping skills for patient to implement such as maintaining healthy behaviors such as clean eating and exercising.    Allowed Patient to freely discuss issues  without interruption or judgement with unconditional positive regard, active listening skills, and empathy. Therapist provided a safe, confidential environment to facilitate the development of a positive therapeutic relationship and encouraged open, honest communication. Assisted Patient in identifying risk factors which would indicate the need for higher level of care including thoughts to harm self or others and/or self-harming behavior and encouraged Patient to contact this office, call 911, or present to the nearest emergency room should any of these events occur. Discussed crisis intervention services and means to access. Patient adamantly and convincingly denies current suicidal or homicidal ideation or perceptual disturbance. Assisted Patient in processing session content; acknowledged and normalized Patient’s thoughts, feelings, and concerns by utilizing a person-centered approach in efforts to build appropriate rapport and a positive therapeutic relationship with open and honest communication. Therapist utilized dialectical behavior techniques to teach and model emotional regulation and relaxation methods. Therapist assisted Patient with  identifying and implementing healthier coping strategies.     Assessment   Patient appears to be experiencing heightened anxiety and depression in response to COVID-19 epidemic  As a result, they can be reasonably expected to continue to benefit from treatment and would likely be at increased risk for decompensation otherwise.    Mental Status Exam:   Hygiene:   good  Cooperation:  Cooperative  Eye Contact:  Good  Psychomotor Behavior:  Appropriate  Affect:  Full range  Mood:  optimistic, happy  Speech:  Normal  Thought Process:  Goal directed  Thought Content:  Mood congruent  Suicidal:  None  Homicidal:  None  Hallucinations:  None  Delusion:  None  Memory:  Intact  Orientation:  Person, Place, Time, and Situation  Reliability:  good  Insight:  Good  Judgement:  Good  Impulse Control:  Good  Physical/Medical Issues:  No      PHQ-Score Total:  PHQ-9 Total Score:        Patient's Support Network Includes:  significant other, children, mother, extended family, and friends    Functional Status: Moderate impairment     Progress toward goal: Not at goal    Prognosis: Good with Ongoing Treatment            Impression/Formulation:    VISIT DIAGNOSIS:     ICD-10-CM ICD-9-CM   1. Bipolar 1 disorder, depressed, partial remission  F31.75 296.55   2. Generalized anxiety disorder  F41.1 300.02   3. Attention deficit hyperactivity disorder, combined type  F90.2 314.01        Patient appeared alert and oriented.  Patient is voluntarily requesting to continue outpatient therapy at [facility].  Patient is receptive to assistance with maintaining a stable lifestyle.  Patient presents with history of mood instability, anxiety, and ADHD.  Patient is agreeable to attend routine therapy sessions.  Patient expressed desire to maintain stability and participate in the therapeutic process.        Crisis Plan:  Symptoms and/or behaviors to indicate a crisis: Isolation and Increased hunger or lack of  appetite    What calming techniques or other strategies will patient use to de-esclate and stay safe: slow down, breathe, visualize calming self, think it though, listen to music, change focus, take a walk    Who is one person patient can contact to assist with de-escalation? sister    If symptoms/behaviors persist, Patient will present to the nearest hospital for an assessment. Advised patient of [facility] ER and assessment services.     Plan:   Patient will continue in individual outpatient therapy with focus on improved functioning and coping skills, maintaining stability, and avoiding decompensation and the need for higher level of care.    Patient will contact this office ([facility] at [phone]), call 911 or present to the nearest emergency room should suicidal or homicidal ideations occur. Provide Cognitive Behavioral Therapy and Solution Focused Therapy to improve functioning, maintain stability, and avoid decompensation and the need for higher level of care.     Return in about 6 weeks, or earlier if symptoms worsen or fail to improve.    Recommended Referrals: none        This document has been electronically signed by Stevenson Coker LCSW  January 6, 2025 15:31 EST        Part of this note may be an electronic transcription/translation of spoken language to printed text using the Dragon Dictation System.    Mode of Visit: Video  Location of patient: -HOME-  Location of provider: +HOME+  You have chosen to receive care through a telehealth visit.  The patient has signed the video visit consent form.  The visit included audio and video interaction. No technical issues occurred during this visit.

## 2025-01-06 NOTE — PLAN OF CARE
Patient was engaged in reviewing treatment progress. Patient expressed she would like to focus on how to managing feeling overwhelmed to be able to apply problem solving skills. Patient stated she also wants to work on not allowing other's moods to impact her own.   Problem: Anxiety 3  Goal: Patient will develop and implement behavioral and cognitive strategies to reduce anxiety and irrational fears.  Outcome: Progressing     Problem: Mood Instability 3  Goal: Patient will achieve mood stability as evidenced by controlled behavior and a more deliberate thought process  Outcome: Progressing

## 2025-01-13 ENCOUNTER — TELEMEDICINE (OUTPATIENT)
Dept: PSYCHIATRY | Facility: CLINIC | Age: 40
End: 2025-01-13
Payer: COMMERCIAL

## 2025-01-13 DIAGNOSIS — F31.75 BIPOLAR 1 DISORDER, DEPRESSED, PARTIAL REMISSION: Primary | ICD-10-CM

## 2025-01-13 DIAGNOSIS — F41.1 GENERALIZED ANXIETY DISORDER: ICD-10-CM

## 2025-01-13 DIAGNOSIS — F90.2 ATTENTION DEFICIT HYPERACTIVITY DISORDER, COMBINED TYPE: ICD-10-CM

## 2025-01-13 DIAGNOSIS — F51.04 PSYCHOPHYSIOLOGICAL INSOMNIA: ICD-10-CM

## 2025-01-13 RX ORDER — BUPROPION HYDROCHLORIDE 150 MG/1
150 TABLET ORAL EVERY MORNING
Qty: 30 TABLET | Refills: 2 | Status: SHIPPED | OUTPATIENT
Start: 2025-01-13 | End: 2026-01-13

## 2025-01-13 RX ORDER — DEXTROAMPHETAMINE SACCHARATE, AMPHETAMINE ASPARTATE MONOHYDRATE, DEXTROAMPHETAMINE SULFATE AND AMPHETAMINE SULFATE 5; 5; 5; 5 MG/1; MG/1; MG/1; MG/1
40 CAPSULE, EXTENDED RELEASE ORAL EVERY MORNING
Qty: 60 CAPSULE | Refills: 0 | Status: SHIPPED | OUTPATIENT
Start: 2025-01-13

## 2025-01-13 RX ORDER — FLUOXETINE 40 MG/1
80 CAPSULE ORAL DAILY
Qty: 60 CAPSULE | Refills: 6 | Status: SHIPPED | OUTPATIENT
Start: 2025-01-13 | End: 2026-01-13

## 2025-01-13 RX ORDER — DEXTROAMPHETAMINE SACCHARATE, AMPHETAMINE ASPARTATE, DEXTROAMPHETAMINE SULFATE AND AMPHETAMINE SULFATE 5; 5; 5; 5 MG/1; MG/1; MG/1; MG/1
TABLET ORAL
Qty: 30 TABLET | Refills: 0 | Status: SHIPPED | OUTPATIENT
Start: 2025-01-13

## 2025-01-13 RX ORDER — ESZOPICLONE 3 MG/1
3 TABLET, FILM COATED ORAL NIGHTLY PRN
Qty: 30 TABLET | Refills: 2 | Status: SHIPPED | OUTPATIENT
Start: 2025-01-13 | End: 2026-01-13

## 2025-01-13 RX ORDER — LAMOTRIGINE 100 MG/1
100 TABLET ORAL DAILY
Qty: 30 TABLET | Refills: 2 | Status: SHIPPED | OUTPATIENT
Start: 2025-01-13 | End: 2026-01-13

## 2025-01-14 ENCOUNTER — OFFICE VISIT (OUTPATIENT)
Dept: BARIATRICS/WEIGHT MGMT | Facility: CLINIC | Age: 40
End: 2025-01-14
Payer: COMMERCIAL

## 2025-01-14 ENCOUNTER — LAB (OUTPATIENT)
Dept: LAB | Facility: HOSPITAL | Age: 40
End: 2025-01-14
Payer: COMMERCIAL

## 2025-01-14 VITALS
BODY MASS INDEX: 45.61 KG/M2 | HEIGHT: 67 IN | HEART RATE: 102 BPM | SYSTOLIC BLOOD PRESSURE: 124 MMHG | WEIGHT: 290.6 LBS | OXYGEN SATURATION: 96 % | RESPIRATION RATE: 18 BRPM | DIASTOLIC BLOOD PRESSURE: 82 MMHG | TEMPERATURE: 98.7 F

## 2025-01-14 DIAGNOSIS — R79.0 ABNORMAL BLOOD LEVEL OF IRON: ICD-10-CM

## 2025-01-14 DIAGNOSIS — R53.83 FATIGUE, UNSPECIFIED TYPE: ICD-10-CM

## 2025-01-14 DIAGNOSIS — R73.03 PREDIABETES: Chronic | ICD-10-CM

## 2025-01-14 DIAGNOSIS — D72.829 LEUKOCYTOSIS, UNSPECIFIED TYPE: ICD-10-CM

## 2025-01-14 DIAGNOSIS — E66.01 OBESITY, CLASS III, BMI 40-49.9 (MORBID OBESITY): Primary | ICD-10-CM

## 2025-01-14 DIAGNOSIS — E78.2 MIXED HYPERLIPIDEMIA: Chronic | ICD-10-CM

## 2025-01-14 DIAGNOSIS — E55.9 VITAMIN D DEFICIENCY: ICD-10-CM

## 2025-01-14 DIAGNOSIS — Z90.3 POSTGASTRECTOMY MALABSORPTION: ICD-10-CM

## 2025-01-14 DIAGNOSIS — R10.13 DYSPEPSIA: ICD-10-CM

## 2025-01-14 DIAGNOSIS — K91.2 POSTGASTRECTOMY MALABSORPTION: ICD-10-CM

## 2025-01-14 DIAGNOSIS — R79.89 ELEVATED PLATELET COUNT: ICD-10-CM

## 2025-01-14 LAB
ALBUMIN SERPL-MCNC: 4 G/DL (ref 3.5–5.2)
ALBUMIN/GLOB SERPL: 1.1 G/DL
ALP SERPL-CCNC: 112 U/L (ref 39–117)
ALT SERPL W P-5'-P-CCNC: 25 U/L (ref 1–33)
ANION GAP SERPL CALCULATED.3IONS-SCNC: 11.3 MMOL/L (ref 5–15)
AST SERPL-CCNC: 20 U/L (ref 1–32)
BASOPHILS # BLD AUTO: 0.03 10*3/MM3 (ref 0–0.2)
BASOPHILS NFR BLD AUTO: 0.3 % (ref 0–1.5)
BILIRUB SERPL-MCNC: 0.3 MG/DL (ref 0–1.2)
BUN SERPL-MCNC: 14 MG/DL (ref 6–20)
BUN/CREAT SERPL: 18.9 (ref 7–25)
CALCIUM SPEC-SCNC: 9.8 MG/DL (ref 8.6–10.5)
CHLORIDE SERPL-SCNC: 105 MMOL/L (ref 98–107)
CHOLEST SERPL-MCNC: 161 MG/DL (ref 0–200)
CO2 SERPL-SCNC: 23.7 MMOL/L (ref 22–29)
CREAT SERPL-MCNC: 0.74 MG/DL (ref 0.57–1)
DEPRECATED RDW RBC AUTO: 42.8 FL (ref 37–54)
EGFRCR SERPLBLD CKD-EPI 2021: 106.4 ML/MIN/1.73
EOSINOPHIL # BLD AUTO: 0.23 10*3/MM3 (ref 0–0.4)
EOSINOPHIL NFR BLD AUTO: 2.5 % (ref 0.3–6.2)
ERYTHROCYTE [DISTWIDTH] IN BLOOD BY AUTOMATED COUNT: 12.9 % (ref 12.3–15.4)
GLOBULIN UR ELPH-MCNC: 3.5 GM/DL
GLUCOSE SERPL-MCNC: 93 MG/DL (ref 65–99)
HBA1C MFR BLD: 5.6 % (ref 4.8–5.6)
HCT VFR BLD AUTO: 45 % (ref 34–46.6)
HDLC SERPL-MCNC: 30 MG/DL (ref 40–60)
HGB BLD-MCNC: 14.7 G/DL (ref 12–15.9)
IMM GRANULOCYTES # BLD AUTO: 0.01 10*3/MM3 (ref 0–0.05)
IMM GRANULOCYTES NFR BLD AUTO: 0.1 % (ref 0–0.5)
LDLC SERPL CALC-MCNC: 113 MG/DL (ref 0–100)
LDLC/HDLC SERPL: 3.71 {RATIO}
LYMPHOCYTES # BLD AUTO: 2.83 10*3/MM3 (ref 0.7–3.1)
LYMPHOCYTES NFR BLD AUTO: 31.2 % (ref 19.6–45.3)
MCH RBC QN AUTO: 29.7 PG (ref 26.6–33)
MCHC RBC AUTO-ENTMCNC: 32.7 G/DL (ref 31.5–35.7)
MCV RBC AUTO: 90.9 FL (ref 79–97)
MONOCYTES # BLD AUTO: 0.81 10*3/MM3 (ref 0.1–0.9)
MONOCYTES NFR BLD AUTO: 8.9 % (ref 5–12)
NEUTROPHILS NFR BLD AUTO: 5.17 10*3/MM3 (ref 1.7–7)
NEUTROPHILS NFR BLD AUTO: 57 % (ref 42.7–76)
NRBC BLD AUTO-RTO: 0 /100 WBC (ref 0–0.2)
PATHOLOGY REVIEW: YES
PLATELET # BLD AUTO: 414 10*3/MM3 (ref 140–450)
PMV BLD AUTO: 11.5 FL (ref 6–12)
POTASSIUM SERPL-SCNC: 4.3 MMOL/L (ref 3.5–5.2)
PROT SERPL-MCNC: 7.5 G/DL (ref 6–8.5)
RBC # BLD AUTO: 4.95 10*6/MM3 (ref 3.77–5.28)
SODIUM SERPL-SCNC: 140 MMOL/L (ref 136–145)
TRIGL SERPL-MCNC: 98 MG/DL (ref 0–150)
VLDLC SERPL-MCNC: 18 MG/DL (ref 5–40)
WBC NRBC COR # BLD AUTO: 9.08 10*3/MM3 (ref 3.4–10.8)

## 2025-01-14 PROCEDURE — 83036 HEMOGLOBIN GLYCOSYLATED A1C: CPT

## 2025-01-14 PROCEDURE — 36415 COLL VENOUS BLD VENIPUNCTURE: CPT

## 2025-01-14 PROCEDURE — 85025 COMPLETE CBC W/AUTO DIFF WBC: CPT

## 2025-01-14 PROCEDURE — 80061 LIPID PANEL: CPT

## 2025-01-14 PROCEDURE — 80053 COMPREHEN METABOLIC PANEL: CPT

## 2025-01-14 NOTE — PROGRESS NOTES
[facility] Bariatric Surgery  [address]    [facility] [id number] [phone]      Patient Name:  Clare Turner  :  1985      Date of Visit: 2025      Reason for Visit:  1 month postop    HPI:  Clare Turner is a 39 y.o. female s/p LSG/HHR 24 GDW    Doing well.  No issues/concerns.  Denies dysphagia, reflux, nausea, vomiting, abdominal pain, diarrhea, and constipation.  Tolerating diet progression - on stage 3.  Getting 110+g prot/day.  Hydrating well.  Taking MVI, B12, B1, Calcium, Vit D, iron, and Vit C.  On Omeprazole .  Still holding Diuretics .  Physical activity: walking.     Presurgery weight:  333 pounds. Today's weight is 132 kg (290 lb 9.6 oz) pounds, today's Body mass index is 46.2 kg/m²., and weight loss since surgery is 43 pounds.       Past Medical History:   Diagnosis Date    ADD (attention deficit disorder)     Adenomatous colon polyp 2024    Colonoscopy 2024.  Repeat 5 years      Anxiety     Arthritis     Asthma     on Dupixent, follows w/ Pulmonary    Bariatric surgery status 2024 with 85% subtotal gastrectomy with hiatal hernia repair      Bipolar 1 disorder 2009    Depression     Elevated hemoglobin A1c     Elevated transaminase measurement     Episcleritis     Fatigue     Former smoker     Full dentures     upper only    GERD (gastroesophageal reflux disease)     H/O alcohol abuse     Clean date 2020    H/O drug abuse (CMS/[facility]) - methamphetamine     Clean date 2020    Heartburn     chronic/episodic, prn Omeprazole, EGD Dr. Spencer  small hiatal hernia with Schatzki's ring    Hidradenitis suppurativa     History of emergence delirium     cries after anesthesia    History of nicotine vaping     Hyperlipidemia 2019    Leukocytosis     Lung nodule     stable - followed by pulmonary    Morbid obesity     Prediabetes 2022    A1C 5.7    Restless leg syndrome 2022     Seasonal allergic rhinitis due to pollen 10/12/2022    Thrombocytosis     Trichimoniasis 2017    Trichimoniasis 2020    Trichimoniasis 2020    Urinary tract infection      Past Surgical History:   Procedure Laterality Date    APPENDECTOMY      BARIATRIC SURGERY  2024    CERVICAL CONIZATION  2007     SECTION WITH TUBAL  2012    uncomplicated - twin delivery    COLONOSCOPY      ENDOSCOPY N/A 2024    Procedure: ESOPHAGOGASTRODUODENOSCOPY;  Surgeon: Derrick Spencer MD;  Location: [facility] OR;  Service: Bariatric;  Laterality: N/A;    FRACTURE SURGERY Right 2016    Closed talus bone fracture with bone graph    GASTRIC SLEEVE LAPAROSCOPIC N/A 2024    Procedure: GASTRIC SLEEVE LAPAROSCOPIC;  Surgeon: Derrick Spencer MD;  Location: [facility] OR;  Service: Bariatric;  Laterality: N/A;    HIATAL HERNIA REPAIR N/A 2024    Procedure: HIATAL HERNIA REPAIR LAPAROSCOPIC;  Surgeon: Derrick Spencer MD;  Location: [facility] OR;  Service: Bariatric;  Laterality: N/A;  start 0818 pause 0833 restart 0848 stop 0902    LAPAROSCOPIC APPENDECTOMY      LAPAROSCOPIC CHOLECYSTECTOMY      dysfunction, no stones    NASAL FRACTURE SURGERY  2017    SEPTOPLASTY  2011    SINUS SURGERY      debridement    TIBIAL TALAR FUSION Right     w/ bone grafting- 3 total surgeries    TONSILLECTOMY AND ADENOIDECTOMY       Outpatient Medications Marked as Taking for the 25 encounter (Office Visit) with Cherrie Landry PA   Medication Sig Dispense Refill    albuterol (PROVENTIL) (2.5 MG/3ML) 0.083% nebulizer solution Take 2.5 mg by nebulization 4 (Four) Times a Day As Needed for Wheezing. 120 each 5    albuterol sulfate  (90 Base) MCG/ACT inhaler Inhale 2 puffs 2 (Two) Times a Day. 18 g 5    amphetamine-dextroamphetamine (Adderall) 20 MG tablet Take 20 mg orally every afternoon. 30 tablet 0    amphetamine-dextroamphetamine XR (Adderall XR) 20 MG 24 hr capsule Take 2  capsules by mouth Every Morning 60 capsule 0    budesonide (Pulmicort) 0.5 MG/2ML nebulizer solution Take 2 mL by nebulization 2 (Two) Times a Day. (Patient taking differently: Take 2 mL by nebulization 2 (Two) Times a Day As Needed.) 120 each 11    buPROPion XL (Wellbutrin XL) 150 MG 24 hr tablet Take 1 tablet by mouth Every Morning. 30 tablet 2    Calcium Carbonate-Vit D-Min (Calcium 1200) 2146-6407 MG-UNIT chewable tablet Chew 1,200 mg Daily. 90 each 2    Cariprazine HCl (Vraylar) 4.5 MG capsule capsule Take 1 capsule by mouth Daily. 30 capsule 6    Cyanocobalamin (Vitamin B-12) 1000 MCG sublingual tablet Place 1 tablet under the tongue Daily. 90 each 2    doxepin (SINEquan) 10 MG capsule Take 1 capsule by mouth Every Night. 30 capsule 6    eszopiclone (Lunesta) 3 MG tablet Take 1 tablet by mouth At Night As Needed for Sleep. Take immediately before bedtime 30 tablet 2    FLUoxetine (PROzac) 40 MG capsule Take 2 capsules by mouth Daily. 60 capsule 6    Fluticasone Furoate-Vilanterol (BREO ELLIPTA) 200-25 MCG/ACT inhaler Inhale 1 puff Daily. 1 inhalation once a day 60 each 5    hydrOXYzine pamoate (VISTARIL) 25 MG capsule Take 1 capsule by mouth 3 (Three) Times a Day As Needed for Anxiety. 90 capsule 6    Iron-Vitamin C (Vitron-C)  MG tablet Take 1 tablet by mouth Daily. 90 tablet 2    lamoTRIgine (LaMICtal) 100 MG tablet Take 1 tablet by mouth Daily. 30 tablet 2    multivitamin tablet tablet Take 1 tablet by mouth Daily. 90 tablet 2    omeprazole (priLOSEC) 40 MG capsule Take 1 capsule by mouth Daily for 60 days. 60 capsule 0    ondansetron ODT (ZOFRAN-ODT) 4 MG disintegrating tablet Place 1 tablet on the tongue Every 8 (Eight) Hours As Needed for Nausea or Vomiting. 10 tablet 0    thiamine (VITAMIN B-1) 100 MG tablet Take 1 tablet by mouth Daily. 30 tablet 2    vitamin D3 125 MCG (5000 UT) capsule capsule Take 1 capsule by mouth Daily. 90 capsule 2     Allergies   Allergen Reactions    Pecan Nut  "Anaphylaxis     Hives, swelling of lips and throat, difficulties breathing. Has epipen    Sulfa Antibiotics Rash       Social History     Socioeconomic History    Marital status:    Tobacco Use    Smoking status: Former     Current packs/day: 0.00     Average packs/day: 0.3 packs/day for 30.8 years (10.0 ttl pk-yrs)     Types: Cigarettes     Start date: 2003     Quit date: 2024     Years since quittin.4     Passive exposure: Yes    Smokeless tobacco: Never    Tobacco comments:     Stopped smoking   Vaping Use    Vaping status: Former    Start date: 9/15/2022   Substance and Sexual Activity    Alcohol use: Not Currently     Comment: quit 20    Drug use: Not Currently     Types: Amphetamines, Marijuana, Methamphetamines     Comment: Clean ~ 2020    Sexual activity: Yes     Partners: Male     Birth control/protection: Other, Tubal ligation     Social History     Social History Narrative    Lives in [facility].   w/ 3 children.  [facility] for [employer].        /82 (BP Location: Left arm, Patient Position: Sitting, Cuff Size: Adult)   Pulse 102   Temp 98.7 °F (37.1 °C) (Temporal)   Resp 18   Ht 168.9 cm (66.5\")   Wt 132 kg (290 lb 9.6 oz)   SpO2 96%   BMI 46.20 kg/m²     Physical Exam  Constitutional:       Appearance: She is well-developed. She is not ill-appearing.   Eyes:      General: No scleral icterus.  Cardiovascular:      Rate and Rhythm: Tachycardia present.   Pulmonary:      Effort: Pulmonary effort is normal.   Abdominal:      Palpations: Abdomen is soft.      Tenderness: There is no abdominal tenderness.      Comments: incisions healing well   Musculoskeletal:         General: Normal range of motion.   Skin:     General: Skin is warm and dry.      Findings: No rash.   Neurological:      Mental Status: She is alert.   Psychiatric:         Behavior: Behavior is cooperative.         Judgment: Judgment normal.           Assessment:  1 " month s/p LSG/HHR 12/11/24 GDW      ICD-10-CM ICD-9-CM   1. Obesity, Class III, BMI 40-49.9 (morbid obesity)  E66.01 278.01   2. Postgastrectomy malabsorption  K91.2 579.3    Z90.3    3. Dyspepsia  R10.13 536.8   4. Fatigue, unspecified type  R53.83 780.79   5. Vitamin D deficiency  E55.9 268.9   6. Abnormal blood level of iron  R79.0 790.6         Class 3 Severe Obesity (BMI >=40). Obesity-related health conditions include the following:  see above . Obesity is improving with treatment. BMI is is above average; BMI management plan is completed. We discussed  see plan .        Plan:  Doing well.  Continue to advance diet per manual.  Continue protein 100g/day.  Continue routine physical activity.  Routine bariatric labs ordered.  Continue vitamins w/ adjustments pending lab results.  Continue PPI.  Continue to avoid ASA/NSAIDS/tobacco x 6 weeks postop, steroids x 8 weeks postop.   Call w/ problems/concerns.    The patient was instructed to follow up in 2 months, sooner if needed.

## 2025-01-15 LAB
LAB AP CASE REPORT: NORMAL
PATH REPORT.FINAL DX SPEC: NORMAL

## 2025-01-19 LAB
25(OH)D3+25(OH)D2 SERPL-MCNC: 49.5 NG/ML (ref 30–100)
FERRITIN SERPL-MCNC: 77 NG/ML (ref 15–150)
FOLATE SERPL-MCNC: 4.2 NG/ML
IRON SERPL-MCNC: 132 UG/DL (ref 27–159)
METHYLMALONATE SERPL-SCNC: 157 NMOL/L (ref 0–378)
PREALB SERPL-MCNC: 22 MG/DL (ref 14–35)
VIT B1 BLD-SCNC: 185.2 NMOL/L (ref 66.5–200)

## 2025-01-29 ENCOUNTER — OFFICE VISIT (OUTPATIENT)
Dept: PULMONOLOGY | Facility: CLINIC | Age: 40
End: 2025-01-29
Payer: COMMERCIAL

## 2025-01-29 VITALS
WEIGHT: 288 LBS | BODY MASS INDEX: 46.28 KG/M2 | HEIGHT: 66 IN | SYSTOLIC BLOOD PRESSURE: 130 MMHG | HEART RATE: 82 BPM | TEMPERATURE: 97.5 F | DIASTOLIC BLOOD PRESSURE: 80 MMHG | OXYGEN SATURATION: 97 %

## 2025-01-29 DIAGNOSIS — R91.1 LUNG NODULE: ICD-10-CM

## 2025-01-29 DIAGNOSIS — J45.40 MODERATE PERSISTENT ASTHMA WITHOUT COMPLICATION: Primary | Chronic | ICD-10-CM

## 2025-01-29 DIAGNOSIS — Z87.891 PERSONAL HISTORY OF SMOKING: ICD-10-CM

## 2025-01-29 RX ORDER — METHION/INOS/CHOL BT/B COM/LIV 110MG-86MG
CAPSULE ORAL
COMMUNITY
Start: 2025-01-19

## 2025-01-29 NOTE — PROGRESS NOTES
"[facility] Pulmonary Follow up      Chief Complaint  Shortness of Breath (Follow up)    Subjective          Clare Turner presents to [facility] for routine follow-up on her asthma.    She does have moderate persistent asthma and has been on Dupixent in the past.  Currently she is doing very well.  She does continue on her Breo daily but does not take her Dupixent currently secondary to just having weight loss surgery.    She did undergo weight loss surgery and is lost over 40 pounds since December.  She also stopped smoking several months ago.    She feels much better.  She has been going to the gym.  She denies any significant shortness of breath, chest tightness or wheezing or cough.  No recent exacerbations.  No use of her rescue inhaler.        Objective     Vital Signs:   /80   Pulse 82   Temp 97.5 °F (36.4 °C)   Ht 168.9 cm (66.5\")   Wt 131 kg (288 lb)   SpO2 97% Comment: room air at rest  BMI 45.79 kg/m²       Immunization History   Administered Date(s) Administered    COVID-19 (PFIZER) BIVALENT 12+YRS 10/12/2022    COVID-19 (PFIZER) Purple Cap Monovalent 04/01/2021, 04/21/2021, 11/15/2021    Fluzone  >6mos 09/17/2024    Fluzone (or Fluarix & Flulaval for VFC) >6mos 11/15/2016, 03/11/2022, 10/12/2022    Pneumococcal Conjugate 20-Valent (PCV20) 11/15/2022    Pneumococcal Polysaccharide (PPSV23) 06/06/2016    Tdap 04/12/2024       Physical Exam     Result Review :                             Assessment and Plan    Problem List Items Addressed This Visit          Pulmonary and Pneumonias    Moderate persistent asthma without complication - Primary (Chronic)    14 mm left upper lobe lung nodule (3/2021)    Overview     Per patient for years, stable            Tobacco    Personal history of smoking     At this time Mrs. Turner is doing very well.  She had weight loss surgery and is lost over 40 pounds and is starting go to the gym and overall " feeling much better.  She continues on her Breo daily but has been off her Dupixent for couple of months after surgery.  This time she is having no asthma symptoms.    She can continue to hold her Dupixent or resume it as indicated with bariatric surgery.    I would like for her to follow-up in 6 months with full PFTs and a chest x-ray.      Follow Up     Return in about 6 months (around 7/29/2025).  Patient was given instructions and counseling regarding her condition or for health maintenance advice. Please see specific information pulled into the AVS if appropriate.         Moderate level of Medical Decision Making complexity based on 2 or more chronic stable illnesses and prescription drug management.    DALLAS Fox, ACNP  [facility]  Electronically signed

## 2025-03-10 ENCOUNTER — TELEMEDICINE (OUTPATIENT)
Dept: PSYCHIATRY | Facility: CLINIC | Age: 40
End: 2025-03-10
Payer: COMMERCIAL

## 2025-03-10 DIAGNOSIS — F90.2 ATTENTION DEFICIT HYPERACTIVITY DISORDER, COMBINED TYPE: ICD-10-CM

## 2025-03-10 DIAGNOSIS — F51.04 PSYCHOPHYSIOLOGICAL INSOMNIA: ICD-10-CM

## 2025-03-10 DIAGNOSIS — F31.75 BIPOLAR 1 DISORDER, DEPRESSED, PARTIAL REMISSION: Primary | ICD-10-CM

## 2025-03-10 DIAGNOSIS — F41.1 GENERALIZED ANXIETY DISORDER: ICD-10-CM

## 2025-03-10 RX ORDER — DEXTROAMPHETAMINE SACCHARATE, AMPHETAMINE ASPARTATE MONOHYDRATE, DEXTROAMPHETAMINE SULFATE AND AMPHETAMINE SULFATE 5; 5; 5; 5 MG/1; MG/1; MG/1; MG/1
40 CAPSULE, EXTENDED RELEASE ORAL EVERY MORNING
Qty: 90 CAPSULE | Refills: 0 | Status: SHIPPED | OUTPATIENT
Start: 2025-03-10

## 2025-03-10 NOTE — PROGRESS NOTES
Patient Name: Clare Turner  MRN: [id number]   :  1985     This provider is located at her home office through the [facility] (through [facility]), [address], [address] using a secure [employer] Video Visit through [employer]. Patient is being seen remotely via telehealth at their home address in [place], and stated they are in a secure environment for this session. The patient's condition being diagnosed/treated is appropriate for telemedicine. The provider identified herself as well as her credentials.   The patient, and/or patients guardian, consent to be seen remotely, and when consent is given they understand that the consent allows for patient identifiable information to be sent to a third party as needed.   They may refuse to be seen remotely at any time. The electronic data is encrypted and password protected, and the patient and/or guardian has been advised of the potential risks to privacy not withstanding such measures.    You have chosen to receive care through a telehealth visit.  Do you consent to use a video/audio connection for your medical care today? Yes    Chief Complaint:      ICD-10-CM ICD-9-CM   1. Bipolar 1 disorder, depressed, partial remission  F31.75 296.55   2. Generalized anxiety disorder  F41.1 300.02   3. Attention deficit hyperactivity disorder, combined type  F90.2 314.01   4. Psychophysiological insomnia  F51.04 307.42       History of Present Illness: Clare Turner is a 39 y.o. female is here today for medication management follow up.  Patient feels things are going well.  Patient states she has noticed some slight jack.  Has noticed herself picking at her face.  Feels that Vraylar is good and it has evened things out.    The following portions of the patient's history were reviewed and updated as appropriate: allergies, current medications, past family history, past medical history, past social history, past  surgical history, and problem list.    Review of Systems;;  Review of Systems   Constitutional:  Negative for activity change, appetite change, fatigue, unexpected weight gain and unexpected weight loss.   Respiratory:  Negative for shortness of breath and wheezing.    Gastrointestinal:  Negative for constipation, diarrhea, nausea and vomiting.   Musculoskeletal:  Negative for gait problem.   Skin:  Negative for dry skin and rash.   Neurological:  Negative for dizziness, speech difficulty, weakness, light-headedness, headache, memory problem and confusion.   Psychiatric/Behavioral:  Positive for dysphoric mood. Negative for agitation, behavioral problems, decreased concentration, hallucinations, self-injury, sleep disturbance, suicidal ideas, negative for hyperactivity, depressed mood and stress. The patient is not nervous/anxious.        Physical Exam;;  Physical Exam  Constitutional:       General: She is not in acute distress.     Appearance: She is well-developed. She is not diaphoretic.   HENT:      Head: Normocephalic and atraumatic.   Eyes:      Conjunctiva/sclera: Conjunctivae normal.   Pulmonary:      Effort: Pulmonary effort is normal. No respiratory distress.   Musculoskeletal:         General: Normal range of motion.      Cervical back: Full passive range of motion without pain and normal range of motion.   Neurological:      Mental Status: She is alert and oriented to person, place, and time.   Psychiatric:         Mood and Affect: Mood is not anxious or depressed. Affect is not labile, blunt, angry or inappropriate.         Speech: Speech is not rapid and pressured or tangential.         Behavior: Behavior normal. Behavior is not agitated, slowed, aggressive, withdrawn, hyperactive or combative. Behavior is cooperative.         Thought Content: Thought content normal. Thought content is not paranoid or delusional. Thought content does not include homicidal or suicidal ideation. Thought content does not  include homicidal or suicidal plan.         Judgment: Judgment normal.       not currently breastfeeding.  There is no height or weight on file to calculate BMI. Video appt unable to obtain.     Current Medications;;    Current Outpatient Medications:     amphetamine-dextroamphetamine XR (Adderall XR) 20 MG 24 hr capsule, Take 2 capsules by mouth Every Morning And one every afternoon, Disp: 90 capsule, Rfl: 0    albuterol (PROVENTIL) (2.5 MG/3ML) 0.083% nebulizer solution, Take 2.5 mg by nebulization 4 (Four) Times a Day As Needed for Wheezing., Disp: 120 each, Rfl: 5    albuterol sulfate  (90 Base) MCG/ACT inhaler, Inhale 2 puffs 2 (Two) Times a Day., Disp: 18 g, Rfl: 5    azithromycin (ZITHROMAX) 250 MG tablet, Take 2 tablets the first day, then 1 tablet daily for 4 days., Disp: 6 tablet, Rfl: 0    budesonide (Pulmicort) 0.5 MG/2ML nebulizer solution, Take 2 mL by nebulization 2 (Two) Times a Day. (Patient taking differently: Take 2 mL by nebulization 2 (Two) Times a Day As Needed.), Disp: 120 each, Rfl: 11    buPROPion XL (Wellbutrin XL) 150 MG 24 hr tablet, Take 1 tablet by mouth Every Morning., Disp: 30 tablet, Rfl: 2    Calcium Carbonate-Vit D-Min (Calcium 1200) 1372-7308 MG-UNIT chewable tablet, Chew 1,200 mg Daily., Disp: 90 each, Rfl: 2    Cariprazine HCl (Vraylar) 4.5 MG capsule capsule, Take 1 capsule by mouth Daily., Disp: 30 capsule, Rfl: 6    Cyanocobalamin (Vitamin B-12) 1000 MCG sublingual tablet, Place 1 tablet under the tongue Daily., Disp: 90 each, Rfl: 2    doxepin (SINEquan) 10 MG capsule, Take 1 capsule by mouth Every Night., Disp: 30 capsule, Rfl: 6    EPINEPHrine (EPIPEN) 0.3 MG/0.3ML solution auto-injector injection, Inject 1 pen in the muscle as directed PRN allergic reaction, Disp: 2 each, Rfl: 1    eszopiclone (Lunesta) 3 MG tablet, Take 1 tablet by mouth At Night As Needed for Sleep. Take immediately before bedtime, Disp: 30 tablet, Rfl: 2    FLUoxetine (PROzac) 40 MG capsule,  Take 2 capsules by mouth Daily., Disp: 60 capsule, Rfl: 6    Fluticasone Furoate-Vilanterol (BREO ELLIPTA) 200-25 MCG/ACT inhaler, Inhale 1 puff Daily. 1 inhalation once a day, Disp: 60 each, Rfl: 5    hydrOXYzine pamoate (VISTARIL) 25 MG capsule, Take 1 capsule by mouth 3 (Three) Times a Day As Needed for Anxiety., Disp: 90 capsule, Rfl: 6    Iron-Vitamin C (Vitron-C)  MG tablet, Take 1 tablet by mouth Daily., Disp: 90 tablet, Rfl: 2    lamoTRIgine (LaMICtal) 100 MG tablet, Take 1 tablet by mouth Daily., Disp: 30 tablet, Rfl: 2    methylPREDNISolone (MEDROL) 4 MG dose pack, Take as directed on package instructions., Disp: 21 tablet, Rfl: 0    multivitamin tablet tablet, Take 1 tablet by mouth Daily., Disp: 90 tablet, Rfl: 2    ondansetron ODT (ZOFRAN-ODT) 4 MG disintegrating tablet, Place 1 tablet on the tongue Every 8 (Eight) Hours As Needed for Nausea or Vomiting., Disp: 10 tablet, Rfl: 0    promethazine-dextromethorphan (PROMETHAZINE-DM) 6.25-15 MG/5ML syrup, Take 5 mL by mouth 4 (Four) Times a Day As Needed for Cough., Disp: 118 mL, Rfl: 0    thiamine (VITAMIN B-1) 100 MG tablet tablet, , Disp: , Rfl:     thiamine (VITAMIN B-1) 100 MG tablet, Take 1 tablet by mouth Daily., Disp: 30 tablet, Rfl: 2    vitamin D3 125 MCG (5000 UT) capsule capsule, Take 1 capsule by mouth Daily., Disp: 90 capsule, Rfl: 2    Lab Results:   Office Visit on 01/14/2025   Component Date Value Ref Range Status    Ferritin 01/14/2025 77  15 - 150 ng/mL Final    Folate 01/14/2025 4.2  >3.0 ng/mL Final    Comment: A serum folate concentration of less than 3.1 ng/mL is  considered to represent clinical deficiency.      Iron 01/14/2025 132  27 - 159 ug/dL Final    Methylmalonic Acid 01/14/2025 157  0 - 378 nmol/L Final    Prealbumin 01/14/2025 22  14 - 35 mg/dL Final    Vitamin B1, Whole Blood 01/14/2025 185.2  66.5 - 200.0 nmol/L Final    25 Hydroxy, Vitamin D 01/14/2025 49.5  30.0 - 100.0 ng/mL Final    Comment: Vitamin D deficiency  has been defined by the [place] of  Medicine and an Endocrine Society practice guideline as a  level of serum 25-OH vitamin D less than 20 ng/mL (1,2).  The Endocrine Society went on to further define vitamin D  insufficiency as a level between 21 and 29 ng/mL (2).  1. IOM ([place] of [employer]). 2010. Dietary reference     intakes for calcium and D. [place]: The     National Academies Press.  2. Mine MF, Dalia RANGEL, Tamera RUIZ, et al.     Evaluation, treatment, and prevention of vitamin D     deficiency: an Endocrine Society clinical practice     guideline. JCEM. 2011 Jul; 96(7):1911-30.     Lab on 01/14/2025   Component Date Value Ref Range Status    Pathology Review 01/14/2025 Yes   Final    Glucose 01/14/2025 93  65 - 99 mg/dL Final    BUN 01/14/2025 14  6 - 20 mg/dL Final    Creatinine 01/14/2025 0.74  0.57 - 1.00 mg/dL Final    Sodium 01/14/2025 140  136 - 145 mmol/L Final    Potassium 01/14/2025 4.3  3.5 - 5.2 mmol/L Final    Chloride 01/14/2025 105  98 - 107 mmol/L Final    CO2 01/14/2025 23.7  22.0 - 29.0 mmol/L Final    Calcium 01/14/2025 9.8  8.6 - 10.5 mg/dL Final    Total Protein 01/14/2025 7.5  6.0 - 8.5 g/dL Final    Albumin 01/14/2025 4.0  3.5 - 5.2 g/dL Final    ALT (SGPT) 01/14/2025 25  1 - 33 U/L Final    AST (SGOT) 01/14/2025 20  1 - 32 U/L Final    Alkaline Phosphatase 01/14/2025 112  39 - 117 U/L Final    Total Bilirubin 01/14/2025 0.3  0.0 - 1.2 mg/dL Final    Globulin 01/14/2025 3.5  gm/dL Final    A/G Ratio 01/14/2025 1.1  g/dL Final    BUN/Creatinine Ratio 01/14/2025 18.9  7.0 - 25.0 Final    Anion Gap 01/14/2025 11.3  5.0 - 15.0 mmol/L Final    eGFR 01/14/2025 106.4  >60.0 mL/min/1.73 Final    Total Cholesterol 01/14/2025 161  0 - 200 mg/dL Final    Triglycerides 01/14/2025 98  0 - 150 mg/dL Final    HDL Cholesterol 01/14/2025 30 (L)  40 - 60 mg/dL Final    LDL Cholesterol  01/14/2025 113 (H)  0 - 100 mg/dL Final    VLDL Cholesterol 01/14/2025 18  5 - 40 mg/dL Final     LDL/HDL Ratio 01/14/2025 3.71   Final    Hemoglobin A1C 01/14/2025 5.60  4.80 - 5.60 % Final    WBC 01/14/2025 9.08  3.40 - 10.80 10*3/mm3 Final    RBC 01/14/2025 4.95  3.77 - 5.28 10*6/mm3 Final    Hemoglobin 01/14/2025 14.7  12.0 - 15.9 g/dL Final    Hematocrit 01/14/2025 45.0  34.0 - 46.6 % Final    MCV 01/14/2025 90.9  79.0 - 97.0 fL Final    MCH 01/14/2025 29.7  26.6 - 33.0 pg Final    MCHC 01/14/2025 32.7  31.5 - 35.7 g/dL Final    RDW 01/14/2025 12.9  12.3 - 15.4 % Final    RDW-SD 01/14/2025 42.8  37.0 - 54.0 fl Final    MPV 01/14/2025 11.5  6.0 - 12.0 fL Final    Platelets 01/14/2025 414  140 - 450 10*3/mm3 Final    Neutrophil % 01/14/2025 57.0  42.7 - 76.0 % Final    Lymphocyte % 01/14/2025 31.2  19.6 - 45.3 % Final    Monocyte % 01/14/2025 8.9  5.0 - 12.0 % Final    Eosinophil % 01/14/2025 2.5  0.3 - 6.2 % Final    Basophil % 01/14/2025 0.3  0.0 - 1.5 % Final    Immature Grans % 01/14/2025 0.1  0.0 - 0.5 % Final    Neutrophils, Absolute 01/14/2025 5.17  1.70 - 7.00 10*3/mm3 Final    Lymphocytes, Absolute 01/14/2025 2.83  0.70 - 3.10 10*3/mm3 Final    Monocytes, Absolute 01/14/2025 0.81  0.10 - 0.90 10*3/mm3 Final    Eosinophils, Absolute 01/14/2025 0.23  0.00 - 0.40 10*3/mm3 Final    Basophils, Absolute 01/14/2025 0.03  0.00 - 0.20 10*3/mm3 Final    Immature Grans, Absolute 01/14/2025 0.01  0.00 - 0.05 10*3/mm3 Final    nRBC 01/14/2025 0.0  0.0 - 0.2 /100 WBC Final    Final Diagnosis 01/14/2025    Final                    Value:1. Blood, Peripheral Smear:  A. White blood cell count within reference range with normal differential counts and occasional atypical lymphocytes.  Atypical lymphocytes may be seen in a variety of viral-induced disorders, but may also be seen in other conditions including TB, brucellosis, toxoplasmosis, lead intoxication or stress reactions.   B. Red blood cell count and morphology adequate.   C. Platelets adequate.          Case Report 01/14/2025    Final                     Value:Surgical Pathology Report                         Case: [id number]                                  Authorizing Provider:  Michael Miner MD      Collected:           2025 07:40 AM          Ordering Location:     [facility]   Received:            2025 07:18 PM                                 [facility]                                                                                [facility]                                                              Pathologist:           Gilles Lyle MD                                                          Specimen:    Blood, Arterial, SMEAR                                                                    Admission on 2024, Discharged on 2024   Component Date Value Ref Range Status    HCG, Urine, QL 2024 Negative  Negative Final    Lot Number 2024 #[id number]   Final    Internal Positive Control 2024 Passed  Positive, Passed Final    Internal Negative Control 2024 Passed  Negative, Passed Final    Expiration Date 2024   Final    Reference Lab Report 2024    Final                    Value:[facility]  [address] [place] [address]  Phone: [phone] or [phone]  Fax: [phone]  Dave Fontanez M.D., Medical Director    PATIENT NAME                           LABORATORY NO.  ORACIO CARR.                  [id number]  [id number]                         AGE              SEX  SSN           CLIENT REF #  [facility]            39      1985  F    [id number]   [id number]    [address] [place] [address] BY-PASS                REQUESTING M.D.     ATTENDING MFarzadD.     COPY TO.  [address]                        [place] [address]                 DATE COLLECTED      DATE RECEIVED      DATE REPORTED  2024    DIAGNOSIS:  STOMACH, PARTIAL  "GASTRECTOMY:  Stomach with reactive oxyntic mucosa  No Helicobacter pylori-like organisms seen  Negative for dysplasia or malignancy    GJK    CLINICAL HISTORY:  Body mass index (BMI) of 50-59.9 in adult,                           hiatal hernia with gastroesophageal  reflux    SPECIMENS RECEIVED:  STOMACH, PARTIAL GASTRECTOMY    MICROSCOPIC DESCRIPTION:  Tissue blocks are prepared and slides are examined microscopically on all  specimens. See diagnosis for details.    Professional interpretation rendered by Derrick Ma M.D., NISHAC.AFarzadP. at [facility], [address] [place] [address].    GROSS DESCRIPTION:  Received in formalin labeled \"subtotal gastrectomy\" is a 24 x 4.2 x 3.2 cm  intact, unopened portion of stomach with tan-purple, mildly congested serosa  and minimal attached perigastric fat.  The lumen contains an abundant amount of  viscous blood and the mucosa is red and mildly congested with normal rugal  folds.  No polyps, ulcers or areas of mucosal thickening are identified.  Representative sections are submitted in blocks A1-A3.  HDM    REVIEWED, DIAGNOSED AND ELECTRONICALLY  SIGNED BY:    Derrick Ma M.D., F.C.A.P.  CPT CODES:  83524      Glucose 12/12/2024 104 (H)  65 - 99 mg/dL Final    BUN 12/12/2024 8  6 - 20 mg/dL Final    Creatinine 12/12/2024 0.78  0.57 - 1.00 mg/dL Final    Sodium 12/12/2024 135 (L)  136 - 145 mmol/L Final    Potassium 12/12/2024 3.8  3.5 - 5.2 mmol/L Final    Chloride 12/12/2024 102  98 - 107 mmol/L Final    CO2 12/12/2024 21.5 (L)  22.0 - 29.0 mmol/L Final    Calcium 12/12/2024 8.1 (L)  8.6 - 10.5 mg/dL Final    Total Protein 12/12/2024 6.9  6.0 - 8.5 g/dL Final    Albumin 12/12/2024 3.7  3.5 - 5.2 g/dL Final    ALT (SGPT) 12/12/2024 42 (H)  1 - 33 U/L Final    AST (SGOT) 12/12/2024 38 (H)  1 - 32 U/L Final    Alkaline Phosphatase 12/12/2024 103  39 - 117 U/L Final    Total Bilirubin 12/12/2024 0.3  0.0 - 1.2 mg/dL Final    Globulin 12/12/2024 3.2  gm/dL Final    A/G " Ratio 12/12/2024 1.2  g/dL Final    BUN/Creatinine Ratio 12/12/2024 10.3  7.0 - 25.0 Final    Anion Gap 12/12/2024 11.5  5.0 - 15.0 mmol/L Final    eGFR 12/12/2024 99.8  >60.0 mL/min/1.73 Final    Iron 12/12/2024 61  37 - 145 mcg/dL Final    WBC 12/12/2024 18.17 (H)  3.40 - 10.80 10*3/mm3 Final    RBC 12/12/2024 4.40  3.77 - 5.28 10*6/mm3 Final    Hemoglobin 12/12/2024 13.1  12.0 - 15.9 g/dL Final    Hematocrit 12/12/2024 39.7  34.0 - 46.6 % Final    MCV 12/12/2024 90.2  79.0 - 97.0 fL Final    MCH 12/12/2024 29.8  26.6 - 33.0 pg Final    MCHC 12/12/2024 33.0  31.5 - 35.7 g/dL Final    RDW 12/12/2024 13.9  12.3 - 15.4 % Final    RDW-SD 12/12/2024 46.2  37.0 - 54.0 fl Final    MPV 12/12/2024 10.1  6.0 - 12.0 fL Final    Platelets 12/12/2024 424  140 - 450 10*3/mm3 Final    Neutrophil % 12/12/2024 77.3 (H)  42.7 - 76.0 % Final    Lymphocyte % 12/12/2024 13.6 (L)  19.6 - 45.3 % Final    Monocyte % 12/12/2024 8.3  5.0 - 12.0 % Final    Eosinophil % 12/12/2024 0.2 (L)  0.3 - 6.2 % Final    Basophil % 12/12/2024 0.2  0.0 - 1.5 % Final    Immature Grans % 12/12/2024 0.4  0.0 - 0.5 % Final    Neutrophils, Absolute 12/12/2024 14.05 (H)  1.70 - 7.00 10*3/mm3 Final    Lymphocytes, Absolute 12/12/2024 2.48  0.70 - 3.10 10*3/mm3 Final    Monocytes, Absolute 12/12/2024 1.51 (H)  0.10 - 0.90 10*3/mm3 Final    Eosinophils, Absolute 12/12/2024 0.03  0.00 - 0.40 10*3/mm3 Final    Basophils, Absolute 12/12/2024 0.03  0.00 - 0.20 10*3/mm3 Final    Immature Grans, Absolute 12/12/2024 0.07 (H)  0.00 - 0.05 10*3/mm3 Final    nRBC 12/12/2024 0.0  0.0 - 0.2 /100 WBC Final       Mental Status Exam:   Hygiene:   good  Cooperation:  Cooperative  Eye Contact:  Good  Psychomotor Behavior:  Appropriate  Mood:dysthymic  Affect:  Appropriate  Hopelessness: Denies  Speech:  Normal  Thought Process:  Goal directed  Thought Content:  Normal  Suicidal:  None  Homicidal:  None  Hallucinations:  None  Delusion:  None  Memory:  Intact  Orientation:   Person, Place, Time, and Situation  Reliability:  good  Insight:  Good  Judgement:  Good  Impulse Control:  Good    PHQ-9 Depression Screening  Little interest or pleasure in doing things? (Patient-Rptd) Not at all   Feeling down, depressed, or hopeless? (Patient-Rptd) Several days   PHQ-2 Total Score (Patient-Rptd) 1   Trouble falling or staying asleep, or sleeping too much? (Patient-Rptd) Not at all   Feeling tired or having little energy? (Patient-Rptd) Several days   Poor appetite or overeating? (Patient-Rptd) Not at all   Feeling bad about yourself - or that you are a failure or have let yourself or your family down? (Patient-Rptd) Not at all   Trouble concentrating on things, such as reading the newspaper or watching television? (Patient-Rptd) Several days   Moving or speaking so slowly that other people could have noticed? Or the opposite - being so fidgety or restless that you have been moving around a lot more than usual? (Patient-Rptd) Several days   Thoughts that you would be better off dead, or of hurting yourself in some way? (Patient-Rptd) Not at all   PHQ-9 Total Score (Patient-Rptd) 4   If you checked off any problems, how difficult have these problems made it for you to do your work, take care of things at home, or get along with other people? (Patient-Rptd) Not difficult at all         Assessment/Plan:  Diagnoses and all orders for this visit:    1. Bipolar 1 disorder, depressed, partial remission (Primary)    2. Generalized anxiety disorder    3. Attention deficit hyperactivity disorder, combined type  -     amphetamine-dextroamphetamine XR (Adderall XR) 20 MG 24 hr capsule; Take 2 capsules by mouth Every Morning And one every afternoon  Dispense: 90 capsule; Refill: 0    4. Psychophysiological insomnia      Patient feels medication is working well.  We will continue medication as ordered and follow-up in 1 month.    A psychological evaluation was conducted in order to assess past and current  level of functioning. Areas assessed included, but were not limited to: perception of social support, perception of ability to face and deal with challenges in life (positive functioning), anxiety symptoms, depressive symptoms, perspective on beliefs/belief system, coping skills for stress, intelligence level,  Therapeutic rapport was established. Interventions conducted today were geared towards incorporating medication management along with support for continued therapy. Education was also provided as to the med management with this provider and what to expect in subsequent sessions.    We discussed risks, benefits,goals and side effects of the above medication and the patient was agreeable with the plan.Patient was educated on the importance of compliance with treatment and follow-up appointments. Patient is aware to contact the [facility] [phone] with any worsening of symptoms. To call for questions or concerns and return early if necessary. Patent is agreeable to go to the Emergency Department or call 911 should they begin SI/HI.     Part of this note may be an electronic transcription/translation of spoken language to printed text using the Dragon Dictation System.    Return in about 4 weeks (around 4/7/2025) for Follow Up 30 min, Video visit.    DALLAS Hoover

## 2025-03-13 ENCOUNTER — PATIENT ROUNDING (BHMG ONLY) (OUTPATIENT)
Dept: URGENT CARE | Facility: CLINIC | Age: 40
End: 2025-03-13
Payer: COMMERCIAL

## 2025-03-27 ENCOUNTER — HOSPITAL ENCOUNTER (EMERGENCY)
Facility: HOSPITAL | Age: 40
Discharge: HOME | End: 2025-03-27
Attending: EMERGENCY MEDICINE
Payer: OTHER MISCELLANEOUS

## 2025-03-27 VITALS
OXYGEN SATURATION: 100 % | WEIGHT: 275.57 LBS | TEMPERATURE: 98.1 F | HEIGHT: 67 IN | RESPIRATION RATE: 18 BRPM | SYSTOLIC BLOOD PRESSURE: 118 MMHG | BODY MASS INDEX: 43.25 KG/M2 | HEART RATE: 93 BPM | DIASTOLIC BLOOD PRESSURE: 69 MMHG

## 2025-03-27 DIAGNOSIS — Z77.21 EXPOSURE TO BLOOD OR BODY FLUID: Primary | ICD-10-CM

## 2025-03-27 LAB
ALBUMIN SERPL-MCNC: 4.3 G/DL (ref 3.5–5.2)
ALBUMIN/GLOB SERPL: 1.5 G/DL
ALP SERPL-CCNC: 102 U/L (ref 39–117)
ALT SERPL W P-5'-P-CCNC: 30 U/L (ref 1–33)
ANION GAP SERPL CALCULATED.3IONS-SCNC: 13 MMOL/L (ref 5–15)
AST SERPL-CCNC: 18 U/L (ref 1–32)
BILIRUB SERPL-MCNC: 0.3 MG/DL (ref 0–1.2)
BUN SERPL-MCNC: 12 MG/DL (ref 6–20)
BUN/CREAT SERPL: 14.1 (ref 7–25)
CALCIUM SPEC-SCNC: 9.6 MG/DL (ref 8.6–10.5)
CHLORIDE SERPL-SCNC: 100 MMOL/L (ref 98–107)
CO2 SERPL-SCNC: 23 MMOL/L (ref 22–29)
CREAT SERPL-MCNC: 0.85 MG/DL (ref 0.57–1)
DEPRECATED RDW RBC AUTO: 44.7 FL (ref 37–54)
EGFRCR SERPLBLD CKD-EPI 2021: 90.1 ML/MIN/1.73
ERYTHROCYTE [DISTWIDTH] IN BLOOD BY AUTOMATED COUNT: 13.9 % (ref 12.3–15.4)
GLOBULIN UR ELPH-MCNC: 2.8 GM/DL
GLUCOSE SERPL-MCNC: 81 MG/DL (ref 65–99)
HBV SURFACE AB SER RIA-ACNC: REACTIVE
HBV SURFACE AG SERPL QL IA: NORMAL
HCT VFR BLD AUTO: 40.6 % (ref 34–46.6)
HCV AB SER QL: NORMAL
HGB BLD-MCNC: 13.8 G/DL (ref 12–15.9)
HIV 1+2 AB+HIV1P24 AG SERPLBLD IA.RAPID: NORMAL
HIV 1+2 AB+HIV1P24 AG SERPLBLD IA.RAPID: NORMAL
MCH RBC QN AUTO: 29.8 PG (ref 26.6–33)
MCHC RBC AUTO-ENTMCNC: 34 G/DL (ref 31.5–35.7)
MCV RBC AUTO: 87.7 FL (ref 79–97)
PLATELET # BLD AUTO: 424 10*3/MM3 (ref 140–450)
PMV BLD AUTO: 10 FL (ref 6–12)
POTASSIUM SERPL-SCNC: 3.9 MMOL/L (ref 3.5–5.2)
PROT SERPL-MCNC: 7.1 G/DL (ref 6–8.5)
RBC # BLD AUTO: 4.63 10*6/MM3 (ref 3.77–5.28)
SODIUM SERPL-SCNC: 136 MMOL/L (ref 136–145)
TREPONEMA PALLIDUM IGG+IGM AB [PRESENCE] IN SERUM OR PLASMA BY IMMUNOASSAY: NORMAL
WBC NRBC COR # BLD AUTO: 13.45 10*3/MM3 (ref 3.4–10.8)

## 2025-03-27 PROCEDURE — 96372 THER/PROPH/DIAG INJ SC/IM: CPT

## 2025-03-27 PROCEDURE — 25010000003 HEPATITIS B IMMUNE GLOBULIN 220 UNIT/ML SOLUTION

## 2025-03-27 PROCEDURE — 36415 COLL VENOUS BLD VENIPUNCTURE: CPT

## 2025-03-27 PROCEDURE — 86780 TREPONEMA PALLIDUM: CPT

## 2025-03-27 PROCEDURE — 85027 COMPLETE CBC AUTOMATED: CPT

## 2025-03-27 PROCEDURE — 86803 HEPATITIS C AB TEST: CPT

## 2025-03-27 PROCEDURE — G0475 HIV COMBINATION ASSAY: HCPCS

## 2025-03-27 PROCEDURE — 86706 HEP B SURFACE ANTIBODY: CPT

## 2025-03-27 PROCEDURE — 86704 HEP B CORE ANTIBODY TOTAL: CPT

## 2025-03-27 PROCEDURE — 87340 HEPATITIS B SURFACE AG IA: CPT

## 2025-03-27 PROCEDURE — 90371 HEP B IG IM: CPT

## 2025-03-27 PROCEDURE — 99283 EMERGENCY DEPT VISIT LOW MDM: CPT | Performed by: EMERGENCY MEDICINE

## 2025-03-27 PROCEDURE — 63710000001 ONDANSETRON ODT 4 MG TABLET DISPERSIBLE

## 2025-03-27 PROCEDURE — 80053 COMPREHEN METABOLIC PANEL: CPT

## 2025-03-27 RX ORDER — ONDANSETRON 4 MG/1
4 TABLET, ORALLY DISINTEGRATING ORAL ONCE
Status: COMPLETED | OUTPATIENT
Start: 2025-03-27 | End: 2025-03-27

## 2025-03-27 RX ORDER — EMTRICITABINE AND TENOFOVIR DISOPROXIL FUMARATE 200; 300 MG/1; MG/1
1 TABLET, FILM COATED ORAL ONCE
Status: COMPLETED | OUTPATIENT
Start: 2025-03-27 | End: 2025-03-27

## 2025-03-27 RX ORDER — EMTRICITABINE AND TENOFOVIR DISOPROXIL FUMARATE 200; 300 MG/1; MG/1
1 TABLET, FILM COATED ORAL DAILY
Qty: 28 TABLET | Refills: 0 | Status: SHIPPED | OUTPATIENT
Start: 2025-03-27 | End: 2025-04-24

## 2025-03-27 RX ORDER — ONDANSETRON 4 MG/1
4 TABLET, ORALLY DISINTEGRATING ORAL EVERY 8 HOURS PRN
Qty: 21 TABLET | Refills: 0 | Status: SHIPPED | OUTPATIENT
Start: 2025-03-27 | End: 2025-04-01 | Stop reason: SDUPTHER

## 2025-03-27 RX ADMIN — HEPATITIS B IMMUNE GLOBULIN (HUMAN) 1 ML: 220 INJECTION INTRAMUSCULAR at 16:53

## 2025-03-27 RX ADMIN — EMTRICITABINE AND TENOFOVIR DISOPROXIL FUMARATE 1 TABLET: 200; 300 TABLET, FILM COATED ORAL at 16:56

## 2025-03-27 RX ADMIN — DOLUTEGRAVIR SODIUM 50 MG: 50 TABLET, FILM COATED ORAL at 16:56

## 2025-03-27 RX ADMIN — HEPATITIS B IMMUNE GLOBULIN (HUMAN) 5 ML: 220 INJECTION INTRAMUSCULAR at 16:55

## 2025-03-27 RX ADMIN — HEPATITIS B IMMUNE GLOBULIN (HUMAN) 1 ML: 220 INJECTION INTRAMUSCULAR at 16:52

## 2025-03-27 RX ADMIN — HEPATITIS B IMMUNE GLOBULIN (HUMAN) 0.5 ML: 220 INJECTION INTRAMUSCULAR at 16:54

## 2025-03-27 RX ADMIN — ONDANSETRON 4 MG: 4 TABLET, ORALLY DISINTEGRATING ORAL at 16:56

## 2025-03-27 NOTE — ED PROVIDER NOTES
EMERGENCY DEPARTMENT ENCOUNTER    Pt Name: Clare Turner  MRN: [id number]  Pt :   1985  Room Number:  01SF/01  Date of encounter:  3/27/2025  PCP: Michael Miner MD  ED Provider: Jairo Garcia PA-C    Historian: Patient, nursing      HPI:  Chief Complaint: Needlestick exposure        Context: Clare Turner is a 39 y.o. female who presents to the ED c/o a needlestick exposure when a needle accidentally poked her underneath of her fingernail on the finger of her left hand does not happen immediately prior to arrival.  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      PAST MEDICAL HISTORY  Past Medical History:   Diagnosis Date    ADD (attention deficit disorder)     Adenomatous colon polyp 2024    Colonoscopy 2024.  Repeat 5 years      Anxiety     Arthritis     Asthma     on Dupixent, follows w/ Pulmonary    Bariatric surgery status 2024 with 85% subtotal gastrectomy with hiatal hernia repair      Bipolar 1 disorder 2009    Depression     Elevated hemoglobin A1c     Elevated transaminase measurement     Episcleritis     Fatigue     Former smoker     Full dentures     upper only    GERD (gastroesophageal reflux disease)     H/O alcohol abuse     Clean date 2020    H/O drug abuse (CMS/[facility]) - methamphetamine     Clean date 2020    Heartburn     chronic/episodic, prn Omeprazole, EGD Dr. Spencer  small hiatal hernia with Schatzki's ring    Hidradenitis suppurativa     History of emergence delirium     cries after anesthesia    History of nicotine vaping     Hyperlipidemia 2019    Leukocytosis     Lung nodule     stable - followed by pulmonary    Morbid obesity     Prediabetes 2022    A1C 5.7    Restless leg syndrome 2022    Seasonal allergic rhinitis due to pollen 10/12/2022    Thrombocytosis     Trichimoniasis 2017    Trichimoniasis 2020    Trichimoniasis  2020    Urinary tract infection          PAST SURGICAL HISTORY  Past Surgical History:   Procedure Laterality Date    APPENDECTOMY      BARIATRIC SURGERY  2024    CERVICAL CONIZATION  2007     SECTION WITH TUBAL  2012    uncomplicated - twin delivery    COLONOSCOPY      ENDOSCOPY N/A 2024    Procedure: ESOPHAGOGASTRODUODENOSCOPY;  Surgeon: Derrick Spencer MD;  Location: [facility] OR;  Service: Bariatric;  Laterality: N/A;    FRACTURE SURGERY Right 2016    Closed talus bone fracture with bone graph    GASTRIC SLEEVE LAPAROSCOPIC N/A 2024    Procedure: GASTRIC SLEEVE LAPAROSCOPIC;  Surgeon: Derrick Spencer MD;  Location: [facility] OR;  Service: Bariatric;  Laterality: N/A;    HIATAL HERNIA REPAIR N/A 2024    Procedure: HIATAL HERNIA REPAIR LAPAROSCOPIC;  Surgeon: Derrick Spencer MD;  Location: [facility] OR;  Service: Bariatric;  Laterality: N/A;  start 0818 pause 0833 restart 0848 stop 0902    LAPAROSCOPIC APPENDECTOMY      LAPAROSCOPIC CHOLECYSTECTOMY      dysfunction, no stones    NASAL FRACTURE SURGERY  2017    SEPTOPLASTY  2011    SINUS SURGERY      debridement    TIBIAL TALAR FUSION Right     w/ bone grafting- 3 total surgeries    TONSILLECTOMY AND ADENOIDECTOMY           FAMILY HISTORY  Family History   Problem Relation Age of Onset    Diabetes Mother 50    Hyperlipidemia Mother     Mental illness Mother     Arthritis Mother     Osteoporosis Mother     Asthma Mother     Depression Mother     Endometriosis Mother     Cancer Mother     Hearing loss Mother     Hypertension Father     Obesity Father     Alcohol abuse Father     Early death Father     Depression Sister     Thyroid disease Maternal Grandmother     Hyperlipidemia Maternal Grandmother     Stroke Maternal Grandmother 50    Hypertension Maternal Grandmother     Obesity Maternal Grandmother     Developmental Disability Maternal Grandmother     Mental illness Maternal Grandmother      Liver disease Maternal Grandmother     Arthritis Maternal Grandmother     Migraines Maternal Grandmother     Diabetes Maternal Grandmother     Anxiety disorder Maternal Grandmother     Depression Maternal Grandmother     Hearing loss Maternal Grandmother     Miscarriages / Stillbirths Maternal Grandmother     Hypertension Maternal Grandfather     Heart attack Maternal Grandfather     Developmental Disability Maternal Grandfather     Arthritis Maternal Grandfather     Asthma Maternal Grandfather     Diabetes Maternal Grandfather     Heart disease Maternal Grandfather     Hearing loss Maternal Grandfather     Heart disease Paternal Grandmother     Hypertension Paternal Grandmother     Obesity Paternal Grandmother     Developmental Disability Paternal Grandmother     Arthritis Paternal Grandmother     Heart attack Paternal Grandmother     Heart failure Paternal Grandmother     Diabetes Paternal Grandmother     Stroke Paternal Grandmother     Heart disease Paternal Grandfather     Heart attack Paternal Grandfather     Hypertension Paternal Grandfather     Obesity Paternal Grandfather     Developmental Disability Paternal Grandfather     Arthritis Paternal Grandfather     Diabetes Paternal Grandfather     Cancer Maternal Uncle         Leukemia    Hearing loss Maternal Aunt          SOCIAL HISTORY  Social History     Socioeconomic History    Marital status:    Tobacco Use    Smoking status: Former     Current packs/day: 0.00     Average packs/day: 0.3 packs/day for 30.8 years (10.0 ttl pk-yrs)     Types: Cigarettes     Start date: 2003     Quit date: 2024     Years since quittin.6     Passive exposure: Yes    Smokeless tobacco: Never    Tobacco comments:     Stopped smoking   Vaping Use    Vaping status: Former    Start date: 9/15/2022   Substance and Sexual Activity    Alcohol use: Not Currently     Comment: quit 20    Drug use: Not Currently     Types: Amphetamines, Marijuana, Methamphetamines      Comment: Clean ~ 1/28/2020    Sexual activity: Yes     Partners: Male     Birth control/protection: Other, Tubal ligation         ALLERGIES  Pecan nut and Sulfa antibiotics        REVIEW OF SYSTEMS  Review of Systems   Constitutional:  Negative for chills and fever.   HENT:  Negative for congestion and sore throat.    Respiratory:  Negative for cough and shortness of breath.    Cardiovascular:  Negative for chest pain.   Gastrointestinal:  Negative for abdominal pain, nausea and vomiting.   Genitourinary:  Negative for dysuria.   Musculoskeletal:  Negative for back pain.   Skin:  Negative for wound.   Neurological:  Negative for dizziness and headaches.   Psychiatric/Behavioral:  Negative for confusion.    All other systems reviewed and are negative.         All systems reviewed and negative except for those discussed in HPI.       PHYSICAL EXAM    I have reviewed the triage vital signs and nursing notes.    ED Triage Vitals [03/27/25 1507]   Temp Heart Rate Resp BP SpO2   98.1 °F (36.7 °C) 93 18 (!) 156/105 100 %      Temp src Heart Rate Source Patient Position BP Location FiO2 (%)   Oral Monitor Sitting Left arm --       Physical Exam  Vitals and nursing note reviewed.   Constitutional:       General: She is not in acute distress.     Appearance: She is not ill-appearing, toxic-appearing or diaphoretic.   HENT:      Head: Normocephalic and atraumatic.      Mouth/Throat:      Mouth: Mucous membranes are moist.      Pharynx: Oropharynx is clear.   Eyes:      Extraocular Movements: Extraocular movements intact.   Cardiovascular:      Rate and Rhythm: Normal rate.      Heart sounds: Normal heart sounds.   Pulmonary:      Effort: Pulmonary effort is normal. No respiratory distress.      Breath sounds: Normal breath sounds.   Abdominal:      Tenderness: There is no abdominal tenderness.   Skin:     General: Skin is warm and dry.      Findings: No rash.   Neurological:      Mental Status: She is alert.              LAB RESULTS  Recent Results (from the past 24 hours)   Hepatitis C Antibody    Collection Time: 03/27/25  3:46 PM    Specimen: Blood   Result Value Ref Range    Hepatitis C Ab Non-Reactive Non-Reactive   HIV-1 / O / 2 Ag / Antibody    Collection Time: 03/27/25  3:46 PM    Specimen: Blood   Result Value Ref Range    HIV-1/ HIV-2 Ab Non-Reactive Non-Reactive    HIV-1 P24 Ag Screen Non-Reactive Non-Reactive   Comprehensive Metabolic Panel    Collection Time: 03/27/25  3:46 PM    Specimen: Blood   Result Value Ref Range    Glucose 81 65 - 99 mg/dL    BUN 12 6 - 20 mg/dL    Creatinine 0.85 0.57 - 1.00 mg/dL    Sodium 136 136 - 145 mmol/L    Potassium 3.9 3.5 - 5.2 mmol/L    Chloride 100 98 - 107 mmol/L    CO2 23.0 22.0 - 29.0 mmol/L    Calcium 9.6 8.6 - 10.5 mg/dL    Total Protein 7.1 6.0 - 8.5 g/dL    Albumin 4.3 3.5 - 5.2 g/dL    ALT (SGPT) 30 1 - 33 U/L    AST (SGOT) 18 1 - 32 U/L    Alkaline Phosphatase 102 39 - 117 U/L    Total Bilirubin 0.3 0.0 - 1.2 mg/dL    Globulin 2.8 gm/dL    A/G Ratio 1.5 g/dL    BUN/Creatinine Ratio 14.1 7.0 - 25.0    Anion Gap 13.0 5.0 - 15.0 mmol/L    eGFR 90.1 >60.0 mL/min/1.73   CBC (No Diff)    Collection Time: 03/27/25  3:46 PM    Specimen: Blood   Result Value Ref Range    WBC 13.45 (H) 3.40 - 10.80 10*3/mm3    RBC 4.63 3.77 - 5.28 10*6/mm3    Hemoglobin 13.8 12.0 - 15.9 g/dL    Hematocrit 40.6 34.0 - 46.6 %    MCV 87.7 79.0 - 97.0 fL    MCH 29.8 26.6 - 33.0 pg    MCHC 34.0 31.5 - 35.7 g/dL    RDW 13.9 12.3 - 15.4 %    RDW-SD 44.7 37.0 - 54.0 fl    MPV 10.0 6.0 - 12.0 fL    Platelets 424 140 - 450 10*3/mm3       If labs were ordered, I independently reviewed the results and considered them in treating the patient.        RADIOLOGY  No Radiology Exams Resulted Within Past 24 Hours    I ordered and independently reviewed the above noted radiographic studies.      See radiologist's dictation for official interpretation.        PROCEDURES    Procedures    No orders to display        MEDICATIONS GIVEN IN ER    Medications   dolutegravir (TIVICAY) tablet 50 mg (50 mg Oral Given 3/27/25 1656)   emtricitabine-tenofovir (TRUVADA) 200-300 MG per tablet 1 tablet (1 tablet Oral Given 3/27/25 1656)   ondansetron ODT (ZOFRAN-ODT) disintegrating tablet 4 mg (4 mg Oral Given 3/27/25 1656)   Hepatitis B Immune Globulin (HYPERHEP B) injection solution 5 mL (5 mL Intramuscular Given 3/27/25 1655)     Followed by   Hepatitis B Immune Globulin (HYPERHEP B) injection solution 1 mL (1 mL Intramuscular Given 3/27/25 1653)     Followed by   Hepatitis B Immune Globulin (HYPERHEP B) injection solution 1 mL (1 mL Intramuscular Given 3/27/25 1652)     Followed by   Hepatitis B Immune Globulin (HYPERHEP B) injection solution 0.5 mL (0.5 mL Intramuscular Given 3/27/25 1654)         MEDICAL DECISION MAKING, PROGRESS, and CONSULTS    All labs, if obtained, have been independently reviewed by me.  All radiology studies, if obtained, have been reviewed by me and the radiologist dictating the report.  All EKG's, if obtained, have been independently viewed and interpreted by me/my attending physician.      Discussion below represents my analysis of pertinent findings related to patient's condition, differential diagnosis, treatment plan and final disposition.    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  There is no obvious wound to the patient's finger.  Plan today will be for postexposure prophylaxis with Tivicay and Truvada, prescriptions for these and Zofran were sent to pharmacy, extensive lab testing was performed  and hepatitis B vaccine booster given.  Patient informed to follow-up with primary care as repeat testing may be required.  Strict ED return precautions were explained and patient verbalized understanding of and agreement with this plan of care.  Informed patient we will  contact her with any positive results.                       Differential diagnosis:    Differential diagnosis included but was not limited to needlestick exposure, STI exposure      Additional sources:    - Discussed/ obtained information from independent historians: None    - External (non-ED) record review: Previous medical records reviewed.  I reviewed did not reveal any documented hepatitis B titer    - Chronic or social conditions impacting care: None    Orders placed during this visit:  Orders Placed This Encounter   Procedures    Hepatitis B Core Antibody, Total    Hepatitis B Surface Antigen    Hepatitis B Surface Antibody    Hepatitis C Antibody    HIV-1 / O / 2 Ag / Antibody    Comprehensive Metabolic Panel    CBC (No Diff)    Treponema pallidum AB w/Reflex RPR         Additional orders considered but not ordered: None      ED Course:    Consultants: Hospital pharmacist    ED Course as of 03/27/25 1714   Thu Mar 27, 2025   1559 WBC(!): 13.45 [TG]      ED Course User Index  [TG] Jairo Garcia PA-C              Shared Decision Making:  After my consideration of clinical presentation and any laboratory/radiology studies obtained, I discussed the findings with the patient/patient representative who is in agreement with the treatment plan and the final disposition.   Risks and benefits of discharge and/or observation/admission were discussed.       AS OF 17:14 EDT VITALS:    BP - (!) 156/105  HR - 93  TEMP - 98.1 °F (36.7 °C) (Oral)  O2 SATS - 100%                  DIAGNOSIS  Final diagnoses:   Exposure to blood or body fluid         DISPOSITION  Discharge      Please note that portions of this document were completed with voice recognition software.      Jairo Garcia PA-C  03/27/25 0861

## 2025-03-27 NOTE — DISCHARGE INSTRUCTIONS
You have been evaluated after a needlestick injury.  You have been given your first dose of HIV postexposure prophylaxis medications and prescriptions for these have been sent to pharmacy.  Your hepatitis B hepatitis C HIV and syphilis testing are send out labs that will resolve over the next several days.  Be aware you will need close follow-up with occupational medicine and/or your primary care provider for possible repeat HIV testing and further testing and analys  Take your medications as prescribed.  Zofran has been prescribed in case these medications cause you to feel nauseous.  We recommend informing your  bariatric surgery office about taking these medications.  Return to the ER for any acute changes or worsening of your condition right away.

## 2025-03-29 LAB — HBV CORE AB SERPL QL IA: NEGATIVE

## 2025-04-01 ENCOUNTER — OFFICE VISIT (OUTPATIENT)
Dept: BARIATRICS/WEIGHT MGMT | Facility: CLINIC | Age: 40
End: 2025-04-01

## 2025-04-01 VITALS
RESPIRATION RATE: 16 BRPM | BODY MASS INDEX: 42.82 KG/M2 | HEIGHT: 67 IN | SYSTOLIC BLOOD PRESSURE: 132 MMHG | OXYGEN SATURATION: 98 % | TEMPERATURE: 97.1 F | HEART RATE: 82 BPM | DIASTOLIC BLOOD PRESSURE: 86 MMHG | WEIGHT: 272.8 LBS

## 2025-04-01 DIAGNOSIS — R79.0 ABNORMAL BLOOD LEVEL OF IRON: ICD-10-CM

## 2025-04-01 DIAGNOSIS — R53.83 FATIGUE, UNSPECIFIED TYPE: ICD-10-CM

## 2025-04-01 DIAGNOSIS — Z90.3 POSTGASTRECTOMY MALABSORPTION: ICD-10-CM

## 2025-04-01 DIAGNOSIS — R10.13 DYSPEPSIA: ICD-10-CM

## 2025-04-01 DIAGNOSIS — E55.9 VITAMIN D DEFICIENCY: ICD-10-CM

## 2025-04-01 DIAGNOSIS — K91.2 POSTGASTRECTOMY MALABSORPTION: ICD-10-CM

## 2025-04-01 DIAGNOSIS — E66.01 OBESITY, CLASS III, BMI 40-49.9 (MORBID OBESITY): Primary | ICD-10-CM

## 2025-04-01 RX ORDER — OMEPRAZOLE 40 MG/1
40 CAPSULE, DELAYED RELEASE ORAL DAILY
Qty: 90 CAPSULE | Refills: 0 | Status: SHIPPED | OUTPATIENT
Start: 2025-04-01

## 2025-04-01 RX ORDER — SPIRONOLACTONE 25 MG/1
25 TABLET ORAL DAILY
COMMUNITY
Start: 2025-03-11

## 2025-04-01 RX ORDER — ONDANSETRON 4 MG/1
4 TABLET, ORALLY DISINTEGRATING ORAL EVERY 8 HOURS PRN
Qty: 30 TABLET | Refills: 0 | Status: SHIPPED | OUTPATIENT
Start: 2025-04-01

## 2025-04-01 NOTE — PROGRESS NOTES
[facility]  [address]    [place], located within [facility] [id number] [phone]      Patient Name:  Clare Turner  :  1985      Date of Visit: 2025      Reason for Visit:  3 months postop    HPI:  Clare Turner is a 39 y.o. female s/p LSG/HHR 24 GDW    On prophylactic RX (Truvada, Tivicay) x 28 days from needle stick at work, says it's really upsetting her stomach, lots of nausea, taking Zofran nightly helps.  Notes increased reflux as well, no longer taking PPI.      Tolerated diet progression, but still has some dysphagia w/ eggs and steak.  Getting 150g prot/day.     Physical activity: 6x/week, going to the gym - cardio, lifting.     Labs 25 - WNL.  Taking recommended vitamins.       Presurgery weight:  333 pounds. Today's weight is 124 kg (272 lb 12.8 oz) pounds, today's Body mass index is 43.37 kg/m²., and weight loss since surgery is 61 pounds.       Past Medical History:   Diagnosis Date    ADD (attention deficit disorder) 2014    Adenomatous colon polyp 2024    Colonoscopy 2024.  Repeat 5 years      Anxiety     Arthritis     Asthma     on Dupixent, follows w/ Pulmonary    Bariatric surgery status 2024 with 85% subtotal gastrectomy with hiatal hernia repair      Bipolar 1 disorder 2009    Depression     Elevated hemoglobin A1c     Elevated transaminase measurement     Episcleritis     Fatigue     Former smoker     Full dentures     upper only    GERD (gastroesophageal reflux disease)     H/O alcohol abuse     Clean date 2020    H/O drug abuse (CMS/[facility]) - methamphetamine 2016    Clean date 2020    Heartburn     chronic/episodic, prn Omeprazole, EGD Dr. Spencer  small hiatal hernia with Schatzki's ring    Hidradenitis suppurativa     History of emergence delirium     cries after anesthesia    History of nicotine vaping     Hyperlipidemia 2019    Leukocytosis     Lung nodule     stable - followed by  pulmonary    Morbid obesity     Prediabetes 2022    A1C 5.7    Restless leg syndrome 2022    Seasonal allergic rhinitis due to pollen 10/12/2022    Thrombocytosis     Trichimoniasis 2017    Trichimoniasis 2020    Trichimoniasis 2020    Urinary tract infection      Past Surgical History:   Procedure Laterality Date    APPENDECTOMY      BARIATRIC SURGERY  2024    CERVICAL CONIZATION  2007     SECTION WITH TUBAL  2012    uncomplicated - twin delivery    COLONOSCOPY      ENDOSCOPY N/A 2024    Procedure: ESOPHAGOGASTRODUODENOSCOPY;  Surgeon: Derrick Spencer MD;  Location: [facility] OR;  Service: Bariatric;  Laterality: N/A;    FRACTURE SURGERY Right 2016    Closed talus bone fracture with bone graph    GASTRIC SLEEVE LAPAROSCOPIC N/A 2024    Procedure: GASTRIC SLEEVE LAPAROSCOPIC;  Surgeon: Derrick Spencer MD;  Location:  [facility] OR;  Service: Bariatric;  Laterality: N/A;    HIATAL HERNIA REPAIR N/A 2024    Procedure: HIATAL HERNIA REPAIR LAPAROSCOPIC;  Surgeon: Derrick Spencer MD;  Location:  [facility] OR;  Service: Bariatric;  Laterality: N/A;  start 0818 pause 0833 restart 0848 stop 0902    LAPAROSCOPIC APPENDECTOMY      LAPAROSCOPIC CHOLECYSTECTOMY      dysfunction, no stones    NASAL FRACTURE SURGERY      SEPTOPLASTY      SINUS SURGERY      debridement    TIBIAL TALAR FUSION Right     w/ bone grafting- 3 total surgeries    TONSILLECTOMY AND ADENOIDECTOMY       Outpatient Medications Marked as Taking for the 25 encounter (Office Visit) with Cherrie Landry PA   Medication Sig Dispense Refill    albuterol (PROVENTIL) (2.5 MG/3ML) 0.083% nebulizer solution Take 2.5 mg by nebulization 4 (Four) Times a Day As Needed for Wheezing. 120 each 5    albuterol sulfate  (90 Base) MCG/ACT inhaler Inhale 2 puffs 2 (Two) Times a Day. 18 g 5    amphetamine-dextroamphetamine XR (Adderall XR) 20 MG 24 hr capsule Take 2  capsules by mouth Every Morning And one every afternoon 90 capsule 0    budesonide (Pulmicort) 0.5 MG/2ML nebulizer solution Take 2 mL by nebulization 2 (Two) Times a Day. (Patient taking differently: Take 2 mL by nebulization 2 (Two) Times a Day As Needed.) 120 each 11    buPROPion XL (Wellbutrin XL) 150 MG 24 hr tablet Take 1 tablet by mouth Every Morning. 30 tablet 2    Calcium Carbonate-Vit D-Min (Calcium 1200) 5052-1321 MG-UNIT chewable tablet Chew 1,200 mg Daily. 90 each 2    Cariprazine HCl (Vraylar) 4.5 MG capsule capsule Take 1 capsule by mouth Daily. 30 capsule 6    Cyanocobalamin (Vitamin B-12) 1000 MCG sublingual tablet Place 1 tablet under the tongue Daily. 90 each 2    dolutegravir (TIVICAY) 50 MG tablet Take 1 tablet by mouth Daily for 28 days. First dose of retail fill to be given in ED 28 tablet 0    doxepin (SINEquan) 10 MG capsule Take 1 capsule by mouth Every Night. 30 capsule 6    emtricitabine-tenofovir (TRUVADA) 200-300 MG per tablet Take 1 tablet by mouth Daily for 28 days. First dose of retail fill to be given in ED 28 tablet 0    eszopiclone (Lunesta) 3 MG tablet Take 1 tablet by mouth At Night As Needed for Sleep. Take immediately before bedtime 30 tablet 2    FLUoxetine (PROzac) 40 MG capsule Take 2 capsules by mouth Daily. 60 capsule 6    Fluticasone Furoate-Vilanterol (BREO ELLIPTA) 200-25 MCG/ACT inhaler Inhale 1 puff Daily. 1 inhalation once a day 60 each 5    hydrOXYzine pamoate (VISTARIL) 25 MG capsule Take 1 capsule by mouth 3 (Three) Times a Day As Needed for Anxiety. 90 capsule 6    Iron-Vitamin C (Vitron-C)  MG tablet Take 1 tablet by mouth Daily. 90 tablet 2    lamoTRIgine (LaMICtal) 100 MG tablet Take 1 tablet by mouth Daily. 30 tablet 2    multivitamin tablet tablet Take 1 tablet by mouth Daily. 90 tablet 2    ondansetron ODT (ZOFRAN-ODT) 4 MG disintegrating tablet Place 1 tablet on the tongue Every 8 (Eight) Hours As Needed for Nausea or Vomiting. 30 tablet 0     "thiamine (VITAMIN B-1) 100 MG tablet Take 1 tablet by mouth Daily. 30 tablet 2    vitamin D3 125 MCG (5000 UT) capsule capsule Take 1 capsule by mouth Daily. 90 capsule 2    [DISCONTINUED] ondansetron ODT (ZOFRAN-ODT) 4 MG disintegrating tablet Place 1 tablet on the tongue Every 8 (Eight) Hours As Needed for Nausea or Vomiting for up to 7 days. 21 tablet 0     Allergies   Allergen Reactions    Pecan Nut Anaphylaxis     Hives, swelling of lips and throat, difficulties breathing. Has epipen    Sulfa Antibiotics Rash       Social History     Socioeconomic History    Marital status:    Tobacco Use    Smoking status: Former     Current packs/day: 0.00     Average packs/day: 0.3 packs/day for 30.8 years (10.0 ttl pk-yrs)     Types: Cigarettes     Start date: 2003     Quit date: 2024     Years since quittin.6     Passive exposure: Yes    Smokeless tobacco: Never    Tobacco comments:     Stopped smoking   Vaping Use    Vaping status: Former    Start date: 9/15/2022   Substance and Sexual Activity    Alcohol use: Not Currently     Comment: quit 20    Drug use: Not Currently     Types: Amphetamines, Marijuana, Methamphetamines     Comment: Clean ~ 2020    Sexual activity: Yes     Partners: Male     Birth control/protection: Other, Tubal ligation     Social History     Social History Narrative    Lives in [facility].   w/ 3 children.  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        /86 (BP Location: Left arm, Patient Position: Sitting)   Pulse 82   Temp 97.1 °F (36.2 °C)   Resp 16   Ht 168.9 cm (66.5\")   Wt 124 kg (272 lb 12.8 oz)   LMP 2025 (Exact Date)   SpO2 98%   BMI 43.37 kg/m²     Physical Exam  Constitutional:       Appearance: She is well-developed.   Cardiovascular:      Rate and Rhythm: Normal rate.   Pulmonary:      Effort: Pulmonary effort is normal.   Musculoskeletal:         General: Normal range of motion.   Neurological:      Mental Status: She " is alert.   Psychiatric:         Thought Content: Thought content normal.         Judgment: Judgment normal.           Assessment:  3 months s/p LSG/HHR 12/11/24 GDW      ICD-10-CM ICD-9-CM   1. Obesity, Class III, BMI 40-49.9 (morbid obesity)  E66.01 278.01   2. Postgastrectomy malabsorption  K91.2 579.3    Z90.3    3. Dyspepsia  R10.13 536.8   4. Fatigue, unspecified type  R53.83 780.79   5. Vitamin D deficiency  E55.9 268.9   6. Abnormal blood level of iron  R79.0 790.6       Class 3 Severe Obesity (BMI >=40). Obesity-related health conditions include the following:  see above . Obesity is improving with treatment. BMI is is above average; BMI management plan is completed. We discussed  see plan .        Plan:  Doing fine.  RX Omeprazole 40mg daily, Zofran RF.  Continue protein 100g/day.  Continue routine physical activity.  Routine bariatric labs ordered.  Continue vitamins w/ adjustments pending lab results.  Call w/ problems/concerns.    The patient was instructed to follow up in 3 months, sooner if needed.

## 2025-04-04 ENCOUNTER — OFFICE VISIT (OUTPATIENT)
Dept: INTERNAL MEDICINE | Facility: CLINIC | Age: 40
End: 2025-04-04
Payer: OTHER MISCELLANEOUS

## 2025-04-04 VITALS
BODY MASS INDEX: 43.94 KG/M2 | WEIGHT: 273.4 LBS | HEIGHT: 66 IN | DIASTOLIC BLOOD PRESSURE: 74 MMHG | SYSTOLIC BLOOD PRESSURE: 116 MMHG | HEART RATE: 72 BPM | TEMPERATURE: 97.5 F

## 2025-04-04 DIAGNOSIS — Z98.84 BARIATRIC SURGERY STATUS: Primary | ICD-10-CM
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
## 2025-04-04 DIAGNOSIS — J45.40 MODERATE PERSISTENT ASTHMA WITHOUT COMPLICATION: Chronic | ICD-10-CM

## 2025-04-04 DIAGNOSIS — E78.2 MIXED HYPERLIPIDEMIA: ICD-10-CM

## 2025-04-04 DIAGNOSIS — R73.03 PREDIABETES: Chronic | ICD-10-CM

## 2025-04-04 NOTE — LETTER
April 4, 2025     Patient: Clare Turner   YOB: 1985   Date of Visit: 4/4/2025       To Whom It May Concern:      Clare Turner was seen in my office today.  Please excuse her absence from work.         Sincerely,        Michael Miner MD    CC: No Recipients

## 2025-04-04 NOTE — PROGRESS NOTES
Chief Complaint  Clare Turner is a 39 y.o. female presenting for Hospital Follow Up Visit.     From [place]. . 3 children. 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     Patient has a past medical history of hyperlipidemia, bipolar 1 disorder w/depression and Hx of jack, ADHD, hidradenitis suppurativa (f/u derm), bariatric surgery (12/11/24 gastric sleeve with 85% subtotal gastrectomy with hiatal hernia repair), GERD, episcleritis, restless legs, moderate persistent asthma (since childhood, f/u pulm), lung nodule stable for years, history of alcohol and substance abuse, urge/stress incontinence and severe obesity.    History of Present Illness  Patient is here for follow-up.    She is overall doing well, but unfortunately she had a accidental needlestick injury at work on 3/27/2025.  There was uncapped sharps in the trash, and she tells me they have 2 HIV-positive patients at their facility, and with unknown source they assumed the worst and started her on postexposure HIV treatment with Truvada.  This has caused significant nausea, but she is tolerating.  They wanted her on this medication for 1 month.  She tells me they wanted blood work ordered by me for follow-up.    She has had a significant weight loss of 60 pounds since her surgery, on 12/12/2024 her weight was 333 pounds, now it is down to 273 pounds.  More recently weight loss has slowed down.    Of note she was tested positive for COVID about 2 weeks ago, also positive for strep.  Treated with Z-Demario.  She has recovered well.  She did not start on the steroids    The following portions of the patient's history were reviewed and updated as appropriate: allergies, current medications, past family history, past medical history, past social history, past surgical history, and problem list.    Objective  /74 (BP Location: Left arm, Patient  "Position: Sitting, Cuff Size: Large Adult)   Pulse 72   Temp 97.5 °F (36.4 °C) (Temporal)   Ht 168.9 cm (66.5\")   Wt 124 kg (273 lb 6.4 oz)   LMP 03/03/2025 (Exact Date)   BMI 43.47 kg/m²     Physical Exam  Constitutional:       Appearance: Normal appearance.   HENT:      Head: Atraumatic.   Eyes:      Extraocular Movements: Extraocular movements intact.      Conjunctiva/sclera: Conjunctivae normal.   Pulmonary:      Effort: Pulmonary effort is normal. No respiratory distress.   Musculoskeletal:      Cervical back: Neck supple.   Neurological:      Mental Status: She is alert and oriented to person, place, and time. Mental status is at baseline.   Psychiatric:         Behavior: Behavior normal.         Thought Content: Thought content normal.         Assessment/Plan   1. Bariatric surgery status  Significant weight loss of 60 pounds in about 4 months, now slowing down.  She feels much better after her weight loss, she is going to the gym.  She is following with bariatrics, and they zuri blood on her visit, not yet showing in process.  Continue follow-up with bariatric surgery and continue on current supplements and vitamins.    2. Moderate persistent asthma without complication  Stable and doing well.  Did not need to take steroids.  Continue on Breo Ellipta and albuterol as needed.    3. Needle stick, hypodermic, accidental, subsequent encounter  Hepatitis B status positive for immunity with hepatitis surface antibody positive, hepatitis B core and surface antigen negative.  Hepatitis C negative.  RPR negative.  HIV negative.  Will need repeat testing for at least HIV, hepatitis C and potentially syphilis.  This would normally be handled by employee health, but if they need me to order blood testing at a certain protocol, she will let me know.    4. Mixed hyperlipidemia   levels improved on blood testing in January, will not at any  need repeat until next year.    5. Prediabetes  Hemoglobin A1C   Date Value " Ref Range Status   01/14/2025 5.60 4.80 - 5.60 % Final   10/03/2024 6.00 (H) 4.80 - 5.60 % Final   07/18/2024 5.9 (H) 4.8 - 5.6 % Final     Comment:              Prediabetes: 5.7 - 6.4           Diabetes: >6.4           Glycemic control for adults with diabetes: <7.0     04/18/2024 5.70 (H) 4.80 - 5.60 % Final   Will be checked next visit.  Likely continued improvement with her weight loss.      Return in about 7 weeks (around 5/23/2025) for Recheck.    Future Appointments         Provider Department Center    4/7/2025 3:30 PM Stevenson Coker, [facility]    4/8/2025 3:00 PM Nani Ball, [place] [facility]    4/16/2025 10:30 AM Michael Miner MD [facility] [place]    6/13/2025 10:45 AM Michael Miner MD [facility] [place]    7/1/2025 8:00 AM Cherrie Landry PA [facility] BARIATRIC SURGERY     7/30/2025 8:00 AM [facility] [place], PFT LAB 1 [facility] [place]    7/30/2025 8:30 AM RAD TECH [facility] [place] [facility] [place]    7/30/2025 9:00 AM Khadra Clements APRN [facility] [place]              Michael Miner MD  Family Medicine  04/04/2025

## 2025-04-07 ENCOUNTER — TELEMEDICINE (OUTPATIENT)
Dept: PSYCHIATRY | Facility: CLINIC | Age: 40
End: 2025-04-07
Payer: MEDICAID

## 2025-04-07 DIAGNOSIS — F31.75 BIPOLAR 1 DISORDER, DEPRESSED, PARTIAL REMISSION: Primary | ICD-10-CM

## 2025-04-07 DIAGNOSIS — F41.1 GENERALIZED ANXIETY DISORDER: ICD-10-CM

## 2025-04-07 DIAGNOSIS — F90.2 ATTENTION DEFICIT HYPERACTIVITY DISORDER, COMBINED TYPE: ICD-10-CM

## 2025-04-07 LAB
25(OH)D3+25(OH)D2 SERPL-MCNC: 63 NG/ML (ref 30–100)
ALBUMIN SERPL-MCNC: 4.3 G/DL (ref 3.9–4.9)
ALP SERPL-CCNC: 116 IU/L (ref 44–121)
ALT SERPL-CCNC: 26 IU/L (ref 0–32)
AST SERPL-CCNC: 23 IU/L (ref 0–40)
BASOPHILS # BLD AUTO: 0.1 X10E3/UL (ref 0–0.2)
BASOPHILS NFR BLD AUTO: 1 %
BILIRUB SERPL-MCNC: <0.2 MG/DL (ref 0–1.2)
BUN SERPL-MCNC: 11 MG/DL (ref 6–20)
BUN/CREAT SERPL: 12 (ref 9–23)
CALCIUM SERPL-MCNC: 9.8 MG/DL (ref 8.7–10.2)
CHLORIDE SERPL-SCNC: 105 MMOL/L (ref 96–106)
CO2 SERPL-SCNC: 20 MMOL/L (ref 20–29)
CREAT SERPL-MCNC: 0.95 MG/DL (ref 0.57–1)
EGFRCR SERPLBLD CKD-EPI 2021: 79 ML/MIN/1.73
EOSINOPHIL # BLD AUTO: 0.4 X10E3/UL (ref 0–0.4)
EOSINOPHIL NFR BLD AUTO: 3 %
ERYTHROCYTE [DISTWIDTH] IN BLOOD BY AUTOMATED COUNT: 13.6 % (ref 11.7–15.4)
FERRITIN SERPL-MCNC: 77 NG/ML (ref 15–150)
FOLATE SERPL-MCNC: 5.8 NG/ML
GLOBULIN SER CALC-MCNC: 3.2 G/DL (ref 1.5–4.5)
GLUCOSE SERPL-MCNC: 102 MG/DL (ref 70–99)
HCT VFR BLD AUTO: 45.9 % (ref 34–46.6)
HGB BLD-MCNC: 15.1 G/DL (ref 11.1–15.9)
IMM GRANULOCYTES # BLD AUTO: 0 X10E3/UL (ref 0–0.1)
IMM GRANULOCYTES NFR BLD AUTO: 0 %
IRON SERPL-MCNC: 70 UG/DL (ref 27–159)
LYMPHOCYTES # BLD AUTO: 3.9 X10E3/UL (ref 0.7–3.1)
LYMPHOCYTES NFR BLD AUTO: 33 %
MCH RBC QN AUTO: 29.8 PG (ref 26.6–33)
MCHC RBC AUTO-ENTMCNC: 32.9 G/DL (ref 31.5–35.7)
MCV RBC AUTO: 91 FL (ref 79–97)
METHYLMALONATE SERPL-SCNC: 193 NMOL/L (ref 0–378)
MONOCYTES # BLD AUTO: 0.9 X10E3/UL (ref 0.1–0.9)
MONOCYTES NFR BLD AUTO: 8 %
NEUTROPHILS # BLD AUTO: 6.3 X10E3/UL (ref 1.4–7)
NEUTROPHILS NFR BLD AUTO: 55 %
PLATELET # BLD AUTO: 467 X10E3/UL (ref 150–450)
POTASSIUM SERPL-SCNC: 4.5 MMOL/L (ref 3.5–5.2)
PREALB SERPL-MCNC: 28 MG/DL (ref 14–35)
PROT SERPL-MCNC: 7.5 G/DL (ref 6–8.5)
RBC # BLD AUTO: 5.06 X10E6/UL (ref 3.77–5.28)
SODIUM SERPL-SCNC: 139 MMOL/L (ref 134–144)
VIT B1 BLD-SCNC: 193.2 NMOL/L (ref 66.5–200)
WBC # BLD AUTO: 11.6 X10E3/UL (ref 3.4–10.8)

## 2025-04-07 NOTE — PLAN OF CARE
Patient was engaged in reviewing treatment progress. Patient stated she did have a recent negative experience at work that has negatively impacted her anxiety despite feeling she has made progress towards her goal. Patient will continue to work towards building coping skills to manage feelings overwhelmed. Patient will also continue to work to stabilize her mood to not allow others' mood to impact her's.   Problem: Anxiety 7  Goal: Patient will develop and implement behavioral and cognitive strategies to reduce anxiety and irrational fears.  Outcome: Progressing     Problem: Mood Instability 3  Goal: Patient will achieve mood stability as evidenced by controlled behavior and a more deliberate thought process  Outcome: Progressing

## 2025-04-07 NOTE — PROGRESS NOTES
"[facility]   Follow-up Progress Note     Date: April 7, 2025  Time In: 3:32  Time Out: 4:13      PROGRESS NOTE  Data:  Clare Turner is a 39 y.o. female presenting to [facility] (through [facility] [address] [place] [address] [place], [address] using a secure MyChart Video Visit through [facility] for assessment with Stevenson Coker LCSW. The patient is seen remotely in their  car  using [facility] My Chart. Patient is being seen via telehealth and stated they are in a secure environment for this session. The patient's condition being diagnosed/treated is appropriate for telemedicine. The provider identified herself as well as her credentials. The patient and/or patients guardian consent to be seen remotely, and when consent is given they understand that the consent allows for patient identifiable information to be sent to a third party as needed. They may refuse to be seen remotely at any time. The electronic data is encrypted and password protected, and the patient has been advised of the potential risks to privacy not withstanding such measures.    Today Patient expressed excitement that she has two of her three children for spring break. Patient stated she has her two boys. Patient stated they have been wanting to go to the gym with patient. Patient stated she is also looking forward to boating soon. Patient stated she has been doing well. Patient stated she did have an incident at work where she was \"stabbed by a dirty needle.\" Patient she had to go to the ER and get \"four different injections and blood drawn.\" Patient stated she has to be on medications for the next 28 days to hopefully avoid any future complications. Patient stated the fault was due to another co-workers negligence. Patient stated frustration with her employer because the behavior was not corrected and workers comp is \"fight me on paying for my " "medications.\" Patient expressed though all of her blood work came back negative there is still the possibility she could contract something. Patient stated she has decided to contact a  but does have guilt towards possibly suing her employer and losing her job. Processed with patient her thoughts and emotions. Patient reported her relationship with her  has improved since learning he was using marijuana. Patient stated she set a boundary that she would not allow that behavior in her home due to her past history with substance abuse. Patient stated he has stopped but she is still encouraging him to seek treatment for his mental health. Engaged patient in reviewing and updating treatment progress.      Clinical Maneuvering/Intervention:    Chief Complaint: mood instability, anxiety, ADHD    (Scales based on 0 - 10 with 10 being the worst)  Depression: 2 Anxiety: 5     Assisted Patient in processing above session content; acknowledged and normalized patient’s thoughts, feelings, and concerns.  Rationalized patient thought process regarding a recent negative experience at work that placed patient at risk.  Discussed triggers associated with patient's anxiety.  Also discussed coping skills for patient to implement such as exercising.    Allowed Patient to freely discuss issues  without interruption or judgement with unconditional positive regard, active listening skills, and empathy. Therapist provided a safe, confidential environment to facilitate the development of a positive therapeutic relationship and encouraged open, honest communication. Assisted Patient in identifying risk factors which would indicate the need for higher level of care including thoughts to harm self or others and/or self-harming behavior and encouraged Patient to contact this office, call 911, or present to the nearest emergency room should any of these events occur. Discussed crisis intervention services and means to access. Patient " adamantly and convincingly denies current suicidal or homicidal ideation or perceptual disturbance. Assisted Patient in processing session content; acknowledged and normalized Patient’s thoughts, feelings, and concerns by utilizing a person-centered approach in efforts to build appropriate rapport and a positive therapeutic relationship with open and honest communication. Therapist utilized dialectical behavior techniques to teach and model emotional regulation and relaxation methods. Therapist assisted Patient with identifying and implementing healthier coping strategies.     Assessment   Patient appears to be experiencing heightened anxiety and depression in response to COVID-19 epidemic  As a result, they can be reasonably expected to continue to benefit from treatment and would likely be at increased risk for decompensation otherwise.    Mental Status Exam:   Hygiene:   good  Cooperation:  Cooperative  Eye Contact:  Good  Psychomotor Behavior:  Appropriate  Affect:  Full range  Mood:  happy  Speech:  Normal  Thought Process:  Goal directed  Thought Content:  Mood congruent  Suicidal:  None  Homicidal:  None  Hallucinations:  None  Delusion:  None  Memory:  Intact  Orientation:  Person, Place, Time, and Situation  Reliability:  good  Insight:  Good  Judgement:  Good  Impulse Control:  Good  Physical/Medical Issues:  No      PHQ-Score Total:  PHQ-9 Total Score:        Patient's Support Network Includes:  significant other, children, mother, extended family, and friends    Functional Status: Moderate impairment     Progress toward goal: Not at goal    Prognosis: Good with Ongoing Treatment            Impression/Formulation:    VISIT DIAGNOSIS:     ICD-10-CM ICD-9-CM   1. Bipolar 1 disorder, depressed, partial remission  F31.75 296.55   2. Generalized anxiety disorder  F41.1 300.02   3. Attention deficit hyperactivity disorder, combined type  F90.2 314.01        Patient appeared alert and oriented.  Patient is  voluntarily requesting to continue outpatient therapy at [facility].  Patient is receptive to assistance with maintaining a stable lifestyle.  Patient presents with history of mood instability, anxiety, and ADHD.  Patient is agreeable to attend routine therapy sessions.  Patient expressed desire to maintain stability and participate in the therapeutic process.        Crisis Plan:  Symptoms and/or behaviors to indicate a crisis: Isolation and Increased hunger or lack of appetite    What calming techniques or other strategies will patient use to de-esclate and stay safe: slow down, breathe, visualize calming self, think it though, listen to music, change focus, take a walk    Who is one person patient can contact to assist with de-escalation? sister    If symptoms/behaviors persist, Patient will present to the nearest hospital for an assessment. Advised patient of [facility] and assessment services.     Plan:   Patient will continue in individual outpatient therapy with focus on improved functioning and coping skills, maintaining stability, and avoiding decompensation and the need for higher level of care.    Patient will contact this office ([facility] at [phone]), call 911 or present to the nearest emergency room should suicidal or homicidal ideations occur. Provide Cognitive Behavioral Therapy and Solution Focused Therapy to improve functioning, maintain stability, and avoid decompensation and the need for higher level of care.     Return in about 6 weeks, or earlier if symptoms worsen or fail to improve.    Recommended Referrals: none        This document has been electronically signed by Stevesnon Coker LCSW  April 7, 2025 15:31 EDT        Part of this note may be an electronic transcription/translation of spoken language to printed text using the Dragon Dictation System.    Mode of Visit: Video  Location of patient: -OTHER-:  car  Location of provider: +HOME+  You have chosen to receive care through a telehealth visit.  The patient has signed the video visit consent form.  The visit included audio and video interaction. No technical issues occurred during this visit.

## 2025-04-08 ENCOUNTER — TELEMEDICINE (OUTPATIENT)
Dept: PSYCHIATRY | Facility: CLINIC | Age: 40
End: 2025-04-08
Payer: MEDICAID

## 2025-04-08 DIAGNOSIS — F90.2 ATTENTION DEFICIT HYPERACTIVITY DISORDER, COMBINED TYPE: ICD-10-CM

## 2025-04-08 DIAGNOSIS — F31.75 BIPOLAR 1 DISORDER, DEPRESSED, PARTIAL REMISSION: ICD-10-CM

## 2025-04-08 DIAGNOSIS — F41.1 GENERALIZED ANXIETY DISORDER: Primary | ICD-10-CM

## 2025-04-08 DIAGNOSIS — F51.04 PSYCHOPHYSIOLOGICAL INSOMNIA: ICD-10-CM

## 2025-04-08 RX ORDER — DEXTROAMPHETAMINE SACCHARATE, AMPHETAMINE ASPARTATE MONOHYDRATE, DEXTROAMPHETAMINE SULFATE AND AMPHETAMINE SULFATE 5; 5; 5; 5 MG/1; MG/1; MG/1; MG/1
40 CAPSULE, EXTENDED RELEASE ORAL EVERY MORNING
Qty: 90 CAPSULE | Refills: 0 | Status: SHIPPED | OUTPATIENT
Start: 2025-04-08

## 2025-04-08 RX ORDER — ESZOPICLONE 3 MG/1
3 TABLET, FILM COATED ORAL NIGHTLY PRN
Qty: 30 TABLET | Refills: 2 | Status: SHIPPED | OUTPATIENT
Start: 2025-04-08 | End: 2026-04-08

## 2025-04-08 RX ORDER — FLUOXETINE HYDROCHLORIDE 40 MG/1
80 CAPSULE ORAL DAILY
Qty: 60 CAPSULE | Refills: 6 | Status: SHIPPED | OUTPATIENT
Start: 2025-04-08 | End: 2026-04-08

## 2025-04-08 RX ORDER — DOXEPIN HYDROCHLORIDE 10 MG/1
10 CAPSULE ORAL NIGHTLY
Qty: 30 CAPSULE | Refills: 6 | Status: SHIPPED | OUTPATIENT
Start: 2025-04-08

## 2025-04-08 RX ORDER — BUPROPION HYDROCHLORIDE 150 MG/1
150 TABLET ORAL EVERY MORNING
Qty: 30 TABLET | Refills: 6 | Status: SHIPPED | OUTPATIENT
Start: 2025-04-08 | End: 2026-04-08

## 2025-04-08 RX ORDER — LAMOTRIGINE 100 MG/1
100 TABLET ORAL DAILY
Qty: 30 TABLET | Refills: 6 | Status: SHIPPED | OUTPATIENT
Start: 2025-04-08 | End: 2026-04-08

## 2025-04-08 RX ORDER — HYDROXYZINE PAMOATE 25 MG/1
25 CAPSULE ORAL 3 TIMES DAILY PRN
Qty: 90 CAPSULE | Refills: 6 | Status: SHIPPED | OUTPATIENT
Start: 2025-04-08

## 2025-04-08 NOTE — PROGRESS NOTES
Patient Name: Clare Turner  MRN: [id number]   :  1985     This provider is located at her home office through the [facility] (through [facility]), [address], [address] using a secure [employer] Video Visit through [employer]. Patient is being seen remotely via telehealth at their home address in [place], and stated they are in a secure environment for this session. The patient's condition being diagnosed/treated is appropriate for telemedicine. The provider identified herself as well as her credentials.   The patient, and/or patients guardian, consent to be seen remotely, and when consent is given they understand that the consent allows for patient identifiable information to be sent to a third party as needed.   They may refuse to be seen remotely at any time. The electronic data is encrypted and password protected, and the patient and/or guardian has been advised of the potential risks to privacy not withstanding such measures.    You have chosen to receive care through a telehealth visit.  Do you consent to use a video/audio connection for your medical care today? Yes    Chief Complaint:      ICD-10-CM ICD-9-CM   1. Generalized anxiety disorder  F41.1 300.02   2. Bipolar 1 disorder, depressed, partial remission  F31.75 296.55   3. Psychophysiological insomnia  F51.04 307.42   4. Attention deficit hyperactivity disorder, combined type  F90.2 314.01       History of Present Illness: Clare Turner is a 39 y.o. female is here today for medication management follow up.  Patient overall is doing well with her mental health.  Did get stuck by a needle at work.  Is having to take the HIV preventative medications per protocol.  They significantly hurt her stomach so she is having to take those at night before she goes to bed.    The following portions of the patient's history were reviewed and updated as appropriate: allergies, current medications,  past family history, past medical history, past social history, past surgical history, and problem list.    Review of Systems;;  Review of Systems   Constitutional:  Negative for activity change, appetite change, fatigue, unexpected weight gain and unexpected weight loss.   Respiratory:  Negative for shortness of breath and wheezing.    Gastrointestinal:  Negative for constipation, diarrhea, nausea and vomiting.   Musculoskeletal:  Negative for gait problem.   Skin:  Negative for dry skin and rash.   Neurological:  Negative for dizziness, speech difficulty, weakness, light-headedness, headache, memory problem and confusion.   Psychiatric/Behavioral:  Positive for stress. Negative for agitation, behavioral problems, decreased concentration, dysphoric mood, hallucinations, self-injury, sleep disturbance, suicidal ideas, negative for hyperactivity and depressed mood. The patient is not nervous/anxious.        Physical Exam;;  Physical Exam  Constitutional:       General: She is not in acute distress.     Appearance: She is well-developed. She is not diaphoretic.   HENT:      Head: Normocephalic and atraumatic.   Eyes:      Conjunctiva/sclera: Conjunctivae normal.   Pulmonary:      Effort: Pulmonary effort is normal. No respiratory distress.   Musculoskeletal:         General: Normal range of motion.      Cervical back: Full passive range of motion without pain and normal range of motion.   Neurological:      Mental Status: She is alert and oriented to person, place, and time.   Psychiatric:         Mood and Affect: Mood is not anxious or depressed. Affect is not labile, blunt, angry or inappropriate.         Speech: Speech is not rapid and pressured or tangential.         Behavior: Behavior normal. Behavior is not agitated, slowed, aggressive, withdrawn, hyperactive or combative. Behavior is cooperative.         Thought Content: Thought content normal. Thought content is not paranoid or delusional. Thought content does  not include homicidal or suicidal ideation. Thought content does not include homicidal or suicidal plan.         Judgment: Judgment normal.       Last menstrual period 03/03/2025, not currently breastfeeding.  There is no height or weight on file to calculate BMI. Video appt unable to obtain.     Current Medications;;    Current Outpatient Medications:     amphetamine-dextroamphetamine XR (Adderall XR) 20 MG 24 hr capsule, Take 2 capsules by mouth Every Morning And one every afternoon, Disp: 90 capsule, Rfl: 0    buPROPion XL (Wellbutrin XL) 150 MG 24 hr tablet, Take 1 tablet by mouth Every Morning., Disp: 30 tablet, Rfl: 6    Cariprazine HCl (Vraylar) 4.5 MG capsule capsule, Take 1 capsule by mouth Daily., Disp: 30 capsule, Rfl: 6    doxepin (SINEquan) 10 MG capsule, Take 1 capsule by mouth Every Night., Disp: 30 capsule, Rfl: 6    eszopiclone (Lunesta) 3 MG tablet, Take 1 tablet by mouth At Night As Needed for Sleep. Take immediately before bedtime, Disp: 30 tablet, Rfl: 2    FLUoxetine (PROzac) 40 MG capsule, Take 2 capsules by mouth Daily., Disp: 60 capsule, Rfl: 6    hydrOXYzine pamoate (VISTARIL) 25 MG capsule, Take 1 capsule by mouth 3 (Three) Times a Day As Needed for Anxiety., Disp: 90 capsule, Rfl: 6    lamoTRIgine (LaMICtal) 100 MG tablet, Take 1 tablet by mouth Daily., Disp: 30 tablet, Rfl: 6    albuterol (PROVENTIL) (2.5 MG/3ML) 0.083% nebulizer solution, Take 2.5 mg by nebulization 4 (Four) Times a Day As Needed for Wheezing., Disp: 120 each, Rfl: 5    albuterol sulfate  (90 Base) MCG/ACT inhaler, Inhale 2 puffs 2 (Two) Times a Day., Disp: 18 g, Rfl: 5    budesonide (Pulmicort) 0.5 MG/2ML nebulizer solution, Take 2 mL by nebulization 2 (Two) Times a Day. (Patient taking differently: Take 2 mL by nebulization 2 (Two) Times a Day As Needed.), Disp: 120 each, Rfl: 11    Calcium Carbonate-Vit D-Min (Calcium 1200) 3867-5168 MG-UNIT chewable tablet, Chew 1,200 mg Daily., Disp: 90 each, Rfl: 2     Cyanocobalamin (Vitamin B-12) 1000 MCG sublingual tablet, Place 1 tablet under the tongue Daily., Disp: 90 each, Rfl: 2    dolutegravir (TIVICAY) 50 MG tablet, Take 1 tablet by mouth Daily for 28 days. First dose of retail fill to be given in ED, Disp: 28 tablet, Rfl: 0    emtricitabine-tenofovir (TRUVADA) 200-300 MG per tablet, Take 1 tablet by mouth Daily for 28 days. First dose of retail fill to be given in ED, Disp: 28 tablet, Rfl: 0    EPINEPHrine (EPIPEN) 0.3 MG/0.3ML solution auto-injector injection, Inject 1 pen in the muscle as directed PRN allergic reaction, Disp: 2 each, Rfl: 1    Fluticasone Furoate-Vilanterol (BREO ELLIPTA) 200-25 MCG/ACT inhaler, Inhale 1 puff Daily. 1 inhalation once a day, Disp: 60 each, Rfl: 5    Iron-Vitamin C (Vitron-C)  MG tablet, Take 1 tablet by mouth Daily., Disp: 90 tablet, Rfl: 2    multivitamin tablet tablet, Take 1 tablet by mouth Daily., Disp: 90 tablet, Rfl: 2    omeprazole (priLOSEC) 40 MG capsule, Take 1 capsule by mouth Daily., Disp: 90 capsule, Rfl: 0    ondansetron ODT (ZOFRAN-ODT) 4 MG disintegrating tablet, Place 1 tablet on the tongue Every 8 (Eight) Hours As Needed for Nausea or Vomiting., Disp: 30 tablet, Rfl: 0    spironolactone (ALDACTONE) 25 MG tablet, Take 1 tablet by mouth Daily., Disp: , Rfl:     thiamine (VITAMIN B-1) 100 MG tablet, Take 1 tablet by mouth Daily., Disp: 30 tablet, Rfl: 2    vitamin D3 125 MCG (5000 UT) capsule capsule, Take 1 capsule by mouth Daily., Disp: 90 capsule, Rfl: 2    Lab Results:   Office Visit on 04/01/2025   Component Date Value Ref Range Status    WBC 04/01/2025 11.6 (H)  3.4 - 10.8 x10E3/uL Final    RBC 04/01/2025 5.06  3.77 - 5.28 x10E6/uL Final    Hemoglobin 04/01/2025 15.1  11.1 - 15.9 g/dL Final    Hematocrit 04/01/2025 45.9  34.0 - 46.6 % Final    MCV 04/01/2025 91  79 - 97 fL Final    MCH 04/01/2025 29.8  26.6 - 33.0 pg Final    MCHC 04/01/2025 32.9  31.5 - 35.7 g/dL Final    RDW 04/01/2025 13.6  11.7 - 15.4 %  Final    Platelets 04/01/2025 467 (H)  150 - 450 x10E3/uL Final    Neutrophil Rel % 04/01/2025 55  Not Estab. % Final    Lymphocyte Rel % 04/01/2025 33  Not Estab. % Final    Monocyte Rel % 04/01/2025 8  Not Estab. % Final    Eosinophil Rel % 04/01/2025 3  Not Estab. % Final    Basophil Rel % 04/01/2025 1  Not Estab. % Final    Neutrophils Absolute 04/01/2025 6.3  1.4 - 7.0 x10E3/uL Final    Lymphocytes Absolute 04/01/2025 3.9 (H)  0.7 - 3.1 x10E3/uL Final    Monocytes Absolute 04/01/2025 0.9  0.1 - 0.9 x10E3/uL Final    Eosinophils Absolute 04/01/2025 0.4  0.0 - 0.4 x10E3/uL Final    Basophils Absolute 04/01/2025 0.1  0.0 - 0.2 x10E3/uL Final    Immature Granulocyte Rel % 04/01/2025 0  Not Estab. % Final    Immature Grans Absolute 04/01/2025 0.0  0.0 - 0.1 x10E3/uL Final    Glucose 04/01/2025 102 (H)  70 - 99 mg/dL Final    BUN 04/01/2025 11  6 - 20 mg/dL Final    Creatinine 04/01/2025 0.95  0.57 - 1.00 mg/dL Final    EGFR Result 04/01/2025 79  >59 mL/min/1.73 Final    BUN/Creatinine Ratio 04/01/2025 12  9 - 23 Final    Sodium 04/01/2025 139  134 - 144 mmol/L Final    Potassium 04/01/2025 4.5  3.5 - 5.2 mmol/L Final    Chloride 04/01/2025 105  96 - 106 mmol/L Final    Total CO2 04/01/2025 20  20 - 29 mmol/L Final    Calcium 04/01/2025 9.8  8.7 - 10.2 mg/dL Final    Total Protein 04/01/2025 7.5  6.0 - 8.5 g/dL Final    Albumin 04/01/2025 4.3  3.9 - 4.9 g/dL Final    Globulin 04/01/2025 3.2  1.5 - 4.5 g/dL Final    Total Bilirubin 04/01/2025 <0.2  0.0 - 1.2 mg/dL Final    Alkaline Phosphatase 04/01/2025 116  44 - 121 IU/L Final    AST (SGOT) 04/01/2025 23  0 - 40 IU/L Final    ALT (SGPT) 04/01/2025 26  0 - 32 IU/L Final    Ferritin 04/01/2025 77  15 - 150 ng/mL Final    Folate 04/01/2025 5.8  >3.0 ng/mL Final    Comment: A serum folate concentration of less than 3.1 ng/mL is  considered to represent clinical deficiency.      Iron 04/01/2025 70  27 - 159 ug/dL Final    Methylmalonic Acid 04/01/2025 193  0 - 378 nmol/L  Final    Prealbumin 04/01/2025 28  14 - 35 mg/dL Final    Vitamin B1, Whole Blood 04/01/2025 193.2  66.5 - 200.0 nmol/L Final    25 Hydroxy, Vitamin D 04/01/2025 63.0  30.0 - 100.0 ng/mL Final    Comment: Vitamin D deficiency has been defined by the [place] [employer]  [employer] and an Endocrine Society practice guideline as a  level of serum 25-OH vitamin D less than 20 ng/mL (1,2).  The Endocrine Society went on to further define vitamin D  insufficiency as a level between 21 and 29 ng/mL (2).  1. IOM ([place] [employer]). 2010. Dietary reference     intakes for calcium and D. [place]: The     National Academies Press.  2. Mine MF, Dalia RANGEL, Tamera RUIZ, et al.     Evaluation, treatment, and prevention of vitamin D     deficiency: an Endocrine Society clinical practice     guideline. JCEM. 2011 Jul; 96(7):1911-30.     Admission on 03/27/2025, Discharged on 03/27/2025   Component Date Value Ref Range Status    Hep B Core Total Ab 03/27/2025 Negative  Negative Final    Hepatitis B Surface Ag 03/27/2025 Non-Reactive  Non-Reactive Final    Hep B S Ab 03/27/2025 Reactive   Final    Hepatitis C Ab 03/27/2025 Non-Reactive  Non-Reactive Final    HIV-1/ HIV-2 Ab 03/27/2025 Non-Reactive  Non-Reactive Final    HIV-1 P24 Ag Screen 03/27/2025 Non-Reactive  Non-Reactive Final    Glucose 03/27/2025 81  65 - 99 mg/dL Final    BUN 03/27/2025 12  6 - 20 mg/dL Final    Creatinine 03/27/2025 0.85  0.57 - 1.00 mg/dL Final    Sodium 03/27/2025 136  136 - 145 mmol/L Final    Potassium 03/27/2025 3.9  3.5 - 5.2 mmol/L Final    Chloride 03/27/2025 100  98 - 107 mmol/L Final    CO2 03/27/2025 23.0  22.0 - 29.0 mmol/L Final    Calcium 03/27/2025 9.6  8.6 - 10.5 mg/dL Final    Total Protein 03/27/2025 7.1  6.0 - 8.5 g/dL Final    Albumin 03/27/2025 4.3  3.5 - 5.2 g/dL Final    ALT (SGPT) 03/27/2025 30  1 - 33 U/L Final    AST (SGOT) 03/27/2025 18  1 - 32 U/L Final    Alkaline Phosphatase 03/27/2025 102  39 - 117 U/L Final     Total Bilirubin 03/27/2025 0.3  0.0 - 1.2 mg/dL Final    Globulin 03/27/2025 2.8  gm/dL Final    A/G Ratio 03/27/2025 1.5  g/dL Final    BUN/Creatinine Ratio 03/27/2025 14.1  7.0 - 25.0 Final    Anion Gap 03/27/2025 13.0  5.0 - 15.0 mmol/L Final    eGFR 03/27/2025 90.1  >60.0 mL/min/1.73 Final    WBC 03/27/2025 13.45 (H)  3.40 - 10.80 10*3/mm3 Final    RBC 03/27/2025 4.63  3.77 - 5.28 10*6/mm3 Final    Hemoglobin 03/27/2025 13.8  12.0 - 15.9 g/dL Final    Hematocrit 03/27/2025 40.6  34.0 - 46.6 % Final    MCV 03/27/2025 87.7  79.0 - 97.0 fL Final    MCH 03/27/2025 29.8  26.6 - 33.0 pg Final    MCHC 03/27/2025 34.0  31.5 - 35.7 g/dL Final    RDW 03/27/2025 13.9  12.3 - 15.4 % Final    RDW-SD 03/27/2025 44.7  37.0 - 54.0 fl Final    MPV 03/27/2025 10.0  6.0 - 12.0 fL Final    Platelets 03/27/2025 424  140 - 450 10*3/mm3 Final    Treponemal AB Total 03/27/2025 Non-Reactive  Non-Reactive Final   Admission on 03/13/2025, Discharged on 03/13/2025   Component Date Value Ref Range Status    COVID19 03/13/2025 Detected (A)   Final    Influenza A Antigen IZABEL 03/13/2025 Not Detected   Final    Influenza B Antigen IZABEL 03/13/2025 Not Detected   Final    Internal Control 03/13/2025 Passed   Final    Lot Number 03/13/2025 [id number]   Final    Expiration Date 03/13/2025 11/27/2025   Final   Office Visit on 01/14/2025   Component Date Value Ref Range Status    Ferritin 01/14/2025 77  15 - 150 ng/mL Final    Folate 01/14/2025 4.2  >3.0 ng/mL Final    Comment: A serum folate concentration of less than 3.1 ng/mL is  considered to represent clinical deficiency.      Iron 01/14/2025 132  27 - 159 ug/dL Final    Methylmalonic Acid 01/14/2025 157  0 - 378 nmol/L Final    Prealbumin 01/14/2025 22  14 - 35 mg/dL Final    Vitamin B1, Whole Blood 01/14/2025 185.2  66.5 - 200.0 nmol/L Final    25 Hydroxy, Vitamin D 01/14/2025 49.5  30.0 - 100.0 ng/mL Final    Comment: Vitamin D deficiency has been defined by the [place] of  Medicine and an  Endocrine Society practice guideline as a  level of serum 25-OH vitamin D less than 20 ng/mL (1,2).  The Endocrine Society went on to further define vitamin D  insufficiency as a level between 21 and 29 ng/mL (2).  1. IOM ([place] [employer]). 2010. Dietary reference     intakes for calcium and D. [place]: The     National Academies Press.  2. Mine MF, Dalia RANGEL, Tamera RUIZ, et al.     Evaluation, treatment, and prevention of vitamin D     deficiency: an Endocrine Society clinical practice     guideline. JCEM. 2011 Jul; 96(5):0821-30.     Lab on 01/14/2025   Component Date Value Ref Range Status    Pathology Review 01/14/2025 Yes   Final    Glucose 01/14/2025 93  65 - 99 mg/dL Final    BUN 01/14/2025 14  6 - 20 mg/dL Final    Creatinine 01/14/2025 0.74  0.57 - 1.00 mg/dL Final    Sodium 01/14/2025 140  136 - 145 mmol/L Final    Potassium 01/14/2025 4.3  3.5 - 5.2 mmol/L Final    Chloride 01/14/2025 105  98 - 107 mmol/L Final    CO2 01/14/2025 23.7  22.0 - 29.0 mmol/L Final    Calcium 01/14/2025 9.8  8.6 - 10.5 mg/dL Final    Total Protein 01/14/2025 7.5  6.0 - 8.5 g/dL Final    Albumin 01/14/2025 4.0  3.5 - 5.2 g/dL Final    ALT (SGPT) 01/14/2025 25  1 - 33 U/L Final    AST (SGOT) 01/14/2025 20  1 - 32 U/L Final    Alkaline Phosphatase 01/14/2025 112  39 - 117 U/L Final    Total Bilirubin 01/14/2025 0.3  0.0 - 1.2 mg/dL Final    Globulin 01/14/2025 3.5  gm/dL Final    A/G Ratio 01/14/2025 1.1  g/dL Final    BUN/Creatinine Ratio 01/14/2025 18.9  7.0 - 25.0 Final    Anion Gap 01/14/2025 11.3  5.0 - 15.0 mmol/L Final    eGFR 01/14/2025 106.4  >60.0 mL/min/1.73 Final    Total Cholesterol 01/14/2025 161  0 - 200 mg/dL Final    Triglycerides 01/14/2025 98  0 - 150 mg/dL Final    HDL Cholesterol 01/14/2025 30 (L)  40 - 60 mg/dL Final    LDL Cholesterol  01/14/2025 113 (H)  0 - 100 mg/dL Final    VLDL Cholesterol 01/14/2025 18  5 - 40 mg/dL Final    LDL/HDL Ratio 01/14/2025 3.71   Final    Hemoglobin  A1C 01/14/2025 5.60  4.80 - 5.60 % Final    WBC 01/14/2025 9.08  3.40 - 10.80 10*3/mm3 Final    RBC 01/14/2025 4.95  3.77 - 5.28 10*6/mm3 Final    Hemoglobin 01/14/2025 14.7  12.0 - 15.9 g/dL Final    Hematocrit 01/14/2025 45.0  34.0 - 46.6 % Final    MCV 01/14/2025 90.9  79.0 - 97.0 fL Final    MCH 01/14/2025 29.7  26.6 - 33.0 pg Final    MCHC 01/14/2025 32.7  31.5 - 35.7 g/dL Final    RDW 01/14/2025 12.9  12.3 - 15.4 % Final    RDW-SD 01/14/2025 42.8  37.0 - 54.0 fl Final    MPV 01/14/2025 11.5  6.0 - 12.0 fL Final    Platelets 01/14/2025 414  140 - 450 10*3/mm3 Final    Neutrophil % 01/14/2025 57.0  42.7 - 76.0 % Final    Lymphocyte % 01/14/2025 31.2  19.6 - 45.3 % Final    Monocyte % 01/14/2025 8.9  5.0 - 12.0 % Final    Eosinophil % 01/14/2025 2.5  0.3 - 6.2 % Final    Basophil % 01/14/2025 0.3  0.0 - 1.5 % Final    Immature Grans % 01/14/2025 0.1  0.0 - 0.5 % Final    Neutrophils, Absolute 01/14/2025 5.17  1.70 - 7.00 10*3/mm3 Final    Lymphocytes, Absolute 01/14/2025 2.83  0.70 - 3.10 10*3/mm3 Final    Monocytes, Absolute 01/14/2025 0.81  0.10 - 0.90 10*3/mm3 Final    Eosinophils, Absolute 01/14/2025 0.23  0.00 - 0.40 10*3/mm3 Final    Basophils, Absolute 01/14/2025 0.03  0.00 - 0.20 10*3/mm3 Final    Immature Grans, Absolute 01/14/2025 0.01  0.00 - 0.05 10*3/mm3 Final    nRBC 01/14/2025 0.0  0.0 - 0.2 /100 WBC Final    Final Diagnosis 01/14/2025    Final                    Value:1. Blood, Peripheral Smear:  A. White blood cell count within reference range with normal differential counts and occasional atypical lymphocytes.  Atypical lymphocytes may be seen in a variety of viral-induced disorders, but may also be seen in other conditions including TB, brucellosis, toxoplasmosis, lead intoxication or stress reactions.   B. Red blood cell count and morphology adequate.   C. Platelets adequate.          Case Report 01/14/2025    Final                    Value:Surgical Pathology Report                          Case: [id number]                                  Authorizing Provider:  Michael Miner MD      Collected:           01/14/2025 07:40 AM          Ordering Location:     [facility]   Received:            01/14/2025 07:18 PM                                 [facility]                                                                                [facility]                                                              Pathologist:           Gilles Lyle MD                                                          Specimen:    Blood, Arterial, SMEAR                                                                        Mental Status Exam:   Hygiene:   good  Cooperation:  Cooperative  Eye Contact:  Good  Psychomotor Behavior:  Appropriate  Mood:within normal limits  Affect:  Appropriate  Hopelessness: Denies  Speech:  Normal  Thought Process:  Goal directed  Thought Content:  Normal  Suicidal:  None  Homicidal:  None  Hallucinations:  None  Delusion:  None  Memory:  Intact  Orientation:  Person, Place, Time, and Situation  Reliability:  good  Insight:  Good  Judgement:  Good  Impulse Control:  Good    PHQ-9 Depression Screening  Little interest or pleasure in doing things? (Patient-Rptd) Several days   Feeling down, depressed, or hopeless? (Patient-Rptd) Several days   PHQ-2 Total Score (Patient-Rptd) 2   Trouble falling or staying asleep, or sleeping too much? (Patient-Rptd) Not at all   Feeling tired or having little energy? (Patient-Rptd) Not at all   Poor appetite or overeating? (Patient-Rptd) Not at all   Feeling bad about yourself - or that you are a failure or have let yourself or your family down? (Patient-Rptd) Not at all   Trouble concentrating on things, such as reading the newspaper or watching television? (Patient-Rptd) Several days   Moving or speaking so slowly that other people could have noticed? Or the opposite - being so fidgety or restless that you have been moving  around a lot more than usual? (Patient-Rptd) Several days   Thoughts that you would be better off dead, or of hurting yourself in some way? (Patient-Rptd) Not at all   PHQ-9 Total Score (Patient-Rptd) 4   If you checked off any problems, how difficult have these problems made it for you to do your work, take care of things at home, or get along with other people? (Patient-Rptd) Somewhat difficult         Assessment/Plan:  Diagnoses and all orders for this visit:    1. Generalized anxiety disorder (Primary)  -     hydrOXYzine pamoate (VISTARIL) 25 MG capsule; Take 1 capsule by mouth 3 (Three) Times a Day As Needed for Anxiety.  Dispense: 90 capsule; Refill: 6    2. Bipolar 1 disorder, depressed, partial remission  -     buPROPion XL (Wellbutrin XL) 150 MG 24 hr tablet; Take 1 tablet by mouth Every Morning.  Dispense: 30 tablet; Refill: 6  -     Cariprazine HCl (Vraylar) 4.5 MG capsule capsule; Take 1 capsule by mouth Daily.  Dispense: 30 capsule; Refill: 6  -     FLUoxetine (PROzac) 40 MG capsule; Take 2 capsules by mouth Daily.  Dispense: 60 capsule; Refill: 6  -     lamoTRIgine (LaMICtal) 100 MG tablet; Take 1 tablet by mouth Daily.  Dispense: 30 tablet; Refill: 6    3. Psychophysiological insomnia  -     doxepin (SINEquan) 10 MG capsule; Take 1 capsule by mouth Every Night.  Dispense: 30 capsule; Refill: 6  -     eszopiclone (Lunesta) 3 MG tablet; Take 1 tablet by mouth At Night As Needed for Sleep. Take immediately before bedtime  Dispense: 30 tablet; Refill: 2    4. Attention deficit hyperactivity disorder, combined type  -     amphetamine-dextroamphetamine XR (Adderall XR) 20 MG 24 hr capsule; Take 2 capsules by mouth Every Morning And one every afternoon  Dispense: 90 capsule; Refill: 0      Patient overall is doing well.  Feels medications are stable for anxiety and mood, ADHD and insomnia.  We will continue medication as ordered and follow-up in 3 months.      We discussed risks, benefits,goals and side  effects of the above medication and the patient was agreeable with the plan.Patient was educated on the importance of compliance with treatment and follow-up appointments. Patient is aware to contact the [facility] [phone] with any worsening of symptoms. To call for questions or concerns and return early if necessary. Patent is agreeable to go to the Emergency Department or call 911 should they begin SI/HI.     Part of this note may be an electronic transcription/translation of spoken language to printed text using the Dragon Dictation System.    Return in about 3 months (around 7/8/2025) for Follow Up 30 min, Video visit.    Nani Ball, [place]

## 2025-05-13 DIAGNOSIS — F90.2 ATTENTION DEFICIT HYPERACTIVITY DISORDER, COMBINED TYPE: Primary | ICD-10-CM

## 2025-05-13 DIAGNOSIS — F90.2 ATTENTION DEFICIT HYPERACTIVITY DISORDER, COMBINED TYPE: ICD-10-CM

## 2025-05-13 RX ORDER — DEXTROAMPHETAMINE SACCHARATE, AMPHETAMINE ASPARTATE MONOHYDRATE, DEXTROAMPHETAMINE SULFATE AND AMPHETAMINE SULFATE 5; 5; 5; 5 MG/1; MG/1; MG/1; MG/1
40 CAPSULE, EXTENDED RELEASE ORAL EVERY MORNING
Qty: 90 CAPSULE | Refills: 0 | Status: SHIPPED | OUTPATIENT
Start: 2025-05-13

## 2025-05-23 ENCOUNTER — TELEPHONE (OUTPATIENT)
Dept: PSYCHIATRY | Facility: CLINIC | Age: 40
End: 2025-05-23
Payer: OTHER MISCELLANEOUS

## 2025-06-05 ENCOUNTER — LAB (OUTPATIENT)
Dept: LAB | Facility: HOSPITAL | Age: 40
End: 2025-06-05
Payer: COMMERCIAL

## 2025-06-05 PROCEDURE — 80307 DRUG TEST PRSMV CHEM ANLYZR: CPT | Performed by: NURSE PRACTITIONER

## 2025-06-13 ENCOUNTER — OFFICE VISIT (OUTPATIENT)
Dept: INTERNAL MEDICINE | Facility: CLINIC | Age: 40
End: 2025-06-13
Payer: OTHER MISCELLANEOUS

## 2025-06-13 ENCOUNTER — LAB (OUTPATIENT)
Dept: LAB | Facility: HOSPITAL | Age: 40
End: 2025-06-13
Payer: COMMERCIAL

## 2025-06-13 VITALS
HEART RATE: 76 BPM | DIASTOLIC BLOOD PRESSURE: 74 MMHG | TEMPERATURE: 97.8 F | SYSTOLIC BLOOD PRESSURE: 114 MMHG | HEIGHT: 66 IN | BODY MASS INDEX: 41.72 KG/M2 | WEIGHT: 259.6 LBS

## 2025-06-13 DIAGNOSIS — Z11.4 ENCOUNTER FOR SCREENING FOR HIV: ICD-10-CM

## 2025-06-13 DIAGNOSIS — Z11.59 NEED FOR HEPATITIS C SCREENING TEST: ICD-10-CM

## 2025-06-13 DIAGNOSIS — Z11.3 ROUTINE SCREENING FOR STI (SEXUALLY TRANSMITTED INFECTION): ICD-10-CM
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
## 2025-06-13 DIAGNOSIS — R73.03 PREDIABETES: Chronic | ICD-10-CM

## 2025-06-13 DIAGNOSIS — Z11.59 NEED FOR HEPATITIS B SCREENING TEST: ICD-10-CM

## 2025-06-13 DIAGNOSIS — H15.102 EPISCLERITIS OF LEFT EYE: Primary | Chronic | ICD-10-CM

## 2025-06-13 DIAGNOSIS — Z98.84 BARIATRIC SURGERY STATUS: ICD-10-CM

## 2025-06-13 PROBLEM — H15.109 EPISCLERITIS: Chronic | Status: ACTIVE | Noted: 2023-04-19

## 2025-06-13 LAB
EXPIRATION DATE: NORMAL
HBA1C MFR BLD: 5.4 % (ref 4.5–5.7)
HBV SURFACE AG SERPL QL IA: NORMAL
HIV 1+2 AB+HIV1 P24 AG SERPL QL IA: NORMAL
Lab: NORMAL

## 2025-06-13 PROCEDURE — 86317 IMMUNOASSAY INFECTIOUS AGENT: CPT

## 2025-06-13 PROCEDURE — 36415 COLL VENOUS BLD VENIPUNCTURE: CPT

## 2025-06-13 PROCEDURE — G0432 EIA HIV-1/HIV-2 SCREEN: HCPCS

## 2025-06-13 PROCEDURE — 87340 HEPATITIS B SURFACE AG IA: CPT

## 2025-06-13 PROCEDURE — 86592 SYPHILIS TEST NON-TREP QUAL: CPT

## 2025-06-13 PROCEDURE — 86803 HEPATITIS C AB TEST: CPT

## 2025-06-13 RX ORDER — DEXAMETHASONE SODIUM PHOSPHATE 1 MG/ML
1 SOLUTION/ DROPS OPHTHALMIC EVERY 6 HOURS
Qty: 5 ML | Refills: 0 | Status: SHIPPED | OUTPATIENT
Start: 2025-06-13

## 2025-06-13 NOTE — PROGRESS NOTES
"Chief Complaint  Clare Turner is a 39 y.o. female presenting for needle stick follow up.     From [place]. . 3 children. 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     Patient has a past medical history of hyperlipidemia, bipolar 1 disorder w/depression and Hx of jack, ADHD, hidradenitis suppurativa (f/u derm), bariatric surgery (12/11/24 gastric sleeve with 85% subtotal gastrectomy with hiatal hernia repair), GERD, episcleritis, restless legs, moderate persistent asthma (since childhood, f/u pulm), lung nodule stable for years, history of alcohol and substance abuse, urge/stress incontinence and severe obesity.    History of Present Illness  Patient is here for follow-up and acute visit.    Over last couple of days her eye has flared up, this time the left side.  Previously had on right side.  She has appointment with the eye doctor on Monday, not able to get in sooner.  She is asking to start eyedrops.    Otherwise she is doing well with her weight loss.    She had accidental needlestick at work on 3/27/2025.  She was on Truvada for 1 months, and blood work was negative.  She is here for follow-up blood work.  Her workplace occupational health wanted her to follow-up with me.    The following portions of the patient's history were reviewed and updated as appropriate: allergies, current medications, past family history, past medical history, past social history, past surgical history, and problem list.    Image captured per patient verbal consent:          Objective  /74 (BP Location: Left arm, Patient Position: Sitting, Cuff Size: Large Adult)   Pulse 76   Temp 97.8 °F (36.6 °C) (Temporal)   Ht 168.9 cm (66.5\")   Wt 118 kg (259 lb 9.6 oz)   BMI 41.28 kg/m²     Physical Exam  Constitutional:       Appearance: Normal appearance.   HENT:      Head: Atraumatic.   Eyes:      Extraocular " Movements: Extraocular movements intact.      Conjunctiva/sclera:      Right eye: Right conjunctiva is injected.     Pulmonary:      Effort: Pulmonary effort is normal. No respiratory distress.   Musculoskeletal:      Cervical back: Neck supple.   Skin:     General: Skin is warm and dry.   Neurological:      Mental Status: She is alert and oriented to person, place, and time. Mental status is at baseline.   Psychiatric:         Behavior: Behavior normal.         Thought Content: Thought content normal.       Assessment/Plan   1. Episcleritis of left eye  Flareup of episcleritis left eye, see image.  Patient will follow-up with ophthalmology on Monday.  - dexAMETHasone (DECADRON) 0.1 % ophthalmic solution; Administer 1 drop into the left eye Every 6 (Six) Hours.  Dispense: 5 mL; Refill: 0    2. Prediabetes  Hemoglobin A1C   Date Value Ref Range Status   06/13/2025 5.4 4.5 - 5.7 % Final   01/14/2025 5.60 4.80 - 5.60 % Final   10/03/2024 6.00 (H) 4.80 - 5.60 % Final   07/18/2024 5.9 (H) 4.8 - 5.6 % Final     Comment:              Prediabetes: 5.7 - 6.4           Diabetes: >6.4           Glycemic control for adults with diabetes: <7.0     04/18/2024 5.70 (H) 4.80 - 5.60 % Final   Improving glycemic control.  Will continue to monitor.  - POC Glycosylated Hemoglobin (Hb A1C)    3. Bariatric surgery status  Wt Readings from Last 3 Encounters:   06/13/25 118 kg (259 lb 9.6 oz)   04/04/25 124 kg (273 lb 6.4 oz)   04/01/25 124 kg (272 lb 12.8 oz)   Patient continues to lose weight.  Will continue to monitor.    4. Needle stick, hypodermic, accidental, subsequent encounter  Will do follow-up in 2 months with annual physical exam and for 6-month repeat of labs.  Status post Truvada.  - HIV-1 / O / 2 Ag / Antibody; Future  - HCV Antibody Rfx To Qnt PCR; Future  - RPR Qualitative with Reflex to Quant; Future  - Hepatitis B Surface Antigen; Future  - Hepatitis B Surf Antibody Quant; Future    5. Routine screening for STI (sexually  transmitted infection)  - HCV Antibody Rfx To Qnt PCR; Future  - RPR Qualitative with Reflex to Quant; Future    6. Encounter for screening for HIV  Patient consents to testing  - HIV-1 / O / 2 Ag / Antibody; Future    7. Need for hepatitis B screening test  - Hepatitis B Surf Antibody Quant; Future    8. Need for hepatitis C screening test  - HCV Antibody Rfx To Qnt PCR; Future      Return in about 8 weeks (around 8/8/2025) for Annual physical.    Future Appointments         Provider Department Center    6/13/2025 12:00 PM LAB [facility]    6/30/2025 3:30 PM Stevenson Coker, MANOJW [facility]    7/1/2025 8:00 AM (Arrive by 7:45 AM) Cherrie Landry PA [facility] BARIATRIC SURGERY     7/8/2025 3:00 PM Nani Ball APRN [facility]    7/28/2025 1:15 PM (Arrive by 1:00 PM) Michael Miner MD [facility] INTERNAL [facility]ICINE [facility]    7/30/2025 8:00 AM (Arrive by 7:45 AM) [facility], PFT LAB 1 [facility]    7/30/2025 8:30 AM (Arrive by 8:15 AM) RAD TECH [facility]    7/30/2025 9:00 AM (Arrive by 8:45 AM) Khadra Clements APRN [facility]              Michael Miner MD  Family Medicine  06/13/2025

## 2025-06-14 LAB
HBV SURFACE AB SER-ACNC: 482 MIU/ML
HCV AB SERPL QL IA: NON REACTIVE
HCV AB SERPL QL IA: NORMAL
RPR SER QL: NORMAL

## 2025-06-17 DIAGNOSIS — F90.2 ATTENTION DEFICIT HYPERACTIVITY DISORDER, COMBINED TYPE: ICD-10-CM

## 2025-06-17 RX ORDER — DEXTROAMPHETAMINE SACCHARATE, AMPHETAMINE ASPARTATE MONOHYDRATE, DEXTROAMPHETAMINE SULFATE AND AMPHETAMINE SULFATE 5; 5; 5; 5 MG/1; MG/1; MG/1; MG/1
40 CAPSULE, EXTENDED RELEASE ORAL EVERY MORNING
Qty: 90 CAPSULE | Refills: 0 | Status: SHIPPED | OUTPATIENT
Start: 2025-06-17 | End: 2025-06-23

## 2025-06-23 ENCOUNTER — PRIOR AUTHORIZATION (OUTPATIENT)
Dept: PSYCHIATRY | Facility: CLINIC | Age: 40
End: 2025-06-23
Payer: COMMERCIAL

## 2025-06-23 DIAGNOSIS — F90.2 ATTENTION DEFICIT HYPERACTIVITY DISORDER, COMBINED TYPE: Primary | ICD-10-CM

## 2025-06-23 RX ORDER — DEXTROAMPHETAMINE SACCHARATE, AMPHETAMINE ASPARTATE MONOHYDRATE, DEXTROAMPHETAMINE SULFATE, AMPHETAMINE SULFATE 12.5; 12.5; 12.5; 12.5 MG/1; MG/1; MG/1; MG/1
50 CAPSULE, EXTENDED RELEASE ORAL EVERY MORNING
Qty: 30 CAPSULE | Refills: 0 | Status: SHIPPED | OUTPATIENT
Start: 2025-06-23

## 2025-06-23 NOTE — TELEPHONE ENCOUNTER
PA denied  denial scanned into chart------The request of 90 per 30 day(s) is more than the health plan’s limit. The limit is 30 per 30 day(s).   Per the health plan’s guideline, CP.PMN.59 Quantity Limit Override and Dose Optimization, we are   unable to approve the request for AMPHETAMINE-DEXTROAMPHET ER Capsule ER 24HR 10MG   due to unmet criteria.Please advise

## 2025-06-23 NOTE — TELEPHONE ENCOUNTER
Pt is willing to try the Mydayis again patient stated in the past with taking the Mydayis she was all over the place.

## 2025-06-24 ENCOUNTER — PRIOR AUTHORIZATION (OUTPATIENT)
Dept: PSYCHIATRY | Facility: CLINIC | Age: 40
End: 2025-06-24
Payer: COMMERCIAL

## 2025-06-30 ENCOUNTER — TELEPHONE (OUTPATIENT)
Dept: PSYCHIATRY | Facility: CLINIC | Age: 40
End: 2025-06-30

## 2025-07-03 ENCOUNTER — TELEMEDICINE (OUTPATIENT)
Dept: PSYCHIATRY | Facility: CLINIC | Age: 40
End: 2025-07-03
Payer: COMMERCIAL

## 2025-07-03 DIAGNOSIS — F31.75 BIPOLAR 1 DISORDER, DEPRESSED, PARTIAL REMISSION: Primary | ICD-10-CM

## 2025-07-03 DIAGNOSIS — F90.2 ATTENTION DEFICIT HYPERACTIVITY DISORDER, COMBINED TYPE: ICD-10-CM

## 2025-07-03 DIAGNOSIS — F41.1 GENERALIZED ANXIETY DISORDER: ICD-10-CM

## 2025-07-03 NOTE — PLAN OF CARE
Patient was engaged in reviewing treatment progress. Patient will decrease seeking situations that cause her stress by identifying triggers of stress. Patient will work to establish and maintain boundaries within her personal relationships.     Problem: Relationship Issues 10  Goal: Patient will initiate personal change to improve relationships.  Outcome: Progressing     Problem: Anxiety 4  Goal: Patient will develop and implement behavioral and cognitive strategies to reduce anxiety and irrational fears.  Outcome: Progressing

## 2025-07-03 NOTE — PROGRESS NOTES
[facility]   Follow-up Progress Note     Date: July 3, 2025  Time In: 10:02  Time Out: 11:04      PROGRESS NOTE  Data:  Clare Turner is a 39 y.o. female presenting to [facility] (through [facility]), [address], [place], [address] using a secure MyChart Video Visit through [facility] for assessment with Stevenson Coker LCSW. The patient is seen remotely in their work using [facility] My Chart. Patient is being seen via telehealth and stated they are in a secure environment for this session. The patient's condition being diagnosed/treated is appropriate for telemedicine. The provider identified herself as well as her credentials. The patient and/or patients guardian consent to be seen remotely, and when consent is given they understand that the consent allows for patient identifiable information to be sent to a third party as needed. They may refuse to be seen remotely at any time. The electronic data is encrypted and password protected, and the patient has been advised of the potential risks to privacy not withstanding such measures.    Today Patient stated she is doing well at work and with her children. Patient reported a continue struggle with her significant other. Patient stated she has also been fighting her new insurance company to cover her medications. Patient stated she had to go for a couple of weeks without her medication. 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 Patient stated they had an arrangement with their landlord that her significant other would do service calls in allan of rent. Patient stated she received an eviction notice from her landlord because her significant other had not done any calls for over a month. Patient stated she was able to get it resolved but it was upsetting his actions almost  "had the family lose their housing. Patient expressed she has been considering ending the relationship but feels guilt \"kicking him out and he has no where to go.\" Patient discussed her role of enabling her partner's behavior has caused a continuation of this cycle of behavior. Engaged patient in processing her challenge to establish and maintain boundaries in her relationship with her partner.     *patient was having audio issues and was unable to hear provider. The appointment was completed via telephone due to technical issues.     Clinical Maneuvering/Intervention:    Chief Complaint: mood instability, anxiety, ADHD    (Scales based on 0 - 10 with 10 being the worst)  Depression: 4 Anxiety: 4     Assisted Patient in processing above session content; acknowledged and normalized patient’s thoughts, feelings, and concerns.  Rationalized patient thought process regarding struggles with patient's significant other.  Discussed triggers associated with patient's anxiety.  Also discussed coping skills for patient to implement such as establishing boundaries.    Allowed Patient to freely discuss issues  without interruption or judgement with unconditional positive regard, active listening skills, and empathy. Therapist provided a safe, confidential environment to facilitate the development of a positive therapeutic relationship and encouraged open, honest communication. Assisted Patient in identifying risk factors which would indicate the need for higher level of care including thoughts to harm self or others and/or self-harming behavior and encouraged Patient to contact this office, call 911, or present to the nearest emergency room should any of these events occur. Discussed crisis intervention services and means to access. Patient adamantly and convincingly denies current suicidal or homicidal ideation or perceptual disturbance. Assisted Patient in processing session content; acknowledged and normalized Patient’s " thoughts, feelings, and concerns by utilizing a person-centered approach in efforts to build appropriate rapport and a positive therapeutic relationship with open and honest communication. Therapist utilized dialectical behavior techniques to teach and model emotional regulation and relaxation methods. Therapist assisted Patient with identifying and implementing healthier coping strategies.     Assessment   Patient appears to be experiencing heightened anxiety and depression in response to COVID-19 epidemic  As a result, they can be reasonably expected to continue to benefit from treatment and would likely be at increased risk for decompensation otherwise.    Mental Status Exam:   Hygiene:   good  Cooperation:  Cooperative  Eye Contact:  Good  Psychomotor Behavior:  Appropriate  Affect:  Full range  Mood: positive  Speech:  Normal  Thought Process:  Goal directed  Thought Content:  Mood congruent  Suicidal:  None  Homicidal:  None  Hallucinations:  None  Delusion:  None  Memory:  Intact  Orientation:  Person, Place, Time, and Situation  Reliability:  good  Insight:  Good  Judgement:  Good  Impulse Control:  Good  Physical/Medical Issues:  No      PHQ-Score Total:  PHQ-9 Total Score:        Patient's Support Network Includes:  significant other, children, mother, and extended family, and friends    Functional Status: Moderate impairment     Progress toward goal: Not at goal    Prognosis: Good with Ongoing Treatment            Impression/Formulation:    VISIT DIAGNOSIS: No diagnosis found.     Patient appeared alert and oriented.  Patient is voluntarily requesting to continue outpatient therapy at [facility].  Patient is receptive to assistance with maintaining a stable lifestyle.  Patient presents with history of mood instability, anxiety, and ADHD.  Patient is agreeable to attend routine therapy sessions.  Patient expressed desire to maintain stability and participate in the  therapeutic process.        Crisis Plan:  Symptoms and/or behaviors to indicate a crisis: Isolation and Increased hunger or lack of appetite    What calming techniques or other strategies will patient use to de-esclate and stay safe: slow down, breathe, visualize calming self, think it though, listen to music, change focus, take a walk    Who is one person patient can contact to assist with de-escalation? sister    If symptoms/behaviors persist, Patient will present to the nearest hospital for an assessment. Advised patient of [facility] ER and assessment services.     Plan:   Patient will continue in individual outpatient therapy with focus on improved functioning and coping skills, maintaining stability, and avoiding decompensation and the need for higher level of care.    Patient will contact this office ([facility] at [phone]), call 911 or present to the nearest emergency room should suicidal or homicidal ideations occur. Provide Cognitive Behavioral Therapy and Solution Focused Therapy to improve functioning, maintain stability, and avoid decompensation and the need for higher level of care.     Return in about 6 weeks, or earlier if symptoms worsen or fail to improve.    Recommended Referrals: none        This document has been electronically signed by Stevenson Coker LCSW  July 3, 2025 10:02 EDT        Part of this note may be an electronic transcription/translation of spoken language to printed text using the Dragon Dictation System.      Mode of Visit: Video  Location of patient: -WORK-  Location of provider: +HOME+  You have chosen to receive care through a telehealth visit.  The patient has signed the video visit consent form.  The visit included audio and video interaction. No technical issues occurred during this visit.

## 2025-07-03 NOTE — TREATMENT PLAN
Multi-Disciplinary Problems (from Behavioral Health Treatment Plan)      Active Problems       Problem: Anxiety  Start Date: 07/03/25      Problem Details: The patient self-scales this problem as a 4 with 10 being the worst. Patient will learn to decrease seeking situations that cause patient's stress by identifying patient's triggers for stress.           Goal Priority Start Date Expected End Date End Date    Patient will develop and implement behavioral and cognitive strategies to reduce anxiety and irrational fears. -- 07/03/25 01/01/26 --    Goal Details: Progress toward goal:  The patient self-scales their progress related to this goal as a 4 with 10 being the worst.        Goal Intervention Frequency Start Date End Date    Help patient explore past emotional issues in relation to present anxiety. PRN 07/03/25 --    Intervention Details: Duration of treatment until discharged.        Goal Intervention Frequency Start Date End Date    Help patient develop an awareness of their cognitive and physical responses to anxiety. PRN 07/03/25 --    Intervention Details: Duration of treatment until discharged.                Problem: Relationship Issues  Start Date: 07/03/25      Problem Details: The patient self-scales this problem as a 10 with 10 being the worst. Patient will establishing and maintain boundaries within her personal relationships.           Goal Priority Start Date Expected End Date End Date    Patient will initiate personal change to improve relationships. -- 07/03/25 01/01/26 --    Goal Details: Progress toward goal:  Not appropriate to rate progress toward goal since this is the initial treatment plan.        Goal Intervention Frequency Start Date End Date    Assist patient in identifying behaviors that focus on relationship building and process the changes needed to improve relationships. PRN 07/03/25 --    Intervention Details: Duration of treatment until discharged.                        Reviewed  By       Stevenson Coker, [facility] 07/03/25 1058                     I have discussed and reviewed this treatment plan with the patient.

## 2025-07-08 ENCOUNTER — TELEMEDICINE (OUTPATIENT)
Dept: PSYCHIATRY | Facility: CLINIC | Age: 40
End: 2025-07-08
Payer: COMMERCIAL

## 2025-07-08 DIAGNOSIS — F31.75 BIPOLAR 1 DISORDER, DEPRESSED, PARTIAL REMISSION: Primary | ICD-10-CM

## 2025-07-08 DIAGNOSIS — F90.2 ATTENTION DEFICIT HYPERACTIVITY DISORDER, COMBINED TYPE: ICD-10-CM

## 2025-07-08 DIAGNOSIS — F51.04 PSYCHOPHYSIOLOGICAL INSOMNIA: ICD-10-CM

## 2025-07-08 DIAGNOSIS — F41.1 GENERALIZED ANXIETY DISORDER: ICD-10-CM

## 2025-07-08 PROCEDURE — 99214 OFFICE O/P EST MOD 30 MIN: CPT | Performed by: NURSE PRACTITIONER

## 2025-07-08 RX ORDER — DEXTROAMPHETAMINE SACCHARATE, AMPHETAMINE ASPARTATE MONOHYDRATE, DEXTROAMPHETAMINE SULFATE AND AMPHETAMINE SULFATE 5; 5; 5; 5 MG/1; MG/1; MG/1; MG/1
CAPSULE, EXTENDED RELEASE ORAL
Qty: 90 CAPSULE | Refills: 0 | Status: SHIPPED | OUTPATIENT
Start: 2025-07-08

## 2025-07-08 RX ORDER — ESZOPICLONE 3 MG/1
3 TABLET, FILM COATED ORAL NIGHTLY PRN
Qty: 30 TABLET | Refills: 2 | Status: SHIPPED | OUTPATIENT
Start: 2025-07-08 | End: 2026-07-08

## 2025-07-08 NOTE — PROGRESS NOTES
"Patient Name: Clare Turner  MRN: [id number]   :  1985     This provider is located at her home office through the [facility] (through [facility]), [address], [address] using a secure elastic[employer]iohart Video Visit through [employer]. Patient is being seen remotely via telehealth in [place], and stated they are in a secure environment for this session. The patient's condition being diagnosed/treated is appropriate for telemedicine. The provider identified herself as well as her credentials.   The patient, and/or patients guardian, consent to be seen remotely, and when consent is given they understand that the consent allows for patient identifiable information to be sent to a third party as needed.   They may refuse to be seen remotely at any time. The electronic data is encrypted and password protected, and the patient and/or guardian has been advised of the potential risks to privacy not withstanding such measures.    You have chosen to receive care through a telehealth visit.  Do you consent to use a video/audio connection for your medical care today? Yes    Chief Complaint:      ICD-10-CM ICD-9-CM   1. Bipolar 1 disorder, depressed, partial remission  F31.75 296.55   2. Generalized anxiety disorder  F41.1 300.02   3. Attention deficit hyperactivity disorder, combined type  F90.2 314.01   4. Psychophysiological insomnia  F51.04 307.42       History of Present Illness  The patient presents for medication management follow-up via telehealth.    She reports an improvement in her condition this week compared to the previous ones. However, she has been dealing with significant stress at home, which has negatively impacted her well-being. She acknowledges her own shortcomings in maintaining healthy boundaries with her partner. She suspects that he may have relapsed into substance use, which is causing additional strain on her mental health. She states, \"I feel " "like he has relapsed on it,\" referring to marijuana use. This situation is affecting both her and her child, prompting her to consider setting firm boundaries and having a serious discussion with him about his behavior. She also mentions financial difficulties, stating, \"financial strain can really just drag you through the med.\" She has been proactive in managing her finances, including opening a separate bank account to ensure her financial security and reduce anxiety.    Social History:  - Significant stress at home  - Financial difficulties  - Proactive in managing finances    Substance Use:  - Suspects partner has relapsed into marijuana use    Results:  - Last consultation was in 04/2025  - Currently taking Adderall extended release 40 mg in the morning and 20 mg in the afternoon  - Had to discontinue ADHD medication for nearly 2 weeks due to financial constraints    The following portions of the patient's history were reviewed and updated as appropriate: allergies, current medications, past family history, past medical history, past social history, past surgical history, and problem list.    Review of Systems;;  Review of Systems   Constitutional:  Negative for activity change, appetite change, fatigue, unexpected weight gain and unexpected weight loss.   Respiratory:  Negative for shortness of breath and wheezing.    Gastrointestinal:  Negative for constipation, diarrhea, nausea and vomiting.   Musculoskeletal:  Negative for gait problem.   Skin:  Negative for dry skin and rash.   Neurological:  Negative for dizziness, speech difficulty, weakness, light-headedness, headache, memory problem and confusion.   Psychiatric/Behavioral:  Negative for agitation, behavioral problems, decreased concentration, dysphoric mood, hallucinations, self-injury, sleep disturbance, suicidal ideas, negative for hyperactivity, depressed mood and stress. The patient is not nervous/anxious.        Physical Exam;;  Physical " Exam  Constitutional:       General: She is not in acute distress.     Appearance: She is well-developed. She is not diaphoretic.   HENT:      Head: Normocephalic and atraumatic.   Eyes:      Conjunctiva/sclera: Conjunctivae normal.   Pulmonary:      Effort: Pulmonary effort is normal. No respiratory distress.   Musculoskeletal:         General: Normal range of motion.      Cervical back: Full passive range of motion without pain and normal range of motion.   Neurological:      Mental Status: She is alert and oriented to person, place, and time.   Psychiatric:         Mood and Affect: Mood is not anxious or depressed. Affect is not labile, blunt, angry or inappropriate.         Speech: Speech is not rapid and pressured or tangential.         Behavior: Behavior normal. Behavior is not agitated, slowed, aggressive, withdrawn, hyperactive or combative. Behavior is cooperative.         Thought Content: Thought content normal. Thought content is not paranoid or delusional. Thought content does not include homicidal or suicidal ideation. Thought content does not include homicidal or suicidal plan.         Judgment: Judgment normal.       not currently breastfeeding.  There is no height or weight on file to calculate BMI.    Current Medications;;    Current Outpatient Medications:     albuterol (PROVENTIL) (2.5 MG/3ML) 0.083% nebulizer solution, Take 2.5 mg by nebulization 4 (Four) Times a Day As Needed for Wheezing., Disp: 120 each, Rfl: 5    albuterol sulfate  (90 Base) MCG/ACT inhaler, Inhale 2 puffs 2 (Two) Times a Day., Disp: 18 g, Rfl: 5    Amphet-Dextroamphet 3-Bead ER (Mydayis) 50 MG capsule sustained-release 24 hr, Take 1 capsule by mouth Every Morning., Disp: 30 capsule, Rfl: 0    budesonide (Pulmicort) 0.5 MG/2ML nebulizer solution, Take 2 mL by nebulization 2 (Two) Times a Day. (Patient taking differently: Take 2 mL by nebulization 2 (Two) Times a Day As Needed.), Disp: 120 each, Rfl: 11    buPROPion XL  (Wellbutrin XL) 150 MG 24 hr tablet, Take 1 tablet by mouth Every Morning., Disp: 30 tablet, Rfl: 6    Calcium Carbonate-Vit D-Min (Calcium 1200) 1679-5176 MG-UNIT chewable tablet, Chew 1,200 mg Daily., Disp: 90 each, Rfl: 2    Cariprazine HCl (Vraylar) 4.5 MG capsule capsule, Take 1 capsule by mouth Daily., Disp: 30 capsule, Rfl: 6    Cyanocobalamin (Vitamin B-12) 1000 MCG sublingual tablet, Place 1 tablet under the tongue Daily., Disp: 90 each, Rfl: 2    dexAMETHasone (DECADRON) 0.1 % ophthalmic solution, Administer 1 drop into the left eye Every 6 (Six) Hours., Disp: 5 mL, Rfl: 0    doxepin (SINEquan) 10 MG capsule, Take 1 capsule by mouth Every Night., Disp: 30 capsule, Rfl: 6    EPINEPHrine (EPIPEN) 0.3 MG/0.3ML solution auto-injector injection, Inject 1 pen in the muscle as directed PRN allergic reaction, Disp: 2 each, Rfl: 1    eszopiclone (Lunesta) 3 MG tablet, Take 1 tablet by mouth At Night As Needed for Sleep. Take immediately before bedtime, Disp: 30 tablet, Rfl: 2    FLUoxetine (PROzac) 40 MG capsule, Take 2 capsules by mouth Daily., Disp: 60 capsule, Rfl: 6    Fluticasone Furoate-Vilanterol (BREO ELLIPTA) 200-25 MCG/ACT inhaler, Inhale 1 puff Daily. 1 inhalation once a day, Disp: 60 each, Rfl: 5    hydrOXYzine pamoate (VISTARIL) 25 MG capsule, Take 1 capsule by mouth 3 (Three) Times a Day As Needed for Anxiety., Disp: 90 capsule, Rfl: 6    Iron-Vitamin C (Vitron-C)  MG tablet, Take 1 tablet by mouth Daily., Disp: 90 tablet, Rfl: 2    lamoTRIgine (LaMICtal) 100 MG tablet, Take 1 tablet by mouth Daily., Disp: 30 tablet, Rfl: 6    multivitamin tablet tablet, Take 1 tablet by mouth Daily., Disp: 90 tablet, Rfl: 2    omeprazole (priLOSEC) 40 MG capsule, Take 1 capsule by mouth Daily., Disp: 90 capsule, Rfl: 0    ondansetron ODT (ZOFRAN-ODT) 4 MG disintegrating tablet, Place 1 tablet on the tongue Every 8 (Eight) Hours As Needed for Nausea or Vomiting., Disp: 30 tablet, Rfl: 0    spironolactone  (ALDACTONE) 25 MG tablet, Take 1 tablet by mouth Daily., Disp: , Rfl:     thiamine (VITAMIN B-1) 100 MG tablet, Take 1 tablet by mouth Daily., Disp: 30 tablet, Rfl: 2    vitamin D3 125 MCG (5000 UT) capsule capsule, Take 1 capsule by mouth Daily., Disp: 90 capsule, Rfl: 2    Lab Results:   Lab on 06/13/2025   Component Date Value Ref Range Status    HIV DUO 06/13/2025 Non-Reactive  Non-Reactive Final    Hepatitis C Ab 06/13/2025 Non Reactive  Non Reactive Final    RPR 06/13/2025 Non-Reactive  Non-Reactive Final    Hepatitis B Surface Ag 06/13/2025 Non-Reactive  Non-Reactive Final    Hepatitis B Surface Ab Quant 06/13/2025 482.0  Immunity>10 mIU/mL Final      Status of Immunity                     Anti-HBs Level    ------------------                     --------------  Inconsistent with Immunity                  0.0 - 10.0  Consistent with Immunity                         >10.0    Interpretation 06/13/2025 Comment   Final    Not infected with HCV unless early or acute infection is  suspected (which may be delayed in an immunocompromised  individual), or other evidence exists to indicate HCV infection.   Office Visit on 06/13/2025   Component Date Value Ref Range Status    Hemoglobin A1C 06/13/2025 5.4  4.5 - 5.7 % Final    Lot Number 06/13/2025 [id number]   Final    Expiration Date 06/13/2025 02-   Final   Results Encounter on 05/13/2025   Component Date Value Ref Range Status    THC, Screen, Urine 06/05/2025 Negative  Negative Final    Phencyclidine (PCP), Urine 06/05/2025 Negative  Negative Final    Cocaine Screen, Urine 06/05/2025 Negative  Negative Final    Methamphetamine, Ur 06/05/2025 Negative  Negative Final    Opiate Screen 06/05/2025 Negative  Negative Final    Amphetamine Screen, Urine 06/05/2025 Positive (A)  Negative Final    Benzodiazepine Screen, Urine 06/05/2025 Negative  Negative Final    Tricyclic Antidepressants Screen 06/05/2025 Negative  Negative Final    Methadone Screen, Urine 06/05/2025  Negative  Negative Final    Barbiturates Screen, Urine 06/05/2025 Negative  Negative Final    Oxycodone Screen, Urine 06/05/2025 Negative  Negative Final    Buprenorphine, Screen, Urine 06/05/2025 Negative  Negative Final    Fentanyl, Urine 06/05/2025 Negative  Negative Final       Mental Status Exam:   Hygiene:   good  Cooperation:  Cooperative  Eye Contact:  Good  Psychomotor Behavior:  Appropriate  Mood:anxious and sad  Affect:  Appropriate  Hopelessness: Denies  Speech:  Normal  Thought Process:  Goal directed  Thought Content:  Normal  Suicidal:  None  Homicidal:  None  Hallucinations:  None  Delusion:  None  Memory:  Intact  Orientation:  Person, Place, Time, and Situation  Reliability:  good  Insight:  Good  Judgement:  Good  Impulse Control:  Good            PHQ-9 Depression Screening  Little interest or pleasure in doing things? (Patient-Rptd) Several days   Feeling down, depressed, or hopeless? (Patient-Rptd) Several days   PHQ-2 Total Score (Patient-Rptd) 2   Trouble falling or staying asleep, or sleeping too much? (Patient-Rptd) Several days   Feeling tired or having little energy? (Patient-Rptd) Several days   Poor appetite or overeating? (Patient-Rptd) Not at all   Feeling bad about yourself - or that you are a failure or have let yourself or your family down? (Patient-Rptd) Several days   Trouble concentrating on things, such as reading the newspaper or watching television? (Patient-Rptd) Several days   Moving or speaking so slowly that other people could have noticed? Or the opposite - being so fidgety or restless that you have been moving around a lot more than usual? (Patient-Rptd) Several days   Thoughts that you would be better off dead, or of hurting yourself in some way? (Patient-Rptd) Not at all   PHQ-9 Total Score (Patient-Rptd) 7   If you checked off any problems, how difficult have these problems made it for you to do your work, take care of things at home, or get along with other people?  (Patient-Rptd) Somewhat difficult     NEXT UDS DUE: 6/5/26     Diagnoses and all orders for this visit:    1. Bipolar 1 disorder, depressed, partial remission (Primary)    2. Generalized anxiety disorder    3. Attention deficit hyperactivity disorder, combined type  -     amphetamine-dextroamphetamine XR (Adderall XR) 20 MG 24 hr capsule; Two po q am and one po q afternoon  Dispense: 90 capsule; Refill: 0    4. Psychophysiological insomnia  -     eszopiclone (Lunesta) 3 MG tablet; Take 1 tablet by mouth At Night As Needed for Sleep. Take immediately before bedtime  Dispense: 30 tablet; Refill: 2         Assessment & Plan  Problems:  - ADHD  - Financial strain related to medication costs  - [place] issues in personal relationships    Content of Therapy:  - Discussed significant stress and conflict at home, particularly related to the patient's partner's suspected relapse and its impact on her and her child.  - Explored feelings of guilt and responsibility, and the importance of setting and maintaining boundaries.  - Addressed the financial strain caused by insurance issues and the high cost of medications.  - Discussed the patient's proactive steps to manage financial stress and ensure personal safety.    Clinical Impression:  The patient appears to be experiencing significant stress due to personal and financial issues. Despite these challenges, she is demonstrating resilience and a commitment to maintaining her mental health and sobriety. She has shown insight into the importance of setting boundaries and is actively working on implementing them. The patient reported that her medications are effective when she can access them, but she faced a period without her ADHD medication due to insurance issues, which was challenging. Overall, she is managing her symptoms well with the current medication regimen.    Therapeutic Intervention:  - Reframing thoughts around guilt and responsibility in her relationship.  -  Encouraging the establishment and maintenance of healthy boundaries.  - Providing support and validation for her proactive financial management strategies.    Plan:  - Continue current medication regimen: Adderall extended release 40 mg in the morning and 20 mg in the afternoon, Wellbutrin, Vraylar, Doxepin, Lunesta, Prozac, and Lamictal.  - Send prescriptions for Adderall and Lunesta to the pharmacy.  - Patient to call and schedule an appointment if any issues arise.    Follow-up:  - Next appointment scheduled for 10/08/2025 at 3:00 PM.    Notes & Risk Factors:  - Financial strain due to medication costs.  - Potential risk of relapse in the patient's partner, impacting the patient's mental health and stability.  - Protective factors include the patient's insight, proactive financial management, and commitment to maintaining boundaries.      We discussed risks, benefits,goals and side effects of the above medication and the patient was agreeable with the plan.Patient was educated on the importance of compliance with treatment and follow-up appointments. Patient is aware to contact the [facility] [phone] with any worsening of symptoms. To call for questions or concerns and return early if necessary. Patent is agreeable to go to the Emergency Department or call 911 should they begin SI/HI.     Patient or patient representative verbalized consent for the use of Ambient Listening during the visit with  DALLAS Altamirano for chart documentation. 7/8/2025  15:22 EDT    Part of this note may be an electronic transcription/translation of spoken language to printed text using the Dragon Dictation System.        DALLAS Hoover

## 2025-08-01 DIAGNOSIS — F90.2 ATTENTION DEFICIT HYPERACTIVITY DISORDER, COMBINED TYPE: ICD-10-CM

## 2025-08-04 RX ORDER — DEXTROAMPHETAMINE SACCHARATE, AMPHETAMINE ASPARTATE MONOHYDRATE, DEXTROAMPHETAMINE SULFATE AND AMPHETAMINE SULFATE 5; 5; 5; 5 MG/1; MG/1; MG/1; MG/1
CAPSULE, EXTENDED RELEASE ORAL
Qty: 90 CAPSULE | Refills: 0 | Status: SHIPPED | OUTPATIENT
Start: 2025-08-04

## (undated) DEVICE — TROCAR: Brand: KII FIOS FIRST ENTRY

## (undated) DEVICE — APL DUPLOSPRAYER MIS 40CM

## (undated) DEVICE — GLOVE,SURG,SENSICARE,ALOE,LF,PF,9: Brand: MEDLINE

## (undated) DEVICE — ANTIBACTERIAL UNDYED BRAIDED (POLYGLACTIN 910), SYNTHETIC ABSORBABLE SUTURE: Brand: COATED VICRYL

## (undated) DEVICE — ENSEAL LAPAROSCOPIC TISSUE SEALER G2 ARTICULATING CURVED JAW FOR USE WITH G2 GENERATOR 5MM DIAMETER 45CM SHAFT LENGTH: Brand: ENSEAL

## (undated) DEVICE — BALN BOUGIE STD/TPR 38F

## (undated) DEVICE — GOWN, ORBIS, XXLARGE, STERILE: Brand: MEDLINE

## (undated) DEVICE — ST TBG PNEUMOCLEAR EVAC SMOKE HIFLO

## (undated) DEVICE — ENDOPATH XCEL BLADELESS TROCARS WITH STABILITY SLEEVES: Brand: ENDOPATH XCEL

## (undated) DEVICE — SEALANT WND FIBRIN TISSEEL VAPOR/HEAT/PREFIL/SYR 10ML

## (undated) DEVICE — SLV SCD CALF HEMOFORCE DVT THERP REPROC MD

## (undated) DEVICE — GLV SURG SENSICARE W/ALOE PF LF 8.5 STRL

## (undated) DEVICE — LAPAROSCOPIC DISSECTOR: Brand: DEROYAL

## (undated) DEVICE — SYR LL BD 10CC

## (undated) DEVICE — NEEDLE, QUINCKE, 22GX3.5": Brand: MEDLINE

## (undated) DEVICE — GOWN,PREVENTION PLUS,L,ST,24/CS: Brand: MEDLINE

## (undated) DEVICE — MARKR SKIN W/RULR

## (undated) DEVICE — MARKR PEN SURG VISIMARK GENTIAN VIOLET

## (undated) DEVICE — BARIATRIC KIT: Brand: MEDLINE INDUSTRIES, INC.

## (undated) DEVICE — SOL NACL 0.9PCT 1000ML

## (undated) DEVICE — DRN PENRS 1/2X36IN

## (undated) DEVICE — PATIENT RETURN ELECTRODE, SINGLE-USE, CONTACT QUALITY MONITORING, ADULT, WITH 9FT CORD, FOR PATIENTS WEIGING OVER 33LBS. (15KG): Brand: MEGADYNE

## (undated) DEVICE — TROC STANDARDTROCAR FOR/TITANSGS 19MM DISP STRL

## (undated) DEVICE — NDL HYPO ECLPS SFTY 22G 1 1/2IN

## (undated) DEVICE — SHT AIR TRANSFR COMFRT GLIDE LAT 40X80IN